# Patient Record
Sex: FEMALE | Race: WHITE | NOT HISPANIC OR LATINO | Employment: FULL TIME | URBAN - METROPOLITAN AREA
[De-identification: names, ages, dates, MRNs, and addresses within clinical notes are randomized per-mention and may not be internally consistent; named-entity substitution may affect disease eponyms.]

---

## 2020-05-25 ENCOUNTER — APPOINTMENT (EMERGENCY)
Dept: RADIOLOGY | Facility: HOSPITAL | Age: 55
End: 2020-05-25
Payer: COMMERCIAL

## 2020-05-25 ENCOUNTER — HOSPITAL ENCOUNTER (EMERGENCY)
Facility: HOSPITAL | Age: 55
Discharge: HOME | End: 2020-05-25
Attending: EMERGENCY MEDICINE | Admitting: EMERGENCY MEDICINE
Payer: COMMERCIAL

## 2020-05-25 VITALS
DIASTOLIC BLOOD PRESSURE: 92 MMHG | WEIGHT: 151.46 LBS | TEMPERATURE: 98.8 F | RESPIRATION RATE: 20 BRPM | SYSTOLIC BLOOD PRESSURE: 159 MMHG | OXYGEN SATURATION: 96 % | HEART RATE: 106 BPM

## 2020-05-25 DIAGNOSIS — R06.00 DYSPNEA: Primary | ICD-10-CM

## 2020-05-25 LAB — SARS-COV-2 RNA RESP QL NAA+PROBE: NEGATIVE

## 2020-05-25 PROCEDURE — 99282 EMERGENCY DEPT VISIT SF MDM: CPT | Performed by: EMERGENCY MEDICINE

## 2020-05-25 PROCEDURE — 71045 X-RAY EXAM CHEST 1 VIEW: CPT

## 2020-05-25 PROCEDURE — 99285 EMERGENCY DEPT VISIT HI MDM: CPT

## 2020-05-25 PROCEDURE — 87635 SARS-COV-2 COVID-19 AMP PRB: CPT | Performed by: EMERGENCY MEDICINE

## 2020-05-25 RX ORDER — LEVOTHYROXINE SODIUM 175 UG/1
1 TABLET ORAL DAILY
COMMUNITY
Start: 2012-01-16 | End: 2020-06-19 | Stop reason: HOSPADM

## 2020-05-27 ENCOUNTER — APPOINTMENT (EMERGENCY)
Dept: RADIOLOGY | Facility: HOSPITAL | Age: 55
End: 2020-05-27
Payer: COMMERCIAL

## 2020-05-27 ENCOUNTER — HOSPITAL ENCOUNTER (EMERGENCY)
Facility: HOSPITAL | Age: 55
Discharge: HOME | End: 2020-05-27
Attending: EMERGENCY MEDICINE | Admitting: EMERGENCY MEDICINE
Payer: COMMERCIAL

## 2020-05-27 VITALS
SYSTOLIC BLOOD PRESSURE: 149 MMHG | OXYGEN SATURATION: 97 % | HEART RATE: 109 BPM | TEMPERATURE: 98.5 F | DIASTOLIC BLOOD PRESSURE: 82 MMHG | RESPIRATION RATE: 19 BRPM | WEIGHT: 149.25 LBS

## 2020-05-27 DIAGNOSIS — S52.502A DISTAL RADIUS FRACTURE, LEFT: Primary | ICD-10-CM

## 2020-05-27 PROCEDURE — 73110 X-RAY EXAM OF WRIST: CPT

## 2020-05-27 PROCEDURE — 99283 EMERGENCY DEPT VISIT LOW MDM: CPT

## 2020-05-27 PROCEDURE — 29125 APPL SHORT ARM SPLINT STATIC: CPT | Performed by: EMERGENCY MEDICINE

## 2020-05-27 PROCEDURE — 99285 EMERGENCY DEPT VISIT HI MDM: CPT | Performed by: EMERGENCY MEDICINE

## 2020-05-27 RX ORDER — OXYCODONE HYDROCHLORIDE AND ACETAMINOPHEN 5; 325 MG/1; MG/1
1 TABLET ORAL EVERY 6 HOURS PRN
Qty: 10 TABLET | Refills: 0 | Status: SHIPPED | OUTPATIENT
Start: 2020-05-27 | End: 2020-06-03 | Stop reason: HOSPADM

## 2020-05-27 RX ORDER — OXYCODONE HYDROCHLORIDE AND ACETAMINOPHEN 5; 325 MG/1; MG/1
1 TABLET ORAL ONCE
Status: COMPLETED | OUTPATIENT
Start: 2020-05-27 | End: 2020-05-27

## 2020-05-27 RX ORDER — OXYCODONE HYDROCHLORIDE AND ACETAMINOPHEN 5; 325 MG/1; MG/1
1 TABLET ORAL EVERY 6 HOURS PRN
Qty: 10 TABLET | Refills: 0 | Status: SHIPPED | OUTPATIENT
Start: 2020-05-27 | End: 2020-05-27 | Stop reason: SDUPTHER

## 2020-05-27 RX ADMIN — OXYCODONE HYDROCHLORIDE AND ACETAMINOPHEN 1 TABLET: 5; 325 TABLET ORAL at 12:45

## 2020-05-28 ENCOUNTER — OFFICE VISIT (OUTPATIENT)
Dept: OBGYN CLINIC | Facility: MEDICAL CENTER | Age: 55
End: 2020-05-28
Payer: COMMERCIAL

## 2020-05-28 VITALS
SYSTOLIC BLOOD PRESSURE: 134 MMHG | HEIGHT: 65 IN | BODY MASS INDEX: 24.66 KG/M2 | WEIGHT: 148 LBS | DIASTOLIC BLOOD PRESSURE: 85 MMHG | TEMPERATURE: 99.2 F | HEART RATE: 100 BPM

## 2020-05-28 DIAGNOSIS — Z11.59 SCREENING FOR VIRAL DISEASE: ICD-10-CM

## 2020-05-28 DIAGNOSIS — S52.602A CLOSED FRACTURE DISTAL RADIUS AND ULNA, LEFT, INITIAL ENCOUNTER: ICD-10-CM

## 2020-05-28 DIAGNOSIS — S52.502A CLOSED FRACTURE DISTAL RADIUS AND ULNA, LEFT, INITIAL ENCOUNTER: ICD-10-CM

## 2020-05-28 DIAGNOSIS — Z11.59 SCREENING FOR VIRAL DISEASE: Primary | ICD-10-CM

## 2020-05-28 PROCEDURE — 99204 OFFICE O/P NEW MOD 45 MIN: CPT | Performed by: ORTHOPAEDIC SURGERY

## 2020-05-28 PROCEDURE — U0003 INFECTIOUS AGENT DETECTION BY NUCLEIC ACID (DNA OR RNA); SEVERE ACUTE RESPIRATORY SYNDROME CORONAVIRUS 2 (SARS-COV-2) (CORONAVIRUS DISEASE [COVID-19]), AMPLIFIED PROBE TECHNIQUE, MAKING USE OF HIGH THROUGHPUT TECHNOLOGIES AS DESCRIBED BY CMS-2020-01-R: HCPCS

## 2020-05-28 PROCEDURE — 29125 APPL SHORT ARM SPLINT STATIC: CPT | Performed by: ORTHOPAEDIC SURGERY

## 2020-05-28 RX ORDER — CEFAZOLIN SODIUM 2 G/50ML
2000 SOLUTION INTRAVENOUS ONCE
Status: CANCELLED | OUTPATIENT
Start: 2020-06-03 | End: 2020-05-28

## 2020-05-30 LAB — SARS-COV-2 RNA SPEC QL NAA+PROBE: NOT DETECTED

## 2020-06-02 ENCOUNTER — ANESTHESIA EVENT (OUTPATIENT)
Dept: PERIOP | Facility: HOSPITAL | Age: 55
End: 2020-06-02
Payer: COMMERCIAL

## 2020-06-03 ENCOUNTER — ANESTHESIA (OUTPATIENT)
Dept: PERIOP | Facility: HOSPITAL | Age: 55
End: 2020-06-03
Payer: COMMERCIAL

## 2020-06-03 ENCOUNTER — HOSPITAL ENCOUNTER (OUTPATIENT)
Facility: HOSPITAL | Age: 55
Setting detail: OUTPATIENT SURGERY
Discharge: HOME | End: 2020-06-03
Attending: ORTHOPAEDIC SURGERY | Admitting: ORTHOPAEDIC SURGERY
Payer: COMMERCIAL

## 2020-06-03 ENCOUNTER — APPOINTMENT (OUTPATIENT)
Dept: RADIOLOGY | Facility: HOSPITAL | Age: 55
End: 2020-06-03
Payer: COMMERCIAL

## 2020-06-03 VITALS
HEART RATE: 92 BPM | DIASTOLIC BLOOD PRESSURE: 82 MMHG | SYSTOLIC BLOOD PRESSURE: 142 MMHG | TEMPERATURE: 97.4 F | RESPIRATION RATE: 16 BRPM | OXYGEN SATURATION: 91 %

## 2020-06-03 DIAGNOSIS — S52.592A OTHER CLOSED FRACTURE OF DISTAL END OF LEFT RADIUS, INITIAL ENCOUNTER: Primary | ICD-10-CM

## 2020-06-03 PROCEDURE — C1713 ANCHOR/SCREW BN/BN,TIS/BN: HCPCS | Performed by: ORTHOPAEDIC SURGERY

## 2020-06-03 PROCEDURE — 73100 X-RAY EXAM OF WRIST: CPT

## 2020-06-03 PROCEDURE — 73100 X-RAY EXAM OF WRIST: CPT | Performed by: ORTHOPAEDIC SURGERY

## 2020-06-03 PROCEDURE — 25607 OPTX DST RD XARTC FX/EPI SEP: CPT | Performed by: ORTHOPAEDIC SURGERY

## 2020-06-03 DEVICE — SCREW CORT 2.5 X 13MM HEXADRIVE 7: Type: IMPLANTABLE DEVICE | Site: WRIST | Status: FUNCTIONAL

## 2020-06-03 DEVICE — 2.5 TRILOCK SCREW 18MM, HD7, 1/PKG
Type: IMPLANTABLE DEVICE | Site: WRIST | Status: FUNCTIONAL
Brand: APTUS

## 2020-06-03 DEVICE — 2.5 TRILOCK SCREW 20MM, HD7, 1/PKG
Type: IMPLANTABLE DEVICE | Site: WRIST | Status: FUNCTIONAL
Brand: APTUS

## 2020-06-03 DEVICE — 2.5 CORTICAL SCREW 16MM, HD7, 5/PKG
Type: IMPLANTABLE DEVICE | Site: WRIST | Status: FUNCTIONAL
Brand: APTUS

## 2020-06-03 DEVICE — 2.5 TRILOCK SCREW 14MM, HD7, 1/PKG
Type: IMPLANTABLE DEVICE | Site: WRIST | Status: FUNCTIONAL
Brand: APTUS

## 2020-06-03 DEVICE — 2.5 TRILOCK SCREW 22MM, HD7, 1/PKG
Type: IMPLANTABLE DEVICE | Site: WRIST | Status: FUNCTIONAL
Brand: APTUS

## 2020-06-03 DEVICE — 2.5 CORTICAL SCREW 14MM, HD7, 5/PKG
Type: IMPLANTABLE DEVICE | Site: WRIST | Status: FUNCTIONAL
Brand: APTUS

## 2020-06-03 DEVICE — 2.5 TRILOCK DIST.RAD.FRACT.PL VOLAR LEFT
Type: IMPLANTABLE DEVICE | Site: WRIST | Status: FUNCTIONAL
Brand: APTUS

## 2020-06-03 DEVICE — 2.5 TRILOCK SCREW 16MM, HD7, 1/PKG
Type: IMPLANTABLE DEVICE | Site: WRIST | Status: FUNCTIONAL
Brand: APTUS

## 2020-06-03 RX ORDER — ONDANSETRON 2 MG/ML
4 INJECTION INTRAMUSCULAR; INTRAVENOUS ONCE AS NEEDED
Status: DISCONTINUED | OUTPATIENT
Start: 2020-06-03 | End: 2020-06-03 | Stop reason: HOSPADM

## 2020-06-03 RX ORDER — MAGNESIUM HYDROXIDE 1200 MG/15ML
LIQUID ORAL AS NEEDED
Status: DISCONTINUED | OUTPATIENT
Start: 2020-06-03 | End: 2020-06-03 | Stop reason: HOSPADM

## 2020-06-03 RX ORDER — ONDANSETRON 2 MG/ML
INJECTION INTRAMUSCULAR; INTRAVENOUS AS NEEDED
Status: DISCONTINUED | OUTPATIENT
Start: 2020-06-03 | End: 2020-06-03 | Stop reason: SURG

## 2020-06-03 RX ORDER — CEFAZOLIN SODIUM 2 G/50ML
SOLUTION INTRAVENOUS AS NEEDED
Status: DISCONTINUED | OUTPATIENT
Start: 2020-06-03 | End: 2020-06-03

## 2020-06-03 RX ORDER — PROMETHAZINE HYDROCHLORIDE 25 MG/ML
12.5 INJECTION, SOLUTION INTRAMUSCULAR; INTRAVENOUS ONCE AS NEEDED
Status: DISCONTINUED | OUTPATIENT
Start: 2020-06-03 | End: 2020-06-03 | Stop reason: HOSPADM

## 2020-06-03 RX ORDER — FENTANYL CITRATE 50 UG/ML
INJECTION, SOLUTION INTRAMUSCULAR; INTRAVENOUS
Status: COMPLETED | OUTPATIENT
Start: 2020-06-03 | End: 2020-06-03

## 2020-06-03 RX ORDER — OXYCODONE HYDROCHLORIDE 5 MG/1
5 TABLET ORAL EVERY 4 HOURS PRN
Qty: 15 TABLET | Refills: 0 | Status: SHIPPED | OUTPATIENT
Start: 2020-06-03 | End: 2020-06-12 | Stop reason: CLARIF

## 2020-06-03 RX ORDER — OXYCODONE HYDROCHLORIDE 5 MG/1
5 TABLET ORAL EVERY 4 HOURS PRN
Status: DISCONTINUED | OUTPATIENT
Start: 2020-06-03 | End: 2020-06-03 | Stop reason: HOSPADM

## 2020-06-03 RX ORDER — LIDOCAINE HYDROCHLORIDE 10 MG/ML
INJECTION, SOLUTION EPIDURAL; INFILTRATION; INTRACAUDAL; PERINEURAL AS NEEDED
Status: DISCONTINUED | OUTPATIENT
Start: 2020-06-03 | End: 2020-06-03 | Stop reason: SURG

## 2020-06-03 RX ORDER — CEFAZOLIN SODIUM 2 G/50ML
2000 SOLUTION INTRAVENOUS ONCE
Status: COMPLETED | OUTPATIENT
Start: 2020-06-03 | End: 2020-06-03

## 2020-06-03 RX ORDER — DEXAMETHASONE SODIUM PHOSPHATE 4 MG/ML
INJECTION, SOLUTION INTRA-ARTICULAR; INTRALESIONAL; INTRAMUSCULAR; INTRAVENOUS; SOFT TISSUE AS NEEDED
Status: DISCONTINUED | OUTPATIENT
Start: 2020-06-03 | End: 2020-06-03 | Stop reason: SURG

## 2020-06-03 RX ORDER — PROPOFOL 10 MG/ML
INJECTION, EMULSION INTRAVENOUS AS NEEDED
Status: DISCONTINUED | OUTPATIENT
Start: 2020-06-03 | End: 2020-06-03 | Stop reason: SURG

## 2020-06-03 RX ORDER — ONDANSETRON 2 MG/ML
4 INJECTION INTRAMUSCULAR; INTRAVENOUS EVERY 6 HOURS PRN
Status: DISCONTINUED | OUTPATIENT
Start: 2020-06-03 | End: 2020-06-03 | Stop reason: HOSPADM

## 2020-06-03 RX ORDER — SODIUM CHLORIDE, SODIUM LACTATE, POTASSIUM CHLORIDE, CALCIUM CHLORIDE 600; 310; 30; 20 MG/100ML; MG/100ML; MG/100ML; MG/100ML
50 INJECTION, SOLUTION INTRAVENOUS CONTINUOUS
Status: DISCONTINUED | OUTPATIENT
Start: 2020-06-03 | End: 2020-06-03 | Stop reason: HOSPADM

## 2020-06-03 RX ORDER — MIDAZOLAM HYDROCHLORIDE 2 MG/2ML
INJECTION, SOLUTION INTRAMUSCULAR; INTRAVENOUS
Status: COMPLETED | OUTPATIENT
Start: 2020-06-03 | End: 2020-06-03

## 2020-06-03 RX ORDER — HYDROMORPHONE HCL/PF 1 MG/ML
0.5 SYRINGE (ML) INJECTION
Status: DISCONTINUED | OUTPATIENT
Start: 2020-06-03 | End: 2020-06-03 | Stop reason: HOSPADM

## 2020-06-03 RX ORDER — ROPIVACAINE HYDROCHLORIDE 5 MG/ML
INJECTION, SOLUTION EPIDURAL; INFILTRATION; PERINEURAL
Status: COMPLETED | OUTPATIENT
Start: 2020-06-03 | End: 2020-06-03

## 2020-06-03 RX ADMIN — DEXAMETHASONE SODIUM PHOSPHATE 4 MG: 4 INJECTION, SOLUTION INTRA-ARTICULAR; INTRALESIONAL; INTRAMUSCULAR; INTRAVENOUS; SOFT TISSUE at 15:00

## 2020-06-03 RX ADMIN — ONDANSETRON HYDROCHLORIDE 4 MG: 2 INJECTION, SOLUTION INTRAMUSCULAR; INTRAVENOUS at 15:00

## 2020-06-03 RX ADMIN — SODIUM CHLORIDE, SODIUM LACTATE, POTASSIUM CHLORIDE, AND CALCIUM CHLORIDE: .6; .31; .03; .02 INJECTION, SOLUTION INTRAVENOUS at 15:01

## 2020-06-03 RX ADMIN — HYDROMORPHONE HYDROCHLORIDE 0.5 MG: 1 INJECTION, SOLUTION INTRAMUSCULAR; INTRAVENOUS; SUBCUTANEOUS at 17:13

## 2020-06-03 RX ADMIN — ROPIVACAINE HYDROCHLORIDE 25 ML: 5 INJECTION, SOLUTION EPIDURAL; INFILTRATION; PERINEURAL at 14:00

## 2020-06-03 RX ADMIN — FENTANYL CITRATE 100 MCG: 50 INJECTION INTRAMUSCULAR; INTRAVENOUS at 14:00

## 2020-06-03 RX ADMIN — CEFAZOLIN SODIUM 2000 MG: 2 SOLUTION INTRAVENOUS at 14:42

## 2020-06-03 RX ADMIN — SODIUM CHLORIDE, SODIUM LACTATE, POTASSIUM CHLORIDE, AND CALCIUM CHLORIDE: .6; .31; .03; .02 INJECTION, SOLUTION INTRAVENOUS at 13:44

## 2020-06-03 RX ADMIN — PROPOFOL 200 MG: 10 INJECTION, EMULSION INTRAVENOUS at 14:44

## 2020-06-03 RX ADMIN — MIDAZOLAM HYDROCHLORIDE 2 MG: 1 INJECTION, SOLUTION INTRAMUSCULAR; INTRAVENOUS at 14:00

## 2020-06-03 RX ADMIN — OXYCODONE HYDROCHLORIDE 5 MG: 5 TABLET ORAL at 18:54

## 2020-06-03 RX ADMIN — ONDANSETRON 4 MG: 2 INJECTION INTRAMUSCULAR; INTRAVENOUS at 17:51

## 2020-06-03 RX ADMIN — LIDOCAINE HYDROCHLORIDE 50 MG: 10 INJECTION, SOLUTION EPIDURAL; INFILTRATION; INTRACAUDAL; PERINEURAL at 14:44

## 2020-06-10 ENCOUNTER — TELEPHONE (OUTPATIENT)
Dept: OBGYN CLINIC | Facility: HOSPITAL | Age: 55
End: 2020-06-10

## 2020-06-12 ENCOUNTER — APPOINTMENT (EMERGENCY)
Dept: RADIOLOGY | Facility: HOSPITAL | Age: 55
DRG: 246 | End: 2020-06-12
Payer: COMMERCIAL

## 2020-06-12 ENCOUNTER — APPOINTMENT (INPATIENT)
Dept: NON INVASIVE DIAGNOSTICS | Facility: HOSPITAL | Age: 55
DRG: 246 | End: 2020-06-12
Payer: COMMERCIAL

## 2020-06-12 ENCOUNTER — HOSPITAL ENCOUNTER (INPATIENT)
Facility: HOSPITAL | Age: 55
LOS: 7 days | Discharge: HOME | DRG: 246 | End: 2020-06-19
Attending: EMERGENCY MEDICINE | Admitting: INTERNAL MEDICINE
Payer: COMMERCIAL

## 2020-06-12 DIAGNOSIS — Z20.822 SUSPECTED COVID-19 VIRUS INFECTION: ICD-10-CM

## 2020-06-12 DIAGNOSIS — E07.9 DISEASE OF THYROID GLAND: ICD-10-CM

## 2020-06-12 DIAGNOSIS — I25.5 ISCHEMIC CARDIOMYOPATHY: ICD-10-CM

## 2020-06-12 DIAGNOSIS — R77.8 ELEVATED TROPONIN: ICD-10-CM

## 2020-06-12 DIAGNOSIS — I50.9 ACUTE CONGESTIVE HEART FAILURE, UNSPECIFIED HEART FAILURE TYPE (HCC): Primary | ICD-10-CM

## 2020-06-12 PROBLEM — N17.9 AKI (ACUTE KIDNEY INJURY) (HCC): Status: ACTIVE | Noted: 2020-06-12

## 2020-06-12 PROBLEM — R05.9 COUGH: Status: ACTIVE | Noted: 2020-06-12

## 2020-06-12 LAB
ALBUMIN SERPL BCP-MCNC: 3.1 G/DL (ref 3.5–5)
ALP SERPL-CCNC: 119 U/L (ref 46–116)
ALT SERPL W P-5'-P-CCNC: 16 U/L (ref 12–78)
ANION GAP SERPL CALCULATED.3IONS-SCNC: 9 MMOL/L (ref 4–13)
AST SERPL W P-5'-P-CCNC: 27 U/L (ref 5–45)
BASOPHILS # BLD AUTO: 0.04 THOUSANDS/ΜL (ref 0–0.1)
BASOPHILS NFR BLD AUTO: 0 % (ref 0–1)
BILIRUB DIRECT SERPL-MCNC: 0.29 MG/DL (ref 0–0.2)
BILIRUB SERPL-MCNC: 0.65 MG/DL (ref 0.2–1)
BUN SERPL-MCNC: 15 MG/DL (ref 5–25)
CALCIUM SERPL-MCNC: 8 MG/DL (ref 8.3–10.1)
CHLORIDE SERPL-SCNC: 103 MMOL/L (ref 100–108)
CO2 SERPL-SCNC: 26 MMOL/L (ref 21–32)
CREAT SERPL-MCNC: 1.35 MG/DL (ref 0.6–1.3)
EOSINOPHIL # BLD AUTO: 0.02 THOUSAND/ΜL (ref 0–0.61)
EOSINOPHIL NFR BLD AUTO: 0 % (ref 0–6)
ERYTHROCYTE [DISTWIDTH] IN BLOOD BY AUTOMATED COUNT: 17.1 % (ref 11.6–15.1)
GFR SERPL CREATININE-BSD FRML MDRD: 45 ML/MIN/1.73SQ M
GLUCOSE SERPL-MCNC: 147 MG/DL (ref 65–140)
HCT VFR BLD AUTO: 33 % (ref 34.8–46.1)
HGB BLD-MCNC: 10.2 G/DL (ref 11.5–15.4)
IMM GRANULOCYTES # BLD AUTO: 0.04 THOUSAND/UL (ref 0–0.2)
IMM GRANULOCYTES NFR BLD AUTO: 0 % (ref 0–2)
LACTATE SERPL-SCNC: 1.4 MMOL/L (ref 0.5–2)
LYMPHOCYTES # BLD AUTO: 0.72 THOUSANDS/ΜL (ref 0.6–4.47)
LYMPHOCYTES NFR BLD AUTO: 8 % (ref 14–44)
MCH RBC QN AUTO: 26 PG (ref 26.8–34.3)
MCHC RBC AUTO-ENTMCNC: 30.9 G/DL (ref 31.4–37.4)
MCV RBC AUTO: 84 FL (ref 82–98)
MONOCYTES # BLD AUTO: 0.73 THOUSAND/ΜL (ref 0.17–1.22)
MONOCYTES NFR BLD AUTO: 8 % (ref 4–12)
NEUTROPHILS # BLD AUTO: 7.84 THOUSANDS/ΜL (ref 1.85–7.62)
NEUTS SEG NFR BLD AUTO: 84 % (ref 43–75)
NRBC BLD AUTO-RTO: 0 /100 WBCS
NT-PROBNP SERPL-MCNC: ABNORMAL PG/ML
PLATELET # BLD AUTO: 245 THOUSANDS/UL (ref 149–390)
PLATELET # BLD AUTO: 278 THOUSANDS/UL (ref 149–390)
PMV BLD AUTO: 11.1 FL (ref 8.9–12.7)
PMV BLD AUTO: 11.4 FL (ref 8.9–12.7)
POTASSIUM SERPL-SCNC: 3.6 MMOL/L (ref 3.5–5.3)
PROT SERPL-MCNC: 7.1 G/DL (ref 6.4–8.2)
RBC # BLD AUTO: 3.93 MILLION/UL (ref 3.81–5.12)
SARS-COV-2 RNA RESP QL NAA+PROBE: NEGATIVE
SODIUM SERPL-SCNC: 138 MMOL/L (ref 136–145)
TROPONIN I SERPL-MCNC: 0.31 NG/ML
TROPONIN I SERPL-MCNC: 0.31 NG/ML
TROPONIN I SERPL-MCNC: 0.35 NG/ML
TROPONIN I SERPL-MCNC: 0.38 NG/ML
TSH SERPL DL<=0.05 MIU/L-ACNC: 3.37 UIU/ML (ref 0.36–3.74)
WBC # BLD AUTO: 9.39 THOUSAND/UL (ref 4.31–10.16)

## 2020-06-12 PROCEDURE — 87635 SARS-COV-2 COVID-19 AMP PRB: CPT | Performed by: EMERGENCY MEDICINE

## 2020-06-12 PROCEDURE — 80048 BASIC METABOLIC PNL TOTAL CA: CPT | Performed by: EMERGENCY MEDICINE

## 2020-06-12 PROCEDURE — 71045 X-RAY EXAM CHEST 1 VIEW: CPT

## 2020-06-12 PROCEDURE — 93005 ELECTROCARDIOGRAM TRACING: CPT

## 2020-06-12 PROCEDURE — 99223 1ST HOSP IP/OBS HIGH 75: CPT | Performed by: INTERNAL MEDICINE

## 2020-06-12 PROCEDURE — 84484 ASSAY OF TROPONIN QUANT: CPT | Performed by: PHYSICIAN ASSISTANT

## 2020-06-12 PROCEDURE — 84443 ASSAY THYROID STIM HORMONE: CPT | Performed by: EMERGENCY MEDICINE

## 2020-06-12 PROCEDURE — 99285 EMERGENCY DEPT VISIT HI MDM: CPT

## 2020-06-12 PROCEDURE — 36415 COLL VENOUS BLD VENIPUNCTURE: CPT | Performed by: EMERGENCY MEDICINE

## 2020-06-12 PROCEDURE — 99285 EMERGENCY DEPT VISIT HI MDM: CPT | Performed by: EMERGENCY MEDICINE

## 2020-06-12 PROCEDURE — 83880 ASSAY OF NATRIURETIC PEPTIDE: CPT | Performed by: EMERGENCY MEDICINE

## 2020-06-12 PROCEDURE — 80076 HEPATIC FUNCTION PANEL: CPT | Performed by: PHYSICIAN ASSISTANT

## 2020-06-12 PROCEDURE — 84484 ASSAY OF TROPONIN QUANT: CPT | Performed by: EMERGENCY MEDICINE

## 2020-06-12 PROCEDURE — 99223 1ST HOSP IP/OBS HIGH 75: CPT | Performed by: PHYSICIAN ASSISTANT

## 2020-06-12 PROCEDURE — 83605 ASSAY OF LACTIC ACID: CPT | Performed by: EMERGENCY MEDICINE

## 2020-06-12 PROCEDURE — 93306 TTE W/DOPPLER COMPLETE: CPT

## 2020-06-12 PROCEDURE — 85025 COMPLETE CBC W/AUTO DIFF WBC: CPT | Performed by: EMERGENCY MEDICINE

## 2020-06-12 PROCEDURE — 85049 AUTOMATED PLATELET COUNT: CPT | Performed by: PHYSICIAN ASSISTANT

## 2020-06-12 RX ORDER — BENZONATATE 100 MG/1
100 CAPSULE ORAL 3 TIMES DAILY PRN
Status: DISCONTINUED | OUTPATIENT
Start: 2020-06-12 | End: 2020-06-19 | Stop reason: HOSPADM

## 2020-06-12 RX ORDER — FUROSEMIDE 10 MG/ML
40 INJECTION INTRAMUSCULAR; INTRAVENOUS ONCE
Status: COMPLETED | OUTPATIENT
Start: 2020-06-12 | End: 2020-06-12

## 2020-06-12 RX ORDER — GUAIFENESIN/DEXTROMETHORPHAN 100-10MG/5
10 SYRUP ORAL EVERY 4 HOURS PRN
Status: DISCONTINUED | OUTPATIENT
Start: 2020-06-12 | End: 2020-06-19 | Stop reason: HOSPADM

## 2020-06-12 RX ORDER — FUROSEMIDE 20 MG/1
20 TABLET ORAL
Status: DISCONTINUED | OUTPATIENT
Start: 2020-06-13 | End: 2020-06-13

## 2020-06-12 RX ORDER — ACETAMINOPHEN 325 MG/1
650 TABLET ORAL ONCE
Status: COMPLETED | OUTPATIENT
Start: 2020-06-12 | End: 2020-06-12

## 2020-06-12 RX ORDER — ACETAMINOPHEN 325 MG/1
650 TABLET ORAL EVERY 4 HOURS PRN
Status: DISCONTINUED | OUTPATIENT
Start: 2020-06-12 | End: 2020-06-19 | Stop reason: HOSPADM

## 2020-06-12 RX ORDER — LANOLIN ALCOHOL/MO/W.PET/CERES
3 CREAM (GRAM) TOPICAL
Status: DISCONTINUED | OUTPATIENT
Start: 2020-06-12 | End: 2020-06-19 | Stop reason: HOSPADM

## 2020-06-12 RX ORDER — ASPIRIN 325 MG
325 TABLET ORAL ONCE
Status: COMPLETED | OUTPATIENT
Start: 2020-06-12 | End: 2020-06-12

## 2020-06-12 RX ORDER — ASPIRIN 81 MG/1
81 TABLET, CHEWABLE ORAL DAILY
Status: DISCONTINUED | OUTPATIENT
Start: 2020-06-12 | End: 2020-06-19 | Stop reason: HOSPADM

## 2020-06-12 RX ORDER — GUAIFENESIN 600 MG
600 TABLET, EXTENDED RELEASE 12 HR ORAL ONCE
Status: COMPLETED | OUTPATIENT
Start: 2020-06-12 | End: 2020-06-12

## 2020-06-12 RX ADMIN — FUROSEMIDE 40 MG: 10 INJECTION, SOLUTION INTRAMUSCULAR; INTRAVENOUS at 16:17

## 2020-06-12 RX ADMIN — ASPIRIN 325 MG ORAL TABLET 325 MG: 325 PILL ORAL at 11:03

## 2020-06-12 RX ADMIN — BENZONATATE 100 MG: 100 CAPSULE ORAL at 21:35

## 2020-06-12 RX ADMIN — GUAIFENESIN AND DEXTROMETHORPHAN 10 ML: 100; 10 SYRUP ORAL at 21:35

## 2020-06-12 RX ADMIN — GUAIFENESIN 600 MG: 600 TABLET, EXTENDED RELEASE ORAL at 11:03

## 2020-06-12 RX ADMIN — BENZONATATE 100 MG: 100 CAPSULE ORAL at 17:51

## 2020-06-12 RX ADMIN — BENZONATATE 100 MG: 100 CAPSULE ORAL at 13:52

## 2020-06-12 RX ADMIN — MELATONIN TAB 3 MG 3 MG: 3 TAB at 21:35

## 2020-06-12 RX ADMIN — FUROSEMIDE 40 MG: 10 INJECTION, SOLUTION INTRAMUSCULAR; INTRAVENOUS at 11:03

## 2020-06-12 RX ADMIN — ACETAMINOPHEN 650 MG: 325 TABLET ORAL at 12:29

## 2020-06-12 RX ADMIN — ENOXAPARIN SODIUM 30 MG: 30 INJECTION SUBCUTANEOUS at 13:42

## 2020-06-13 ENCOUNTER — APPOINTMENT (INPATIENT)
Dept: RADIOLOGY | Facility: HOSPITAL | Age: 55
DRG: 246 | End: 2020-06-13
Payer: COMMERCIAL

## 2020-06-13 PROBLEM — J96.01 ACUTE RESPIRATORY FAILURE WITH HYPOXIA (HCC): Status: ACTIVE | Noted: 2020-06-12

## 2020-06-13 LAB
ALBUMIN SERPL BCP-MCNC: 2.5 G/DL (ref 3.5–5)
ALP SERPL-CCNC: 108 U/L (ref 46–116)
ALT SERPL W P-5'-P-CCNC: 17 U/L (ref 12–78)
ANION GAP SERPL CALCULATED.3IONS-SCNC: 9 MMOL/L (ref 4–13)
AST SERPL W P-5'-P-CCNC: 34 U/L (ref 5–45)
ATRIAL RATE: 105 BPM
BASOPHILS # BLD AUTO: 0.05 THOUSANDS/ΜL (ref 0–0.1)
BASOPHILS NFR BLD AUTO: 1 % (ref 0–1)
BILIRUB SERPL-MCNC: 0.84 MG/DL (ref 0.2–1)
BUN SERPL-MCNC: 17 MG/DL (ref 5–25)
CALCIUM SERPL-MCNC: 7.1 MG/DL (ref 8.3–10.1)
CHLORIDE SERPL-SCNC: 99 MMOL/L (ref 100–108)
CHOLEST SERPL-MCNC: 167 MG/DL (ref 50–200)
CO2 SERPL-SCNC: 26 MMOL/L (ref 21–32)
CREAT SERPL-MCNC: 1.31 MG/DL (ref 0.6–1.3)
CRP SERPL QL: 249.8 MG/L
EOSINOPHIL # BLD AUTO: 0.12 THOUSAND/ΜL (ref 0–0.61)
EOSINOPHIL NFR BLD AUTO: 1 % (ref 0–6)
ERYTHROCYTE [DISTWIDTH] IN BLOOD BY AUTOMATED COUNT: 16.9 % (ref 11.6–15.1)
ERYTHROCYTE [SEDIMENTATION RATE] IN BLOOD: 104 MM/HOUR (ref 0–20)
EST. AVERAGE GLUCOSE BLD GHB EST-MCNC: 126 MG/DL
FERRITIN SERPL-MCNC: 94 NG/ML (ref 8–388)
GFR SERPL CREATININE-BSD FRML MDRD: 47 ML/MIN/1.73SQ M
GLUCOSE SERPL-MCNC: 108 MG/DL (ref 65–140)
HBA1C MFR BLD: 6 %
HCT VFR BLD AUTO: 30.3 % (ref 34.8–46.1)
HDLC SERPL-MCNC: 52 MG/DL
HGB BLD-MCNC: 9.4 G/DL (ref 11.5–15.4)
IMM GRANULOCYTES # BLD AUTO: 0.04 THOUSAND/UL (ref 0–0.2)
IMM GRANULOCYTES NFR BLD AUTO: 1 % (ref 0–2)
LDH SERPL-CCNC: 540 U/L (ref 81–234)
LDLC SERPL CALC-MCNC: 97 MG/DL (ref 0–100)
LYMPHOCYTES # BLD AUTO: 0.99 THOUSANDS/ΜL (ref 0.6–4.47)
LYMPHOCYTES NFR BLD AUTO: 12 % (ref 14–44)
MCH RBC QN AUTO: 25.8 PG (ref 26.8–34.3)
MCHC RBC AUTO-ENTMCNC: 31 G/DL (ref 31.4–37.4)
MCV RBC AUTO: 83 FL (ref 82–98)
MONOCYTES # BLD AUTO: 0.68 THOUSAND/ΜL (ref 0.17–1.22)
MONOCYTES NFR BLD AUTO: 8 % (ref 4–12)
NEUTROPHILS # BLD AUTO: 6.65 THOUSANDS/ΜL (ref 1.85–7.62)
NEUTS SEG NFR BLD AUTO: 77 % (ref 43–75)
NRBC BLD AUTO-RTO: 0 /100 WBCS
P AXIS: 41 DEGREES
PLATELET # BLD AUTO: 229 THOUSANDS/UL (ref 149–390)
PMV BLD AUTO: 11.9 FL (ref 8.9–12.7)
POTASSIUM SERPL-SCNC: 3.4 MMOL/L (ref 3.5–5.3)
PR INTERVAL: 132 MS
PROT SERPL-MCNC: 6.5 G/DL (ref 6.4–8.2)
QRS AXIS: -33 DEGREES
QRSD INTERVAL: 156 MS
QT INTERVAL: 424 MS
QTC INTERVAL: 560 MS
RBC # BLD AUTO: 3.65 MILLION/UL (ref 3.81–5.12)
SARS-COV-2 RNA RESP QL NAA+PROBE: NEGATIVE
SODIUM SERPL-SCNC: 134 MMOL/L (ref 136–145)
T WAVE AXIS: 127 DEGREES
TRIGL SERPL-MCNC: 89 MG/DL
VENTRICULAR RATE: 105 BPM
WBC # BLD AUTO: 8.53 THOUSAND/UL (ref 4.31–10.16)

## 2020-06-13 PROCEDURE — NC001 PR NO CHARGE: Performed by: INTERNAL MEDICINE

## 2020-06-13 PROCEDURE — 99232 SBSQ HOSP IP/OBS MODERATE 35: CPT | Performed by: INTERNAL MEDICINE

## 2020-06-13 PROCEDURE — 82728 ASSAY OF FERRITIN: CPT | Performed by: FAMILY MEDICINE

## 2020-06-13 PROCEDURE — 71045 X-RAY EXAM CHEST 1 VIEW: CPT

## 2020-06-13 PROCEDURE — 83615 LACTATE (LD) (LDH) ENZYME: CPT | Performed by: FAMILY MEDICINE

## 2020-06-13 PROCEDURE — 83036 HEMOGLOBIN GLYCOSYLATED A1C: CPT | Performed by: PHYSICIAN ASSISTANT

## 2020-06-13 PROCEDURE — 99233 SBSQ HOSP IP/OBS HIGH 50: CPT | Performed by: FAMILY MEDICINE

## 2020-06-13 PROCEDURE — 86140 C-REACTIVE PROTEIN: CPT | Performed by: FAMILY MEDICINE

## 2020-06-13 PROCEDURE — 93010 ELECTROCARDIOGRAM REPORT: CPT | Performed by: INTERNAL MEDICINE

## 2020-06-13 PROCEDURE — 85652 RBC SED RATE AUTOMATED: CPT | Performed by: FAMILY MEDICINE

## 2020-06-13 PROCEDURE — 93306 TTE W/DOPPLER COMPLETE: CPT | Performed by: INTERNAL MEDICINE

## 2020-06-13 PROCEDURE — 80061 LIPID PANEL: CPT | Performed by: PHYSICIAN ASSISTANT

## 2020-06-13 PROCEDURE — 87635 SARS-COV-2 COVID-19 AMP PRB: CPT | Performed by: PHYSICIAN ASSISTANT

## 2020-06-13 PROCEDURE — 85025 COMPLETE CBC W/AUTO DIFF WBC: CPT | Performed by: PHYSICIAN ASSISTANT

## 2020-06-13 PROCEDURE — 80053 COMPREHEN METABOLIC PANEL: CPT | Performed by: PHYSICIAN ASSISTANT

## 2020-06-13 RX ORDER — B-COMPLEX WITH VITAMIN C
1 TABLET ORAL 2 TIMES DAILY WITH MEALS
Status: DISCONTINUED | OUTPATIENT
Start: 2020-06-13 | End: 2020-06-19 | Stop reason: HOSPADM

## 2020-06-13 RX ORDER — FUROSEMIDE 10 MG/ML
40 INJECTION INTRAMUSCULAR; INTRAVENOUS
Status: DISCONTINUED | OUTPATIENT
Start: 2020-06-14 | End: 2020-06-13

## 2020-06-13 RX ORDER — ZINC SULFATE 50(220)MG
220 CAPSULE ORAL DAILY
Status: DISCONTINUED | OUTPATIENT
Start: 2020-06-13 | End: 2020-06-19 | Stop reason: HOSPADM

## 2020-06-13 RX ORDER — ASCORBIC ACID 500 MG
1000 TABLET ORAL DAILY
Status: DISCONTINUED | OUTPATIENT
Start: 2020-06-13 | End: 2020-06-19 | Stop reason: HOSPADM

## 2020-06-13 RX ORDER — CARVEDILOL 3.12 MG/1
3.12 TABLET ORAL 2 TIMES DAILY WITH MEALS
Status: DISCONTINUED | OUTPATIENT
Start: 2020-06-13 | End: 2020-06-14

## 2020-06-13 RX ORDER — FUROSEMIDE 10 MG/ML
40 INJECTION INTRAMUSCULAR; INTRAVENOUS
Status: DISCONTINUED | OUTPATIENT
Start: 2020-06-13 | End: 2020-06-13

## 2020-06-13 RX ORDER — FUROSEMIDE 10 MG/ML
20 INJECTION INTRAMUSCULAR; INTRAVENOUS
Status: DISCONTINUED | OUTPATIENT
Start: 2020-06-13 | End: 2020-06-13

## 2020-06-13 RX ORDER — FUROSEMIDE 10 MG/ML
40 INJECTION INTRAMUSCULAR; INTRAVENOUS
Status: COMPLETED | OUTPATIENT
Start: 2020-06-14 | End: 2020-06-15

## 2020-06-13 RX ADMIN — CALCIUM CARBONATE-VITAMIN D TAB 500 MG-200 UNIT 1 TABLET: 500-200 TAB at 16:28

## 2020-06-13 RX ADMIN — ZINC SULFATE 220 MG (50 MG) CAPSULE 220 MG: CAPSULE at 11:28

## 2020-06-13 RX ADMIN — FUROSEMIDE 20 MG: 10 INJECTION, SOLUTION INTRAMUSCULAR; INTRAVENOUS at 11:28

## 2020-06-13 RX ADMIN — LEVOTHYROXINE SODIUM 175 MCG: 125 TABLET ORAL at 06:03

## 2020-06-13 RX ADMIN — ENOXAPARIN SODIUM 30 MG: 30 INJECTION SUBCUTANEOUS at 08:16

## 2020-06-13 RX ADMIN — MELATONIN TAB 3 MG 3 MG: 3 TAB at 21:18

## 2020-06-13 RX ADMIN — CARVEDILOL 3.12 MG: 3.12 TABLET, FILM COATED ORAL at 16:28

## 2020-06-13 RX ADMIN — FUROSEMIDE 20 MG: 20 TABLET ORAL at 08:16

## 2020-06-13 RX ADMIN — ASPIRIN 81 MG 81 MG: 81 TABLET ORAL at 08:16

## 2020-06-13 RX ADMIN — OXYCODONE HYDROCHLORIDE AND ACETAMINOPHEN 1000 MG: 500 TABLET ORAL at 11:28

## 2020-06-14 ENCOUNTER — APPOINTMENT (INPATIENT)
Dept: RADIOLOGY | Facility: HOSPITAL | Age: 55
DRG: 246 | End: 2020-06-14
Payer: COMMERCIAL

## 2020-06-14 PROBLEM — I50.21 ACUTE SYSTOLIC CONGESTIVE HEART FAILURE (HCC): Status: ACTIVE | Noted: 2020-06-12

## 2020-06-14 LAB
ANION GAP SERPL CALCULATED.3IONS-SCNC: 12 MMOL/L (ref 4–13)
BUN SERPL-MCNC: 22 MG/DL (ref 5–25)
CALCIUM SERPL-MCNC: 8.1 MG/DL (ref 8.3–10.1)
CHLORIDE SERPL-SCNC: 100 MMOL/L (ref 100–108)
CO2 SERPL-SCNC: 28 MMOL/L (ref 21–32)
CREAT SERPL-MCNC: 1.28 MG/DL (ref 0.6–1.3)
ERYTHROCYTE [DISTWIDTH] IN BLOOD BY AUTOMATED COUNT: 17.1 % (ref 11.6–15.1)
GFR SERPL CREATININE-BSD FRML MDRD: 48 ML/MIN/1.73SQ M
GLUCOSE SERPL-MCNC: 106 MG/DL (ref 65–140)
HCT VFR BLD AUTO: 36 % (ref 34.8–46.1)
HGB BLD-MCNC: 11 G/DL (ref 11.5–15.4)
MCH RBC QN AUTO: 25.6 PG (ref 26.8–34.3)
MCHC RBC AUTO-ENTMCNC: 30.6 G/DL (ref 31.4–37.4)
MCV RBC AUTO: 84 FL (ref 82–98)
PLATELET # BLD AUTO: 252 THOUSANDS/UL (ref 149–390)
PMV BLD AUTO: 10.9 FL (ref 8.9–12.7)
POTASSIUM SERPL-SCNC: 3.1 MMOL/L (ref 3.5–5.3)
RBC # BLD AUTO: 4.29 MILLION/UL (ref 3.81–5.12)
SODIUM SERPL-SCNC: 140 MMOL/L (ref 136–145)
WBC # BLD AUTO: 5.53 THOUSAND/UL (ref 4.31–10.16)

## 2020-06-14 PROCEDURE — 99232 SBSQ HOSP IP/OBS MODERATE 35: CPT | Performed by: FAMILY MEDICINE

## 2020-06-14 PROCEDURE — 99232 SBSQ HOSP IP/OBS MODERATE 35: CPT | Performed by: INTERNAL MEDICINE

## 2020-06-14 PROCEDURE — 80048 BASIC METABOLIC PNL TOTAL CA: CPT | Performed by: FAMILY MEDICINE

## 2020-06-14 PROCEDURE — 85027 COMPLETE CBC AUTOMATED: CPT | Performed by: FAMILY MEDICINE

## 2020-06-14 PROCEDURE — 71045 X-RAY EXAM CHEST 1 VIEW: CPT

## 2020-06-14 RX ORDER — CARVEDILOL 6.25 MG/1
6.25 TABLET ORAL 2 TIMES DAILY WITH MEALS
Status: DISCONTINUED | OUTPATIENT
Start: 2020-06-14 | End: 2020-06-19 | Stop reason: HOSPADM

## 2020-06-14 RX ORDER — POTASSIUM CHLORIDE 20 MEQ/1
40 TABLET, EXTENDED RELEASE ORAL ONCE
Status: COMPLETED | OUTPATIENT
Start: 2020-06-14 | End: 2020-06-14

## 2020-06-14 RX ADMIN — ENOXAPARIN SODIUM 30 MG: 30 INJECTION SUBCUTANEOUS at 08:01

## 2020-06-14 RX ADMIN — CARVEDILOL 6.25 MG: 6.25 TABLET, FILM COATED ORAL at 16:02

## 2020-06-14 RX ADMIN — POTASSIUM CHLORIDE 40 MEQ: 1500 TABLET, EXTENDED RELEASE ORAL at 09:04

## 2020-06-14 RX ADMIN — OXYCODONE HYDROCHLORIDE AND ACETAMINOPHEN 1000 MG: 500 TABLET ORAL at 08:00

## 2020-06-14 RX ADMIN — ASPIRIN 81 MG 81 MG: 81 TABLET ORAL at 08:01

## 2020-06-14 RX ADMIN — LEVOTHYROXINE SODIUM 175 MCG: 125 TABLET ORAL at 05:25

## 2020-06-14 RX ADMIN — MELATONIN TAB 3 MG 3 MG: 3 TAB at 21:22

## 2020-06-14 RX ADMIN — CARVEDILOL 3.12 MG: 3.12 TABLET, FILM COATED ORAL at 07:52

## 2020-06-14 RX ADMIN — ZINC SULFATE 220 MG (50 MG) CAPSULE 220 MG: CAPSULE at 08:01

## 2020-06-14 RX ADMIN — CALCIUM CARBONATE-VITAMIN D TAB 500 MG-200 UNIT 1 TABLET: 500-200 TAB at 16:02

## 2020-06-14 RX ADMIN — FUROSEMIDE 40 MG: 10 INJECTION, SOLUTION INTRAMUSCULAR; INTRAVENOUS at 16:02

## 2020-06-14 RX ADMIN — CALCIUM CARBONATE-VITAMIN D TAB 500 MG-200 UNIT 1 TABLET: 500-200 TAB at 07:52

## 2020-06-15 ENCOUNTER — APPOINTMENT (INPATIENT)
Dept: RADIOLOGY | Facility: HOSPITAL | Age: 55
DRG: 246 | End: 2020-06-15
Payer: COMMERCIAL

## 2020-06-15 ENCOUNTER — TELEPHONE (OUTPATIENT)
Dept: OBGYN CLINIC | Facility: MEDICAL CENTER | Age: 55
End: 2020-06-15

## 2020-06-15 LAB
ANION GAP SERPL CALCULATED.3IONS-SCNC: 10 MMOL/L (ref 4–13)
BUN SERPL-MCNC: 26 MG/DL (ref 5–25)
CALCIUM SERPL-MCNC: 8.6 MG/DL (ref 8.3–10.1)
CHLORIDE SERPL-SCNC: 102 MMOL/L (ref 100–108)
CO2 SERPL-SCNC: 29 MMOL/L (ref 21–32)
CREAT SERPL-MCNC: 1.21 MG/DL (ref 0.6–1.3)
CRP SERPL QL: 112.2 MG/L
D DIMER PPP FEU-MCNC: 2.34 UG/ML FEU
GFR SERPL CREATININE-BSD FRML MDRD: 52 ML/MIN/1.73SQ M
GLUCOSE SERPL-MCNC: 105 MG/DL (ref 65–140)
MAGNESIUM SERPL-MCNC: 2 MG/DL (ref 1.6–2.6)
POTASSIUM SERPL-SCNC: 3.2 MMOL/L (ref 3.5–5.3)
PROCALCITONIN SERPL-MCNC: 0.41 NG/ML
SODIUM SERPL-SCNC: 141 MMOL/L (ref 136–145)

## 2020-06-15 PROCEDURE — 83735 ASSAY OF MAGNESIUM: CPT | Performed by: FAMILY MEDICINE

## 2020-06-15 PROCEDURE — 99232 SBSQ HOSP IP/OBS MODERATE 35: CPT | Performed by: INTERNAL MEDICINE

## 2020-06-15 PROCEDURE — 99233 SBSQ HOSP IP/OBS HIGH 50: CPT | Performed by: INTERNAL MEDICINE

## 2020-06-15 PROCEDURE — 80048 BASIC METABOLIC PNL TOTAL CA: CPT | Performed by: FAMILY MEDICINE

## 2020-06-15 PROCEDURE — 84145 PROCALCITONIN (PCT): CPT | Performed by: FAMILY MEDICINE

## 2020-06-15 PROCEDURE — 86140 C-REACTIVE PROTEIN: CPT | Performed by: FAMILY MEDICINE

## 2020-06-15 PROCEDURE — 85379 FIBRIN DEGRADATION QUANT: CPT | Performed by: FAMILY MEDICINE

## 2020-06-15 PROCEDURE — 71045 X-RAY EXAM CHEST 1 VIEW: CPT

## 2020-06-15 RX ORDER — FUROSEMIDE 20 MG/1
20 TABLET ORAL DAILY
Status: DISCONTINUED | OUTPATIENT
Start: 2020-06-16 | End: 2020-06-19 | Stop reason: HOSPADM

## 2020-06-15 RX ORDER — POTASSIUM CHLORIDE 20 MEQ/1
40 TABLET, EXTENDED RELEASE ORAL 2 TIMES DAILY
Status: COMPLETED | OUTPATIENT
Start: 2020-06-15 | End: 2020-06-15

## 2020-06-15 RX ADMIN — FUROSEMIDE 40 MG: 10 INJECTION, SOLUTION INTRAMUSCULAR; INTRAVENOUS at 15:31

## 2020-06-15 RX ADMIN — CARVEDILOL 6.25 MG: 6.25 TABLET, FILM COATED ORAL at 07:09

## 2020-06-15 RX ADMIN — ZINC SULFATE 220 MG (50 MG) CAPSULE 220 MG: CAPSULE at 07:09

## 2020-06-15 RX ADMIN — CALCIUM CARBONATE-VITAMIN D TAB 500 MG-200 UNIT 1 TABLET: 500-200 TAB at 07:09

## 2020-06-15 RX ADMIN — POTASSIUM CHLORIDE 40 MEQ: 1500 TABLET, EXTENDED RELEASE ORAL at 09:37

## 2020-06-15 RX ADMIN — ASPIRIN 81 MG 81 MG: 81 TABLET ORAL at 07:09

## 2020-06-15 RX ADMIN — LEVOTHYROXINE SODIUM 175 MCG: 125 TABLET ORAL at 05:00

## 2020-06-15 RX ADMIN — FUROSEMIDE 40 MG: 10 INJECTION, SOLUTION INTRAMUSCULAR; INTRAVENOUS at 07:08

## 2020-06-15 RX ADMIN — CARVEDILOL 6.25 MG: 6.25 TABLET, FILM COATED ORAL at 15:31

## 2020-06-15 RX ADMIN — CALCIUM CARBONATE-VITAMIN D TAB 500 MG-200 UNIT 1 TABLET: 500-200 TAB at 15:31

## 2020-06-15 RX ADMIN — POTASSIUM CHLORIDE 40 MEQ: 1500 TABLET, EXTENDED RELEASE ORAL at 17:06

## 2020-06-15 RX ADMIN — OXYCODONE HYDROCHLORIDE AND ACETAMINOPHEN 1000 MG: 500 TABLET ORAL at 07:09

## 2020-06-16 ENCOUNTER — APPOINTMENT (INPATIENT)
Dept: RADIOLOGY | Facility: HOSPITAL | Age: 55
DRG: 246 | End: 2020-06-16
Payer: COMMERCIAL

## 2020-06-16 PROBLEM — I42.9 CARDIOMYOPATHY (HCC): Status: ACTIVE | Noted: 2020-06-16

## 2020-06-16 PROBLEM — S52.502A FRACTURE OF LEFT DISTAL RADIUS: Status: ACTIVE | Noted: 2020-06-16

## 2020-06-16 PROBLEM — D64.9 ANEMIA: Status: ACTIVE | Noted: 2020-06-16

## 2020-06-16 PROBLEM — R73.03 PREDIABETES: Status: ACTIVE | Noted: 2020-06-16

## 2020-06-16 PROBLEM — I44.7 LBBB (LEFT BUNDLE BRANCH BLOCK): Status: ACTIVE | Noted: 2020-06-16

## 2020-06-16 PROBLEM — J96.01 ACUTE RESPIRATORY FAILURE WITH HYPOXIA (HCC): Status: RESOLVED | Noted: 2020-06-12 | Resolved: 2020-06-16

## 2020-06-16 LAB
ALBUMIN SERPL BCP-MCNC: 2.7 G/DL (ref 3.5–5)
ALP SERPL-CCNC: 117 U/L (ref 46–116)
ALT SERPL W P-5'-P-CCNC: 21 U/L (ref 12–78)
ANION GAP SERPL CALCULATED.3IONS-SCNC: 10 MMOL/L (ref 4–13)
AST SERPL W P-5'-P-CCNC: 36 U/L (ref 5–45)
BASOPHILS # BLD AUTO: 0.04 THOUSANDS/ΜL (ref 0–0.1)
BASOPHILS NFR BLD AUTO: 1 % (ref 0–1)
BILIRUB SERPL-MCNC: 0.32 MG/DL (ref 0.2–1)
BUN SERPL-MCNC: 27 MG/DL (ref 5–25)
CALCIUM SERPL-MCNC: 8.8 MG/DL (ref 8.3–10.1)
CHLORIDE SERPL-SCNC: 100 MMOL/L (ref 100–108)
CO2 SERPL-SCNC: 29 MMOL/L (ref 21–32)
CREAT SERPL-MCNC: 1.15 MG/DL (ref 0.6–1.3)
EOSINOPHIL # BLD AUTO: 0.2 THOUSAND/ΜL (ref 0–0.61)
EOSINOPHIL NFR BLD AUTO: 4 % (ref 0–6)
ERYTHROCYTE [DISTWIDTH] IN BLOOD BY AUTOMATED COUNT: 17.1 % (ref 11.6–15.1)
GFR SERPL CREATININE-BSD FRML MDRD: 55 ML/MIN/1.73SQ M
GLUCOSE SERPL-MCNC: 99 MG/DL (ref 65–140)
HCT VFR BLD AUTO: 36.4 % (ref 34.8–46.1)
HGB BLD-MCNC: 11 G/DL (ref 11.5–15.4)
IMM GRANULOCYTES # BLD AUTO: 0.01 THOUSAND/UL (ref 0–0.2)
IMM GRANULOCYTES NFR BLD AUTO: 0 % (ref 0–2)
LYMPHOCYTES # BLD AUTO: 1.13 THOUSANDS/ΜL (ref 0.6–4.47)
LYMPHOCYTES NFR BLD AUTO: 22 % (ref 14–44)
MAGNESIUM SERPL-MCNC: 2 MG/DL (ref 1.6–2.6)
MCH RBC QN AUTO: 25.2 PG (ref 26.8–34.3)
MCHC RBC AUTO-ENTMCNC: 30.2 G/DL (ref 31.4–37.4)
MCV RBC AUTO: 84 FL (ref 82–98)
MONOCYTES # BLD AUTO: 0.49 THOUSAND/ΜL (ref 0.17–1.22)
MONOCYTES NFR BLD AUTO: 10 % (ref 4–12)
NEUTROPHILS # BLD AUTO: 3.28 THOUSANDS/ΜL (ref 1.85–7.62)
NEUTS SEG NFR BLD AUTO: 63 % (ref 43–75)
NRBC BLD AUTO-RTO: 0 /100 WBCS
PHOSPHATE SERPL-MCNC: 4.3 MG/DL (ref 2.7–4.5)
PLATELET # BLD AUTO: 303 THOUSANDS/UL (ref 149–390)
PMV BLD AUTO: 11.4 FL (ref 8.9–12.7)
POTASSIUM SERPL-SCNC: 4.1 MMOL/L (ref 3.5–5.3)
PROCALCITONIN SERPL-MCNC: 0.25 NG/ML
PROT SERPL-MCNC: 7.2 G/DL (ref 6.4–8.2)
RBC # BLD AUTO: 4.36 MILLION/UL (ref 3.81–5.12)
SODIUM SERPL-SCNC: 139 MMOL/L (ref 136–145)
WBC # BLD AUTO: 5.15 THOUSAND/UL (ref 4.31–10.16)

## 2020-06-16 PROCEDURE — 99024 POSTOP FOLLOW-UP VISIT: CPT | Performed by: PHYSICIAN ASSISTANT

## 2020-06-16 PROCEDURE — 84100 ASSAY OF PHOSPHORUS: CPT | Performed by: INTERNAL MEDICINE

## 2020-06-16 PROCEDURE — 73110 X-RAY EXAM OF WRIST: CPT

## 2020-06-16 PROCEDURE — 84145 PROCALCITONIN (PCT): CPT | Performed by: FAMILY MEDICINE

## 2020-06-16 PROCEDURE — 71045 X-RAY EXAM CHEST 1 VIEW: CPT

## 2020-06-16 PROCEDURE — 99233 SBSQ HOSP IP/OBS HIGH 50: CPT | Performed by: INTERNAL MEDICINE

## 2020-06-16 PROCEDURE — 83735 ASSAY OF MAGNESIUM: CPT | Performed by: INTERNAL MEDICINE

## 2020-06-16 PROCEDURE — 80053 COMPREHEN METABOLIC PANEL: CPT | Performed by: INTERNAL MEDICINE

## 2020-06-16 PROCEDURE — 99232 SBSQ HOSP IP/OBS MODERATE 35: CPT | Performed by: INTERNAL MEDICINE

## 2020-06-16 PROCEDURE — 85025 COMPLETE CBC W/AUTO DIFF WBC: CPT | Performed by: INTERNAL MEDICINE

## 2020-06-16 RX ORDER — SODIUM CHLORIDE 9 MG/ML
100 INJECTION, SOLUTION INTRAVENOUS ONCE
Status: COMPLETED | OUTPATIENT
Start: 2020-06-17 | End: 2020-06-17

## 2020-06-16 RX ORDER — ASPIRIN 81 MG/1
243 TABLET, CHEWABLE ORAL ONCE
Status: DISCONTINUED | OUTPATIENT
Start: 2020-06-17 | End: 2020-06-18

## 2020-06-16 RX ORDER — ASPIRIN 81 MG/1
243 TABLET, CHEWABLE ORAL ONCE
Status: DISCONTINUED | OUTPATIENT
Start: 2020-06-16 | End: 2020-06-16

## 2020-06-16 RX ADMIN — OXYCODONE HYDROCHLORIDE AND ACETAMINOPHEN 1000 MG: 500 TABLET ORAL at 08:30

## 2020-06-16 RX ADMIN — LEVOTHYROXINE SODIUM 175 MCG: 125 TABLET ORAL at 05:11

## 2020-06-16 RX ADMIN — CARVEDILOL 6.25 MG: 6.25 TABLET, FILM COATED ORAL at 08:30

## 2020-06-16 RX ADMIN — ZINC SULFATE 220 MG (50 MG) CAPSULE 220 MG: CAPSULE at 08:30

## 2020-06-16 RX ADMIN — FUROSEMIDE 20 MG: 20 TABLET ORAL at 08:30

## 2020-06-16 RX ADMIN — CALCIUM CARBONATE-VITAMIN D TAB 500 MG-200 UNIT 1 TABLET: 500-200 TAB at 17:27

## 2020-06-16 RX ADMIN — CALCIUM CARBONATE-VITAMIN D TAB 500 MG-200 UNIT 1 TABLET: 500-200 TAB at 08:30

## 2020-06-16 RX ADMIN — ASPIRIN 81 MG 81 MG: 81 TABLET ORAL at 08:30

## 2020-06-16 RX ADMIN — ENOXAPARIN SODIUM 40 MG: 40 INJECTION SUBCUTANEOUS at 08:30

## 2020-06-16 RX ADMIN — CARVEDILOL 6.25 MG: 6.25 TABLET, FILM COATED ORAL at 17:28

## 2020-06-17 ENCOUNTER — APPOINTMENT (OUTPATIENT)
Dept: NON INVASIVE DIAGNOSTICS | Facility: HOSPITAL | Age: 55
DRG: 246 | End: 2020-06-17
Attending: INTERNAL MEDICINE
Payer: COMMERCIAL

## 2020-06-17 PROBLEM — N17.9 AKI (ACUTE KIDNEY INJURY) (HCC): Status: RESOLVED | Noted: 2020-06-12 | Resolved: 2020-06-17

## 2020-06-17 LAB
ANION GAP SERPL CALCULATED.3IONS-SCNC: 8 MMOL/L (ref 4–13)
ATRIAL RATE: 81 BPM
ATRIAL RATE: 89 BPM
ATRIAL RATE: 90 BPM
BUN SERPL-MCNC: 30 MG/DL (ref 5–25)
CALCIUM SERPL-MCNC: 8.7 MG/DL (ref 8.3–10.1)
CHLORIDE SERPL-SCNC: 100 MMOL/L (ref 100–108)
CO2 SERPL-SCNC: 29 MMOL/L (ref 21–32)
CREAT SERPL-MCNC: 1.09 MG/DL (ref 0.6–1.3)
ERYTHROCYTE [DISTWIDTH] IN BLOOD BY AUTOMATED COUNT: 16.6 % (ref 11.6–15.1)
FERRITIN SERPL-MCNC: 83 NG/ML (ref 8–388)
GFR SERPL CREATININE-BSD FRML MDRD: 59 ML/MIN/1.73SQ M
GLUCOSE SERPL-MCNC: 103 MG/DL (ref 65–140)
HCT VFR BLD AUTO: 33.9 % (ref 34.8–46.1)
HGB BLD-MCNC: 10.3 G/DL (ref 11.5–15.4)
IRON SATN MFR SERPL: 9 %
IRON SERPL-MCNC: 24 UG/DL (ref 50–170)
KCT BLD-ACNC: 231 SEC (ref 89–137)
MCH RBC QN AUTO: 25.4 PG (ref 26.8–34.3)
MCHC RBC AUTO-ENTMCNC: 30.4 G/DL (ref 31.4–37.4)
MCV RBC AUTO: 84 FL (ref 82–98)
P AXIS: 28 DEGREES
P AXIS: 30 DEGREES
P AXIS: 34 DEGREES
PLATELET # BLD AUTO: 266 THOUSANDS/UL (ref 149–390)
PMV BLD AUTO: 11.1 FL (ref 8.9–12.7)
POTASSIUM SERPL-SCNC: 4 MMOL/L (ref 3.5–5.3)
PR INTERVAL: 130 MS
PR INTERVAL: 132 MS
PR INTERVAL: 134 MS
QRS AXIS: -18 DEGREES
QRS AXIS: -20 DEGREES
QRS AXIS: -20 DEGREES
QRSD INTERVAL: 84 MS
QRSD INTERVAL: 90 MS
QRSD INTERVAL: 92 MS
QT INTERVAL: 410 MS
QT INTERVAL: 414 MS
QT INTERVAL: 444 MS
QTC INTERVAL: 498 MS
QTC INTERVAL: 506 MS
QTC INTERVAL: 515 MS
RBC # BLD AUTO: 4.06 MILLION/UL (ref 3.81–5.12)
SODIUM SERPL-SCNC: 137 MMOL/L (ref 136–145)
SPECIMEN SOURCE: ABNORMAL
T WAVE AXIS: 201 DEGREES
T WAVE AXIS: 217 DEGREES
T WAVE AXIS: 218 DEGREES
TIBC SERPL-MCNC: 267 UG/DL (ref 250–450)
VENTRICULAR RATE: 81 BPM
VENTRICULAR RATE: 89 BPM
VENTRICULAR RATE: 90 BPM
WBC # BLD AUTO: 4.59 THOUSAND/UL (ref 4.31–10.16)

## 2020-06-17 PROCEDURE — 99152 MOD SED SAME PHYS/QHP 5/>YRS: CPT | Performed by: INTERNAL MEDICINE

## 2020-06-17 PROCEDURE — C1874 STENT, COATED/COV W/DEL SYS: HCPCS

## 2020-06-17 PROCEDURE — 92928 PRQ TCAT PLMT NTRAC ST 1 LES: CPT | Performed by: INTERNAL MEDICINE

## 2020-06-17 PROCEDURE — 93458 L HRT ARTERY/VENTRICLE ANGIO: CPT | Performed by: INTERNAL MEDICINE

## 2020-06-17 PROCEDURE — 93571 IV DOP VEL&/PRESS C FLO 1ST: CPT | Performed by: INTERNAL MEDICINE

## 2020-06-17 PROCEDURE — 82728 ASSAY OF FERRITIN: CPT | Performed by: PHYSICIAN ASSISTANT

## 2020-06-17 PROCEDURE — 85347 COAGULATION TIME ACTIVATED: CPT

## 2020-06-17 PROCEDURE — 80048 BASIC METABOLIC PNL TOTAL CA: CPT | Performed by: PHYSICIAN ASSISTANT

## 2020-06-17 PROCEDURE — 85027 COMPLETE CBC AUTOMATED: CPT | Performed by: PHYSICIAN ASSISTANT

## 2020-06-17 PROCEDURE — C1887 CATHETER, GUIDING: HCPCS | Performed by: INTERNAL MEDICINE

## 2020-06-17 PROCEDURE — C1769 GUIDE WIRE: HCPCS | Performed by: INTERNAL MEDICINE

## 2020-06-17 PROCEDURE — 99232 SBSQ HOSP IP/OBS MODERATE 35: CPT

## 2020-06-17 PROCEDURE — B2111ZZ FLUOROSCOPY OF MULTIPLE CORONARY ARTERIES USING LOW OSMOLAR CONTRAST: ICD-10-PCS | Performed by: INTERNAL MEDICINE

## 2020-06-17 PROCEDURE — 93010 ELECTROCARDIOGRAM REPORT: CPT | Performed by: INTERNAL MEDICINE

## 2020-06-17 PROCEDURE — 83550 IRON BINDING TEST: CPT | Performed by: PHYSICIAN ASSISTANT

## 2020-06-17 PROCEDURE — 93005 ELECTROCARDIOGRAM TRACING: CPT

## 2020-06-17 PROCEDURE — 93572 IV DOP VEL&/PRESS C FLO EA: CPT | Performed by: INTERNAL MEDICINE

## 2020-06-17 PROCEDURE — C1894 INTRO/SHEATH, NON-LASER: HCPCS | Performed by: INTERNAL MEDICINE

## 2020-06-17 PROCEDURE — 99153 MOD SED SAME PHYS/QHP EA: CPT | Performed by: INTERNAL MEDICINE

## 2020-06-17 PROCEDURE — 99232 SBSQ HOSP IP/OBS MODERATE 35: CPT | Performed by: PHYSICIAN ASSISTANT

## 2020-06-17 PROCEDURE — 83540 ASSAY OF IRON: CPT | Performed by: PHYSICIAN ASSISTANT

## 2020-06-17 PROCEDURE — 4A023N7 MEASUREMENT OF CARDIAC SAMPLING AND PRESSURE, LEFT HEART, PERCUTANEOUS APPROACH: ICD-10-PCS | Performed by: INTERNAL MEDICINE

## 2020-06-17 PROCEDURE — B2151ZZ FLUOROSCOPY OF LEFT HEART USING LOW OSMOLAR CONTRAST: ICD-10-PCS | Performed by: INTERNAL MEDICINE

## 2020-06-17 PROCEDURE — C9600 PERC DRUG-EL COR STENT SING: HCPCS | Performed by: INTERNAL MEDICINE

## 2020-06-17 PROCEDURE — 027036Z DILATION OF CORONARY ARTERY, ONE ARTERY WITH THREE DRUG-ELUTING INTRALUMINAL DEVICES, PERCUTANEOUS APPROACH: ICD-10-PCS | Performed by: INTERNAL MEDICINE

## 2020-06-17 PROCEDURE — C1725 CATH, TRANSLUMIN NON-LASER: HCPCS | Performed by: INTERNAL MEDICINE

## 2020-06-17 RX ORDER — VERAPAMIL HYDROCHLORIDE 2.5 MG/ML
INJECTION, SOLUTION INTRAVENOUS CODE/TRAUMA/SEDATION MEDICATION
Status: COMPLETED | OUTPATIENT
Start: 2020-06-17 | End: 2020-06-17

## 2020-06-17 RX ORDER — NITROGLYCERIN 20 MG/100ML
INJECTION INTRAVENOUS CODE/TRAUMA/SEDATION MEDICATION
Status: COMPLETED | OUTPATIENT
Start: 2020-06-17 | End: 2020-06-17

## 2020-06-17 RX ORDER — ONDANSETRON 2 MG/ML
4 INJECTION INTRAMUSCULAR; INTRAVENOUS EVERY 4 HOURS PRN
Status: DISCONTINUED | OUTPATIENT
Start: 2020-06-17 | End: 2020-06-19 | Stop reason: HOSPADM

## 2020-06-17 RX ORDER — LIDOCAINE HYDROCHLORIDE 10 MG/ML
INJECTION, SOLUTION EPIDURAL; INFILTRATION; INTRACAUDAL; PERINEURAL CODE/TRAUMA/SEDATION MEDICATION
Status: COMPLETED | OUTPATIENT
Start: 2020-06-17 | End: 2020-06-17

## 2020-06-17 RX ORDER — FERROUS SULFATE 325(65) MG
325 TABLET ORAL
Status: DISCONTINUED | OUTPATIENT
Start: 2020-06-17 | End: 2020-06-19 | Stop reason: HOSPADM

## 2020-06-17 RX ORDER — HYDROXYZINE HCL 10 MG/5 ML
25 SOLUTION, ORAL ORAL ONCE
Status: COMPLETED | OUTPATIENT
Start: 2020-06-17 | End: 2020-06-17

## 2020-06-17 RX ORDER — FENTANYL CITRATE 50 UG/ML
INJECTION, SOLUTION INTRAMUSCULAR; INTRAVENOUS CODE/TRAUMA/SEDATION MEDICATION
Status: COMPLETED | OUTPATIENT
Start: 2020-06-17 | End: 2020-06-17

## 2020-06-17 RX ORDER — MIDAZOLAM HYDROCHLORIDE 2 MG/2ML
INJECTION, SOLUTION INTRAMUSCULAR; INTRAVENOUS CODE/TRAUMA/SEDATION MEDICATION
Status: COMPLETED | OUTPATIENT
Start: 2020-06-17 | End: 2020-06-17

## 2020-06-17 RX ORDER — NITROGLYCERIN 0.4 MG/1
0.4 TABLET SUBLINGUAL
Status: DISCONTINUED | OUTPATIENT
Start: 2020-06-17 | End: 2020-06-19 | Stop reason: HOSPADM

## 2020-06-17 RX ORDER — SODIUM CHLORIDE 9 MG/ML
100 INJECTION, SOLUTION INTRAVENOUS CONTINUOUS
Status: DISPENSED | OUTPATIENT
Start: 2020-06-17 | End: 2020-06-17

## 2020-06-17 RX ORDER — HEPARIN SODIUM 1000 [USP'U]/ML
INJECTION, SOLUTION INTRAVENOUS; SUBCUTANEOUS CODE/TRAUMA/SEDATION MEDICATION
Status: COMPLETED | OUTPATIENT
Start: 2020-06-17 | End: 2020-06-17

## 2020-06-17 RX ADMIN — SODIUM CHLORIDE 100 ML/HR: 0.9 INJECTION, SOLUTION INTRAVENOUS at 05:15

## 2020-06-17 RX ADMIN — FUROSEMIDE 20 MG: 20 TABLET ORAL at 08:02

## 2020-06-17 RX ADMIN — TICAGRELOR 90 MG: 90 TABLET ORAL at 17:46

## 2020-06-17 RX ADMIN — Medication 200 MCG: at 11:28

## 2020-06-17 RX ADMIN — IOHEXOL 10 ML: 350 INJECTION, SOLUTION INTRAVENOUS at 12:18

## 2020-06-17 RX ADMIN — OXYCODONE HYDROCHLORIDE AND ACETAMINOPHEN 1000 MG: 500 TABLET ORAL at 08:02

## 2020-06-17 RX ADMIN — IOHEXOL 100 ML: 350 INJECTION, SOLUTION INTRAVENOUS at 11:57

## 2020-06-17 RX ADMIN — FENTANYL CITRATE 50 MCG: 50 INJECTION, SOLUTION INTRAMUSCULAR; INTRAVENOUS at 11:41

## 2020-06-17 RX ADMIN — CARVEDILOL 6.25 MG: 6.25 TABLET, FILM COATED ORAL at 16:35

## 2020-06-17 RX ADMIN — ASPIRIN 81 MG 81 MG: 81 TABLET ORAL at 08:03

## 2020-06-17 RX ADMIN — NITROGLYCERIN 200 MCG: 20 INJECTION INTRAVENOUS at 11:42

## 2020-06-17 RX ADMIN — NITROGLYCERIN 1 INCH: 20 OINTMENT TOPICAL at 13:12

## 2020-06-17 RX ADMIN — IOHEXOL 36 ML: 350 INJECTION, SOLUTION INTRAVENOUS at 12:13

## 2020-06-17 RX ADMIN — MIDAZOLAM 1 MG: 1 INJECTION INTRAMUSCULAR; INTRAVENOUS at 11:41

## 2020-06-17 RX ADMIN — HEPARIN SODIUM 4000 UNITS: 1000 INJECTION INTRAVENOUS; SUBCUTANEOUS at 11:27

## 2020-06-17 RX ADMIN — HEPARIN SODIUM 4000 UNITS: 1000 INJECTION INTRAVENOUS; SUBCUTANEOUS at 11:47

## 2020-06-17 RX ADMIN — CARVEDILOL 6.25 MG: 6.25 TABLET, FILM COATED ORAL at 08:02

## 2020-06-17 RX ADMIN — ZINC SULFATE 220 MG (50 MG) CAPSULE 220 MG: CAPSULE at 08:02

## 2020-06-17 RX ADMIN — MIDAZOLAM 1 MG: 1 INJECTION INTRAMUSCULAR; INTRAVENOUS at 11:24

## 2020-06-17 RX ADMIN — HEPARIN SODIUM 4000 UNITS: 1000 INJECTION INTRAVENOUS; SUBCUTANEOUS at 11:32

## 2020-06-17 RX ADMIN — FENTANYL CITRATE 50 MCG: 50 INJECTION, SOLUTION INTRAMUSCULAR; INTRAVENOUS at 11:24

## 2020-06-17 RX ADMIN — ONDANSETRON 4 MG: 2 INJECTION INTRAMUSCULAR; INTRAVENOUS at 09:31

## 2020-06-17 RX ADMIN — CALCIUM CARBONATE-VITAMIN D TAB 500 MG-200 UNIT 1 TABLET: 500-200 TAB at 08:02

## 2020-06-17 RX ADMIN — FENTANYL CITRATE 50 MCG: 50 INJECTION, SOLUTION INTRAMUSCULAR; INTRAVENOUS at 12:04

## 2020-06-17 RX ADMIN — TICAGRELOR 180 MG: 90 TABLET ORAL at 11:34

## 2020-06-17 RX ADMIN — LIDOCAINE HYDROCHLORIDE 1 ML: 10 INJECTION, SOLUTION EPIDURAL; INFILTRATION; INTRACAUDAL; PERINEURAL at 11:24

## 2020-06-17 RX ADMIN — VERAPAMIL HYDROCHLORIDE 2.5 MG: 2.5 INJECTION, SOLUTION INTRAVENOUS at 11:28

## 2020-06-17 RX ADMIN — CALCIUM CARBONATE-VITAMIN D TAB 500 MG-200 UNIT 1 TABLET: 500-200 TAB at 16:35

## 2020-06-17 RX ADMIN — HYDROXYZINE HYDROCHLORIDE 25 MG: 10 SYRUP ORAL at 23:44

## 2020-06-17 RX ADMIN — SODIUM CHLORIDE 100 ML/HR: 0.9 INJECTION, SOLUTION INTRAVENOUS at 12:43

## 2020-06-17 RX ADMIN — FERROUS SULFATE TAB 325 MG (65 MG ELEMENTAL FE) 325 MG: 325 (65 FE) TAB at 13:15

## 2020-06-17 RX ADMIN — NITROGLYCERIN 1 INCH: 20 OINTMENT TOPICAL at 20:42

## 2020-06-17 RX ADMIN — MIDAZOLAM 1 MG: 1 INJECTION INTRAMUSCULAR; INTRAVENOUS at 12:04

## 2020-06-18 PROCEDURE — 99232 SBSQ HOSP IP/OBS MODERATE 35: CPT | Performed by: INTERNAL MEDICINE

## 2020-06-18 PROCEDURE — 99232 SBSQ HOSP IP/OBS MODERATE 35: CPT

## 2020-06-18 RX ORDER — HYDROXYZINE HYDROCHLORIDE 25 MG/1
25 TABLET, FILM COATED ORAL ONCE
Status: COMPLETED | OUTPATIENT
Start: 2020-06-18 | End: 2020-06-18

## 2020-06-18 RX ORDER — LOSARTAN POTASSIUM 25 MG/1
25 TABLET ORAL DAILY
Status: DISCONTINUED | OUTPATIENT
Start: 2020-06-18 | End: 2020-06-19 | Stop reason: HOSPADM

## 2020-06-18 RX ADMIN — LEVOTHYROXINE SODIUM 175 MCG: 125 TABLET ORAL at 05:42

## 2020-06-18 RX ADMIN — ENOXAPARIN SODIUM 40 MG: 40 INJECTION SUBCUTANEOUS at 08:25

## 2020-06-18 RX ADMIN — FERROUS SULFATE TAB 325 MG (65 MG ELEMENTAL FE) 325 MG: 325 (65 FE) TAB at 12:05

## 2020-06-18 RX ADMIN — CALCIUM CARBONATE-VITAMIN D TAB 500 MG-200 UNIT 1 TABLET: 500-200 TAB at 17:06

## 2020-06-18 RX ADMIN — ASPIRIN 81 MG 81 MG: 81 TABLET ORAL at 08:25

## 2020-06-18 RX ADMIN — TICAGRELOR 90 MG: 90 TABLET ORAL at 08:25

## 2020-06-18 RX ADMIN — LOSARTAN POTASSIUM 25 MG: 25 TABLET, FILM COATED ORAL at 12:10

## 2020-06-18 RX ADMIN — OXYCODONE HYDROCHLORIDE AND ACETAMINOPHEN 1000 MG: 500 TABLET ORAL at 08:25

## 2020-06-18 RX ADMIN — CARVEDILOL 6.25 MG: 6.25 TABLET, FILM COATED ORAL at 08:25

## 2020-06-18 RX ADMIN — FUROSEMIDE 20 MG: 20 TABLET ORAL at 08:25

## 2020-06-18 RX ADMIN — TICAGRELOR 90 MG: 90 TABLET ORAL at 17:06

## 2020-06-18 RX ADMIN — CALCIUM CARBONATE-VITAMIN D TAB 500 MG-200 UNIT 1 TABLET: 500-200 TAB at 08:25

## 2020-06-18 RX ADMIN — HYDROXYZINE HYDROCHLORIDE 25 MG: 25 TABLET ORAL at 22:28

## 2020-06-18 RX ADMIN — ZINC SULFATE 220 MG (50 MG) CAPSULE 220 MG: CAPSULE at 08:25

## 2020-06-19 ENCOUNTER — TRANSITIONAL CARE MANAGEMENT (OUTPATIENT)
Dept: FAMILY MEDICINE CLINIC | Facility: CLINIC | Age: 55
End: 2020-06-19

## 2020-06-19 ENCOUNTER — HOSPITAL ENCOUNTER (INPATIENT)
Facility: HOSPITAL | Age: 55
LOS: 4 days | Discharge: HOME | DRG: 246 | End: 2020-06-24
Attending: EMERGENCY MEDICINE | Admitting: INTERNAL MEDICINE
Payer: COMMERCIAL

## 2020-06-19 VITALS
TEMPERATURE: 98.7 F | OXYGEN SATURATION: 98 % | SYSTOLIC BLOOD PRESSURE: 92 MMHG | HEIGHT: 65 IN | HEART RATE: 87 BPM | RESPIRATION RATE: 18 BRPM | WEIGHT: 140.43 LBS | DIASTOLIC BLOOD PRESSURE: 62 MMHG | BODY MASS INDEX: 23.4 KG/M2

## 2020-06-19 DIAGNOSIS — I50.9 CHF (CONGESTIVE HEART FAILURE) (HCC): ICD-10-CM

## 2020-06-19 DIAGNOSIS — R77.8 ELEVATED TROPONIN: ICD-10-CM

## 2020-06-19 DIAGNOSIS — R07.9 CHEST PAIN, UNSPECIFIED TYPE: Primary | ICD-10-CM

## 2020-06-19 DIAGNOSIS — I42.9 CARDIOMYOPATHY (HCC): ICD-10-CM

## 2020-06-19 DIAGNOSIS — I30.9 ACUTE MYOPERICARDITIS: ICD-10-CM

## 2020-06-19 DIAGNOSIS — D64.9 ANEMIA: ICD-10-CM

## 2020-06-19 DIAGNOSIS — Z95.820 S/P ANGIOPLASTY WITH STENT: ICD-10-CM

## 2020-06-19 PROBLEM — I50.22 CHRONIC SYSTOLIC (CONGESTIVE) HEART FAILURE (HCC): Status: ACTIVE | Noted: 2020-06-19

## 2020-06-19 PROBLEM — I25.10 CAD (CORONARY ARTERY DISEASE): Status: ACTIVE | Noted: 2020-06-19

## 2020-06-19 LAB
ALBUMIN SERPL BCP-MCNC: 2.7 G/DL (ref 3.5–5)
ALP SERPL-CCNC: 133 U/L (ref 46–116)
ALT SERPL W P-5'-P-CCNC: 27 U/L (ref 12–78)
ANION GAP SERPL CALCULATED.3IONS-SCNC: 10 MMOL/L (ref 4–13)
APTT PPP: 29 SECONDS (ref 23–37)
AST SERPL W P-5'-P-CCNC: 30 U/L (ref 5–45)
ATRIAL RATE: 87 BPM
BASOPHILS # BLD AUTO: 0.04 THOUSANDS/ΜL (ref 0–0.1)
BASOPHILS NFR BLD AUTO: 1 % (ref 0–1)
BILIRUB SERPL-MCNC: 0.33 MG/DL (ref 0.2–1)
BUN SERPL-MCNC: 29 MG/DL (ref 5–25)
CALCIUM SERPL-MCNC: 7.9 MG/DL (ref 8.3–10.1)
CHLORIDE SERPL-SCNC: 99 MMOL/L (ref 100–108)
CK SERPL-CCNC: 64 U/L (ref 26–192)
CO2 SERPL-SCNC: 27 MMOL/L (ref 21–32)
CREAT SERPL-MCNC: 1.47 MG/DL (ref 0.6–1.3)
EOSINOPHIL # BLD AUTO: 0.1 THOUSAND/ΜL (ref 0–0.61)
EOSINOPHIL NFR BLD AUTO: 1 % (ref 0–6)
ERYTHROCYTE [DISTWIDTH] IN BLOOD BY AUTOMATED COUNT: 16.5 % (ref 11.6–15.1)
GFR SERPL CREATININE-BSD FRML MDRD: 41 ML/MIN/1.73SQ M
GLUCOSE SERPL-MCNC: 127 MG/DL (ref 65–140)
HCT VFR BLD AUTO: 33.3 % (ref 34.8–46.1)
HGB BLD-MCNC: 10.3 G/DL (ref 11.5–15.4)
IMM GRANULOCYTES # BLD AUTO: 0.09 THOUSAND/UL (ref 0–0.2)
IMM GRANULOCYTES NFR BLD AUTO: 1 % (ref 0–2)
INR PPP: 1.05 (ref 0.84–1.19)
LYMPHOCYTES # BLD AUTO: 0.94 THOUSANDS/ΜL (ref 0.6–4.47)
LYMPHOCYTES NFR BLD AUTO: 12 % (ref 14–44)
MAGNESIUM SERPL-MCNC: 2 MG/DL (ref 1.6–2.6)
MCH RBC QN AUTO: 25.7 PG (ref 26.8–34.3)
MCHC RBC AUTO-ENTMCNC: 30.9 G/DL (ref 31.4–37.4)
MCV RBC AUTO: 83 FL (ref 82–98)
MONOCYTES # BLD AUTO: 0.92 THOUSAND/ΜL (ref 0.17–1.22)
MONOCYTES NFR BLD AUTO: 11 % (ref 4–12)
NEUTROPHILS # BLD AUTO: 6.09 THOUSANDS/ΜL (ref 1.85–7.62)
NEUTS SEG NFR BLD AUTO: 74 % (ref 43–75)
NRBC BLD AUTO-RTO: 0 /100 WBCS
P AXIS: 32 DEGREES
PLATELET # BLD AUTO: 286 THOUSANDS/UL (ref 149–390)
PMV BLD AUTO: 11.6 FL (ref 8.9–12.7)
POTASSIUM SERPL-SCNC: 4 MMOL/L (ref 3.5–5.3)
PR INTERVAL: 128 MS
PROT SERPL-MCNC: 6.7 G/DL (ref 6.4–8.2)
PROTHROMBIN TIME: 13.8 SECONDS (ref 11.6–14.5)
QRS AXIS: -4 DEGREES
QRSD INTERVAL: 90 MS
QT INTERVAL: 420 MS
QTC INTERVAL: 505 MS
RBC # BLD AUTO: 4.01 MILLION/UL (ref 3.81–5.12)
SODIUM SERPL-SCNC: 136 MMOL/L (ref 136–145)
T WAVE AXIS: 226 DEGREES
VENTRICULAR RATE: 87 BPM
WBC # BLD AUTO: 8.18 THOUSAND/UL (ref 4.31–10.16)

## 2020-06-19 PROCEDURE — 83735 ASSAY OF MAGNESIUM: CPT | Performed by: NURSE PRACTITIONER

## 2020-06-19 PROCEDURE — 96374 THER/PROPH/DIAG INJ IV PUSH: CPT

## 2020-06-19 PROCEDURE — 99239 HOSP IP/OBS DSCHRG MGMT >30: CPT | Performed by: INTERNAL MEDICINE

## 2020-06-19 PROCEDURE — 84484 ASSAY OF TROPONIN QUANT: CPT | Performed by: NURSE PRACTITIONER

## 2020-06-19 PROCEDURE — 99285 EMERGENCY DEPT VISIT HI MDM: CPT | Performed by: NURSE PRACTITIONER

## 2020-06-19 PROCEDURE — 93010 ELECTROCARDIOGRAM REPORT: CPT | Performed by: INTERNAL MEDICINE

## 2020-06-19 PROCEDURE — 85025 COMPLETE CBC W/AUTO DIFF WBC: CPT | Performed by: NURSE PRACTITIONER

## 2020-06-19 PROCEDURE — 36415 COLL VENOUS BLD VENIPUNCTURE: CPT | Performed by: NURSE PRACTITIONER

## 2020-06-19 PROCEDURE — 85730 THROMBOPLASTIN TIME PARTIAL: CPT | Performed by: NURSE PRACTITIONER

## 2020-06-19 PROCEDURE — 99285 EMERGENCY DEPT VISIT HI MDM: CPT

## 2020-06-19 PROCEDURE — 82550 ASSAY OF CK (CPK): CPT | Performed by: NURSE PRACTITIONER

## 2020-06-19 PROCEDURE — 80053 COMPREHEN METABOLIC PANEL: CPT | Performed by: NURSE PRACTITIONER

## 2020-06-19 PROCEDURE — 93005 ELECTROCARDIOGRAM TRACING: CPT

## 2020-06-19 PROCEDURE — 99232 SBSQ HOSP IP/OBS MODERATE 35: CPT | Performed by: INTERNAL MEDICINE

## 2020-06-19 PROCEDURE — 83880 ASSAY OF NATRIURETIC PEPTIDE: CPT | Performed by: NURSE PRACTITIONER

## 2020-06-19 PROCEDURE — 85610 PROTHROMBIN TIME: CPT | Performed by: NURSE PRACTITIONER

## 2020-06-19 RX ORDER — ACETAMINOPHEN 325 MG/1
975 TABLET ORAL ONCE
Status: COMPLETED | OUTPATIENT
Start: 2020-06-20 | End: 2020-06-20

## 2020-06-19 RX ORDER — ATORVASTATIN CALCIUM 40 MG/1
40 TABLET, FILM COATED ORAL DAILY
Qty: 30 TABLET | Refills: 0 | Status: SHIPPED | OUTPATIENT
Start: 2020-06-19 | End: 2020-07-10

## 2020-06-19 RX ORDER — ASPIRIN 81 MG/1
81 TABLET, CHEWABLE ORAL DAILY
Qty: 30 TABLET | Refills: 0 | Status: SHIPPED | OUTPATIENT
Start: 2020-06-19

## 2020-06-19 RX ORDER — FUROSEMIDE 20 MG/1
20 TABLET ORAL DAILY
Qty: 30 TABLET | Refills: 0 | Status: SHIPPED | OUTPATIENT
Start: 2020-06-19 | End: 2020-07-10 | Stop reason: SDUPTHER

## 2020-06-19 RX ORDER — LOSARTAN POTASSIUM 25 MG/1
25 TABLET ORAL DAILY
Qty: 30 TABLET | Refills: 0 | Status: SHIPPED | OUTPATIENT
Start: 2020-06-19 | End: 2020-06-28 | Stop reason: HOSPADM

## 2020-06-19 RX ORDER — FENTANYL CITRATE 50 UG/ML
25 INJECTION, SOLUTION INTRAMUSCULAR; INTRAVENOUS ONCE
Status: DISCONTINUED | OUTPATIENT
Start: 2020-06-20 | End: 2020-06-20

## 2020-06-19 RX ORDER — FENTANYL CITRATE 50 UG/ML
25 INJECTION, SOLUTION INTRAMUSCULAR; INTRAVENOUS ONCE
Status: COMPLETED | OUTPATIENT
Start: 2020-06-19 | End: 2020-06-19

## 2020-06-19 RX ORDER — CARVEDILOL 6.25 MG/1
6.25 TABLET ORAL 2 TIMES DAILY WITH MEALS
Qty: 60 TABLET | Refills: 0 | Status: SHIPPED | OUTPATIENT
Start: 2020-06-19 | End: 2020-06-24 | Stop reason: HOSPADM

## 2020-06-19 RX ORDER — LEVOTHYROXINE SODIUM 175 UG/1
175 TABLET ORAL
Qty: 30 TABLET | Refills: 0 | Status: SHIPPED | OUTPATIENT
Start: 2020-06-20 | End: 2020-07-21 | Stop reason: SDUPTHER

## 2020-06-19 RX ORDER — NITROGLYCERIN 0.4 MG/1
0.4 TABLET SUBLINGUAL
Qty: 30 TABLET | Refills: 0 | Status: SHIPPED | OUTPATIENT
Start: 2020-06-19

## 2020-06-19 RX ORDER — ACETAMINOPHEN 325 MG/1
650 TABLET ORAL EVERY 4 HOURS PRN
Qty: 30 TABLET | Refills: 0 | Status: SHIPPED | OUTPATIENT
Start: 2020-06-19

## 2020-06-19 RX ADMIN — TICAGRELOR 90 MG: 90 TABLET ORAL at 08:22

## 2020-06-19 RX ADMIN — ZINC SULFATE 220 MG (50 MG) CAPSULE 220 MG: CAPSULE at 08:24

## 2020-06-19 RX ADMIN — FENTANYL CITRATE 25 MCG: 50 INJECTION, SOLUTION INTRAMUSCULAR; INTRAVENOUS at 23:28

## 2020-06-19 RX ADMIN — LEVOTHYROXINE SODIUM 175 MCG: 125 TABLET ORAL at 06:22

## 2020-06-19 RX ADMIN — FUROSEMIDE 20 MG: 20 TABLET ORAL at 08:22

## 2020-06-19 RX ADMIN — CALCIUM CARBONATE-VITAMIN D TAB 500 MG-200 UNIT 1 TABLET: 500-200 TAB at 08:23

## 2020-06-19 RX ADMIN — CARVEDILOL 6.25 MG: 6.25 TABLET, FILM COATED ORAL at 08:22

## 2020-06-19 RX ADMIN — LOSARTAN POTASSIUM 25 MG: 25 TABLET, FILM COATED ORAL at 08:23

## 2020-06-19 RX ADMIN — OXYCODONE HYDROCHLORIDE AND ACETAMINOPHEN 1000 MG: 500 TABLET ORAL at 08:23

## 2020-06-19 RX ADMIN — ASPIRIN 81 MG 81 MG: 81 TABLET ORAL at 08:23

## 2020-06-20 ENCOUNTER — APPOINTMENT (INPATIENT)
Dept: RADIOLOGY | Facility: HOSPITAL | Age: 55
DRG: 246 | End: 2020-06-20
Payer: COMMERCIAL

## 2020-06-20 LAB
ANION GAP SERPL CALCULATED.3IONS-SCNC: 10 MMOL/L (ref 4–13)
APTT PPP: 31 SECONDS (ref 23–37)
ATRIAL RATE: 94 BPM
BUN SERPL-MCNC: 30 MG/DL (ref 5–25)
CALCIUM SERPL-MCNC: 7.8 MG/DL (ref 8.3–10.1)
CHLORIDE SERPL-SCNC: 99 MMOL/L (ref 100–108)
CO2 SERPL-SCNC: 26 MMOL/L (ref 21–32)
CREAT SERPL-MCNC: 1.5 MG/DL (ref 0.6–1.3)
CRP SERPL QL: 57.9 MG/L
ERYTHROCYTE [SEDIMENTATION RATE] IN BLOOD: 76 MM/HOUR (ref 0–20)
GFR SERPL CREATININE-BSD FRML MDRD: 40 ML/MIN/1.73SQ M
GLUCOSE SERPL-MCNC: 115 MG/DL (ref 65–140)
NT-PROBNP SERPL-MCNC: 1880 PG/ML
P AXIS: 43 DEGREES
PLATELET # BLD AUTO: 262 THOUSANDS/UL (ref 149–390)
PMV BLD AUTO: 11.6 FL (ref 8.9–12.7)
POTASSIUM SERPL-SCNC: 4.2 MMOL/L (ref 3.5–5.3)
PR INTERVAL: 130 MS
QRS AXIS: 2 DEGREES
QRSD INTERVAL: 86 MS
QT INTERVAL: 376 MS
QTC INTERVAL: 470 MS
SARS-COV-2 RNA RESP QL NAA+PROBE: NEGATIVE
SODIUM SERPL-SCNC: 135 MMOL/L (ref 136–145)
T WAVE AXIS: 203 DEGREES
TROPONIN I SERPL-MCNC: 3.44 NG/ML
TROPONIN I SERPL-MCNC: 3.71 NG/ML
TROPONIN I SERPL-MCNC: 3.77 NG/ML
VENTRICULAR RATE: 94 BPM

## 2020-06-20 PROCEDURE — 71046 X-RAY EXAM CHEST 2 VIEWS: CPT

## 2020-06-20 PROCEDURE — 99223 1ST HOSP IP/OBS HIGH 75: CPT | Performed by: INTERNAL MEDICINE

## 2020-06-20 PROCEDURE — 86769 SARS-COV-2 COVID-19 ANTIBODY: CPT | Performed by: INTERNAL MEDICINE

## 2020-06-20 PROCEDURE — 85652 RBC SED RATE AUTOMATED: CPT | Performed by: INTERNAL MEDICINE

## 2020-06-20 PROCEDURE — 93010 ELECTROCARDIOGRAM REPORT: CPT | Performed by: INTERNAL MEDICINE

## 2020-06-20 PROCEDURE — 84484 ASSAY OF TROPONIN QUANT: CPT | Performed by: NURSE PRACTITIONER

## 2020-06-20 PROCEDURE — 86140 C-REACTIVE PROTEIN: CPT | Performed by: INTERNAL MEDICINE

## 2020-06-20 PROCEDURE — 85730 THROMBOPLASTIN TIME PARTIAL: CPT | Performed by: PHYSICIAN ASSISTANT

## 2020-06-20 PROCEDURE — 87635 SARS-COV-2 COVID-19 AMP PRB: CPT | Performed by: INTERNAL MEDICINE

## 2020-06-20 PROCEDURE — 84484 ASSAY OF TROPONIN QUANT: CPT | Performed by: PHYSICIAN ASSISTANT

## 2020-06-20 PROCEDURE — 85049 AUTOMATED PLATELET COUNT: CPT | Performed by: PHYSICIAN ASSISTANT

## 2020-06-20 PROCEDURE — 93005 ELECTROCARDIOGRAM TRACING: CPT

## 2020-06-20 PROCEDURE — 80048 BASIC METABOLIC PNL TOTAL CA: CPT | Performed by: PHYSICIAN ASSISTANT

## 2020-06-20 PROCEDURE — 36415 COLL VENOUS BLD VENIPUNCTURE: CPT | Performed by: NURSE PRACTITIONER

## 2020-06-20 RX ORDER — ASPIRIN 81 MG/1
81 TABLET, CHEWABLE ORAL DAILY
Status: DISCONTINUED | OUTPATIENT
Start: 2020-06-20 | End: 2020-06-24 | Stop reason: HOSPADM

## 2020-06-20 RX ORDER — ATORVASTATIN CALCIUM 40 MG/1
40 TABLET, FILM COATED ORAL
Status: DISCONTINUED | OUTPATIENT
Start: 2020-06-20 | End: 2020-06-24 | Stop reason: HOSPADM

## 2020-06-20 RX ORDER — COLCHICINE 0.6 MG/1
0.6 TABLET ORAL 2 TIMES DAILY
Status: DISCONTINUED | OUTPATIENT
Start: 2020-06-20 | End: 2020-06-24 | Stop reason: HOSPADM

## 2020-06-20 RX ORDER — CARVEDILOL 6.25 MG/1
6.25 TABLET ORAL 2 TIMES DAILY WITH MEALS
Status: DISCONTINUED | OUTPATIENT
Start: 2020-06-20 | End: 2020-06-23

## 2020-06-20 RX ORDER — NITROGLYCERIN 0.4 MG/1
0.4 TABLET SUBLINGUAL
Status: DISCONTINUED | OUTPATIENT
Start: 2020-06-20 | End: 2020-06-24

## 2020-06-20 RX ORDER — DEXAMETHASONE SODIUM PHOSPHATE 10 MG/ML
10 INJECTION, SOLUTION INTRAMUSCULAR; INTRAVENOUS ONCE
Status: COMPLETED | OUTPATIENT
Start: 2020-06-20 | End: 2020-06-20

## 2020-06-20 RX ORDER — ONDANSETRON 2 MG/ML
4 INJECTION INTRAMUSCULAR; INTRAVENOUS EVERY 6 HOURS PRN
Status: DISCONTINUED | OUTPATIENT
Start: 2020-06-20 | End: 2020-06-20

## 2020-06-20 RX ORDER — ACETAMINOPHEN 325 MG/1
650 TABLET ORAL EVERY 6 HOURS PRN
Status: DISCONTINUED | OUTPATIENT
Start: 2020-06-20 | End: 2020-06-21

## 2020-06-20 RX ORDER — HEPARIN SODIUM 5000 [USP'U]/ML
5000 INJECTION, SOLUTION INTRAVENOUS; SUBCUTANEOUS EVERY 8 HOURS SCHEDULED
Status: DISCONTINUED | OUTPATIENT
Start: 2020-06-20 | End: 2020-06-20

## 2020-06-20 RX ORDER — HEPARIN SODIUM 10000 [USP'U]/100ML
3-30 INJECTION, SOLUTION INTRAVENOUS
Status: DISCONTINUED | OUTPATIENT
Start: 2020-06-20 | End: 2020-06-20

## 2020-06-20 RX ADMIN — TICAGRELOR 90 MG: 90 TABLET ORAL at 17:36

## 2020-06-20 RX ADMIN — MORPHINE SULFATE 2 MG: 2 INJECTION, SOLUTION INTRAMUSCULAR; INTRAVENOUS at 06:41

## 2020-06-20 RX ADMIN — NITROGLYCERIN 0.4 MG: 0.4 TABLET SUBLINGUAL at 13:20

## 2020-06-20 RX ADMIN — ATORVASTATIN CALCIUM 40 MG: 40 TABLET, FILM COATED ORAL at 17:36

## 2020-06-20 RX ADMIN — TICAGRELOR 90 MG: 90 TABLET ORAL at 08:26

## 2020-06-20 RX ADMIN — NITROGLYCERIN 0.4 MG: 0.4 TABLET SUBLINGUAL at 13:26

## 2020-06-20 RX ADMIN — HEPARIN SODIUM AND DEXTROSE 18 UNITS/KG/HR: 10000; 5 INJECTION INTRAVENOUS at 06:53

## 2020-06-20 RX ADMIN — TICAGRELOR 90 MG: 90 TABLET ORAL at 02:16

## 2020-06-20 RX ADMIN — ACETAMINOPHEN 975 MG: 325 TABLET ORAL at 00:02

## 2020-06-20 RX ADMIN — ASPIRIN 81 MG 81 MG: 81 TABLET ORAL at 08:26

## 2020-06-20 RX ADMIN — COLCHICINE 0.6 MG: 0.6 TABLET, FILM COATED ORAL at 17:36

## 2020-06-20 RX ADMIN — LEVOTHYROXINE SODIUM 175 MCG: 100 TABLET ORAL at 05:13

## 2020-06-20 RX ADMIN — DEXAMETHASONE SODIUM PHOSPHATE 10 MG: 10 INJECTION, SOLUTION INTRAMUSCULAR; INTRAVENOUS at 11:24

## 2020-06-20 RX ADMIN — COLCHICINE 0.6 MG: 0.6 TABLET, FILM COATED ORAL at 11:23

## 2020-06-20 RX ADMIN — ACETAMINOPHEN 650 MG: 325 TABLET ORAL at 21:38

## 2020-06-20 RX ADMIN — MORPHINE SULFATE 2 MG: 2 INJECTION, SOLUTION INTRAMUSCULAR; INTRAVENOUS at 01:09

## 2020-06-21 PROBLEM — I30.9 ACUTE MYOPERICARDITIS: Status: ACTIVE | Noted: 2020-06-19

## 2020-06-21 LAB
ANION GAP SERPL CALCULATED.3IONS-SCNC: 9 MMOL/L (ref 4–13)
ATRIAL RATE: 76 BPM
BASOPHILS # BLD AUTO: 0.02 THOUSANDS/ΜL (ref 0–0.1)
BASOPHILS NFR BLD AUTO: 0 % (ref 0–1)
BUN SERPL-MCNC: 29 MG/DL (ref 5–25)
CALCIUM SERPL-MCNC: 7.8 MG/DL (ref 8.3–10.1)
CHLORIDE SERPL-SCNC: 102 MMOL/L (ref 100–108)
CO2 SERPL-SCNC: 27 MMOL/L (ref 21–32)
CREAT SERPL-MCNC: 1.21 MG/DL (ref 0.6–1.3)
EOSINOPHIL # BLD AUTO: 0.05 THOUSAND/ΜL (ref 0–0.61)
EOSINOPHIL NFR BLD AUTO: 1 % (ref 0–6)
ERYTHROCYTE [DISTWIDTH] IN BLOOD BY AUTOMATED COUNT: 16.1 % (ref 11.6–15.1)
GFR SERPL CREATININE-BSD FRML MDRD: 52 ML/MIN/1.73SQ M
GLUCOSE SERPL-MCNC: 101 MG/DL (ref 65–140)
HCT VFR BLD AUTO: 31.8 % (ref 34.8–46.1)
HGB BLD-MCNC: 9.9 G/DL (ref 11.5–15.4)
IMM GRANULOCYTES # BLD AUTO: 0.05 THOUSAND/UL (ref 0–0.2)
IMM GRANULOCYTES NFR BLD AUTO: 1 % (ref 0–2)
LYMPHOCYTES # BLD AUTO: 1.12 THOUSANDS/ΜL (ref 0.6–4.47)
LYMPHOCYTES NFR BLD AUTO: 15 % (ref 14–44)
MCH RBC QN AUTO: 25.8 PG (ref 26.8–34.3)
MCHC RBC AUTO-ENTMCNC: 31.1 G/DL (ref 31.4–37.4)
MCV RBC AUTO: 83 FL (ref 82–98)
MONOCYTES # BLD AUTO: 0.61 THOUSAND/ΜL (ref 0.17–1.22)
MONOCYTES NFR BLD AUTO: 8 % (ref 4–12)
NEUTROPHILS # BLD AUTO: 5.75 THOUSANDS/ΜL (ref 1.85–7.62)
NEUTS SEG NFR BLD AUTO: 75 % (ref 43–75)
NRBC BLD AUTO-RTO: 0 /100 WBCS
P AXIS: 31 DEGREES
PLATELET # BLD AUTO: 260 THOUSANDS/UL (ref 149–390)
PMV BLD AUTO: 11.4 FL (ref 8.9–12.7)
POTASSIUM SERPL-SCNC: 4 MMOL/L (ref 3.5–5.3)
PR INTERVAL: 130 MS
QRS AXIS: -1 DEGREES
QRSD INTERVAL: 88 MS
QT INTERVAL: 416 MS
QTC INTERVAL: 468 MS
RBC # BLD AUTO: 3.84 MILLION/UL (ref 3.81–5.12)
SARS-COV-2 IGG SERPL QL IA: NEGATIVE
SODIUM SERPL-SCNC: 138 MMOL/L (ref 136–145)
T WAVE AXIS: 203 DEGREES
VENTRICULAR RATE: 76 BPM
WBC # BLD AUTO: 7.6 THOUSAND/UL (ref 4.31–10.16)

## 2020-06-21 PROCEDURE — 93010 ELECTROCARDIOGRAM REPORT: CPT | Performed by: INTERNAL MEDICINE

## 2020-06-21 PROCEDURE — 85025 COMPLETE CBC W/AUTO DIFF WBC: CPT | Performed by: INTERNAL MEDICINE

## 2020-06-21 PROCEDURE — 99232 SBSQ HOSP IP/OBS MODERATE 35: CPT | Performed by: INTERNAL MEDICINE

## 2020-06-21 PROCEDURE — 80048 BASIC METABOLIC PNL TOTAL CA: CPT | Performed by: INTERNAL MEDICINE

## 2020-06-21 RX ORDER — PANTOPRAZOLE SODIUM 40 MG/1
40 TABLET, DELAYED RELEASE ORAL ONCE
Status: COMPLETED | OUTPATIENT
Start: 2020-06-21 | End: 2020-06-21

## 2020-06-21 RX ORDER — ACETAMINOPHEN 325 MG/1
650 TABLET ORAL EVERY 6 HOURS PRN
Status: DISCONTINUED | OUTPATIENT
Start: 2020-06-21 | End: 2020-06-24 | Stop reason: HOSPADM

## 2020-06-21 RX ORDER — ACETAMINOPHEN 325 MG/1
650 TABLET ORAL EVERY 6 HOURS PRN
Status: DISCONTINUED | OUTPATIENT
Start: 2020-06-21 | End: 2020-06-21

## 2020-06-21 RX ORDER — DEXAMETHASONE SODIUM PHOSPHATE 4 MG/ML
10 INJECTION, SOLUTION INTRA-ARTICULAR; INTRALESIONAL; INTRAMUSCULAR; INTRAVENOUS; SOFT TISSUE ONCE
Status: COMPLETED | OUTPATIENT
Start: 2020-06-21 | End: 2020-06-21

## 2020-06-21 RX ADMIN — ACETAMINOPHEN 650 MG: 325 TABLET ORAL at 21:33

## 2020-06-21 RX ADMIN — ATORVASTATIN CALCIUM 40 MG: 40 TABLET, FILM COATED ORAL at 17:14

## 2020-06-21 RX ADMIN — COLCHICINE 0.6 MG: 0.6 TABLET, FILM COATED ORAL at 17:14

## 2020-06-21 RX ADMIN — CARVEDILOL 6.25 MG: 6.25 TABLET, FILM COATED ORAL at 17:14

## 2020-06-21 RX ADMIN — COLCHICINE 0.6 MG: 0.6 TABLET, FILM COATED ORAL at 09:09

## 2020-06-21 RX ADMIN — TICAGRELOR 90 MG: 90 TABLET ORAL at 09:09

## 2020-06-21 RX ADMIN — CARVEDILOL 6.25 MG: 6.25 TABLET, FILM COATED ORAL at 09:09

## 2020-06-21 RX ADMIN — DOXYLAMINE SUCCINATE 12.5 MG: 25 TABLET ORAL at 00:23

## 2020-06-21 RX ADMIN — PANTOPRAZOLE SODIUM 40 MG: 40 TABLET, DELAYED RELEASE ORAL at 11:58

## 2020-06-21 RX ADMIN — LEVOTHYROXINE SODIUM 175 MCG: 100 TABLET ORAL at 05:45

## 2020-06-21 RX ADMIN — ASPIRIN 81 MG 81 MG: 81 TABLET ORAL at 09:09

## 2020-06-21 RX ADMIN — DEXAMETHASONE SODIUM PHOSPHATE 10 MG: 4 INJECTION, SOLUTION INTRAMUSCULAR; INTRAVENOUS at 11:58

## 2020-06-21 RX ADMIN — ENOXAPARIN SODIUM 40 MG: 40 INJECTION SUBCUTANEOUS at 11:58

## 2020-06-21 RX ADMIN — TICAGRELOR 90 MG: 90 TABLET ORAL at 17:14

## 2020-06-22 ENCOUNTER — APPOINTMENT (INPATIENT)
Dept: NON INVASIVE DIAGNOSTICS | Facility: HOSPITAL | Age: 55
DRG: 246 | End: 2020-06-22
Payer: COMMERCIAL

## 2020-06-22 PROBLEM — D50.9 IRON DEFICIENCY ANEMIA: Status: ACTIVE | Noted: 2020-06-22

## 2020-06-22 LAB
ANION GAP SERPL CALCULATED.3IONS-SCNC: 11 MMOL/L (ref 4–13)
BASOPHILS # BLD AUTO: 0.04 THOUSANDS/ΜL (ref 0–0.1)
BASOPHILS NFR BLD AUTO: 1 % (ref 0–1)
BUN SERPL-MCNC: 30 MG/DL (ref 5–25)
CALCIUM SERPL-MCNC: 8 MG/DL (ref 8.3–10.1)
CHLORIDE SERPL-SCNC: 103 MMOL/L (ref 100–108)
CO2 SERPL-SCNC: 24 MMOL/L (ref 21–32)
CREAT SERPL-MCNC: 1.19 MG/DL (ref 0.6–1.3)
CRP SERPL QL: 58.5 MG/L
EOSINOPHIL # BLD AUTO: 0.06 THOUSAND/ΜL (ref 0–0.61)
EOSINOPHIL NFR BLD AUTO: 1 % (ref 0–6)
ERYTHROCYTE [DISTWIDTH] IN BLOOD BY AUTOMATED COUNT: 16.3 % (ref 11.6–15.1)
GFR SERPL CREATININE-BSD FRML MDRD: 53 ML/MIN/1.73SQ M
GLUCOSE SERPL-MCNC: 93 MG/DL (ref 65–140)
HCT VFR BLD AUTO: 32.5 % (ref 34.8–46.1)
HGB BLD-MCNC: 10.1 G/DL (ref 11.5–15.4)
IMM GRANULOCYTES # BLD AUTO: 0.05 THOUSAND/UL (ref 0–0.2)
IMM GRANULOCYTES NFR BLD AUTO: 1 % (ref 0–2)
LYMPHOCYTES # BLD AUTO: 1.73 THOUSANDS/ΜL (ref 0.6–4.47)
LYMPHOCYTES NFR BLD AUTO: 22 % (ref 14–44)
MCH RBC QN AUTO: 25.8 PG (ref 26.8–34.3)
MCHC RBC AUTO-ENTMCNC: 31.1 G/DL (ref 31.4–37.4)
MCV RBC AUTO: 83 FL (ref 82–98)
MONOCYTES # BLD AUTO: 0.6 THOUSAND/ΜL (ref 0.17–1.22)
MONOCYTES NFR BLD AUTO: 8 % (ref 4–12)
NEUTROPHILS # BLD AUTO: 5.42 THOUSANDS/ΜL (ref 1.85–7.62)
NEUTS SEG NFR BLD AUTO: 67 % (ref 43–75)
NRBC BLD AUTO-RTO: 0 /100 WBCS
PLATELET # BLD AUTO: 281 THOUSANDS/UL (ref 149–390)
PMV BLD AUTO: 11.2 FL (ref 8.9–12.7)
POTASSIUM SERPL-SCNC: 4 MMOL/L (ref 3.5–5.3)
RBC # BLD AUTO: 3.92 MILLION/UL (ref 3.81–5.12)
SODIUM SERPL-SCNC: 138 MMOL/L (ref 136–145)
WBC # BLD AUTO: 7.9 THOUSAND/UL (ref 4.31–10.16)

## 2020-06-22 PROCEDURE — 99232 SBSQ HOSP IP/OBS MODERATE 35: CPT | Performed by: INTERNAL MEDICINE

## 2020-06-22 PROCEDURE — 80048 BASIC METABOLIC PNL TOTAL CA: CPT | Performed by: INTERNAL MEDICINE

## 2020-06-22 PROCEDURE — 86140 C-REACTIVE PROTEIN: CPT | Performed by: INTERNAL MEDICINE

## 2020-06-22 PROCEDURE — 93325 DOPPLER ECHO COLOR FLOW MAPG: CPT | Performed by: INTERNAL MEDICINE

## 2020-06-22 PROCEDURE — 93308 TTE F-UP OR LMTD: CPT | Performed by: INTERNAL MEDICINE

## 2020-06-22 PROCEDURE — 93321 DOPPLER ECHO F-UP/LMTD STD: CPT | Performed by: INTERNAL MEDICINE

## 2020-06-22 PROCEDURE — 85025 COMPLETE CBC W/AUTO DIFF WBC: CPT | Performed by: INTERNAL MEDICINE

## 2020-06-22 PROCEDURE — 93308 TTE F-UP OR LMTD: CPT

## 2020-06-22 RX ADMIN — ACETAMINOPHEN 650 MG: 325 TABLET ORAL at 22:13

## 2020-06-22 RX ADMIN — ATORVASTATIN CALCIUM 40 MG: 40 TABLET, FILM COATED ORAL at 18:10

## 2020-06-22 RX ADMIN — TICAGRELOR 90 MG: 90 TABLET ORAL at 09:41

## 2020-06-22 RX ADMIN — COLCHICINE 0.6 MG: 0.6 TABLET, FILM COATED ORAL at 18:10

## 2020-06-22 RX ADMIN — CARVEDILOL 6.25 MG: 6.25 TABLET, FILM COATED ORAL at 18:10

## 2020-06-22 RX ADMIN — ENOXAPARIN SODIUM 40 MG: 40 INJECTION SUBCUTANEOUS at 09:41

## 2020-06-22 RX ADMIN — ASPIRIN 81 MG 81 MG: 81 TABLET ORAL at 09:41

## 2020-06-22 RX ADMIN — COLCHICINE 0.6 MG: 0.6 TABLET, FILM COATED ORAL at 09:40

## 2020-06-22 RX ADMIN — TICAGRELOR 90 MG: 90 TABLET ORAL at 18:10

## 2020-06-22 RX ADMIN — DOXYLAMINE SUCCINATE 12.5 MG: 25 TABLET ORAL at 00:02

## 2020-06-22 RX ADMIN — CARVEDILOL 6.25 MG: 6.25 TABLET, FILM COATED ORAL at 09:40

## 2020-06-22 RX ADMIN — LEVOTHYROXINE SODIUM 175 MCG: 100 TABLET ORAL at 06:14

## 2020-06-23 ENCOUNTER — APPOINTMENT (INPATIENT)
Dept: NON INVASIVE DIAGNOSTICS | Facility: HOSPITAL | Age: 55
DRG: 246 | End: 2020-06-23
Payer: COMMERCIAL

## 2020-06-23 ENCOUNTER — APPOINTMENT (INPATIENT)
Dept: CT IMAGING | Facility: HOSPITAL | Age: 55
DRG: 246 | End: 2020-06-23
Payer: COMMERCIAL

## 2020-06-23 LAB — D DIMER PPP FEU-MCNC: 1.82 UG/ML FEU

## 2020-06-23 PROCEDURE — 71275 CT ANGIOGRAPHY CHEST: CPT

## 2020-06-23 PROCEDURE — 85379 FIBRIN DEGRADATION QUANT: CPT

## 2020-06-23 PROCEDURE — 93970 EXTREMITY STUDY: CPT | Performed by: SURGERY

## 2020-06-23 PROCEDURE — 93970 EXTREMITY STUDY: CPT

## 2020-06-23 PROCEDURE — 99232 SBSQ HOSP IP/OBS MODERATE 35: CPT | Performed by: INTERNAL MEDICINE

## 2020-06-23 PROCEDURE — 99232 SBSQ HOSP IP/OBS MODERATE 35: CPT

## 2020-06-23 RX ORDER — DOXYCYCLINE HYCLATE 50 MG/1
324 CAPSULE, GELATIN COATED ORAL
Status: DISCONTINUED | OUTPATIENT
Start: 2020-06-23 | End: 2020-06-24 | Stop reason: HOSPADM

## 2020-06-23 RX ORDER — CARVEDILOL 12.5 MG/1
12.5 TABLET ORAL 2 TIMES DAILY WITH MEALS
Status: DISCONTINUED | OUTPATIENT
Start: 2020-06-23 | End: 2020-06-24 | Stop reason: HOSPADM

## 2020-06-23 RX ADMIN — LEVOTHYROXINE SODIUM 175 MCG: 100 TABLET ORAL at 06:02

## 2020-06-23 RX ADMIN — CARVEDILOL 6.25 MG: 6.25 TABLET, FILM COATED ORAL at 09:01

## 2020-06-23 RX ADMIN — COLCHICINE 0.6 MG: 0.6 TABLET, FILM COATED ORAL at 16:37

## 2020-06-23 RX ADMIN — TICAGRELOR 90 MG: 90 TABLET ORAL at 16:37

## 2020-06-23 RX ADMIN — ACETAMINOPHEN 650 MG: 325 TABLET ORAL at 22:23

## 2020-06-23 RX ADMIN — FERROUS GLUCONATE 324 MG: 324 TABLET ORAL at 16:36

## 2020-06-23 RX ADMIN — CARVEDILOL 12.5 MG: 12.5 TABLET, FILM COATED ORAL at 16:36

## 2020-06-23 RX ADMIN — COLCHICINE 0.6 MG: 0.6 TABLET, FILM COATED ORAL at 09:01

## 2020-06-23 RX ADMIN — DOXYLAMINE SUCCINATE 12.5 MG: 25 TABLET ORAL at 22:23

## 2020-06-23 RX ADMIN — TICAGRELOR 90 MG: 90 TABLET ORAL at 09:01

## 2020-06-23 RX ADMIN — ASPIRIN 81 MG 81 MG: 81 TABLET ORAL at 09:02

## 2020-06-23 RX ADMIN — IOHEXOL 80 ML: 350 INJECTION, SOLUTION INTRAVENOUS at 11:52

## 2020-06-23 RX ADMIN — ENOXAPARIN SODIUM 40 MG: 40 INJECTION SUBCUTANEOUS at 09:01

## 2020-06-23 RX ADMIN — ATORVASTATIN CALCIUM 40 MG: 40 TABLET, FILM COATED ORAL at 16:36

## 2020-06-24 VITALS
RESPIRATION RATE: 20 BRPM | SYSTOLIC BLOOD PRESSURE: 123 MMHG | TEMPERATURE: 98.7 F | BODY MASS INDEX: 23.25 KG/M2 | HEIGHT: 65 IN | HEART RATE: 81 BPM | DIASTOLIC BLOOD PRESSURE: 69 MMHG | WEIGHT: 139.55 LBS | OXYGEN SATURATION: 96 %

## 2020-06-24 PROCEDURE — 99239 HOSP IP/OBS DSCHRG MGMT >30: CPT | Performed by: INTERNAL MEDICINE

## 2020-06-24 PROCEDURE — 99232 SBSQ HOSP IP/OBS MODERATE 35: CPT | Performed by: INTERNAL MEDICINE

## 2020-06-24 RX ORDER — DOXYCYCLINE HYCLATE 50 MG/1
324 CAPSULE, GELATIN COATED ORAL
Qty: 60 TABLET | Refills: 0 | Status: SHIPPED | OUTPATIENT
Start: 2020-06-24 | End: 2020-08-13 | Stop reason: SDUPTHER

## 2020-06-24 RX ORDER — CARVEDILOL 12.5 MG/1
12.5 TABLET ORAL 2 TIMES DAILY WITH MEALS
Qty: 60 TABLET | Refills: 0 | Status: SHIPPED | OUTPATIENT
Start: 2020-06-24 | End: 2021-07-17 | Stop reason: HOSPADM

## 2020-06-24 RX ORDER — COLCHICINE 0.6 MG/1
0.6 TABLET ORAL 2 TIMES DAILY
Qty: 60 TABLET | Refills: 0 | Status: SHIPPED | OUTPATIENT
Start: 2020-06-24 | End: 2020-07-10

## 2020-06-24 RX ADMIN — TICAGRELOR 90 MG: 90 TABLET ORAL at 08:18

## 2020-06-24 RX ADMIN — FERROUS GLUCONATE 324 MG: 324 TABLET ORAL at 06:27

## 2020-06-24 RX ADMIN — ASPIRIN 81 MG 81 MG: 81 TABLET ORAL at 08:17

## 2020-06-24 RX ADMIN — ENOXAPARIN SODIUM 40 MG: 40 INJECTION SUBCUTANEOUS at 08:17

## 2020-06-24 RX ADMIN — CARVEDILOL 12.5 MG: 12.5 TABLET, FILM COATED ORAL at 08:17

## 2020-06-24 RX ADMIN — LEVOTHYROXINE SODIUM 175 MCG: 100 TABLET ORAL at 06:27

## 2020-06-24 RX ADMIN — COLCHICINE 0.6 MG: 0.6 TABLET, FILM COATED ORAL at 08:17

## 2020-06-25 ENCOUNTER — TRANSITIONAL CARE MANAGEMENT (OUTPATIENT)
Dept: FAMILY MEDICINE CLINIC | Facility: CLINIC | Age: 55
End: 2020-06-25

## 2020-06-27 ENCOUNTER — APPOINTMENT (EMERGENCY)
Dept: RADIOLOGY | Facility: HOSPITAL | Age: 55
End: 2020-06-27
Payer: COMMERCIAL

## 2020-06-27 ENCOUNTER — APPOINTMENT (EMERGENCY)
Dept: CT IMAGING | Facility: HOSPITAL | Age: 55
End: 2020-06-27
Payer: COMMERCIAL

## 2020-06-27 ENCOUNTER — HOSPITAL ENCOUNTER (OUTPATIENT)
Facility: HOSPITAL | Age: 55
Setting detail: OBSERVATION
Discharge: HOME | End: 2020-06-28
Attending: EMERGENCY MEDICINE | Admitting: INTERNAL MEDICINE
Payer: COMMERCIAL

## 2020-06-27 DIAGNOSIS — R51.9 HEADACHE: ICD-10-CM

## 2020-06-27 DIAGNOSIS — I25.5 ISCHEMIC CARDIOMYOPATHY: ICD-10-CM

## 2020-06-27 DIAGNOSIS — R07.9 CHEST PAIN, RULE OUT ACUTE MYOCARDIAL INFARCTION: Primary | ICD-10-CM

## 2020-06-27 DIAGNOSIS — N17.9 AKI (ACUTE KIDNEY INJURY) (HCC): ICD-10-CM

## 2020-06-27 DIAGNOSIS — R55 SYNCOPE: ICD-10-CM

## 2020-06-27 DIAGNOSIS — R93.89 ABNORMAL CHEST CT: ICD-10-CM

## 2020-06-27 PROBLEM — R79.89 ELEVATED D-DIMER: Status: ACTIVE | Noted: 2020-06-27

## 2020-06-27 PROBLEM — E83.51 HYPOCALCEMIA: Status: ACTIVE | Noted: 2020-06-27

## 2020-06-27 LAB
ANION GAP SERPL CALCULATED.3IONS-SCNC: 12 MMOL/L (ref 4–13)
BASOPHILS # BLD AUTO: 0.05 THOUSANDS/ΜL (ref 0–0.1)
BASOPHILS NFR BLD AUTO: 1 % (ref 0–1)
BUN SERPL-MCNC: 27 MG/DL (ref 5–25)
CALCIUM SERPL-MCNC: 7.7 MG/DL (ref 8.3–10.1)
CHLORIDE SERPL-SCNC: 104 MMOL/L (ref 100–108)
CO2 SERPL-SCNC: 24 MMOL/L (ref 21–32)
CREAT SERPL-MCNC: 1.75 MG/DL (ref 0.6–1.3)
D DIMER PPP FEU-MCNC: 7.91 UG/ML FEU
EOSINOPHIL # BLD AUTO: 0.14 THOUSAND/ΜL (ref 0–0.61)
EOSINOPHIL NFR BLD AUTO: 2 % (ref 0–6)
ERYTHROCYTE [DISTWIDTH] IN BLOOD BY AUTOMATED COUNT: 16.4 % (ref 11.6–15.1)
GFR SERPL CREATININE-BSD FRML MDRD: 33 ML/MIN/1.73SQ M
GLUCOSE SERPL-MCNC: 118 MG/DL (ref 65–140)
HCT VFR BLD AUTO: 32.8 % (ref 34.8–46.1)
HGB BLD-MCNC: 9.9 G/DL (ref 11.5–15.4)
IMM GRANULOCYTES # BLD AUTO: 0.13 THOUSAND/UL (ref 0–0.2)
IMM GRANULOCYTES NFR BLD AUTO: 2 % (ref 0–2)
LYMPHOCYTES # BLD AUTO: 1.13 THOUSANDS/ΜL (ref 0.6–4.47)
LYMPHOCYTES NFR BLD AUTO: 16 % (ref 14–44)
MCH RBC QN AUTO: 25.7 PG (ref 26.8–34.3)
MCHC RBC AUTO-ENTMCNC: 30.2 G/DL (ref 31.4–37.4)
MCV RBC AUTO: 85 FL (ref 82–98)
MONOCYTES # BLD AUTO: 0.54 THOUSAND/ΜL (ref 0.17–1.22)
MONOCYTES NFR BLD AUTO: 8 % (ref 4–12)
NEUTROPHILS # BLD AUTO: 5.04 THOUSANDS/ΜL (ref 1.85–7.62)
NEUTS SEG NFR BLD AUTO: 71 % (ref 43–75)
NRBC BLD AUTO-RTO: 0 /100 WBCS
NT-PROBNP SERPL-MCNC: 3370 PG/ML
PLATELET # BLD AUTO: 272 THOUSANDS/UL (ref 149–390)
PMV BLD AUTO: 10.3 FL (ref 8.9–12.7)
POTASSIUM SERPL-SCNC: 4.3 MMOL/L (ref 3.5–5.3)
RBC # BLD AUTO: 3.85 MILLION/UL (ref 3.81–5.12)
SODIUM SERPL-SCNC: 140 MMOL/L (ref 136–145)
TROPONIN I SERPL-MCNC: 0.26 NG/ML
TROPONIN I SERPL-MCNC: 0.26 NG/ML
WBC # BLD AUTO: 7.03 THOUSAND/UL (ref 4.31–10.16)

## 2020-06-27 PROCEDURE — 96361 HYDRATE IV INFUSION ADD-ON: CPT

## 2020-06-27 PROCEDURE — 84484 ASSAY OF TROPONIN QUANT: CPT | Performed by: PHYSICIAN ASSISTANT

## 2020-06-27 PROCEDURE — 85379 FIBRIN DEGRADATION QUANT: CPT | Performed by: EMERGENCY MEDICINE

## 2020-06-27 PROCEDURE — 36415 COLL VENOUS BLD VENIPUNCTURE: CPT | Performed by: EMERGENCY MEDICINE

## 2020-06-27 PROCEDURE — 80048 BASIC METABOLIC PNL TOTAL CA: CPT | Performed by: EMERGENCY MEDICINE

## 2020-06-27 PROCEDURE — 93005 ELECTROCARDIOGRAM TRACING: CPT

## 2020-06-27 PROCEDURE — 85025 COMPLETE CBC W/AUTO DIFF WBC: CPT | Performed by: EMERGENCY MEDICINE

## 2020-06-27 PROCEDURE — 99220 PR INITIAL OBSERVATION CARE/DAY 70 MINUTES: CPT | Performed by: PHYSICIAN ASSISTANT

## 2020-06-27 PROCEDURE — 71275 CT ANGIOGRAPHY CHEST: CPT

## 2020-06-27 PROCEDURE — 99285 EMERGENCY DEPT VISIT HI MDM: CPT | Performed by: EMERGENCY MEDICINE

## 2020-06-27 PROCEDURE — 99285 EMERGENCY DEPT VISIT HI MDM: CPT

## 2020-06-27 PROCEDURE — 83880 ASSAY OF NATRIURETIC PEPTIDE: CPT | Performed by: EMERGENCY MEDICINE

## 2020-06-27 PROCEDURE — 96374 THER/PROPH/DIAG INJ IV PUSH: CPT

## 2020-06-27 PROCEDURE — 84484 ASSAY OF TROPONIN QUANT: CPT | Performed by: EMERGENCY MEDICINE

## 2020-06-27 PROCEDURE — 96375 TX/PRO/DX INJ NEW DRUG ADDON: CPT

## 2020-06-27 PROCEDURE — 70450 CT HEAD/BRAIN W/O DYE: CPT

## 2020-06-27 RX ORDER — ONDANSETRON 2 MG/ML
4 INJECTION INTRAMUSCULAR; INTRAVENOUS ONCE
Status: COMPLETED | OUTPATIENT
Start: 2020-06-27 | End: 2020-06-27

## 2020-06-27 RX ORDER — ATORVASTATIN CALCIUM 40 MG/1
40 TABLET, FILM COATED ORAL EVERY EVENING
Status: DISCONTINUED | OUTPATIENT
Start: 2020-06-28 | End: 2020-06-28 | Stop reason: HOSPADM

## 2020-06-27 RX ORDER — ASPIRIN 325 MG
325 TABLET ORAL ONCE
Status: COMPLETED | OUTPATIENT
Start: 2020-06-27 | End: 2020-06-27

## 2020-06-27 RX ORDER — SODIUM CHLORIDE 9 MG/ML
75 INJECTION, SOLUTION INTRAVENOUS CONTINUOUS
Status: DISPENSED | OUTPATIENT
Start: 2020-06-27 | End: 2020-06-28

## 2020-06-27 RX ORDER — LANOLIN ALCOHOL/MO/W.PET/CERES
6 CREAM (GRAM) TOPICAL
Status: DISCONTINUED | OUTPATIENT
Start: 2020-06-27 | End: 2020-06-27

## 2020-06-27 RX ORDER — CARVEDILOL 12.5 MG/1
12.5 TABLET ORAL 2 TIMES DAILY WITH MEALS
Status: DISCONTINUED | OUTPATIENT
Start: 2020-06-28 | End: 2020-06-28

## 2020-06-27 RX ORDER — HEPARIN SODIUM 5000 [USP'U]/ML
5000 INJECTION, SOLUTION INTRAVENOUS; SUBCUTANEOUS EVERY 8 HOURS SCHEDULED
Status: DISCONTINUED | OUTPATIENT
Start: 2020-06-27 | End: 2020-06-28 | Stop reason: HOSPADM

## 2020-06-27 RX ORDER — MORPHINE SULFATE 4 MG/ML
4 INJECTION, SOLUTION INTRAMUSCULAR; INTRAVENOUS ONCE
Status: COMPLETED | OUTPATIENT
Start: 2020-06-27 | End: 2020-06-27

## 2020-06-27 RX ORDER — ASPIRIN 81 MG/1
81 TABLET, CHEWABLE ORAL DAILY
Status: DISCONTINUED | OUTPATIENT
Start: 2020-06-28 | End: 2020-06-28 | Stop reason: HOSPADM

## 2020-06-27 RX ORDER — NITROGLYCERIN 0.4 MG/1
0.4 TABLET SUBLINGUAL
Status: DISCONTINUED | OUTPATIENT
Start: 2020-06-27 | End: 2020-06-28 | Stop reason: HOSPADM

## 2020-06-27 RX ORDER — ACETAMINOPHEN 325 MG/1
650 TABLET ORAL EVERY 6 HOURS PRN
Status: DISCONTINUED | OUTPATIENT
Start: 2020-06-27 | End: 2020-06-28 | Stop reason: HOSPADM

## 2020-06-27 RX ORDER — COLCHICINE 0.6 MG/1
0.6 TABLET ORAL 2 TIMES DAILY
Status: DISCONTINUED | OUTPATIENT
Start: 2020-06-27 | End: 2020-06-28 | Stop reason: HOSPADM

## 2020-06-27 RX ORDER — ONDANSETRON 2 MG/ML
4 INJECTION INTRAMUSCULAR; INTRAVENOUS EVERY 6 HOURS PRN
Status: DISCONTINUED | OUTPATIENT
Start: 2020-06-27 | End: 2020-06-28 | Stop reason: HOSPADM

## 2020-06-27 RX ORDER — LOSARTAN POTASSIUM 25 MG/1
25 TABLET ORAL DAILY
Status: DISCONTINUED | OUTPATIENT
Start: 2020-06-28 | End: 2020-06-28

## 2020-06-27 RX ADMIN — SODIUM CHLORIDE 500 ML: 0.9 INJECTION, SOLUTION INTRAVENOUS at 17:23

## 2020-06-27 RX ADMIN — DOXYLAMINE SUCCINATE 25 MG: 25 TABLET ORAL at 23:57

## 2020-06-27 RX ADMIN — COLCHICINE 0.6 MG: 0.6 TABLET, FILM COATED ORAL at 22:02

## 2020-06-27 RX ADMIN — MORPHINE SULFATE 4 MG: 4 INJECTION INTRAVENOUS at 16:01

## 2020-06-27 RX ADMIN — TICAGRELOR 90 MG: 90 TABLET ORAL at 22:02

## 2020-06-27 RX ADMIN — SODIUM CHLORIDE 75 ML/HR: 0.9 INJECTION, SOLUTION INTRAVENOUS at 22:04

## 2020-06-27 RX ADMIN — ASPIRIN 325 MG ORAL TABLET 325 MG: 325 PILL ORAL at 17:56

## 2020-06-27 RX ADMIN — IOHEXOL 85 ML: 350 INJECTION, SOLUTION INTRAVENOUS at 17:16

## 2020-06-27 RX ADMIN — ACETAMINOPHEN 650 MG: 325 TABLET ORAL at 23:57

## 2020-06-27 RX ADMIN — HEPARIN SODIUM 5000 UNITS: 5000 INJECTION INTRAVENOUS; SUBCUTANEOUS at 22:03

## 2020-06-27 RX ADMIN — ONDANSETRON 4 MG: 2 INJECTION INTRAMUSCULAR; INTRAVENOUS at 16:00

## 2020-06-28 VITALS
TEMPERATURE: 98 F | RESPIRATION RATE: 18 BRPM | BODY MASS INDEX: 23.21 KG/M2 | HEART RATE: 86 BPM | WEIGHT: 139.33 LBS | SYSTOLIC BLOOD PRESSURE: 114 MMHG | DIASTOLIC BLOOD PRESSURE: 63 MMHG | HEIGHT: 65 IN | OXYGEN SATURATION: 96 %

## 2020-06-28 LAB
25(OH)D3 SERPL-MCNC: 106.3 NG/ML (ref 30–100)
ANION GAP SERPL CALCULATED.3IONS-SCNC: 13 MMOL/L (ref 4–13)
ATRIAL RATE: 66 BPM
BUN SERPL-MCNC: 27 MG/DL (ref 5–25)
CA-I BLD-SCNC: 0.94 MMOL/L (ref 1.12–1.32)
CALCIUM SERPL-MCNC: 6.9 MG/DL (ref 8.3–10.1)
CHLORIDE SERPL-SCNC: 105 MMOL/L (ref 100–108)
CO2 SERPL-SCNC: 22 MMOL/L (ref 21–32)
CREAT SERPL-MCNC: 1.59 MG/DL (ref 0.6–1.3)
GFR SERPL CREATININE-BSD FRML MDRD: 37 ML/MIN/1.73SQ M
GLUCOSE SERPL-MCNC: 88 MG/DL (ref 65–140)
P AXIS: 26 DEGREES
POTASSIUM SERPL-SCNC: 3.7 MMOL/L (ref 3.5–5.3)
PR INTERVAL: 124 MS
PTH-INTACT SERPL-MCNC: 14 PG/ML (ref 18.4–80.1)
QRS AXIS: -2 DEGREES
QRSD INTERVAL: 92 MS
QT INTERVAL: 426 MS
QTC INTERVAL: 446 MS
SODIUM SERPL-SCNC: 140 MMOL/L (ref 136–145)
T WAVE AXIS: 197 DEGREES
VENTRICULAR RATE: 66 BPM

## 2020-06-28 PROCEDURE — 93010 ELECTROCARDIOGRAM REPORT: CPT | Performed by: INTERNAL MEDICINE

## 2020-06-28 PROCEDURE — 80048 BASIC METABOLIC PNL TOTAL CA: CPT | Performed by: PHYSICIAN ASSISTANT

## 2020-06-28 PROCEDURE — 82306 VITAMIN D 25 HYDROXY: CPT | Performed by: PHYSICIAN ASSISTANT

## 2020-06-28 PROCEDURE — 99217 PR OBSERVATION CARE DISCHARGE MANAGEMENT: CPT | Performed by: FAMILY MEDICINE

## 2020-06-28 PROCEDURE — 99204 OFFICE O/P NEW MOD 45 MIN: CPT | Performed by: INTERNAL MEDICINE

## 2020-06-28 PROCEDURE — 82330 ASSAY OF CALCIUM: CPT | Performed by: PHYSICIAN ASSISTANT

## 2020-06-28 PROCEDURE — 83970 ASSAY OF PARATHORMONE: CPT | Performed by: PHYSICIAN ASSISTANT

## 2020-06-28 RX ORDER — CARVEDILOL 12.5 MG/1
12.5 TABLET ORAL 2 TIMES DAILY WITH MEALS
Status: DISCONTINUED | OUTPATIENT
Start: 2020-06-28 | End: 2020-06-28 | Stop reason: HOSPADM

## 2020-06-28 RX ADMIN — LEVOTHYROXINE SODIUM 175 MCG: 100 TABLET ORAL at 05:36

## 2020-06-28 RX ADMIN — LOSARTAN POTASSIUM 25 MG: 25 TABLET, FILM COATED ORAL at 08:26

## 2020-06-28 RX ADMIN — ASPIRIN 81 MG 81 MG: 81 TABLET ORAL at 08:27

## 2020-06-28 RX ADMIN — COLCHICINE 0.6 MG: 0.6 TABLET, FILM COATED ORAL at 08:26

## 2020-06-28 RX ADMIN — CARVEDILOL 12.5 MG: 12.5 TABLET, FILM COATED ORAL at 08:27

## 2020-06-28 RX ADMIN — HEPARIN SODIUM 5000 UNITS: 5000 INJECTION INTRAVENOUS; SUBCUTANEOUS at 05:36

## 2020-06-28 RX ADMIN — ACETAMINOPHEN 650 MG: 325 TABLET ORAL at 13:40

## 2020-06-28 RX ADMIN — TICAGRELOR 90 MG: 90 TABLET ORAL at 08:27

## 2020-07-10 ENCOUNTER — OFFICE VISIT (OUTPATIENT)
Dept: CARDIOLOGY CLINIC | Facility: CLINIC | Age: 55
End: 2020-07-10
Payer: COMMERCIAL

## 2020-07-10 VITALS
HEIGHT: 65 IN | OXYGEN SATURATION: 99 % | HEART RATE: 90 BPM | BODY MASS INDEX: 22.73 KG/M2 | WEIGHT: 136.4 LBS | SYSTOLIC BLOOD PRESSURE: 114 MMHG | DIASTOLIC BLOOD PRESSURE: 62 MMHG | TEMPERATURE: 98.2 F

## 2020-07-10 DIAGNOSIS — I25.5 ISCHEMIC CARDIOMYOPATHY: ICD-10-CM

## 2020-07-10 DIAGNOSIS — I50.22 CHRONIC SYSTOLIC CONGESTIVE HEART FAILURE (HCC): Primary | ICD-10-CM

## 2020-07-10 DIAGNOSIS — I25.10 CORONARY ARTERY DISEASE INVOLVING NATIVE CORONARY ARTERY OF NATIVE HEART WITHOUT ANGINA PECTORIS: ICD-10-CM

## 2020-07-10 DIAGNOSIS — R55 SYNCOPE, UNSPECIFIED SYNCOPE TYPE: ICD-10-CM

## 2020-07-10 DIAGNOSIS — I30.9 ACUTE MYOPERICARDITIS: ICD-10-CM

## 2020-07-10 DIAGNOSIS — N17.9 AKI (ACUTE KIDNEY INJURY) (HCC): ICD-10-CM

## 2020-07-10 LAB
ATRIAL RATE: 105 BPM
P AXIS: 41 DEGREES
PR INTERVAL: 132 MS
QRS AXIS: -33 DEGREES
QRSD INTERVAL: 156 MS
QT INTERVAL: 424 MS
QTC INTERVAL: 560 MS
T WAVE AXIS: 127 DEGREES
VENTRICULAR RATE: 105 BPM

## 2020-07-10 PROCEDURE — 1036F TOBACCO NON-USER: CPT | Performed by: INTERNAL MEDICINE

## 2020-07-10 PROCEDURE — 93010 ELECTROCARDIOGRAM REPORT: CPT | Performed by: INTERNAL MEDICINE

## 2020-07-10 PROCEDURE — 1111F DSCHRG MED/CURRENT MED MERGE: CPT | Performed by: INTERNAL MEDICINE

## 2020-07-10 PROCEDURE — 3008F BODY MASS INDEX DOCD: CPT | Performed by: INTERNAL MEDICINE

## 2020-07-10 PROCEDURE — 99214 OFFICE O/P EST MOD 30 MIN: CPT | Performed by: INTERNAL MEDICINE

## 2020-07-10 RX ORDER — ATORVASTATIN CALCIUM 80 MG/1
80 TABLET, FILM COATED ORAL DAILY
Qty: 90 TABLET | Refills: 2 | Status: SHIPPED | OUTPATIENT
Start: 2020-07-10 | End: 2020-07-10

## 2020-07-10 RX ORDER — FUROSEMIDE 20 MG/1
TABLET ORAL
Qty: 90 TABLET | Refills: 1 | Status: SHIPPED | OUTPATIENT
Start: 2020-07-10 | End: 2021-01-29 | Stop reason: SDUPTHER

## 2020-07-10 RX ORDER — COLCHICINE 0.6 MG/1
0.6 TABLET ORAL 2 TIMES DAILY
Qty: 60 TABLET | Refills: 1 | Status: SHIPPED | OUTPATIENT
Start: 2020-07-10 | End: 2021-07-17 | Stop reason: HOSPADM

## 2020-07-10 RX ORDER — COLCHICINE 0.6 MG/1
0.6 TABLET ORAL 2 TIMES DAILY
Qty: 60 TABLET | Refills: 1 | Status: SHIPPED | OUTPATIENT
Start: 2020-07-10 | End: 2020-07-10

## 2020-07-10 RX ORDER — ATORVASTATIN CALCIUM 40 MG/1
40 TABLET, FILM COATED ORAL DAILY
Qty: 90 TABLET | Refills: 2 | Status: SHIPPED | OUTPATIENT
Start: 2020-07-10 | End: 2021-07-17 | Stop reason: HOSPADM

## 2020-07-10 NOTE — PROGRESS NOTES
Cardiology Office Note  MD Yvrose Donis MD Estanislado Langdon, DO, MD Mariajose Reid DO, Alban Parmar DO, [facility] [place]  ----------------------------------------------------------------  [address] [address]    Trenton Gooden 50 y o  female MRN: [id number]  Unit/Bed#:  Encounter: [id number]      History of Present Illness: It was a please to see Trenton Gooden in the office today for follow-up CV evaluation  She was recently hospitalized in June of 2020 for significant shortness of breath and bilateral airspace disease  The patient was found to be in acute heart failure  She underwent aggressive IV diuresis  Echocardiogram showed have severe left ventricular dysfunction  She has a known history of left ventricular dysfunction with recovered LV function before in the past   Her EF was now down to 25%  She underwent cardiac catheterization and was found to have LAD disease  She received 3 stents to the LAD  Subsequently, she was discharged home  She then came back with episodes of chest discomfort  The chest discomfort she was experiencing was pleuritic in nature  Her symptoms markedly improved on colchicine  She was treated as possible myopericarditis  Once her symptoms of chest discomfort were resolved, she was discharged home  Several days later, she had experienced some lightheadedness and dizziness with loss of consciousness  She came in with orthostatic hypotension  Creatinine was as high as 1 7  IV fluids were given and she had improvement in her creatinine  Lasix was decreased to 20 mg every other day and she was discharged home  Since discharge, she denies any further lightheadedness or dizziness or loss of consciousness  Denies lower extremity swelling, orthopnea or paroxysmal nocturnal dyspnea  She states she is back to her usual state of health    At discharge, she was discharged with a life vest and has been using it appropriately  Denies any shocks  She has been ambulating without any problems  She has plan for cardiac rehab in the near future  Review of Systems:  Review of Systems   Constitution: Negative for decreased appetite, fever, weight gain and weight loss  HENT: Negative for congestion and sore throat  Eyes: Negative for visual disturbance  Cardiovascular: Negative for chest pain, dyspnea on exertion, leg swelling, near-syncope and palpitations  Respiratory: Negative for cough and shortness of breath  Hematologic/Lymphatic: Negative for bleeding problem  Skin: Negative for rash  Musculoskeletal: Negative for myalgias and neck pain  Gastrointestinal: Negative for abdominal pain and nausea  Neurological: Negative for light-headedness and weakness  Psychiatric/Behavioral: Negative for depression  Past Medical History:   Diagnosis Date    Allergies     Disease of thyroid gland     hypo       Past Surgical History:   Procedure Laterality Date    CARDIAC CATHETERIZATION       SECTION      x2    GA OPEN TX RADIAL & ULNAR SHAFT FX FIX RADIUS AND ULNA Left 6/3/2020    Procedure: Open reduction internal fixation left distal radius fracture;  Surgeon: Gabriele Larios MD;  Location:  MAIN OR;  Service: Orthopedics    TOTAL THYROIDECTOMY      TUBAL LIGATION         Social History     Socioeconomic History    Marital status:      Spouse name: Not on file    Number of children: Not on file    Years of education: Not on file    Highest education level: Not on file   Occupational History    Not on file   Social Needs    Financial resource strain: Not on file    Food insecurity:     Worry: Not on file     Inability: Not on file    Transportation needs:     Medical: Not on file     Non-medical: Not on file   Tobacco Use    Smoking status: Never Smoker    Smokeless tobacco: Never Used   Substance and Sexual Activity    Alcohol use:  Yes Frequency: Monthly or less     Binge frequency: Never     Comment: only on special ocassions       Drug use: Never    Sexual activity: Not on file   Lifestyle    Physical activity:     Days per week: Not on file     Minutes per session: Not on file    Stress: Not on file   Relationships    Social connections:     Talks on phone: Not on file     Gets together: Not on file     Attends Jewish service: Not on file     Active member of club or organization: Not on file     Attends meetings of clubs or organizations: Not on file     Relationship status: Not on file    Intimate partner violence:     Fear of current or ex partner: Not on file     Emotionally abused: Not on file     Physically abused: Not on file     Forced sexual activity: Not on file   Other Topics Concern    Not on file   Social History Narrative    Not on file       Family History   Problem Relation Age of Onset    No Known Problems Mother     No Known Problems Father     Cancer Family        Allergies   Allergen Reactions    Penicillins Rash         Current Outpatient Medications:     acetaminophen (TYLENOL) 325 mg tablet, Take 2 tablets (650 mg total) by mouth every 4 (four) hours as needed for mild pain, headaches or fever, Disp: 30 tablet, Rfl: 0    aspirin 81 mg chewable tablet, Chew 1 tablet (81 mg total) daily, Disp: 30 tablet, Rfl: 0    atorvastatin (LIPITOR) 40 mg tablet, Take 1 tablet (40 mg total) by mouth daily, Disp: 30 tablet, Rfl: 0    carvedilol (COREG) 12 5 mg tablet, Take 1 tablet (12 5 mg total) by mouth 2 (two) times a day with meals, Disp: 60 tablet, Rfl: 0    colchicine (COLCRYS) 0 6 mg tablet, Take 1 tablet (0 6 mg total) by mouth 2 (two) times a day, Disp: 60 tablet, Rfl: 0    ferrous gluconate (FERGON) 324 mg tablet, Take 1 tablet (324 mg total) by mouth 2 (two) times a day before meals, Disp: 60 tablet, Rfl: 0    furosemide (LASIX) 20 mg tablet, Take 1 tablet (20 mg total) by mouth daily, Disp: 30 tablet, Rfl: 0    levothyroxine (Synthroid) 175 mcg tablet, Take 1 tablet (175 mcg total) by mouth daily in the early morning, Disp: 30 tablet, Rfl: 0    nitroglycerin (NITROSTAT) 0 4 mg SL tablet, Place 1 tablet (0 4 mg total) under the tongue every 5 (five) minutes as needed for chest pain, Disp: 30 tablet, Rfl: 0    ticagrelor (BRILINTA) 90 MG, Take 1 tablet (90 mg total) by mouth 2 (two) times a day, Disp: 60 tablet, Rfl: 0    Vitals:    07/10/20 0959   BP: 114/62   Pulse: 90   Temp: 98 2 °F (36 8 °C)   SpO2: 99%   Weight: 61 9 kg (136 lb 6 4 oz)   Height: 5' 5" (1 651 m)       PHYSICAL EXAMINATION:  Gen: Awake, Alert, NAD    HEENT: AT/NC, Anicteric, mmm  Neck: Supple, No elevated JVP  Resp: CTA bilaterally no w/r/r  CV: RRR +S1, S2, No m/r/g; wearing life vest  Abd: Soft, NT/ND + BS  Ext: warm, no LE edema bilaterally  Neuro: Follows commands, moves all extermities  Psych: Appropriate affect    --------------------------------------------------------------------------------  TREADMILL STRESS  No results found for this or any previous visit    --------------------------------------------------------------------------------  NUCLEAR STRESS TEST: No results found for this or any previous visit  No results found for this or any previous visit     --------------------------------------------------------------------------------  CATH:    Results for orders placed during the hospital encounter of 20   Cardiac catheterization    Narrative [address]    [place], [address] [facility]  [phone]    [facility]    Invasive Cardiovascular Lab Complete Report    Patient: Adi Merchant  MR number: [id number]  Account number: [de-identified]  Study date: 2020  Gender: Female  : 1965  Height: 65 in  Weight: 141 9 lb  BSA: 1 71 mï¾²    Allergies: PENICILLINS    Diagnostic Cardiologist:  Nina Melendrez MD  Interventional Cardiologist:  Nina Melendrez MD  Primary Physician:  Theresa Markham MD    SUMMARY    CORONARY CIRCULATION:  Mid LAD: There was a diffuse 60 % stenosis  This is a likely culprit for the patient's clinical presentation  An intervention was performed  Positive iFR 0 85  1st obtuse marginal: There was a 50 % stenosis  It appears amenable to percutaneous intervention  Negative iFR    CARDIAC STRUCTURES:  Global left ventricular function was moderately depressed  EF calculated by contrast ventriculography was 35 %  1ST LESION INTERVENTIONS:  A successful drug-eluting stent with balloon angioplasty procedure was performed on the 60 % lesion in the mid LAD  Following intervention there was an excellent angiographic appearance with a 0 % residual stenosis  A Resolute [place] Rx 2 5 x 22mm drug-eluting stent was placed across the lesion and deployed at a maximum inflation pressure of 16 elsa  A Resolute [place] Rx 3 0 x 26mm drug-eluting stent was placed across the lesion and deployed at a maximum inflation pressure of 16 elsa  A Resolute [place] Rx 3 5 x 12mm drug-eluting stent was placed across the lesion and deployed at a maximum inflation pressure of 12 elsa  INDICATIONS:  --  Possible CAD: myocardial infarction without ST elevation (NSTEMI)  --  Congestive heart failure with cardiomyopathy  PROCEDURES PERFORMED    --  Left heart catheterization with ventriculography  --  Left coronary angiography  --  Right coronary angiography  --  Diagnostic myocardial fractional flow reserve  --  Diagnostic myocardial fractional flow reserve  --  Inpatient  --  Mod Sedation Same Physician Initial 15min  --  Myocardial Flow [place]  --  Coronary Catheterization (w/ LHC)  --  Myocardial Flow Reserve Additional Vessel  --  Mod Sedation Same Physician Add 15min  --  Mod Sedation Same Physician Add 15min  --  Mod Sedation Same Physician Add 15min  --  --  Coronary Drug Eluting Stent w/PTCA    --  Intervention on mid LAD: drug-eluting stent, balloon angioplasty  PROCEDURE: The risks and alternatives of the procedures and conscious sedation were explained to the patient and informed consent was obtained  The patient was brought to the cath lab and placed on the table  The planned puncture sites  were prepped and draped in the usual sterile fashion  --  Right radial artery access  After performing an Otoniel's test to verify adequate ulnar artery supply to the hand, the radial site was prepped  The puncture site was infiltrated with local anesthetic  The vessel was accessed using the  modified Seldinger technique, a wire was advanced into the vessel, and a sheath was advanced over the wire into the vessel  --  Left heart catheterization with ventriculography  A catheter was advanced over a guidewire into the ascending aorta  After recording ascending aortic pressure, the catheter was advanced across the aortic valve and left ventricular  pressure was recorded  Ventriculography was performed  The catheter was pulled back across the aortic valve and into the ascending aorta and pullback pressures were obtained  --  Left coronary artery angiography  A catheter was advanced over a guidewire into the aorta and positioned in the left coronary artery ostium under fluoroscopic guidance  Angiography was performed  --  Right coronary artery angiography  A catheter was advanced over a guidewire into the aorta and positioned in the right coronary artery ostium under fluoroscopic guidance  Angiography was performed  --  Myocardial fractional flow reserve  Flow reserve was measured using a 0 014" pressure-monitoring guide-wire  Steady baseline values were obtained  Mean arterial pressure and mean distal coronary pressures were then obtained at maximum  hyperemia  --  Myocardial fractional flow reserve  Flow reserve was measured using a 0 014" pressure-monitoring guide-wire  Steady baseline values were obtained   Mean arterial pressure and mean distal coronary pressures were then obtained at maximum  hyperemia  --  Inpatient  --  Mod Sedation Same Physician Initial 15min  --  Myocardial Flow [place]  --  Coronary Catheterization (w/ LHC)  --  Myocardial Flow Reserve Additional Vessel  --  Mod Sedation Same Physician Add 15min  --  Mod Sedation Same Physician Add 15min  --  Mod Sedation Same Physician Add 15min  --  Instant Flow Reserve was measured using 0 014 pressure-monitoring guide-wire  Steady baseline values were obtained by measuring the ratio of distal coronary pressure (Pd) to the aortic pressure (Pa) during a wave-free segment of  diastole  LAD and OM1    LESION INTERVENTION: A successful drug-eluting stent with balloon angioplasty procedure was performed on the 60 % lesion in the mid LAD  Following intervention there was an excellent angiographic appearance with a 0 % residual stenosis  There was LALO 3 flow before the procedure and LALO 3 flow after the procedure  There was no dissection  --  Vessel setup was performed  A Launcher 6Fr Ebu 3 5 guiding catheter was used to cannulate the vessel  --  Vessel setup was performed  A Runthrough NS 180cm wire was used to cross the lesion  --  A Resolute [place] Rx 2 5 x 22mm drug-eluting stent was placed across the lesion and deployed at a maximum inflation pressure of 16 elsa  --  A Resolute [place] Rx 3 0 x 26mm drug-eluting stent was placed across the lesion and deployed at a maximum inflation pressure of 16 elsa  --  Balloon angioplasty was performed, with 1 inflations and a maximum inflation pressure of 18 elsa  --  A Resolute [place] Rx 3 5 x 12mm drug-eluting stent was placed across the lesion and deployed at a maximum inflation pressure of 12 elsa  --  Balloon angioplasty was performed, with 1 inflations and a maximum inflation pressure of 16 elsa      --  Balloon angioplasty was performed, using a [place] Trek Rx 3 5 x 12mm balloon, with 6 inflations and a maximum inflation pressure of 20 elsa  INTERVENTIONS:  --  Coronary Drug Eluting Stent w/PTCA  PROCEDURE COMPLETION: The patient tolerated the procedure well and was discharged from the cath lab  TIMING: Test started at 11:17  Test concluded at 12:17  HEMOSTASIS: The sheath was removed  The site was compressed with a Hemoband  device  Hemostasis was obtained  MEDICATIONS GIVEN: Versed (2mg/2ml), 1 mg, IV, at 11:23  Fentanyl (1OOmcg/2 ml), 50 mcg, IV, at 11:23  1% Lidocaine, 1 ml, subcutaneously, at 11:25  Heparin 1000 units/ml, 4,000 units, IV, at 11:27  Verapamil (5mg/2ml), 2 5 mg, IV, at 11:28  Nitroglycerin (200mcg/ml), 200 mcg, at 11:28  Heparin 1000 units/ml, 4,000 units, IV, at 11:31  Ticagrelor, 180 mg, PO, at 11:33  Versed (2mg/2ml), 1 mg, IV, at 11:41  Fentanyl (1OOmcg/2 ml), 50  mcg, IV, at 11:41  Nitroglycerine (200mcg/ml), 200 mcg, at 11:42  Heparin 1000 units/ml, 4,000 units, IV, at 11:46  Versed (2mg/2ml), 1 mg, IV, at 12:03  Fentanyl (1OOmcg/2 ml), 50 mcg, IV, at 12:04  CONTRAST GIVEN: 100 ml Omnipaque (350mg  I /ml)  36 ml Omnipaque (350mg I/ml)  10 ml Omnipaque (350mg I /ml)  RADIATION EXPOSURE: Fluoroscopy time: 8 18 min  HEMODYNAMICS: Hemodynamic assessment demonstrated normal LVEDP  VENTRICLES:   --  Global left ventricular function was moderately depressed  EF calculated by contrast ventriculography was 35 %  VALVES:  AORTIC VALVE:   --  There was no aortic stenosis  MITRAL VALVE:   --  The mitral valve exhibited no regurgitation  CORONARY VESSELS:   --  The coronary circulation is right dominant  --  Left main: The vessel was medium to large sized  Angiography showed minor luminal irregularities  --  LAD: The vessel was large sized and mildly calcified  Angiography showed the vessel to wrap around the cardiac apex and moderate atherosclerosis  There was one major diagonal branch  --  Proximal LAD: There was a 40 % stenosis  --  Mid LAD: There was a diffuse 60 % stenosis   This is a likely culprit for the patient's clinical presentation  An intervention was performed  Positive iFR 0 85  --  Circumflex: The vessel was medium sized  There were two major obtuse marginals  --  1st obtuse marginal: There was a 50 % stenosis  It appears amenable to percutaneous intervention  Negative iFR  --  RCA: The vessel was medium sized and moderately tortuous  Angiography showed mild atherosclerosis  --  Mid RCA: There was a discrete 40 % stenosis  IMPRESSIONS:  There is significant double vessel coronary artery disease  RECOMMENDATIONS  The patient should continue with the present medications  DISPOSITION:  The patient left the catheterization laboratory in stable condition  Prepared and signed by    Gilma Stroud MD  Signed 2020 12:28:47    Study diagram    Angiographic findings  Native coronary lesions:  ï¾·Proximal LAD: Lesion 1: 40 % stenosis  ï¾·Mid LAD: Lesion 1: diffuse, 60 % stenosis  ï¾·OM1: Lesion 1: 50 % stenosis  ï¾·Mid RCA: Lesion 1: discrete, 40 % stenosis  Intervention results  Native coronary lesions:  ï¾·Successful drug-eluting stent and balloon angioplasty of the 60 % stenosis in mid LAD  Appearance excellent with 0 % residual stenosis  Stent: Resolute [place] Rx 2 5 x 22mm drug-eluting  Stent: Resolute [place] Rx 3 0 x 26mm drug-eluting  Stent: Resolute [place] Rx 3 5 x 12mm drug-eluting      Hemodynamic tables    Pressures:  Baseline  Pressures:  - HR: 86  Pressures:  - Rhythm:  Pressures:  -- Aortic Pressure (S/D/M): 119/78/70  Pressures:  -- Left Ventricle (s/edp): 128/20/--    Outputs:  Baseline  Outputs:  -- CALCULATIONS: [age]  Outputs:  -- CALCULATIONS: Body Surface Area: 1 71  Outputs:  -- CALCULATIONS: Height in cm: 165 00  Outputs:  -- CALCULATIONS: Sex: Female  Outputs:  -- CALCULATIONS: Weight in k 50       --------------------------------------------------------------------------------  ECHO:   Results for orders placed during the hospital encounter of 20   Echo complete with contrast if indicated    Narrative [facility] [address]  [place], [address]  [phone]    Transthoracic Echocardiogram  2D, M-mode, Doppler, and Color Doppler    Study date:  2020    Patient: Catherine Ye  MR number: [id number]  Account number: [de-identified]  : 1965  Age: 48 years  Gender: Female  Status: Inpatient  Location: Bedside  Height: 65 in  Weight: 145 6 lb  BP: 130/ 109 mmHg    Indications: Heart failure  New bundle branch block  Diagnoses: I50 9 - Heart failure, unspecified    Sonographer:  CARLTON Mitchell  Primary Physician:  Theresa Markham MD  Referring Physician:  Jules Brown DO  Group:  [facility]  Interpreting Physician:  Jules Brown DO    SUMMARY    SUMMARY:  This is a technically adequate study  1  Left ventricle is moderately dilated with severely reduced systolic function  Left ventricular ejection fraction is estimated at 29%  2  Mild concentric left ventricular hypertrophy  3  Grade 1 diastolic dysfunction is present  4  Severe global hypokinesis with wall motion consistent with conduction abnormality  5  Left atrium is moderately dilated and right atrium is mildly dilated  6  Aortic valve sclerotic with adequate separation  Mild aortic regurgitation  7  Mild mitral annular calcification with mild mitral regurgitation  8  Pulmonary artery systolic pressures cannot be estimated due to lack of tricuspid regurgitation jet  9  Small pericardial effusion without echocardiographic indications of tamponade physiology    SUMMARY MEASUREMENTS  2D measurements:  Unspecified Anatomy:   %FS was 10 7 %  EDV(Teich) was 171 6 ml   EF Biplane was 28 7 %  EF(Teich) was 23 %  ESV(Teich) was 132 1 ml  IVSd was 1 cm  LAAs A2C was 24 2 cm2  LAAs A4C was 21 cm2  LAESV A-L A2C was 90 3 ml  LAESV A-L  A4C was 63 ml  LAESV Index (A-L) was 45 4 ml/m2  LAESV MOD A2C was 87 5 ml    LAESV MOD A4C was 58 1 ml  LAESV(A-L) was 78 5 ml  LAESV(MOD BP) was 74 ml  LAESVInd MOD BP was 42 8 ml/m2  LALs A2C was 5 5 cm  LALs A4C was 6 cm  LVEDV  MOD A2C was 155 1 ml  LVEDV MOD A4C was 149 9 ml  LVEDV MOD BP was 153 3 ml  LVEDVInd MOD BP was 88 6 ml/m2  LVEF MOD A2C was 30 6 %  LVEF MOD A4C was 27 9 %  LVESV MOD A2C was 107 7 ml  LVESV MOD A4C was 108 ml  LVESV MOD BP was  109 3 ml  LVESVInd MOD BP was 63 2 ml/m2  LVIDd was 5 9 cm  LVIDs was 5 2 cm  LVLd A2C was 9 7 cm  LVLd A4C was 9 6 cm  LVLs A2C was 9 2 cm  LVLs A4C was 8 9 cm  LVPWd was 1 2 cm  Lorrie A4C was 16 4 cm2  RAEDV A-L was 47 5 ml  RAEDV MOD was 46 7 ml  RALd was 4 8 cm  RVIDd was 3 8 cm  RWT was 0 4   SV MOD A2C was 47 4 ml   SV MOD A4C was 41 8 ml   SV(Teich) was 39 5 ml   CW measurements:  Unspecified Anatomy:   AV Env  Ti was 234 4 ms   AV VTI was 27 4 cm  AV Vmax was 1 5 m/s  AV Vmean was 1 2 m/s  AV maxPG was 8 5 mmHg  AV meanPG was 5 9 mmHg  MM measurements:  Unspecified Anatomy:   TAPSE was 3 3 cm  PW measurements:  Unspecified Anatomy:   DVI was 0 6   LVOT Env  Ti was 238 5 ms  LVOT VTI was 15 5 cm  LVOT Vmax was 0 9 m/s  LVOT Vmean was 0 7 m/s  LVOT maxPG was 3 3 mmHg  LVOT meanPG was 1 9 mmHg  HISTORY: PRIOR HISTORY: hypothyroid  PROCEDURE: The procedure was performed at the bedside  This was a routine study  The transthoracic approach was used  The study included complete 2D imaging, M-mode, complete spectral Doppler, and color Doppler  The heart rate was 108 bpm,  at the start of the study  Image quality was adequate  LEFT VENTRICLE: Left ventricle is moderately dilated with severely reduced systolic function  Left ventricular ejection fraction is estimated at 29%  There is mild concentric left ventricular hypertrophy  Grade 1 diastolic dysfunction  There is severe global hypokinesis with wall motion consistent with conduction abnormality      RIGHT VENTRICLE: Right ventricle is normal size and function  LEFT ATRIUM: Left atrium is moderately dilated  ATRIAL SEPTUM: Interatrial septum is bowing toward the right atrium consistent with elevated left atrial pressures  RIGHT ATRIUM: Right atrium is mildly dilated  MITRAL VALVE: Mitral valve opens well  There is mild mitral annular calcification posteriorly  Mild mitral regurgitation  AORTIC VALVE: Aortic valve is minimally sclerotic with adequate separation  There is no evidence of aortic stenosis  Mild aortic regurgitation  LVOT diameter 2 cm, LVOT VTI 15 5, stroke volume 48 9 mL    TRICUSPID VALVE: Tricuspid valve opens well  There is no evidence of tricuspid regurgitation  Pulmonary artery systolic pressures cannot be estimated due to lack of tricuspid regurgitation jet  PULMONIC VALVE: Pulmonic valve not seen on the study  PERICARDIUM: There is a small pericardial effusion without echocardiographic indications of tamponade physiology  AORTA: The aortic root is normal in size at 3 3 cm  SYSTEMIC VEINS: IVC: The IVC is normal size with collapse      SYSTEM MEASUREMENT TABLES    2D  %FS: 10 7 %  EDV(Teich): 171 6 ml  EF Biplane: 28 7 %  EF(Teich): 23 %  ESV(Teich): 132 1 ml  IVSd: 1 cm  LAAs A2C: 24 2 cm2  LAAs A4C: 21 cm2  LAESV A-L A2C: 90 3 ml  LAESV A-L A4C: 63 ml  LAESV Index (A-L): 45 4 ml/m2  LAESV MOD A2C: 87 5 ml  LAESV MOD A4C: 58 1 ml  LAESV(A-L): 78 5 ml  LAESV(MOD BP): 74 ml  LAESVInd MOD BP: 42 8 ml/m2  LALs A2C: 5 5 cm  LALs A4C: 6 cm  LVEDV MOD A2C: 155 1 ml  LVEDV MOD A4C: 149 9 ml  LVEDV MOD BP: 153 3 ml  LVEDVInd MOD BP: 88 6 ml/m2  LVEF MOD A2C: 30 6 %  LVEF MOD A4C: 27 9 %  LVESV MOD A2C: 107 7 ml  LVESV MOD A4C: 108 ml  LVESV MOD BP: 109 3 ml  LVESVInd MOD BP: 63 2 ml/m2  LVIDd: 5 9 cm  LVIDs: 5 2 cm  LVLd A2C: 9 7 cm  LVLd A4C: 9 6 cm  LVLs A2C: 9 2 cm  LVLs A4C: 8 9 cm  LVPWd: 1 2 cm  Lorrie A4C: 16 4 cm2  RAEDV A-L: 47 5 ml  RAEDV MOD: 46 7 ml  RALd: 4 8 cm  RVIDd: 3 8 cm  RWT: 0 4  SV MOD A2C: 47 4 ml  SV MOD A4C: 41 8 ml  SV(Teich): 39 5 ml    CW  AV Env  Ti: 234 4 ms  AV VTI: 27 4 cm  AV Vmax: 1 5 m/s  AV Vmean: 1 2 m/s  AV maxP 5 mmHg  AV meanP 9 mmHg    MM  TAPSE: 3 3 cm    PW  DVI: 0 6  LVOT Env  Ti: 238 5 ms  LVOT VTI: 15 5 cm  LVOT Vmax: 0 9 m/s  LVOT Vmean: 0 7 m/s  LVOT maxPG: 3 3 mmHg  LVOT meanP 9 mmHg    [facility]    Prepared and electronically signed by    Raj Benton DO  Signed 2020 15:04:09       No results found for this or any previous visit   --------------------------------------------------------------------------------  HOLTER  No results found for this or any previous visit  No results found for this or any previous visit   --------------------------------------------------------------------------------  CAROTIDS  No results found for this or any previous visit    --------------------------------------------------------------------------------  ECGs:  No results found for this visit on 07/10/20  Lab Results   Component Value Date    WBC 7 03 2020    HGB 9 9 (L) 2020    HCT 32 8 (L) 2020    MCV 85 2020     2020      Lab Results   Component Value Date    SODIUM 140 2020    K 3 7 2020     2020    CO2 22 2020    BUN 27 (H) 2020    CREATININE 1 59 (H) 2020    GLUC 88 2020    CALCIUM 6 9 (L) 2020      Lab Results   Component Value Date    HGBA1C 6 0 (H) 2020      No results found for: CHOL  Lab Results   Component Value Date    HDL 52 2020     Lab Results   Component Value Date    LDLCALC 97 2020     Lab Results   Component Value Date    TRIG 89 2020     No results found for: [place], [place]   Lab Results   Component Value Date    INR 1 05 2020    PROTIME 13 8 2020        There are no diagnoses linked to this encounter      IMPRESSION:  Syncope secondary orthostasis/volume depletion  Recent acute myopericarditis  Chronic systolic heart failure  CAD s/p cath w/ SEJAL-LAD x 3, with residual 40% pLAD, 50% OM1, 40% mRCA, luminal and irregularities of LM, June 17, 2020  LVEF 29%, moderate LV dilatation, mild LVH, grade 1 diastolic dysfunction, global hypokinesis, biatrial dilatation AV sclerosis with mild AR, mild, small pericardial effusion, June 2020  Acute Kidney Injury  Left bundle branch block   History of ischemic and non-ischemic cardiomyopathy  Anemia  Goiter s/p thyroidectomy with hypothyroidism    PLAN:  It was a pleasure to see Alease Severs in the office today for follow-up CV evaluation  Since our last encounter in the hospital, she has been doing well without any complaints of chest discomfort  She has no exertional symptoms  She examines to be euvolemic in the office today  Her weight trend shows that she is at her best weight overall  Her blood pressure and heart rate are stable in the office today  She does state that she feels a little bit fatigued since the increased dose in carvedilol medication  She has been taking her other medications as prescribed  Denies any bleeding  Recent hospitalization for syncope was likely orthostatic in nature as there was some exertional component and her creatinine was found to be elevated  She is now fitted with a life vest and has been wearing it as instructed  Denies any shocks  Based on her clinical presentation, I have the following recommendations:    1  Given her significant fatigue, we have discussed risks and benefits of decreasing her carvedilol medication and she would like to be decreased to 6 25 mg b i d     We will decrease her medication to this dose  2  Continue with life vest for minimum of 3 months duration and total   3  Repeat echocardiogram in 2 months to reassess her cardiac structure and function    Should her left ventricular function be decreased at that time with ejection fraction of less than 35%, is reasonable that she have ICD placement  4  If her renal function has returned to normal on metabolic panel, may consider the initiation of ACE-inhibitor as an outpatient with 2 week blood pressure check  5  Continue dual anti-platelet therapy with aspirin and Brilinta for a minimum of 1 year  6  Continue Lipitor 40 mg q h s     Would increase Lipitor to 80 mg q h s  Once patient is off of colchicine  7  Check BMP to assess patient's renal function on current dosing of Lasix  Continue Lasix every Monday Wednesday Friday for now  8  As always, I have recommended a heart healthy diet low in sodium an exercise regimen  9  She is planned for cardiac rehab in near future  I believe this is appropriate  We will see how she does with exercise on monitor  10  We will follow up with her in 2 months to discuss evaluate her echocardiogram findings and discuss possibility of ICD    As always, please do not hesitate to call if any questions  Portions of the record may have been created with voice recognition software  Occasional wrong word or "sound a like" substitutions may have occurred due to the inherent limitations of voice recognition software  Read the chart carefully and recognize, using context, where substitutions have occurred          Signed: Mio La DO, [facility]

## 2020-07-14 LAB
BUN SERPL-MCNC: 29 MG/DL (ref 7–25)
BUN/CREAT SERPL: 20 (CALC) (ref 6–22)
CALCIUM SERPL-MCNC: 8.5 MG/DL (ref 8.6–10.4)
CHLORIDE SERPL-SCNC: 106 MMOL/L (ref 98–110)
CO2 SERPL-SCNC: 24 MMOL/L (ref 20–32)
CREAT SERPL-MCNC: 1.42 MG/DL (ref 0.5–1.05)
GLUCOSE SERPL-MCNC: 87 MG/DL (ref 65–99)
POTASSIUM SERPL-SCNC: 4.3 MMOL/L (ref 3.5–5.3)
SL AMB EGFR AFRICAN AMERICAN: 49 ML/MIN/1.73M2
SL AMB EGFR NON AFRICAN AMERICAN: 42 ML/MIN/1.73M2
SODIUM SERPL-SCNC: 140 MMOL/L (ref 135–146)

## 2020-07-21 ENCOUNTER — OFFICE VISIT (OUTPATIENT)
Dept: FAMILY MEDICINE CLINIC | Facility: CLINIC | Age: 55
End: 2020-07-21
Payer: COMMERCIAL

## 2020-07-21 VITALS
WEIGHT: 136 LBS | SYSTOLIC BLOOD PRESSURE: 88 MMHG | TEMPERATURE: 97.6 F | DIASTOLIC BLOOD PRESSURE: 50 MMHG | BODY MASS INDEX: 22.66 KG/M2 | HEIGHT: 65 IN | HEART RATE: 96 BPM

## 2020-07-21 DIAGNOSIS — N17.9 AKI (ACUTE KIDNEY INJURY) (HCC): ICD-10-CM

## 2020-07-21 DIAGNOSIS — I25.10 CORONARY ARTERY DISEASE INVOLVING NATIVE CORONARY ARTERY OF NATIVE HEART WITHOUT ANGINA PECTORIS: ICD-10-CM

## 2020-07-21 DIAGNOSIS — R94.4 DECREASED GFR: ICD-10-CM

## 2020-07-21 DIAGNOSIS — E83.51 HYPOCALCEMIA: ICD-10-CM

## 2020-07-21 DIAGNOSIS — R79.89 LOW SERUM PARATHYROID HORMONE (PTH): ICD-10-CM

## 2020-07-21 DIAGNOSIS — I42.9 CARDIOMYOPATHY, UNSPECIFIED TYPE (HCC): ICD-10-CM

## 2020-07-21 DIAGNOSIS — E07.9 DISEASE OF THYROID GLAND: ICD-10-CM

## 2020-07-21 DIAGNOSIS — E89.0 POSTOPERATIVE HYPOTHYROIDISM: ICD-10-CM

## 2020-07-21 DIAGNOSIS — I50.22 CHRONIC SYSTOLIC (CONGESTIVE) HEART FAILURE (HCC): Primary | ICD-10-CM

## 2020-07-21 DIAGNOSIS — I30.9 ACUTE MYOPERICARDITIS: ICD-10-CM

## 2020-07-21 PROCEDURE — 1036F TOBACCO NON-USER: CPT | Performed by: PHYSICIAN ASSISTANT

## 2020-07-21 PROCEDURE — 99214 OFFICE O/P EST MOD 30 MIN: CPT | Performed by: PHYSICIAN ASSISTANT

## 2020-07-21 PROCEDURE — 1111F DSCHRG MED/CURRENT MED MERGE: CPT | Performed by: PHYSICIAN ASSISTANT

## 2020-07-21 PROCEDURE — 3008F BODY MASS INDEX DOCD: CPT | Performed by: PHYSICIAN ASSISTANT

## 2020-07-21 RX ORDER — LEVOTHYROXINE SODIUM 175 UG/1
175 TABLET ORAL
Qty: 90 TABLET | Refills: 1 | Status: SHIPPED | OUTPATIENT
Start: 2020-07-21 | End: 2020-10-06 | Stop reason: DRUGHIGH

## 2020-07-21 NOTE — ASSESSMENT & PLAN NOTE
PT was just admitted for syncope with MARINA however looking back her GFR has been running in the 40's with normal BUN/creat   Refer to Nephro for eval

## 2020-07-21 NOTE — ASSESSMENT & PLAN NOTE
Wt Readings from Last 3 Encounters:   07/10/20 61 9 kg (136 lb 6 4 oz)   06/27/20 63 2 kg (139 lb 5 3 oz)   06/20/20 63 3 kg (139 lb 8 8 oz)

## 2020-07-21 NOTE — ASSESSMENT & PLAN NOTE
Found during last admission  PTH was low at 14 vitamin-D was actually high at over 100 and phosphorus was not run  At this time because it has been persistent over the past month or so I would like to refer patient to endocrinology for further evaluation of a possible parathyroid issue causing these findings

## 2020-07-21 NOTE — PATIENT INSTRUCTIONS
Problem List Items Addressed This Visit        Endocrine    Postoperative hypothyroidism     Last TSH within normal limits  Continue current supplementation  Relevant Medications    levothyroxine (Synthroid) 175 mcg tablet       Cardiovascular and Mediastinum    Cardiomyopathy ([facility] )     Recent 3 stent placement with EF at 25%  Pt following with cardio on lipitor  Acute myopericarditis     Currently on therapy with colchicine and status post dexamethasone  Patient currently asymptomatic  Follow-up with cardiology as directed  CAD (coronary artery disease)     Patient continues with cardiology and has an appoint with them in September  She is currently wearing a LifeVest and may need a pacemaker in the future  Currently doing well  Recently status post stenting x3 in June  Chronic systolic (congestive) heart failure (HCC) - Primary     Wt Readings from Last 3 Encounters:   07/10/20 61 9 kg (136 lb 6 4 oz)   06/27/20 63 2 kg (139 lb 5 3 oz)   06/20/20 63 3 kg (139 lb 8 8 oz)                     Genitourinary    MARINA (acute kidney injury) ([facility] )    Relevant Orders    Ambulatory referral to Nephrology       Other    Hypocalcemia     Found during last admission  PTH was low at 14 vitamin-D was actually high at over 100 and phosphorus was not run  At this time because it has been persistent over the past month or so I would like to refer patient to endocrinology for further evaluation of a possible parathyroid issue causing these findings  Relevant Orders    Ambulatory referral to Endocrinology    Decreased GFR     PT was just admitted for syncope with MARINA however looking back her GFR has been running in the 40's with normal BUN/creat   Refer to Nephro for eval           Relevant Orders    Ambulatory referral to Nephrology      Other Visit Diagnoses     Disease of thyroid gland        Relevant Medications    levothyroxine (Synthroid) 175 mcg tablet    Other Relevant Orders Ambulatory referral to Endocrinology    Low serum parathyroid hormone (PTH)        Relevant Orders    Ambulatory referral to Endocrinology

## 2020-07-21 NOTE — ASSESSMENT & PLAN NOTE
Currently on therapy with colchicine and status post dexamethasone  Patient currently asymptomatic  Follow-up with cardiology as directed

## 2020-07-21 NOTE — ASSESSMENT & PLAN NOTE
Patient continues with cardiology and has an appoint with them in September  She is currently wearing a LifeVest and may need a pacemaker in the future  Currently doing well  Recently status post stenting x3 in June  yes

## 2020-07-21 NOTE — ASSESSMENT & PLAN NOTE
Wt Readings from Last 3 Encounters:   07/21/20 61 7 kg (136 lb)   07/10/20 61 9 kg (136 lb 6 4 oz)   06/27/20 63 2 kg (139 lb 5 3 oz)

## 2020-07-21 NOTE — PROGRESS NOTES
Assessment and Plan:    Problem List Items Addressed This Visit        Endocrine    Postoperative hypothyroidism     Last TSH within normal limits  Continue current supplementation  Relevant Medications    levothyroxine (Synthroid) 175 mcg tablet       Cardiovascular and Mediastinum    Cardiomyopathy ([facility] )     Recent 3 stent placement with EF at 25%  Pt following with cardio on lipitor  Acute myopericarditis     Currently on therapy with colchicine and status post dexamethasone  Patient currently asymptomatic  Follow-up with cardiology as directed  CAD (coronary artery disease)     Patient continues with cardiology and has an appoint with them in September  She is currently wearing a LifeVest and may need a pacemaker in the future  Currently doing well  Recently status post stenting x3 in June  Chronic systolic (congestive) heart failure (HCC) - Primary     Wt Readings from Last 3 Encounters:   07/10/20 61 9 kg (136 lb 6 4 oz)   06/27/20 63 2 kg (139 lb 5 3 oz)   06/20/20 63 3 kg (139 lb 8 8 oz)                     Genitourinary    MARINA (acute kidney injury) ([facility] )    Relevant Orders    Ambulatory referral to Nephrology       Other    Hypocalcemia     Found during last admission  PTH was low at 14 vitamin-D was actually high at over 100 and phosphorus was not run  At this time because it has been persistent over the past month or so I would like to refer patient to endocrinology for further evaluation of a possible parathyroid issue causing these findings  Relevant Orders    Ambulatory referral to Endocrinology    Decreased GFR     PT was just admitted for syncope with MARINA however looking back her GFR has been running in the 40's with normal BUN/creat   Refer to Nephro for eval           Relevant Orders    Ambulatory referral to Nephrology      Other Visit Diagnoses     Disease of thyroid gland        Relevant Medications    levothyroxine (Synthroid) 175 mcg tablet Other Relevant Orders    Ambulatory referral to Endocrinology    Low serum parathyroid hormone (PTH)        Relevant Orders    Ambulatory referral to Endocrinology                 Diagnoses and all orders for this visit:    Chronic systolic (congestive) heart failure (HCC)    Coronary artery disease involving native coronary artery of native heart without angina pectoris    MARINA (acute kidney injury) ([facility] )  -     Ambulatory referral to Nephrology; Future    Hypocalcemia  -     Ambulatory referral to Endocrinology; Future    Decreased GFR  -     Ambulatory referral to Nephrology; Future    Cardiomyopathy, unspecified type ([facility] )    Postoperative hypothyroidism    Disease of thyroid gland  -     levothyroxine (Synthroid) 175 mcg tablet; Take 1 tablet (175 mcg total) by mouth daily in the early morning  -     Ambulatory referral to Endocrinology; Future    Low serum parathyroid hormone (PTH)  -     Ambulatory referral to Endocrinology; Future    Acute myopericarditis    Other orders  -     Cancel: PTH, intact  -     Cancel: Vitamin D 25 hydroxy  -     Cancel: Phosphorus  -     Cancel: Calcium; Future              Subjective:      Patient ID: Flora Reyes is a 50 y o  female  CC:    Chief Complaint   Patient presents with    Follow-up     patient is here for a hospital f/u admitted on 6/27/20, d/c'd on 6/28/20 for chest pain and a syncopal episode  Patient saw cardiology on 7/10  ak       HPI:    Patient here for a follow-up to hospitalization from [id number] at [facility]  She actually had multiple admissions prior to this which showed that she had CAD and 3 stents were placed and then she went back into the hospital for probable myocarditis and was treated with dexamethasone and colchicine earlier in June  This time she went into the hospital and was admitted because she had syncope with dehydration and acute kidney injury    She did see cardiology on the 10th of this month and does have a life vest on which is supposed to be on for 3 months repeat echo in 2 months as her last ejection fraction was 25%  She will then be getting a pacemaker if her ejection fraction continues to be less than 35%  Cardiology's states that they would like to increase her Lipitor to 80 mg once she is off the colchicine  They did decrease her Lasix and carvedilol  Incidentally found was it continued low calcium for which a vitamin D was in the range of 100 and a PTH was slightly low at 14  Further outpatient workup is warranted  Patient states that she is still very tired and short of breath and has a 2nd opinion with cardiologist   She keeps repeating that she just wants to go back to work  Denies any chest pain  The following portions of the patient's history were reviewed and updated as appropriate: allergies, current medications, past family history, past medical history, past social history, past surgical history and problem list       Review of Systems   Constitutional: Positive for fatigue  HENT: Negative  Eyes: Negative  Respiratory: Positive for shortness of breath  Negative for cough  Cardiovascular: Negative  Negative for chest pain  Gastrointestinal: Negative  Endocrine: Negative  Genitourinary: Negative  Musculoskeletal: Negative  Skin: Negative  Allergic/Immunologic: Negative  Neurological: Negative  Hematological: Negative  Psychiatric/Behavioral: Negative  Data to review:       Objective:    Vitals:    07/21/20 1413   BP: (!) 88/50   Pulse: 96   Temp: 97 6 °F (36 4 °C)   Weight: 61 7 kg (136 lb)   Height: 5' 5" (1 651 m)        Physical Exam   Constitutional: She is oriented to person, place, and time  She appears well-developed and well-nourished  No distress  HENT:   Head: Normocephalic and atraumatic  Eyes: Conjunctivae are normal  Right eye exhibits no discharge  Left eye exhibits no discharge  Neck: Neck supple  Carotid bruit is not present  Cardiovascular: Normal rate, regular rhythm and normal heart sounds  Exam reveals no gallop and no friction rub  No murmur heard  Pulmonary/Chest: Effort normal and breath sounds normal  No respiratory distress  She has no wheezes  She has no rales  Neurological: She is alert and oriented to person, place, and time  Skin: Skin is warm and dry  She is not diaphoretic  Psychiatric: She has a normal mood and affect  Judgment normal    Nursing note and vitals reviewed

## 2020-07-23 ENCOUNTER — TELEPHONE (OUTPATIENT)
Dept: CARDIOLOGY CLINIC | Facility: CLINIC | Age: 55
End: 2020-07-23

## 2020-07-23 NOTE — TELEPHONE ENCOUNTER
I think I filled out the form and it has been in the out chart bin  I would be happy to change the date if necessary  Please let me know  Thank you

## 2020-07-23 NOTE — TELEPHONE ENCOUNTER
Pt called looking for release to work form she ask Dr Pita To to complete at last [address] 7/10/20   Patient would like work release for end of month please call or send form to her

## 2020-07-31 ENCOUNTER — TELEPHONE (OUTPATIENT)
Dept: CARDIOLOGY CLINIC | Facility: CLINIC | Age: 55
End: 2020-07-31

## 2020-07-31 NOTE — TELEPHONE ENCOUNTER
Phone call to patient  Received fax request for records from 2nd  [place]  To release recent office visit and blood work  Patient confirmed she signed release  Her work is requesting she get 2nd opinion  She requested we hold off sending records until she recontacts us to forward records  She has not been informed of a sched visit as of yet  She is feeling better and may not want to proceed with visit  Female

## 2020-08-13 ENCOUNTER — OFFICE VISIT (OUTPATIENT)
Dept: FAMILY MEDICINE CLINIC | Facility: CLINIC | Age: 55
End: 2020-08-13
Payer: COMMERCIAL

## 2020-08-13 VITALS
HEART RATE: 82 BPM | HEIGHT: 65 IN | TEMPERATURE: 97.8 F | BODY MASS INDEX: 22.33 KG/M2 | WEIGHT: 134 LBS | RESPIRATION RATE: 18 BRPM | SYSTOLIC BLOOD PRESSURE: 108 MMHG | DIASTOLIC BLOOD PRESSURE: 64 MMHG

## 2020-08-13 DIAGNOSIS — I42.9 CARDIOMYOPATHY, UNSPECIFIED TYPE (HCC): ICD-10-CM

## 2020-08-13 DIAGNOSIS — I50.22 CHRONIC SYSTOLIC (CONGESTIVE) HEART FAILURE (HCC): Primary | ICD-10-CM

## 2020-08-13 DIAGNOSIS — D64.9 ANEMIA: ICD-10-CM

## 2020-08-13 PROCEDURE — 99214 OFFICE O/P EST MOD 30 MIN: CPT | Performed by: PHYSICIAN ASSISTANT

## 2020-08-13 PROCEDURE — 1111F DSCHRG MED/CURRENT MED MERGE: CPT | Performed by: PHYSICIAN ASSISTANT

## 2020-08-13 PROCEDURE — 3008F BODY MASS INDEX DOCD: CPT | Performed by: PHYSICIAN ASSISTANT

## 2020-08-13 PROCEDURE — 1036F TOBACCO NON-USER: CPT | Performed by: PHYSICIAN ASSISTANT

## 2020-08-13 RX ORDER — DOXYCYCLINE HYCLATE 50 MG/1
324 CAPSULE, GELATIN COATED ORAL
Qty: 60 TABLET | Refills: 0 | Status: SHIPPED | OUTPATIENT
Start: 2020-08-13 | End: 2020-09-05

## 2020-08-13 NOTE — ASSESSMENT & PLAN NOTE
Wt Readings from Last 3 Encounters:   08/13/20 60 8 kg (134 lb)   07/21/20 61 7 kg (136 lb)   07/10/20 61 9 kg (136 lb 6 4 oz)       Note completed for patient to return to work without restrictions  Patient has imaging for Cardiology the end of the month with an appointment to review  She is not wearing her LifeVest   She was feeling very lightheaded dizzy with near syncope and apparently got no response from cardiologist office so she took up on herself to cut her Coreg in half as well as her Lipitor  She states after doing this she feels amazing and all of her side effects went away her blood pressure has improved and she really feels ready to go back to work full-time

## 2020-08-13 NOTE — PROGRESS NOTES
Assessment and Plan:    Problem List Items Addressed This Visit        Cardiovascular and Mediastinum    Cardiomyopathy ([facility] )    Chronic systolic (congestive) heart failure ([facility] ) - Primary     Wt Readings from Last 3 Encounters:   08/13/20 60 8 kg (134 lb)   07/21/20 61 7 kg (136 lb)   07/10/20 61 9 kg (136 lb 6 4 oz)       Note completed for patient to return to work without restrictions  Patient has imaging for Cardiology the end of the month with an appointment to review  She is not wearing her LifeVest   She was feeling very lightheaded dizzy with near syncope and apparently got no response from cardiologist office so she took up on herself to cut her Coreg in half as well as her Lipitor  She states after doing this she feels amazing and all of her side effects went away her blood pressure has improved and she really feels ready to go back to work full-time  Other    Anemia    Relevant Medications    ferrous gluconate (FERGON) 324 mg tablet                 Diagnoses and all orders for this visit:    Chronic systolic (congestive) heart failure (HCC)    Anemia  -     ferrous gluconate (FERGON) 324 mg tablet; Take 1 tablet (324 mg total) by mouth 2 (two) times a day before meals    Cardiomyopathy, unspecified type ([facility] )              Subjective:      Patient ID: Artem Avilez is a 50 y o  female  CC:    Chief Complaint   Patient presents with    Follow-up     form; return to work       HPI:    Patient is here today to get clearance to return to work  She was hospitalized for significant issues including CHF and cardiomyopathy  After discharge she was not feeling well and kept feeling too lightheaded and dizzy to be able to do anything and felt like she was going to pass out all the time  She did follow-up with cardiology and was released to work but she felt she really still could not work because of her lightheadedness and dizziness and near-syncope L episodes    She had another calling to cardiology and never received any message back from them and states that the office is horrible and she can never get any communication in response to her questions  She took it upon herself to cut her carvedilol and Lipitor in half and she states she feels so much better and all of her symptoms have resolved and she feels great and that she would like to return to work  She works from home and work [employer] starts on Wednesdays so she would like to return to work next Wednesday if at all possible  She states that she was unable to return to work from the low no initially given to her by Cardiology  She does have a follow-up imaging to be done the end of this month and then an appointment with cardiology to review  She also has a life vest but she is not wearing it as often as she should be which is always because she states that it does not work well and does not connect with her and is too big getting keeps going off saying that it is not connected and that she feels bad because her insurance is paying a lot of money every month for this and does not seem to be working  She is going to return to Cardiology but she states that she thought it would mess up the insurance company if she did so before her next appointment  The following portions of the patient's history were reviewed and updated as appropriate: allergies, current medications, past family history, past medical history, past social history, past surgical history and problem list       Review of Systems   Constitutional: Negative  HENT: Negative  Eyes: Negative  Respiratory: Negative  Cardiovascular: Negative  Gastrointestinal: Negative  Endocrine: Negative  Genitourinary: Negative  Musculoskeletal: Negative  Skin: Negative  Allergic/Immunologic: Negative  Neurological: Negative  Hematological: Negative  Psychiatric/Behavioral: Negative            Data to review: Objective:    Vitals:    08/13/20 1129   BP: 108/64   Pulse: 82   Resp: 18   Temp: 97 8 °F (36 6 °C)   TempSrc: Temporal   Weight: 60 8 kg (134 lb)   Height: 5' 5" (1 651 m)        Physical Exam  Vitals signs and nursing note reviewed  Constitutional:       Appearance: Normal appearance  She is well-developed  HENT:      Head: Normocephalic and atraumatic  Eyes:      General: Lids are normal       Conjunctiva/sclera: Conjunctivae normal       Pupils: Pupils are equal, round, and reactive to light  Cardiovascular:      Rate and Rhythm: Normal rate and regular rhythm  Heart sounds: No murmur  Pulmonary:      Effort: Pulmonary effort is normal       Breath sounds: Normal breath sounds  Skin:     General: Skin is warm and dry  Neurological:      General: No focal deficit present  Mental Status: She is alert  Coordination: Coordination is intact  Psychiatric:         Attention and Perception: Attention normal          Mood and Affect: Mood normal          Behavior: Behavior normal  Behavior is cooperative  Thought Content:  Thought content normal          Cognition and Memory: Cognition normal          Judgment: Judgment normal

## 2020-08-25 ENCOUNTER — TELEPHONE (OUTPATIENT)
Dept: CARDIOLOGY CLINIC | Facility: CLINIC | Age: 55
End: 2020-08-25

## 2020-08-25 ENCOUNTER — HOSPITAL ENCOUNTER (OUTPATIENT)
Dept: NON INVASIVE DIAGNOSTICS | Facility: HOSPITAL | Age: 55
Discharge: HOME | End: 2020-08-25
Attending: INTERNAL MEDICINE
Payer: COMMERCIAL

## 2020-08-25 DIAGNOSIS — I25.5 ISCHEMIC CARDIOMYOPATHY: ICD-10-CM

## 2020-08-25 PROCEDURE — 93325 DOPPLER ECHO COLOR FLOW MAPG: CPT | Performed by: INTERNAL MEDICINE

## 2020-08-25 PROCEDURE — 93308 TTE F-UP OR LMTD: CPT | Performed by: INTERNAL MEDICINE

## 2020-08-25 PROCEDURE — 93321 DOPPLER ECHO F-UP/LMTD STD: CPT | Performed by: INTERNAL MEDICINE

## 2020-08-25 PROCEDURE — 93308 TTE F-UP OR LMTD: CPT

## 2020-08-25 NOTE — TELEPHONE ENCOUNTER
Received [facility] and it was put in Dr Mike Wilcox in box for review and signature

## 2020-08-31 ENCOUNTER — TELEPHONE (OUTPATIENT)
Dept: CARDIOLOGY CLINIC | Facility: CLINIC | Age: 55
End: 2020-08-31

## 2020-09-02 NOTE — TELEPHONE ENCOUNTER
Per DR Marv Mary, patient no longer needs lifevest   I faxed the paperwork back to [phone], with Dr Nico Brand notation and signature

## 2020-09-05 DIAGNOSIS — D64.9 ANEMIA: ICD-10-CM

## 2020-09-05 RX ORDER — DOXYCYCLINE HYCLATE 50 MG/1
324 CAPSULE, GELATIN COATED ORAL
Qty: 60 TABLET | Refills: 0 | Status: SHIPPED | OUTPATIENT
Start: 2020-09-05

## 2020-09-09 ENCOUNTER — CONSULT (OUTPATIENT)
Dept: ENDOCRINOLOGY | Facility: CLINIC | Age: 55
End: 2020-09-09
Payer: COMMERCIAL

## 2020-09-09 VITALS
SYSTOLIC BLOOD PRESSURE: 120 MMHG | HEART RATE: 84 BPM | TEMPERATURE: 97.3 F | BODY MASS INDEX: 22.66 KG/M2 | WEIGHT: 136 LBS | DIASTOLIC BLOOD PRESSURE: 84 MMHG | HEIGHT: 65 IN

## 2020-09-09 DIAGNOSIS — Z12.11 ENCOUNTER FOR SCREENING COLONOSCOPY: ICD-10-CM

## 2020-09-09 DIAGNOSIS — E89.0 POSTOPERATIVE HYPOTHYROIDISM: ICD-10-CM

## 2020-09-09 DIAGNOSIS — R79.89 LOW SERUM PARATHYROID HORMONE (PTH): ICD-10-CM

## 2020-09-09 DIAGNOSIS — E83.51 HYPOCALCEMIA: Primary | ICD-10-CM

## 2020-09-09 DIAGNOSIS — E07.9 DISEASE OF THYROID GLAND: ICD-10-CM

## 2020-09-09 PROCEDURE — 1036F TOBACCO NON-USER: CPT | Performed by: INTERNAL MEDICINE

## 2020-09-09 PROCEDURE — 99204 OFFICE O/P NEW MOD 45 MIN: CPT | Performed by: INTERNAL MEDICINE

## 2020-09-09 NOTE — PROGRESS NOTES
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  · Patient reports being on calcium supplements and vitamin D supplements over the counter but she has not taken them for a while   · She was hospitalized many years ago for a seizure secondary to hypocalcemia   · Currently not on supplements so we will check her labs again to see if her hypoparathyroidism is post surgical vs secondary to elevated Vitamin D     Prediabetes  · Dietary advise given  · Exercise advised   · Will follow A1c     CC:   Low calcium     History of Present Illness     HPI:  Patient is a 50year old female with a past medical history of heart failure with reduced EF, CAD who was hospitalized for a heart failure exacerbation and found to have low calcium levels while inpatient  She denies any symptoms other than fatigue and feels well overall  She was hospitalized many years ago for a seizure secondary to hypocalcemia  Patient reports being on calcium supplements and vitamin D supplements over the counter but she has not taken them for a while      Review of Systems   Constitutional: Positive for fatigue  HENT: Negative  Eyes: Negative  Respiratory: Negative  Cardiovascular: Negative  Endocrine: Negative  Genitourinary: Negative  Neurological: Negative for seizures and weakness         Historical Information   Past Medical History:   Diagnosis Date    Allergies     Disease of thyroid gland     hypo Past Surgical History:   Procedure Laterality Date    CARDIAC CATHETERIZATION       SECTION      x2    OR OPEN TX RADIAL & ULNAR SHAFT FX FIX RADIUS AND ULNA Left 6/3/2020    Procedure: Open reduction internal fixation left distal radius fracture;  Surgeon: Homero Messina MD;  Location: [facility] MAIN OR;  Service: Orthopedics    TOTAL THYROIDECTOMY      TUBAL LIGATION       Social History   Social History     Substance and Sexual Activity   Alcohol Use Yes    Frequency: Monthly or less    Binge frequency: Never    Comment: only on special ocassions        Social History     Substance and Sexual Activity   Drug Use Never     Social History     Tobacco Use   Smoking Status Never Smoker   Smokeless Tobacco Never Used     Family History:   Family History   Problem Relation Age of Onset    No Known Problems Mother     No Known Problems Father     Cancer Family        Meds/Allergies   Current Outpatient Medications   Medication Sig Dispense Refill    acetaminophen (TYLENOL) 325 mg tablet Take 2 tablets (650 mg total) by mouth every 4 (four) hours as needed for mild pain, headaches or fever 30 tablet 0    aspirin 81 mg chewable tablet Chew 1 tablet (81 mg total) daily 30 tablet 0    atorvastatin (LIPITOR) 40 mg tablet Take 1 tablet (40 mg total) by mouth daily (Patient taking differently: Take 20 mg by mouth daily ) 90 tablet 2    carvedilol (COREG) 12 5 mg tablet Take 1 tablet (12 5 mg total) by mouth 2 (two) times a day with meals (Patient taking differently: Take 6 25 mg by mouth 2 (two) times a day with meals ) 60 tablet 0    colchicine (COLCRYS) 0 6 mg tablet Take 1 tablet (0 6 mg total) by mouth 2 (two) times a day 60 tablet 1    ferrous gluconate (FERGON) 324 mg tablet TAKE 1 TABLET (324 MG TOTAL) BY MOUTH 2 (TWO) TIMES A DAY BEFORE MEALS 60 tablet 0    furosemide (LASIX) 20 mg tablet TAKE 1 TABLET BY MOUTH ONCE DAILY EVERY Monday, Wednesday, and Friday 90 tablet 1    levothyroxine (Synthroid) 175 mcg tablet Take 1 tablet (175 mcg total) by mouth daily in the early morning 90 tablet 1    nitroglycerin (NITROSTAT) 0 4 mg SL tablet Place 1 tablet (0 4 mg total) under the tongue every 5 (five) minutes as needed for chest pain 30 tablet 0    ticagrelor (BRILINTA) 90 MG Take 1 tablet (90 mg total) by mouth 2 (two) times a day 60 tablet 11     No current facility-administered medications for this visit  Allergies   Allergen Reactions    Penicillins Rash       Objective   Vitals: Blood pressure 120/84, pulse 84, temperature (!) 97 3 °F (36 3 °C), height 5' 5" (1 651 m), weight 61 7 kg (136 lb), not currently breastfeeding  Physical Exam:   GENERAL: NAD  HEENT:  NC/AT, MMM, no scleral icterus  CARDIAC:  RRR, +S1/S2, no S3/S4 heard, no m/g/r  PULMONARY:  CTA B/L, no wheezing/rales/rhonci, non-labored breathing  ABDOMEN:  Soft, NT/ND, +BS, no rebound/guarding/rigidity  Extremities:  2+ Pulses in DP/PT  No edema, cyanosis, or clubbing  NEUROLOGIC:  Alert/oriented x3  SKIN:  No rashes or erythema    The history was obtained from the review of the chart, patient  Lab Results:   Lab Results   Component Value Date/Time    TSH 3RD Amanda Lai 3 370 06/12/2020 11:09 AM       Imaging Studies:        I have personally reviewed pertinent reports  Portions of the record may have been created with voice recognition software  Occasional wrong word or "sound a like" substitutions may have occurred due to the inherent limitations of voice recognition software  Read the chart carefully and recognize, using context, where substitutions have occurred

## 2020-09-15 ENCOUNTER — TELEPHONE (OUTPATIENT)
Dept: CARDIOLOGY CLINIC | Facility: CLINIC | Age: 55
End: 2020-09-15

## 2020-09-15 NOTE — TELEPHONE ENCOUNTER
----- Message from Letty Parada sent at 9/2/2020  2:37 PM EDT -----    ----- Message -----  From: Winter Eastman DO  Sent: 9/2/2020   2:31 PM EDT  To: Card[facility]    Please schedule for follow-up appointment in 1 month to reassess patient  Thank you

## 2020-09-28 LAB
25(OH)D3 SERPL-MCNC: 84 NG/ML (ref 30–100)
ALBUMIN SERPL-MCNC: 3.4 G/DL (ref 3.6–5.1)
ALBUMIN/GLOB SERPL: 1.3 (CALC) (ref 1–2.5)
ALP SERPL-CCNC: 98 U/L (ref 37–153)
ALT SERPL-CCNC: 16 U/L (ref 6–29)
AST SERPL-CCNC: 20 U/L (ref 10–35)
BILIRUB SERPL-MCNC: 0.6 MG/DL (ref 0.2–1.2)
BUN SERPL-MCNC: 27 MG/DL (ref 7–25)
BUN/CREAT SERPL: 25 (CALC) (ref 6–22)
CALCIUM SERPL-MCNC: 8.5 MG/DL (ref 8.6–10.4)
CALCIUM SERPL-MCNC: 8.5 MG/DL (ref 8.6–10.4)
CHLORIDE SERPL-SCNC: 106 MMOL/L (ref 98–110)
CO2 SERPL-SCNC: 29 MMOL/L (ref 20–32)
CREAT SERPL-MCNC: 1.09 MG/DL (ref 0.5–1.05)
GLOBULIN SER CALC-MCNC: 2.7 G/DL (CALC) (ref 1.9–3.7)
GLUCOSE SERPL-MCNC: 92 MG/DL (ref 65–99)
PHOSPHATE SERPL-MCNC: 4.7 MG/DL (ref 2.5–4.5)
POTASSIUM SERPL-SCNC: 4 MMOL/L (ref 3.5–5.3)
PROT SERPL-MCNC: 6.1 G/DL (ref 6.1–8.1)
PTH-INTACT SERPL-MCNC: 9 PG/ML (ref 14–64)
SL AMB EGFR AFRICAN AMERICAN: 67 ML/MIN/1.73M2
SL AMB EGFR NON AFRICAN AMERICAN: 58 ML/MIN/1.73M2
SODIUM SERPL-SCNC: 142 MMOL/L (ref 135–146)
T4 FREE SERPL-MCNC: 2.6 NG/DL (ref 0.8–1.8)
TSH SERPL-ACNC: 0.01 MIU/L

## 2020-10-06 ENCOUNTER — TELEPHONE (OUTPATIENT)
Dept: ENDOCRINOLOGY | Facility: CLINIC | Age: 55
End: 2020-10-06

## 2020-10-06 DIAGNOSIS — E07.9 DISEASE OF THYROID GLAND: Primary | ICD-10-CM

## 2020-10-06 DIAGNOSIS — E89.0 POSTOPERATIVE HYPOTHYROIDISM: Primary | ICD-10-CM

## 2020-10-07 RX ORDER — LEVOTHYROXINE SODIUM 0.15 MG/1
150 TABLET ORAL DAILY
Qty: 30 TABLET | Refills: 5 | Status: SHIPPED | OUTPATIENT
Start: 2020-10-07 | End: 2021-07-17 | Stop reason: HOSPADM

## 2020-10-16 PROBLEM — E20.9 HYPOPARATHYROIDISM (HCC): Status: ACTIVE | Noted: 2020-10-16

## 2020-10-21 ENCOUNTER — TELEPHONE (OUTPATIENT)
Dept: CARDIOLOGY CLINIC | Facility: CLINIC | Age: 55
End: 2020-10-21

## 2021-01-29 DIAGNOSIS — I25.5 ISCHEMIC CARDIOMYOPATHY: ICD-10-CM

## 2021-01-29 RX ORDER — FUROSEMIDE 20 MG/1
TABLET ORAL
Qty: 90 TABLET | Refills: 1 | Status: SHIPPED | OUTPATIENT
Start: 2021-01-29 | End: 2021-07-17 | Stop reason: HOSPADM

## 2021-03-02 DIAGNOSIS — E07.9 DISEASE OF THYROID GLAND: ICD-10-CM

## 2021-03-02 RX ORDER — LEVOTHYROXINE SODIUM 0.15 MG/1
TABLET ORAL
Qty: 90 TABLET | Refills: 1 | OUTPATIENT
Start: 2021-03-02

## 2021-04-28 ENCOUNTER — HOSPITAL ENCOUNTER (EMERGENCY)
Facility: HOSPITAL | Age: 56
Discharge: HOME | End: 2021-04-28
Attending: EMERGENCY MEDICINE | Admitting: EMERGENCY MEDICINE
Payer: COMMERCIAL

## 2021-04-28 ENCOUNTER — APPOINTMENT (EMERGENCY)
Dept: RADIOLOGY | Facility: HOSPITAL | Age: 56
End: 2021-04-28
Payer: COMMERCIAL

## 2021-04-28 VITALS
TEMPERATURE: 98.4 F | DIASTOLIC BLOOD PRESSURE: 80 MMHG | HEART RATE: 103 BPM | OXYGEN SATURATION: 100 % | BODY MASS INDEX: 23.19 KG/M2 | RESPIRATION RATE: 16 BRPM | SYSTOLIC BLOOD PRESSURE: 157 MMHG | WEIGHT: 139.33 LBS

## 2021-04-28 DIAGNOSIS — S93.401A RIGHT ANKLE SPRAIN: Primary | ICD-10-CM

## 2021-04-28 PROCEDURE — 99283 EMERGENCY DEPT VISIT LOW MDM: CPT

## 2021-04-28 PROCEDURE — 99282 EMERGENCY DEPT VISIT SF MDM: CPT | Performed by: PHYSICIAN ASSISTANT

## 2021-04-28 PROCEDURE — 73610 X-RAY EXAM OF ANKLE: CPT

## 2021-04-28 NOTE — ED PROVIDER NOTES
History  Chief Complaint   Patient presents with    Ankle Pain     C/o R ankle pain since Sunday after jumping out of a truck  Patient is a 82-year-old female with a past medical history of cardiomyopathy, CHF who presents with right ankle pain for 4 days  Patient states she was stepping out of a truck when she missed the last step, rolling her right ankle  She states she did slowly collapsed to her buttock, but denies any head injury, LOC, headaches, dizziness, lightheadedness, neck pain or stiffness, vision changes, nausea, vomiting  She states she was able to get up after the injury, but noted pain in the right ankle  Initially it was swollen and painful, but she states the swelling has improved but notes persistent pain  The pain is worse with ambulation, better with rest   She has been taking ibuprofen, with little relief  She denies any other injuries, numbness, tingling, weakness of the leg, knee pain, calf pain or swelling  She believes she broke that ankle as a child, but denies any recent injury or history of surgery to the ankle  Patient states she is otherwise in her usual state health denies any fevers, chills, diaphoresis, congestion, cough, shortness of breath, chest pain, abdominal pain, diarrhea, urinary changes, rash  Prior to Admission Medications   Prescriptions Last Dose Informant Patient Reported?  Taking?   acetaminophen (TYLENOL) 325 mg tablet  Self No No   Sig: Take 2 tablets (650 mg total) by mouth every 4 (four) hours as needed for mild pain, headaches or fever   aspirin 81 mg chewable tablet  Self No No   Sig: Chew 1 tablet (81 mg total) daily   atorvastatin (LIPITOR) 40 mg tablet   No No   Sig: Take 1 tablet (40 mg total) by mouth daily   Patient taking differently: Take 20 mg by mouth daily    carvedilol (COREG) 12 5 mg tablet  Self No No   Sig: Take 1 tablet (12 5 mg total) by mouth 2 (two) times a day with meals   Patient taking differently: Take 6 25 mg by mouth 2 (two) times a day with meals    colchicine (COLCRYS) 0 6 mg tablet   No No   Sig: Take 1 tablet (0 6 mg total) by mouth 2 (two) times a day   ferrous gluconate (FERGON) 324 mg tablet   No No   Sig: TAKE 1 TABLET (324 MG TOTAL) BY MOUTH 2 (TWO) TIMES A DAY BEFORE MEALS   furosemide (LASIX) 20 mg tablet   No No   Sig: TAKE 1 TABLET BY MOUTH ONCE DAILY EVERY Monday, Wednesday, and Friday   levothyroxine 150 mcg tablet   No No   Sig: Take 1 tablet (150 mcg total) by mouth daily   nitroglycerin (NITROSTAT) 0 4 mg SL tablet  Self No No   Sig: Place 1 tablet (0 4 mg total) under the tongue every 5 (five) minutes as needed for chest pain   ticagrelor (BRILINTA) 90 MG   No No   Sig: Take 1 tablet (90 mg total) by mouth 2 (two) times a day      Facility-Administered Medications: None       Past Medical History:   Diagnosis Date    Allergies     Disease of thyroid gland     hypo       Past Surgical History:   Procedure Laterality Date    CARDIAC CATHETERIZATION       SECTION      x2    WI OPEN TX RADIAL & ULNAR SHAFT FX FIX RADIUS AND ULNA Left 6/3/2020    Procedure: Open reduction internal fixation left distal radius fracture;  Surgeon: Nikolai Loya MD;  Location: [address] [place] [address];  Service: Orthopedics    TOTAL THYROIDECTOMY      TUBAL LIGATION         Family History   Problem Relation Age of Onset    No Known Problems Mother     No Known Problems Father     Cancer Family      I have reviewed and agree with the history as documented  E-Cigarette/Vaping    E-Cigarette Use Never User      E-Cigarette/Vaping Substances    Nicotine No     THC No     CBD No     Flavoring No      Social History     Tobacco Use    Smoking status: Never Smoker    Smokeless tobacco: Never Used   Substance Use Topics    Alcohol use: Yes     Frequency: Monthly or less     Binge frequency: Never     Comment: only on special ocassions       Drug use: Never       Review of Systems   Constitutional: Negative for chills, diaphoresis and fever  HENT: Negative for congestion  Respiratory: Negative for cough, shortness of breath, wheezing and stridor  Cardiovascular: Negative for chest pain, palpitations and leg swelling  Gastrointestinal: Negative for abdominal pain, diarrhea, nausea and vomiting  Genitourinary: Negative for difficulty urinating  Musculoskeletal: Positive for arthralgias (Right ankle pain and swelling) and joint swelling  Negative for neck pain  Skin: Negative for color change, pallor and rash  Neurological: Negative for light-headedness and headaches  All other systems reviewed and are negative  Physical Exam  Physical Exam  Vitals signs and nursing note reviewed  Constitutional:       General: She is awake  She is not in acute distress  Appearance: She is well-developed  She is not ill-appearing, toxic-appearing or diaphoretic  HENT:      Head: Normocephalic and atraumatic  Right Ear: External ear normal       Left Ear: External ear normal       Nose: Nose normal    Eyes:      General: No scleral icterus  Conjunctiva/sclera: Conjunctivae normal       Pupils: Pupils are equal, round, and reactive to light  Neck:      Musculoskeletal: Normal range of motion and neck supple  Vascular: No JVD  Trachea: No tracheal deviation  Cardiovascular:      Rate and Rhythm: Normal rate and regular rhythm  Pulses: Normal pulses  Dorsalis pedis pulses are 2+ on the right side and 2+ on the left side  Posterior tibial pulses are 2+ on the right side and 2+ on the left side  Heart sounds: Normal heart sounds, S1 normal and S2 normal    Pulmonary:      Effort: Pulmonary effort is normal  No tachypnea or respiratory distress  Breath sounds: Normal breath sounds  No stridor  No decreased breath sounds or wheezing  Musculoskeletal: Normal range of motion  General: No deformity  Right knee: Normal       Right ankle: She exhibits swelling  She exhibits no ecchymosis, no deformity, no laceration and normal pulse  Tenderness  Lateral malleolus tenderness found  No head of 5th metatarsal and no proximal fibula tenderness found  Achilles tendon normal       Right lower leg: Normal       Right foot: Normal    Skin:     General: Skin is dry  Neurological:      Mental Status: She is alert and oriented to person, place, and time  GCS: GCS eye subscore is 4  GCS verbal subscore is 5  GCS motor subscore is 6  Psychiatric:         Behavior: Behavior normal  Behavior is cooperative  Vital Signs  ED Triage Vitals [04/28/21 0954]   Temperature Pulse Respirations Blood Pressure SpO2   98 4 °F (36 9 °C) 103 16 157/80 100 %      Temp Source Heart Rate Source Patient Position - Orthostatic VS BP Location FiO2 (%)   Oral Monitor Sitting Right arm --      Pain Score       8           Vitals:    04/28/21 0954   BP: 157/80   Pulse: 103   Patient Position - Orthostatic VS: Sitting         Visual Acuity      ED Medications  Medications - No data to display    Diagnostic Studies  Results Reviewed     None                 XR ankle 3+ views RIGHT   ED Interpretation by Gabby Mendoza PA-C (04/28 1142)   No acute osseous abnormality noted      Final Result by Jodie Clarke MD (04/28 1400)      No acute osseous abnormality  Workstation performed: [id number]                    Procedures  Procedures         ED Course                                           MDM  Number of Diagnoses or Management Options  Right ankle sprain:   Diagnosis management comments: Reviewed results of x-ray, no acute pathology noted  Informed that we will call if radiology notes any significant findings  Patient provided with Aircast and crutches, reviewed treatment at home  Recommended follow-up with Podiatry or Ortho for further evaluation of symptoms  Recommended follow-up with PCP as needed for monitoring of symptoms   The management plan was discussed in detail with the patient at bedside and all questions were answered  Provided both verbal and written instructions  Reviewed red flag symptoms and strict return to ED instructions  Patient notes understanding and agrees to plan  Disposition  Final diagnoses:   Right ankle sprain     Time reflects when diagnosis was documented in both MDM as applicable and the Disposition within this note     Time User Action Codes Description Comment    4/28/2021 11:44 AM Padma Junior Add [[id number] [id number]] Right ankle sprain       ED Disposition     ED Disposition Condition Date/Time Comment    Discharge Stable Wed Apr 28, 2021 11:58 AM Omkar Luis discharge to home/self care              Follow-up Information     Follow up With Specialties Details Why Contact Info Additional Jayme Bravo PA-C Family Medicine, Physician Assistant  As needed [address] [place] [address] [address]  [phone] [address] [facility] Emergency Department Emergency Medicine  If symptoms worsen [facility] [id number]  [address] [place] [address] Emergency Department, [address] [place] [address], [facility] [address] Orthopedic Surgery Schedule an appointment as soon as possible for a visit   [address] [facility]  [address] [facility] [address] [phone] [address], [address] [facility], [address], [place], [place], [address]   [address]  [address] [facility] [address]  [address] [place] [address] [facility], [address], [place], [place], [address] [facility]          Discharge Medication List as of 4/28/2021 11:59 AM      CONTINUE these medications which have NOT CHANGED    Details   acetaminophen (TYLENOL) 325 mg tablet Take 2 tablets (650 mg total) by mouth every 4 (four) hours as needed for mild pain, headaches or fever, Starting Fri 6/19/2020, Normal      aspirin 81 mg chewable tablet Chew 1 tablet (81 mg total) daily, Starting Fri 6/19/2020, Normal      atorvastatin (LIPITOR) 40 mg tablet Take 1 tablet (40 mg total) by mouth daily, Starting Fri 7/10/2020, Until Thu 10/8/2020, Normal      carvedilol (COREG) 12 5 mg tablet Take 1 tablet (12 5 mg total) by mouth 2 (two) times a day with meals, Starting Wed 6/24/2020, Print      colchicine (COLCRYS) 0 6 mg tablet Take 1 tablet (0 6 mg total) by mouth 2 (two) times a day, Starting Fri 7/10/2020, Normal      ferrous gluconate (FERGON) 324 mg tablet TAKE 1 TABLET (324 MG TOTAL) BY MOUTH 2 (TWO) TIMES A DAY BEFORE MEALS, Starting Sat 9/5/2020, Normal      furosemide (LASIX) 20 mg tablet TAKE 1 TABLET BY MOUTH ONCE DAILY EVERY Monday, Wednesday, and Friday, Normal      levothyroxine 150 mcg tablet Take 1 tablet (150 mcg total) by mouth daily, Starting Wed 10/7/2020, Normal      nitroglycerin (NITROSTAT) 0 4 mg SL tablet Place 1 tablet (0 4 mg total) under the tongue every 5 (five) minutes as needed for chest pain, Starting Fri 6/19/2020, Normal      ticagrelor (BRILINTA) 90 MG Take 1 tablet (90 mg total) by mouth 2 (two) times a day, Starting Fri 7/10/2020, Normal           No discharge procedures on file      PDMP Review       Value Time User    PDMP Reviewed  Yes 6/27/2020  8:57 PM Fiordaliza Shell PA-C          ED Provider  Electronically Signed by           Corbin Palma PA-C  04/28/21 1181

## 2021-04-28 NOTE — Clinical Note
Augustus Eaton was seen and treated in our emergency department on 4/28/2021  Diagnosis: Right ankle sprain    Dennis Mixnaresh  may return to work on return date  She may return on this date: 05/03/2021         If you have any questions or concerns, please don't hesitate to call        Sharona Sun PA-C    ______________________________           _______________          _______________  Hospital Representative                              Date                                Time

## 2021-04-28 NOTE — DISCHARGE INSTRUCTIONS
Continue Tylenol or ibuprofen at home as needed for pain  Rest, ice, elevation may help alleviate symptoms  Follow-up with Podiatry or Ortho for further evaluation of symptoms  Return to ED if symptoms worsen including increasing pain, swelling, inability to bear weight, tingling, numbness of the foot or leg

## 2021-05-05 ENCOUNTER — APPOINTMENT (EMERGENCY)
Dept: RADIOLOGY | Facility: HOSPITAL | Age: 56
End: 2021-05-05
Payer: COMMERCIAL

## 2021-05-05 ENCOUNTER — HOSPITAL ENCOUNTER (EMERGENCY)
Facility: HOSPITAL | Age: 56
Discharge: HOME | End: 2021-05-05
Attending: EMERGENCY MEDICINE
Payer: COMMERCIAL

## 2021-05-05 VITALS
HEART RATE: 108 BPM | SYSTOLIC BLOOD PRESSURE: 160 MMHG | RESPIRATION RATE: 20 BRPM | OXYGEN SATURATION: 98 % | TEMPERATURE: 99 F | DIASTOLIC BLOOD PRESSURE: 80 MMHG | BODY MASS INDEX: 23.52 KG/M2 | WEIGHT: 141.31 LBS

## 2021-05-05 DIAGNOSIS — S40.029A: ICD-10-CM

## 2021-05-05 DIAGNOSIS — M79.601 RIGHT ARM PAIN: Primary | ICD-10-CM

## 2021-05-05 PROCEDURE — 73110 X-RAY EXAM OF WRIST: CPT

## 2021-05-05 PROCEDURE — 99282 EMERGENCY DEPT VISIT SF MDM: CPT | Performed by: PHYSICIAN ASSISTANT

## 2021-05-05 PROCEDURE — 99283 EMERGENCY DEPT VISIT LOW MDM: CPT

## 2021-05-05 RX ORDER — ACETAMINOPHEN 325 MG/1
650 TABLET ORAL ONCE
Status: COMPLETED | OUTPATIENT
Start: 2021-05-05 | End: 2021-05-05

## 2021-05-05 RX ADMIN — ACETAMINOPHEN 650 MG: 325 TABLET ORAL at 20:06

## 2021-05-05 NOTE — Clinical Note
Augustus Angelito was seen and treated in our emergency department on 5/5/2021  Diagnosis:     Alpha Mixnaresh  may return to work on return date  She may return on this date: 05/07/2021         If you have any questions or concerns, please don't hesitate to call        Elver Crespo RN    ______________________________           _______________          _______________  Hospital Representative                              Date                                Time

## 2021-05-05 NOTE — Clinical Note
Ian Maddox was seen and treated in our emergency department on 5/5/2021  Diagnosis:     Helen   may return to work on return date  She may return on this date: 05/10/2021         If you have any questions or concerns, please don't hesitate to call        Gabi Barraza RN    ______________________________           _______________          _______________  Hospital Representative                              Date                                Time

## 2021-05-05 NOTE — ED PROVIDER NOTES
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  Taking?   acetaminophen (TYLENOL) 325 mg tablet  Self No No   Sig: Take 2 tablets (650 mg total) by mouth every 4 (four) hours as needed for mild pain, headaches or fever   aspirin 81 mg chewable tablet  Self No No   Sig: Chew 1 tablet (81 mg total) daily   atorvastatin (LIPITOR) 40 mg tablet   No No   Sig: Take 1 tablet (40 mg total) by mouth daily   Patient taking differently: Take 20 mg by mouth daily    carvedilol (COREG) 12 5 mg tablet  Self No No   Sig: Take 1 tablet (12 5 mg total) by mouth 2 (two) times a day with meals   Patient taking differently: Take 6 25 mg by mouth 2 (two) times a day with meals    colchicine (COLCRYS) 0 6 mg tablet   No No   Sig: Take 1 tablet (0 6 mg total) by mouth 2 (two) times a day   ferrous gluconate (FERGON) 324 mg tablet   No No   Sig: TAKE 1 TABLET (324 MG TOTAL) BY MOUTH 2 (TWO) TIMES A DAY BEFORE MEALS   furosemide (LASIX) 20 mg tablet   No No   Sig: TAKE 1 TABLET BY MOUTH ONCE DAILY EVERY Monday, Wednesday, and Friday   levothyroxine 150 mcg tablet   No No   Sig: Take 1 tablet (150 mcg total) by mouth daily   nitroglycerin (NITROSTAT) 0 4 mg SL tablet  Self No No   Sig: Place 1 tablet (0 4 mg total) under the tongue every 5 (five) minutes as needed for chest pain   ticagrelor (BRILINTA) 90 MG   No No   Sig: Take 1 tablet (90 mg total) by mouth 2 (two) times a day      Facility-Administered Medications: None       Past Medical History:   Diagnosis Date    Allergies     Disease of thyroid gland     hypo       Past Surgical History:   Procedure Laterality Date    CARDIAC CATHETERIZATION       SECTION      x2    UT OPEN TX RADIAL & ULNAR SHAFT FX FIX RADIUS AND ULNA Left 6/3/2020    Procedure: Open reduction internal fixation left distal radius fracture;  Surgeon: Ry Joe MD;  Location: [address] [place] [address];  Service: Orthopedics    TOTAL THYROIDECTOMY      TUBAL LIGATION         Family History   Problem Relation Age of Onset    No Known Problems Mother     No Known Problems Father     Cancer Family      I have reviewed and agree with the history as documented  E-Cigarette/Vaping    E-Cigarette Use Never User      E-Cigarette/Vaping Substances    Nicotine No     THC No     CBD No     Flavoring No      Social History     Tobacco Use    Smoking status: Never Smoker    Smokeless tobacco: Never Used   Substance Use Topics    Alcohol use: Yes     Frequency: Monthly or less     Binge frequency: Never     Comment: only on special ocassions   Drug use: Never       Review of Systems   Musculoskeletal: Positive for arthralgias  Negative for joint swelling  Skin: Positive for color change and wound  Neurological: Negative for weakness and numbness  All other systems reviewed and are negative  Physical Exam  Physical Exam  Vitals signs reviewed  Constitutional:       General: She is awake  She is not in acute distress  Appearance: Normal appearance  She is well-developed and well-groomed  She is not ill-appearing or toxic-appearing  HENT:      Head: Normocephalic and atraumatic  Mouth/Throat:      Lips: Pink        Mouth: Mucous membranes are moist    Eyes:      Extraocular Movements: Extraocular movements intact  Conjunctiva/sclera: Conjunctivae normal       Pupils: Pupils are equal, round, and reactive to light  Neck:      Musculoskeletal: Normal range of motion and neck supple  Cardiovascular:      Rate and Rhythm: Normal rate and regular rhythm  Pulses: Normal pulses  Pulmonary:      Effort: Pulmonary effort is normal       Breath sounds: Normal breath sounds and air entry  Musculoskeletal:      Right elbow: She exhibits normal range of motion, no swelling, no effusion, no deformity and no laceration  No tenderness found  Right wrist: She exhibits decreased range of motion and bony tenderness  She exhibits no swelling, no effusion, no crepitus, no deformity and no laceration  Right forearm: She exhibits tenderness, bony tenderness and laceration (+abrasion)  She exhibits no swelling (-hematoma) and no edema  Right hand: She exhibits normal range of motion, no bony tenderness, normal capillary refill, no deformity, no laceration and no swelling  Normal sensation noted  Normal strength noted  Comments: Sensation intact in all proximal and distal dermatomes; compartments soft; skin pink and warm; radial pulse 2+   Skin:     General: Skin is warm  Capillary Refill: Capillary refill takes less than 2 seconds  Neurological:      Mental Status: She is alert and oriented to person, place, and time  Psychiatric:         Behavior: Behavior is cooperative           Vital Signs  ED Triage Vitals   Temperature Pulse Respirations Blood Pressure SpO2   05/05/21 1856 05/05/21 1856 05/05/21 1856 05/05/21 1856 05/05/21 1856   99 1 °F (37 3 °C) (!) 124 (!) 24 162/87 98 %      Temp Source Heart Rate Source Patient Position - Orthostatic VS BP Location FiO2 (%)   05/05/21 1856 05/05/21 1856 05/05/21 1856 05/05/21 1856 --   Oral Monitor Sitting Left arm       Pain Score       05/05/21 2006       7           Vitals:    05/05/21 1856 05/05/21 2032   BP: 162/87 160/80   Pulse: (!) 124 (!) 108   Patient Position - Orthostatic VS: Sitting          Visual Acuity      ED Medications  Medications   acetaminophen (TYLENOL) tablet 650 mg (650 mg Oral Given 5/5/21 2006)       Diagnostic Studies  Results Reviewed     None                 XR wrist 3+ views RIGHT   Final Result by Rosa Toledo MD (05/05 2021)      Swelling  No fracture            Workstation performed: [id number]                    Procedures  Procedures         ED Course                             SBIRT 22yo+      Most Recent Value   SBIRT (22 yo +)   In order to provide better care to our patients, we are screening all of our patients for alcohol and drug use  Would it be okay to ask you these screening questions? Unable to answer at this time Filed at: 05/05/2021 2025                    MDM  Number of Diagnoses or Management Options  Contusion of arm:   Right arm pain:   Diagnosis management comments: On exam, well-appearing female, no acute distress, nontoxic appearance, vitals unremarkable, awake alert and oriented, musculoskeletal and neurovascular exam as above, remainder of exam unremarkable  History and exam most consistent with traumatic contusion  Will obtain x-ray to assess for any fracture, given decreased range of motion of the wrist   There are no exam findings to suggest hematoma formation or compartment syndrome  Amount and/or Complexity of Data Reviewed  Tests in the radiology section of CPT®: ordered and reviewed  Discussion of test results with the performing providers: yes  Decide to obtain previous medical records or to obtain history from someone other than the patient: yes  Obtain history from someone other than the patient: yes  Review and summarize past medical records: yes  Discuss the patient with other providers: yes  Independent visualization of images, tracings, or specimens: yes    Patient Progress  Patient progress: stable (See ED course note for dispo and plan    Supportive care measures and PCP follow-up within 24 hours of ED discharge discussed  I reviewed and discussed imaging findings with the patient at bedside  I discussed emergency department return parameters  I answered any and all questions the patient had regarding emergency department course of evaluation and treatment  The patient verbalized understanding of and agreement with plan   )      Disposition  Final diagnoses:   Right arm pain   Contusion of arm     Time reflects when diagnosis was documented in both MDM as applicable and the Disposition within this note     Time User Action Codes Description Comment    5/5/2021  8:24 PM Manuela Byrd Add [M79 601] Right arm pain     5/5/2021  8:24 PM Manuela Byrd Add [S40 029A] Contusion of arm       ED Disposition     ED Disposition Condition Date/Time Comment    Discharge Stable Wed May 5, 2021  8:24 PM Kerry Dubois discharge to home/self care              Follow-up Information     Follow up With Specialties Details Why [address] [facility] Emergency Department Emergency Medicine Go to  If symptoms worsen [facility] [id number]  [address] [place] [address] Emergency Department, [address] [place] [address] [address], [place], [address] Neeta Mccray PA-C Family Medicine, Physician Assistant Call  For further evaluation [address] [place] [address] Drive  [phone]             Discharge Medication List as of 5/5/2021  8:24 PM      CONTINUE these medications which have NOT CHANGED    Details   acetaminophen (TYLENOL) 325 mg tablet Take 2 tablets (650 mg total) by mouth every 4 (four) hours as needed for mild pain, headaches or fever, Starting Fri 6/19/2020, Normal      aspirin 81 mg chewable tablet Chew 1 tablet (81 mg total) daily, Starting Fri 6/19/2020, Normal      atorvastatin (LIPITOR) 40 mg tablet Take 1 tablet (40 mg total) by mouth daily, Starting Fri 7/10/2020, Until Thu 10/8/2020, Normal      carvedilol (COREG) 12 5 mg tablet Take 1 tablet (12 5 mg total) by mouth 2 (two) times a day with meals, Starting Wed 6/24/2020, Print      colchicine (COLCRYS) 0 6 mg tablet Take 1 tablet (0 6 mg total) by mouth 2 (two) times a day, Starting Fri 7/10/2020, Normal      ferrous gluconate (FERGON) 324 mg tablet TAKE 1 TABLET (324 MG TOTAL) BY MOUTH 2 (TWO) TIMES A DAY BEFORE MEALS, Starting Sat 9/5/2020, Normal      furosemide (LASIX) 20 mg tablet TAKE 1 TABLET BY MOUTH ONCE DAILY EVERY Monday, Wednesday, and Friday, Normal      levothyroxine 150 mcg tablet Take 1 tablet (150 mcg total) by mouth daily, Starting Wed 10/7/2020, Normal      nitroglycerin (NITROSTAT) 0 4 mg SL tablet Place 1 tablet (0 4 mg total) under the tongue every 5 (five) minutes as needed for chest pain, Starting Fri 6/19/2020, Normal      ticagrelor (BRILINTA) 90 MG Take 1 tablet (90 mg total) by mouth 2 (two) times a day, Starting Fri 7/10/2020, Normal           No discharge procedures on file      PDMP Review       Value Time User    PDMP Reviewed  Yes 6/27/2020  8:57 PM Bill Pillai PA-C          ED Provider  Electronically Signed by           Steve Reina PA-C  05/07/21 7122

## 2021-05-07 ENCOUNTER — OFFICE VISIT (OUTPATIENT)
Dept: FAMILY MEDICINE CLINIC | Facility: CLINIC | Age: 56
End: 2021-05-07
Payer: COMMERCIAL

## 2021-05-07 VITALS
BODY MASS INDEX: 23.66 KG/M2 | HEART RATE: 120 BPM | WEIGHT: 142 LBS | TEMPERATURE: 98.6 F | DIASTOLIC BLOOD PRESSURE: 88 MMHG | SYSTOLIC BLOOD PRESSURE: 152 MMHG | HEIGHT: 65 IN

## 2021-05-07 DIAGNOSIS — I67.82 ISCHEMIC BRAIN DAMAGE: ICD-10-CM

## 2021-05-07 DIAGNOSIS — R90.89 ABNORMAL CT OF BRAIN: Primary | ICD-10-CM

## 2021-05-07 PROCEDURE — 99214 OFFICE O/P EST MOD 30 MIN: CPT | Performed by: PHYSICIAN ASSISTANT

## 2021-05-07 PROCEDURE — 3008F BODY MASS INDEX DOCD: CPT | Performed by: PHYSICIAN ASSISTANT

## 2021-05-07 PROCEDURE — 1036F TOBACCO NON-USER: CPT | Performed by: PHYSICIAN ASSISTANT

## 2021-05-07 NOTE — PROGRESS NOTES
Assessment and Plan:    Problem List Items Addressed This Visit        Cardiovascular and Mediastinum    Ischemic brain damage     This was found after a fall at [facility] CT and radiology is recommending MRI for further eval  Pt is asymptomatic  Consider need to refer to neuro after results available  Relevant Orders    MRI brain wo contrast       Other    Abnormal CT of brain - Primary    Relevant Orders    MRI brain wo contrast                 Diagnoses and all orders for this visit:    Abnormal CT of brain  -     MRI brain wo contrast; Future    Ischemic brain damage  -     MRI brain wo contrast; Future              Subjective:      Patient ID: Luis Kirk is a 50 y o  female  CC:    Chief Complaint   Patient presents with    Follow-up     review ct results  HPI:     Patient fell and went to the emergency room at [facility] on the 3rd of this month  They did do a CT head which compared to the last 1 done in 2008 showed changes new finding that needs to be worked up with an MRI as an outpatient  Basically was showing some ischemic changes in the left basal ganglia and left frontal ruiz  America Sanches did a CT head in June which also did show some findings but not the findings that were picked up this month which are new when he do compared to radiology reports  Patient states that she has no problems at all with being off balance  She states that she is clumsy nose has been  No other neurological focal deficits memory problems trouble with speech or strength  The following portions of the patient's history were reviewed and updated as appropriate: allergies, current medications, past family history, past medical history, past social history, past surgical history and problem list       Review of Systems   Constitutional: Negative  HENT: Negative  Eyes: Negative  Respiratory: Negative  Cardiovascular: Negative  Gastrointestinal: Negative      Endocrine: Negative  Genitourinary: Negative  Musculoskeletal: Negative  Skin: Negative  Allergic/Immunologic: Negative  Neurological: Negative  Hematological: Negative  Psychiatric/Behavioral: Negative  Data to review:       Objective:    Vitals:    05/07/21 1456   BP: 152/88   BP Location: Left arm   Patient Position: Sitting   Cuff Size: Adult   Pulse: (!) 120   Temp: 98 6 °F (37 °C)   TempSrc: Temporal   Weight: 64 4 kg (142 lb)   Height: 5' 5" (1 651 m)        Physical Exam  Vitals signs and nursing note reviewed  Constitutional:       Appearance: Normal appearance  She is well-developed  HENT:      Head: Normocephalic and atraumatic  Eyes:      General: Lids are normal       Conjunctiva/sclera: Conjunctivae normal       Pupils: Pupils are equal, round, and reactive to light  Cardiovascular:      Rate and Rhythm: Normal rate and regular rhythm  Heart sounds: No murmur  Pulmonary:      Effort: Pulmonary effort is normal       Breath sounds: Normal breath sounds  Skin:     General: Skin is warm and dry  Neurological:      General: No focal deficit present  Mental Status: She is alert and oriented to person, place, and time  Cranial Nerves: Cranial nerves are intact  Sensory: Sensation is intact  Motor: Motor function is intact  Coordination: Coordination is intact  Gait: Gait is intact  Deep Tendon Reflexes: Reflexes are normal and symmetric  Psychiatric:         Mood and Affect: Mood normal          Behavior: Behavior normal  Behavior is cooperative  Thought Content:  Thought content normal          Judgment: Judgment normal

## 2021-05-07 NOTE — ASSESSMENT & PLAN NOTE
This was found after a fall at [address] and radiology is recommending MRI for further eval  Pt is asymptomatic  Consider need to refer to neuro after results available

## 2021-05-19 ENCOUNTER — HOSPITAL ENCOUNTER (OUTPATIENT)
Dept: MRI IMAGING | Facility: HOSPITAL | Age: 56
Discharge: HOME | End: 2021-05-19
Payer: COMMERCIAL

## 2021-05-19 ENCOUNTER — TELEPHONE (OUTPATIENT)
Dept: FAMILY MEDICINE CLINIC | Facility: CLINIC | Age: 56
End: 2021-05-19

## 2021-05-19 DIAGNOSIS — I67.82 ISCHEMIC BRAIN DAMAGE: ICD-10-CM

## 2021-05-19 DIAGNOSIS — R90.89 ABNORMAL CT OF BRAIN: ICD-10-CM

## 2021-05-19 PROCEDURE — 70551 MRI BRAIN STEM W/O DYE: CPT

## 2021-05-19 PROCEDURE — G1004 CDSM NDSC: HCPCS

## 2021-05-21 ENCOUNTER — TELEPHONE (OUTPATIENT)
Dept: FAMILY MEDICINE CLINIC | Facility: CLINIC | Age: 56
End: 2021-05-21

## 2021-05-21 DIAGNOSIS — R93.89 ABNORMAL CT OF THE CHEST: Primary | ICD-10-CM

## 2021-05-21 NOTE — TELEPHONE ENCOUNTER
LM on VM informing pt we received reminder that pt was to have follow up CT scan to study performed last June (2020),  I gave # for central scheduling and requested pt call to schedule follow up CT scan

## 2021-05-27 ENCOUNTER — PATIENT MESSAGE (OUTPATIENT)
Dept: FAMILY MEDICINE CLINIC | Facility: CLINIC | Age: 56
End: 2021-05-27

## 2021-05-27 DIAGNOSIS — E07.9 DISEASE OF THYROID GLAND: ICD-10-CM

## 2021-05-27 DIAGNOSIS — I63.81 MULTIPLE LACUNAR INFARCTS (HCC): Primary | ICD-10-CM

## 2021-05-27 RX ORDER — LEVOTHYROXINE SODIUM 0.15 MG/1
150 TABLET ORAL DAILY
Qty: 30 TABLET | Refills: 0 | Status: CANCELLED | OUTPATIENT
Start: 2021-05-27

## 2021-05-27 NOTE — RESULT ENCOUNTER NOTE
Please let pt know that her MRI brain shows that she has evidence of L infarcts (strokes)  I have placed an order for her to see neuro non emergently for follow up  She is already on an anticoagulant from cardio  Neuro will likely take time to get into and this is OK  Thank you

## 2021-05-28 ENCOUNTER — TELEPHONE (OUTPATIENT)
Dept: FAMILY MEDICINE CLINIC | Facility: CLINIC | Age: 56
End: 2021-05-28

## 2021-05-28 RX ORDER — LEVOTHYROXINE SODIUM 0.15 MG/1
150 TABLET ORAL DAILY
Qty: 30 TABLET | Refills: 5 | OUTPATIENT
Start: 2021-05-28

## 2021-05-28 RX ORDER — LEVOTHYROXINE SODIUM 0.15 MG/1
TABLET ORAL
Qty: 90 TABLET | OUTPATIENT
Start: 2021-05-28

## 2021-05-28 NOTE — TELEPHONE ENCOUNTER
She is getting us confused with her endocrinologist  She left a message with them as well  They prescribe her thyroid meds

## 2021-05-28 NOTE — TELEPHONE ENCOUNTER
Pt states she was told 2 weeks ago that med would be filled for 30 days, but labs were needed  I don't see any notes in the pt's chart that indicate she was to get a 30 days supply until labs are done  All I see is refill request denied  Last appt, 9/9/20    Please review and see if refill is appropriate

## 2021-05-28 NOTE — TELEPHONE ENCOUNTER
We can provide refill when patient scheduled f/u   Has not been seen since initial consultation in September and no labs completed

## 2021-05-28 NOTE — TELEPHONE ENCOUNTER
Pt gets this med through her endo and she is overdue for their lab order  Elvia Montemayor may have already called pt but not sure

## 2021-07-11 ENCOUNTER — APPOINTMENT (EMERGENCY)
Dept: RADIOLOGY | Facility: HOSPITAL | Age: 56
DRG: 246 | End: 2021-07-11
Payer: COMMERCIAL

## 2021-07-11 ENCOUNTER — HOSPITAL ENCOUNTER (INPATIENT)
Facility: HOSPITAL | Age: 56
LOS: 6 days | Discharge: HOME | DRG: 246 | End: 2021-07-17
Attending: EMERGENCY MEDICINE | Admitting: INTERNAL MEDICINE
Payer: COMMERCIAL

## 2021-07-11 DIAGNOSIS — N17.9 AKI (ACUTE KIDNEY INJURY) (HCC): ICD-10-CM

## 2021-07-11 DIAGNOSIS — R77.8 ELEVATED TROPONIN: ICD-10-CM

## 2021-07-11 DIAGNOSIS — R07.9 CHEST PAIN: Primary | ICD-10-CM

## 2021-07-11 DIAGNOSIS — I50.21 ACUTE SYSTOLIC CONGESTIVE HEART FAILURE (HCC): ICD-10-CM

## 2021-07-11 DIAGNOSIS — E87.1 HYPONATREMIA: ICD-10-CM

## 2021-07-11 DIAGNOSIS — R06.02 SHORTNESS OF BREATH: ICD-10-CM

## 2021-07-11 DIAGNOSIS — E83.51 HYPOCALCEMIA: ICD-10-CM

## 2021-07-11 DIAGNOSIS — E89.0 POSTOPERATIVE HYPOTHYROIDISM: ICD-10-CM

## 2021-07-11 DIAGNOSIS — E87.6 HYPOKALEMIA: ICD-10-CM

## 2021-07-11 DIAGNOSIS — R79.89 POSITIVE D DIMER: ICD-10-CM

## 2021-07-11 LAB
ALBUMIN SERPL BCP-MCNC: 3 G/DL (ref 3.5–5)
ALP SERPL-CCNC: 126 U/L (ref 46–116)
ALT SERPL W P-5'-P-CCNC: 34 U/L (ref 12–78)
AMPHETAMINES SERPL QL SCN: NEGATIVE
ANION GAP SERPL CALCULATED.3IONS-SCNC: 11 MMOL/L (ref 4–13)
ANION GAP SERPL CALCULATED.3IONS-SCNC: 8 MMOL/L (ref 4–13)
ANION GAP SERPL CALCULATED.3IONS-SCNC: 9 MMOL/L (ref 4–13)
ANION GAP SERPL CALCULATED.3IONS-SCNC: 9 MMOL/L (ref 4–13)
APTT PPP: 26 SECONDS (ref 23–37)
APTT PPP: 50 SECONDS (ref 23–37)
APTT PPP: 56 SECONDS (ref 23–37)
AST SERPL W P-5'-P-CCNC: 31 U/L (ref 5–45)
ATRIAL RATE: 101 BPM
ATRIAL RATE: 104 BPM
ATRIAL RATE: 105 BPM
BARBITURATES UR QL: NEGATIVE
BASE EX.OXY STD BLDV CALC-SCNC: 78.5 % (ref 60–80)
BASE EXCESS BLDV CALC-SCNC: -2.5 MMOL/L
BASOPHILS # BLD AUTO: 0.07 THOUSANDS/ΜL (ref 0–0.1)
BASOPHILS NFR BLD AUTO: 1 % (ref 0–1)
BENZODIAZ UR QL: NEGATIVE
BILIRUB DIRECT SERPL-MCNC: 0.11 MG/DL (ref 0–0.2)
BILIRUB SERPL-MCNC: 0.24 MG/DL (ref 0.2–1)
BUN SERPL-MCNC: 21 MG/DL (ref 5–25)
BUN SERPL-MCNC: 21 MG/DL (ref 5–25)
BUN SERPL-MCNC: 22 MG/DL (ref 5–25)
BUN SERPL-MCNC: 23 MG/DL (ref 5–25)
CA-I BLD-SCNC: 0.92 MMOL/L (ref 1.12–1.32)
CA-I BLD-SCNC: 1.03 MMOL/L (ref 1.12–1.32)
CALCIUM SERPL-MCNC: 6.4 MG/DL (ref 8.3–10.1)
CALCIUM SERPL-MCNC: 6.8 MG/DL (ref 8.3–10.1)
CALCIUM SERPL-MCNC: 6.8 MG/DL (ref 8.3–10.1)
CALCIUM SERPL-MCNC: 7.2 MG/DL (ref 8.3–10.1)
CHLORIDE SERPL-SCNC: 107 MMOL/L (ref 100–108)
CHLORIDE SERPL-SCNC: 87 MMOL/L (ref 100–108)
CO2 SERPL-SCNC: 23 MMOL/L (ref 21–32)
CO2 SERPL-SCNC: 24 MMOL/L (ref 21–32)
CO2 SERPL-SCNC: 26 MMOL/L (ref 21–32)
CO2 SERPL-SCNC: 28 MMOL/L (ref 21–32)
COCAINE UR QL: NEGATIVE
CREAT SERPL-MCNC: 1.82 MG/DL (ref 0.6–1.3)
CREAT SERPL-MCNC: 1.95 MG/DL (ref 0.6–1.3)
CREAT SERPL-MCNC: 2.06 MG/DL (ref 0.6–1.3)
CREAT SERPL-MCNC: 2.13 MG/DL (ref 0.6–1.3)
D DIMER PPP FEU-MCNC: 1.17 UG/ML FEU
EOSINOPHIL # BLD AUTO: 0.19 THOUSAND/ΜL (ref 0–0.61)
EOSINOPHIL NFR BLD AUTO: 3 % (ref 0–6)
ERYTHROCYTE [DISTWIDTH] IN BLOOD BY AUTOMATED COUNT: 16.1 % (ref 11.6–15.1)
GFR SERPL CREATININE-BSD FRML MDRD: 26 ML/MIN/1.73SQ M
GFR SERPL CREATININE-BSD FRML MDRD: 27 ML/MIN/1.73SQ M
GFR SERPL CREATININE-BSD FRML MDRD: 29 ML/MIN/1.73SQ M
GFR SERPL CREATININE-BSD FRML MDRD: 31 ML/MIN/1.73SQ M
GLUCOSE SERPL-MCNC: 137 MG/DL (ref 65–140)
GLUCOSE SERPL-MCNC: 86 MG/DL (ref 65–140)
GLUCOSE SERPL-MCNC: 93 MG/DL (ref 65–140)
GLUCOSE SERPL-MCNC: 95 MG/DL (ref 65–140)
HCO3 BLDV-SCNC: 22 MMOL/L (ref 24–30)
HCT VFR BLD AUTO: 37.8 % (ref 34.8–46.1)
HGB BLD-MCNC: 11.5 G/DL (ref 11.5–15.4)
IMM GRANULOCYTES # BLD AUTO: 0.02 THOUSAND/UL (ref 0–0.2)
IMM GRANULOCYTES NFR BLD AUTO: 0 % (ref 0–2)
INR PPP: 1.07 (ref 0.84–1.19)
LYMPHOCYTES # BLD AUTO: 1.58 THOUSANDS/ΜL (ref 0.6–4.47)
LYMPHOCYTES NFR BLD AUTO: 21 % (ref 14–44)
MAGNESIUM SERPL-MCNC: 1.8 MG/DL (ref 1.6–2.6)
MAGNESIUM SERPL-MCNC: 1.9 MG/DL (ref 1.6–2.6)
MCH RBC QN AUTO: 25.8 PG (ref 26.8–34.3)
MCHC RBC AUTO-ENTMCNC: 30.4 G/DL (ref 31.4–37.4)
MCV RBC AUTO: 85 FL (ref 82–98)
METHADONE UR QL: NEGATIVE
MONOCYTES # BLD AUTO: 0.58 THOUSAND/ΜL (ref 0.17–1.22)
MONOCYTES NFR BLD AUTO: 8 % (ref 4–12)
NEUTROPHILS # BLD AUTO: 4.99 THOUSANDS/ΜL (ref 1.85–7.62)
NEUTS SEG NFR BLD AUTO: 67 % (ref 43–75)
NRBC BLD AUTO-RTO: 0 /100 WBCS
NT-PROBNP SERPL-MCNC: ABNORMAL PG/ML
O2 CT BLDV-SCNC: 12.8 ML/DL
OPIATES UR QL SCN: NEGATIVE
OXYCODONE+OXYMORPHONE UR QL SCN: NEGATIVE
P AXIS: 42 DEGREES
P AXIS: 44 DEGREES
P AXIS: 50 DEGREES
PCO2 BLDV: 37 MM HG (ref 42–50)
PCP UR QL: POSITIVE
PH BLDV: 7.39 [PH] (ref 7.3–7.4)
PHOSPHATE SERPL-MCNC: 3.8 MG/DL (ref 2.7–4.5)
PLATELET # BLD AUTO: 259 THOUSANDS/UL (ref 149–390)
PMV BLD AUTO: 11 FL (ref 8.9–12.7)
PO2 BLDV: 46.9 MM HG (ref 35–45)
POTASSIUM SERPL-SCNC: 2.3 MMOL/L (ref 3.5–5.3)
POTASSIUM SERPL-SCNC: 4.1 MMOL/L (ref 3.5–5.3)
POTASSIUM SERPL-SCNC: 4.4 MMOL/L (ref 3.5–5.3)
POTASSIUM SERPL-SCNC: 4.5 MMOL/L (ref 3.5–5.3)
PR INTERVAL: 122 MS
PR INTERVAL: 130 MS
PR INTERVAL: 140 MS
PROT SERPL-MCNC: 6.2 G/DL (ref 6.4–8.2)
PROTHROMBIN TIME: 13.7 SECONDS (ref 11.6–14.5)
QRS AXIS: -42 DEGREES
QRS AXIS: -46 DEGREES
QRS AXIS: -49 DEGREES
QRSD INTERVAL: 158 MS
QRSD INTERVAL: 162 MS
QRSD INTERVAL: 164 MS
QT INTERVAL: 426 MS
QT INTERVAL: 436 MS
QT INTERVAL: 444 MS
QTC INTERVAL: 563 MS
QTC INTERVAL: 573 MS
QTC INTERVAL: 575 MS
RBC # BLD AUTO: 4.46 MILLION/UL (ref 3.81–5.12)
SARS-COV-2 RNA RESP QL NAA+PROBE: NEGATIVE
SODIUM SERPL-SCNC: 119 MMOL/L (ref 136–145)
SODIUM SERPL-SCNC: 142 MMOL/L (ref 136–145)
SODIUM SERPL-SCNC: 142 MMOL/L (ref 136–145)
SODIUM SERPL-SCNC: 143 MMOL/L (ref 136–145)
T WAVE AXIS: 101 DEGREES
T WAVE AXIS: 104 DEGREES
T WAVE AXIS: 97 DEGREES
T4 FREE SERPL-MCNC: 1.66 NG/DL (ref 0.76–1.46)
THC UR QL: NEGATIVE
TROPONIN I SERPL-MCNC: 0.21 NG/ML
TROPONIN I SERPL-MCNC: 0.24 NG/ML
TROPONIN I SERPL-MCNC: 0.31 NG/ML
TROPONIN I SERPL-MCNC: 0.38 NG/ML
TROPONIN I SERPL-MCNC: 0.43 NG/ML
TSH SERPL DL<=0.05 MIU/L-ACNC: 0.01 UIU/ML (ref 0.36–3.74)
VENTRICULAR RATE: 101 BPM
VENTRICULAR RATE: 104 BPM
VENTRICULAR RATE: 105 BPM
WBC # BLD AUTO: 7.43 THOUSAND/UL (ref 4.31–10.16)

## 2021-07-11 PROCEDURE — 84484 ASSAY OF TROPONIN QUANT: CPT | Performed by: PHYSICIAN ASSISTANT

## 2021-07-11 PROCEDURE — 80048 BASIC METABOLIC PNL TOTAL CA: CPT | Performed by: NURSE PRACTITIONER

## 2021-07-11 PROCEDURE — 99285 EMERGENCY DEPT VISIT HI MDM: CPT | Performed by: PHYSICIAN ASSISTANT

## 2021-07-11 PROCEDURE — 71045 X-RAY EXAM CHEST 1 VIEW: CPT

## 2021-07-11 PROCEDURE — 84100 ASSAY OF PHOSPHORUS: CPT | Performed by: NURSE PRACTITIONER

## 2021-07-11 PROCEDURE — U0003 INFECTIOUS AGENT DETECTION BY NUCLEIC ACID (DNA OR RNA); SEVERE ACUTE RESPIRATORY SYNDROME CORONAVIRUS 2 (SARS-COV-2) (CORONAVIRUS DISEASE [COVID-19]), AMPLIFIED PROBE TECHNIQUE, MAKING USE OF HIGH THROUGHPUT TECHNOLOGIES AS DESCRIBED BY CMS-2020-01-R: HCPCS | Performed by: PHYSICIAN ASSISTANT

## 2021-07-11 PROCEDURE — 96375 TX/PRO/DX INJ NEW DRUG ADDON: CPT

## 2021-07-11 PROCEDURE — 99222 1ST HOSP IP/OBS MODERATE 55: CPT | Performed by: INTERNAL MEDICINE

## 2021-07-11 PROCEDURE — 99285 EMERGENCY DEPT VISIT HI MDM: CPT

## 2021-07-11 PROCEDURE — 85610 PROTHROMBIN TIME: CPT | Performed by: PHYSICIAN ASSISTANT

## 2021-07-11 PROCEDURE — 96365 THER/PROPH/DIAG IV INF INIT: CPT

## 2021-07-11 PROCEDURE — 93005 ELECTROCARDIOGRAM TRACING: CPT

## 2021-07-11 PROCEDURE — 84439 ASSAY OF FREE THYROXINE: CPT | Performed by: NURSE PRACTITIONER

## 2021-07-11 PROCEDURE — 85379 FIBRIN DEGRADATION QUANT: CPT | Performed by: PHYSICIAN ASSISTANT

## 2021-07-11 PROCEDURE — U0005 INFEC AGEN DETEC AMPLI PROBE: HCPCS | Performed by: PHYSICIAN ASSISTANT

## 2021-07-11 PROCEDURE — 80307 DRUG TEST PRSMV CHEM ANLYZR: CPT | Performed by: NURSE PRACTITIONER

## 2021-07-11 PROCEDURE — 83880 ASSAY OF NATRIURETIC PEPTIDE: CPT | Performed by: PHYSICIAN ASSISTANT

## 2021-07-11 PROCEDURE — 84443 ASSAY THYROID STIM HORMONE: CPT | Performed by: NURSE PRACTITIONER

## 2021-07-11 PROCEDURE — 85025 COMPLETE CBC W/AUTO DIFF WBC: CPT | Performed by: PHYSICIAN ASSISTANT

## 2021-07-11 PROCEDURE — 85730 THROMBOPLASTIN TIME PARTIAL: CPT | Performed by: PHYSICIAN ASSISTANT

## 2021-07-11 PROCEDURE — 82805 BLOOD GASES W/O2 SATURATION: CPT | Performed by: PHYSICIAN ASSISTANT

## 2021-07-11 PROCEDURE — 83735 ASSAY OF MAGNESIUM: CPT | Performed by: NURSE PRACTITIONER

## 2021-07-11 PROCEDURE — 80048 BASIC METABOLIC PNL TOTAL CA: CPT | Performed by: INTERNAL MEDICINE

## 2021-07-11 PROCEDURE — 85730 THROMBOPLASTIN TIME PARTIAL: CPT | Performed by: INTERNAL MEDICINE

## 2021-07-11 PROCEDURE — 96368 THER/DIAG CONCURRENT INF: CPT

## 2021-07-11 PROCEDURE — 80048 BASIC METABOLIC PNL TOTAL CA: CPT | Performed by: PHYSICIAN ASSISTANT

## 2021-07-11 PROCEDURE — 80076 HEPATIC FUNCTION PANEL: CPT | Performed by: PHYSICIAN ASSISTANT

## 2021-07-11 PROCEDURE — 85730 THROMBOPLASTIN TIME PARTIAL: CPT | Performed by: NURSE PRACTITIONER

## 2021-07-11 PROCEDURE — 36415 COLL VENOUS BLD VENIPUNCTURE: CPT | Performed by: PHYSICIAN ASSISTANT

## 2021-07-11 PROCEDURE — 83735 ASSAY OF MAGNESIUM: CPT | Performed by: PHYSICIAN ASSISTANT

## 2021-07-11 PROCEDURE — 82330 ASSAY OF CALCIUM: CPT | Performed by: PHYSICIAN ASSISTANT

## 2021-07-11 PROCEDURE — 93010 ELECTROCARDIOGRAM REPORT: CPT | Performed by: INTERNAL MEDICINE

## 2021-07-11 PROCEDURE — 82330 ASSAY OF CALCIUM: CPT | Performed by: NURSE PRACTITIONER

## 2021-07-11 PROCEDURE — 84484 ASSAY OF TROPONIN QUANT: CPT | Performed by: NURSE PRACTITIONER

## 2021-07-11 PROCEDURE — 99291 CRITICAL CARE FIRST HOUR: CPT | Performed by: INTERNAL MEDICINE

## 2021-07-11 RX ORDER — HEPARIN SODIUM 1000 [USP'U]/ML
4000 INJECTION, SOLUTION INTRAVENOUS; SUBCUTANEOUS ONCE
Status: COMPLETED | OUTPATIENT
Start: 2021-07-11 | End: 2021-07-11

## 2021-07-11 RX ORDER — ASPIRIN 81 MG/1
81 TABLET, CHEWABLE ORAL DAILY
Status: DISCONTINUED | OUTPATIENT
Start: 2021-07-11 | End: 2021-07-17 | Stop reason: HOSPADM

## 2021-07-11 RX ORDER — ACETAMINOPHEN 325 MG/1
650 TABLET ORAL EVERY 4 HOURS PRN
Status: DISCONTINUED | OUTPATIENT
Start: 2021-07-11 | End: 2021-07-17 | Stop reason: HOSPADM

## 2021-07-11 RX ORDER — SODIUM CHLORIDE 9 MG/ML
75 INJECTION, SOLUTION INTRAVENOUS CONTINUOUS
Status: DISCONTINUED | OUTPATIENT
Start: 2021-07-11 | End: 2021-07-11

## 2021-07-11 RX ORDER — FENTANYL CITRATE 50 UG/ML
25 INJECTION, SOLUTION INTRAMUSCULAR; INTRAVENOUS ONCE
Status: COMPLETED | OUTPATIENT
Start: 2021-07-11 | End: 2021-07-11

## 2021-07-11 RX ORDER — LANOLIN ALCOHOL/MO/W.PET/CERES
6 CREAM (GRAM) TOPICAL
Status: DISCONTINUED | OUTPATIENT
Start: 2021-07-11 | End: 2021-07-17 | Stop reason: HOSPADM

## 2021-07-11 RX ORDER — METOPROLOL SUCCINATE 25 MG/1
12.5 TABLET, EXTENDED RELEASE ORAL 2 TIMES DAILY
Status: DISCONTINUED | OUTPATIENT
Start: 2021-07-11 | End: 2021-07-17 | Stop reason: HOSPADM

## 2021-07-11 RX ORDER — POTASSIUM CHLORIDE 14.9 MG/ML
20 INJECTION INTRAVENOUS
Status: COMPLETED | OUTPATIENT
Start: 2021-07-11 | End: 2021-07-11

## 2021-07-11 RX ORDER — ONDANSETRON 2 MG/ML
4 INJECTION INTRAMUSCULAR; INTRAVENOUS ONCE
Status: COMPLETED | OUTPATIENT
Start: 2021-07-11 | End: 2021-07-11

## 2021-07-11 RX ORDER — FUROSEMIDE 10 MG/ML
20 INJECTION INTRAMUSCULAR; INTRAVENOUS DAILY
Status: DISCONTINUED | OUTPATIENT
Start: 2021-07-12 | End: 2021-07-11

## 2021-07-11 RX ORDER — FUROSEMIDE 10 MG/ML
20 INJECTION INTRAMUSCULAR; INTRAVENOUS DAILY
Status: DISCONTINUED | OUTPATIENT
Start: 2021-07-11 | End: 2021-07-12

## 2021-07-11 RX ORDER — POTASSIUM CHLORIDE 20 MEQ/1
60 TABLET, EXTENDED RELEASE ORAL ONCE
Status: COMPLETED | OUTPATIENT
Start: 2021-07-11 | End: 2021-07-11

## 2021-07-11 RX ORDER — DIPHENHYDRAMINE HCL 25 MG
50 TABLET ORAL
Status: DISCONTINUED | OUTPATIENT
Start: 2021-07-11 | End: 2021-07-13

## 2021-07-11 RX ORDER — CALCIUM GLUCONATE 20 MG/ML
1 INJECTION, SOLUTION INTRAVENOUS ONCE
Status: COMPLETED | OUTPATIENT
Start: 2021-07-11 | End: 2021-07-11

## 2021-07-11 RX ORDER — LEVOTHYROXINE SODIUM 0.07 MG/1
150 TABLET ORAL
Status: DISCONTINUED | OUTPATIENT
Start: 2021-07-11 | End: 2021-07-13

## 2021-07-11 RX ORDER — HEPARIN SODIUM 10000 [USP'U]/100ML
3-20 INJECTION, SOLUTION INTRAVENOUS
Status: DISCONTINUED | OUTPATIENT
Start: 2021-07-11 | End: 2021-07-13

## 2021-07-11 RX ADMIN — FUROSEMIDE 20 MG: 10 INJECTION, SOLUTION INTRAMUSCULAR; INTRAVENOUS at 12:52

## 2021-07-11 RX ADMIN — MELATONIN 6 MG: at 22:45

## 2021-07-11 RX ADMIN — METOPROLOL SUCCINATE 12.5 MG: 25 TABLET, EXTENDED RELEASE ORAL at 12:40

## 2021-07-11 RX ADMIN — POTASSIUM CHLORIDE 20 MEQ: 14.9 INJECTION, SOLUTION INTRAVENOUS at 06:23

## 2021-07-11 RX ADMIN — FENTANYL CITRATE 25 MCG: 50 INJECTION INTRAMUSCULAR; INTRAVENOUS at 03:46

## 2021-07-11 RX ADMIN — ASPIRIN 81 MG CHEWABLE TABLET 81 MG: 81 TABLET CHEWABLE at 08:05

## 2021-07-11 RX ADMIN — HEPARIN SODIUM 4000 UNITS: 1000 INJECTION INTRAVENOUS; SUBCUTANEOUS at 04:27

## 2021-07-11 RX ADMIN — POTASSIUM CHLORIDE 20 MEQ: 14.9 INJECTION, SOLUTION INTRAVENOUS at 04:19

## 2021-07-11 RX ADMIN — POTASSIUM CHLORIDE 60 MEQ: 1500 TABLET, EXTENDED RELEASE ORAL at 04:12

## 2021-07-11 RX ADMIN — METOPROLOL SUCCINATE 12.5 MG: 25 TABLET, EXTENDED RELEASE ORAL at 21:41

## 2021-07-11 RX ADMIN — ONDANSETRON 4 MG: 2 INJECTION INTRAMUSCULAR; INTRAVENOUS at 03:30

## 2021-07-11 RX ADMIN — SODIUM CHLORIDE 75 ML/HR: 0.9 INJECTION, SOLUTION INTRAVENOUS at 06:26

## 2021-07-11 RX ADMIN — LEVOTHYROXINE SODIUM 150 MCG: 75 TABLET ORAL at 08:05

## 2021-07-11 RX ADMIN — HEPARIN SODIUM 12 UNITS/KG/HR: 10000 INJECTION, SOLUTION INTRAVENOUS at 04:29

## 2021-07-11 RX ADMIN — CALCIUM GLUCONATE 1 G: 20 INJECTION, SOLUTION INTRAVENOUS at 04:13

## 2021-07-11 RX ADMIN — NITROGLYCERIN 1 INCH: 20 OINTMENT TOPICAL at 03:32

## 2021-07-11 NOTE — H&P
[place] 50  H&P- Blaise Pea 1965, 49 y o  female MRN: [id number]  Unit/Bed#: ICU 01 Encounter: [id number]  Primary Care Provider: Abhinav Damon PA-C   Date and time admitted to hospital: 7/11/2021  3:16 AM    * Hyponatremia  Assessment & Plan  · Patient presented to the ED with persistent chest pain which was similar in nature to when she had a cardiac stent placed June 2020  · Patient was noted to have a sodium 119   Previous sodium September 2020 142  · Start saline at 75 ml/hr  · BMP q4h  · Consider nephrology consult  · Monitor for signs of volume overload and pulmonary edema due to elevated pro- BNP    MARINA (acute kidney injury) ([facility] [address] )  Assessment & Plan  · Cardio-renal syndrome (?)  · Baseline creatinine appears to be between 1-1 4  · Creatinine 2 1  · Continue IV fluids  · Consider nephrology consult    Acute systolic congestive heart failure (HCC)  Assessment & Plan  Wt Readings from Last 3 Encounters:   07/11/21 70 2 kg (154 lb 12 2 oz)   05/19/21 64 4 kg (142 lb)   05/07/21 64 4 kg (142 lb)         · Weight appears elevated from baseline 1 year ago at 61 kgs  · Pro-BNP 16,000  · Chest xray with pulmonary edema  · Patient on room air with 100% oxygenation  · Unable to give lasix due to hyponatremia and elevated creatinine  · Check echocardiogram  · Consider cardiology consult    Cardiomyopathy [facility])  Assessment & Plan  · Patient was admitted in June 2020 with CAD s/p stent placement to be continued on ASA and brilinta for 1 year  · June 22, 2020 Echo at that time revealed an EF 25% with severe global hypokinesis and grade 1 diastolic heart failure  · Patient was placed in a lifevest to follow up with cardiology - discontinued in august 2020  · August 2020 echo showed improvement of EF to 43% with only mild hypokinesis  · Patient has not followed up with cardiology since July 2020  · Patient was to continue coreg and lipitor and lasix  · Check echocardiogram    CAD (coronary artery disease)  Assessment & Plan  · S/p stent placement on June 2020  · Follow up cardiology  · Continue ASA    Elevated d-dimer  Assessment & Plan  · Appears chronic      Postoperative hypothyroidism  Assessment & Plan  · Patient follow with endocrine  · Continue levothyroxine  · Check TSH    Hypocalcemia  Assessment & Plan  · Replete  · Check BMP this afternoon    Hypokalemia  Assessment & Plan  · replete    Chest pain  Assessment & Plan  · EKG without signs of infarction  · Trop 0 24 trend  · Continue ASA  · Consider cardiology consult  · Heparin infusion started in the ER for ACS - can likely discontinue    -------------------------------------------------------------------------------------------------------------  Chief Complaint: chest pain    History of Present Illness   HX and PE limited by:   Kelly Lopez is a 49 y o  female with a past medical history of CAD s/p stent, hypothyroidism, cardiomyopathy, CHF, hypocalcemia  Patient presented to the emergency department with chest pain and shortness of breath  The patient was noted to have a sodium 119 with hypokalemia, hypocalcemia, elevated creatinine and elevated pro - BNP  In the ED the patient was given nitro, electrolyte replacements and started on a heparin infusion  Patient notes her chest pain has resolved but still feels short of breath  Of note, she is 100% on room air and is talking in full sentences without distress  History obtained from chart review and the patient   -------------------------------------------------------------------------------------------------------------  Dispo: Admit to Stepdown Level 1    Code Status: Prior  --------------------------------------------------------------------------------------------------------------  Review of Systems   Respiratory: Positive for cough and shortness of breath  Cardiovascular: Positive for chest pain         A 12-point, complete review of systems was reviewed and negative except as stated above     Physical Exam  Vitals reviewed  Constitutional:       General: She is not in acute distress  HENT:      Head: Normocephalic and atraumatic  Nose: Nose normal       Mouth/Throat:      Mouth: Mucous membranes are moist    Eyes:      Extraocular Movements: Extraocular movements intact  Pupils: Pupils are equal, round, and reactive to light  Cardiovascular:      Rate and Rhythm: Normal rate and regular rhythm  Pulses: Normal pulses  Heart sounds: Normal heart sounds  Pulmonary:      Effort: Pulmonary effort is normal       Breath sounds: Rales present  Abdominal:      General: Abdomen is flat  Bowel sounds are normal  There is no distension  Palpations: Abdomen is soft  Tenderness: There is no abdominal tenderness  Musculoskeletal:         General: No swelling  Normal range of motion  Cervical back: Normal range of motion  Right lower leg: No edema  Left lower leg: No edema  Skin:     General: Skin is warm and dry  Neurological:      General: No focal deficit present  Mental Status: She is alert and oriented to person, place, and time  Psychiatric:         Mood and Affect: Mood normal        --------------------------------------------------------------------------------------------------------------  Vitals:   Vitals:    07/11/21 0349 07/11/21 0401 07/11/21 0445 07/11/21 0515   BP:  112/63 101/68 121/73   BP Location:  Right arm Right arm Right arm   Pulse:  (!) 106 100 101   Resp:  (!) 24 (!) 24 20   Temp: 98 3 °F (36 8 °C)      TempSrc: Oral      SpO2:  97% 94% 93%   Weight:         Temp  Min: 98 3 °F (36 8 °C)  Max: 98 3 °F (36 8 °C)        Body mass index is 25 75 kg/m²      Laboratory and Diagnostics:  Results from last 7 days   Lab Units 07/11/21  0327   WBC Thousand/uL 7 43   HEMOGLOBIN g/dL 11 5   HEMATOCRIT % 37 8   PLATELETS Thousands/uL 259   NEUTROS PCT % 67   MONOS PCT % 8 Results from last 7 days   Lab Units 21  0327   SODIUM mmol/L 119*   POTASSIUM mmol/L 2 3*   CHLORIDE mmol/L 87*   CO2 mmol/L 23   ANION GAP mmol/L 9   BUN mg/dL 23   CREATININE mg/dL 2 13*   CALCIUM mg/dL 6 4*   GLUCOSE RANDOM mg/dL 137   ALT U/L 34   AST U/L 31   ALK PHOS U/L 126*   ALBUMIN g/dL 3 0*   TOTAL BILIRUBIN mg/dL 0 24     Results from last 7 days   Lab Units 21  0435   MAGNESIUM mg/dL 1 8      Results from last 7 days   Lab Units 21  0409   INR  1 07   PTT seconds 26      Results from last 7 days   Lab Units 21  0327   TROPONIN I ng/mL 0 24*         ABG:    VBG:  Results from last 7 days   Lab Units 21  0327   PH MARY  7 392   PCO2 MARY mm Hg 37 0*   PO2 MARY mm Hg 46 9*   HCO3 MARY mmol/L 22 0*   BASE EXC MARY mmol/L -2 5           Micro:        EKG: SR  Imaging: I have personally reviewed pertinent reports  Historical Information   Past Medical History:   Diagnosis Date    Allergies     Disease of thyroid gland     hypo     Past Surgical History:   Procedure Laterality Date    CARDIAC CATHETERIZATION       SECTION      x2    WA OPEN TX RADIAL & ULNAR SHAFT FX FIX RADIUS AND ULNA Left 6/3/2020    Procedure: Open reduction internal fixation left distal radius fracture;  Surgeon: Raj Mcdaniel MD;  Location: [address] [place] [address];  Service: Orthopedics    TOTAL THYROIDECTOMY      TUBAL LIGATION       Social History   Social History     Substance and Sexual Activity   Alcohol Use Yes    Comment: only on special ocassions        Social History     Substance and Sexual Activity   Drug Use Never     Social History     Tobacco Use   Smoking Status Never Smoker   Smokeless Tobacco Never Used     Exercise History:   Family History:   Family History   Problem Relation Age of Onset    No Known Problems Mother     No Known Problems Father     Cancer Family      I have reviewed this patient's family history and commented on sigificant items within the HPI      Medications:  Current Facility-Administered Medications   Medication Dose Route Frequency    heparin (porcine) 25,000 units in 0 45% NaCl 250 mL infusion (premix)  3-20 Units/kg/hr (Order-Specific) Intravenous Titrated    potassium chloride 20 mEq IVPB (premix)  20 mEq Intravenous Q2H    sodium chloride 0 9 % infusion  75 mL/hr Intravenous Continuous     Home medications:  Prior to Admission Medications   Prescriptions Last Dose Informant Patient Reported? Taking?   acetaminophen (TYLENOL) 325 mg tablet  Self No No   Sig: Take 2 tablets (650 mg total) by mouth every 4 (four) hours as needed for mild pain, headaches or fever   aspirin 81 mg chewable tablet  Self No No   Sig: Chew 1 tablet (81 mg total) daily   atorvastatin (LIPITOR) 40 mg tablet   No No   Sig: Take 1 tablet (40 mg total) by mouth daily   Patient taking differently: Take 20 mg by mouth daily    carvedilol (COREG) 12 5 mg tablet  Self No No   Sig: Take 1 tablet (12 5 mg total) by mouth 2 (two) times a day with meals   Patient taking differently: Take 6 25 mg by mouth 2 (two) times a day with meals    colchicine (COLCRYS) 0 6 mg tablet   No No   Sig: Take 1 tablet (0 6 mg total) by mouth 2 (two) times a day   ferrous gluconate (FERGON) 324 mg tablet   No No   Sig: TAKE 1 TABLET (324 MG TOTAL) BY MOUTH 2 (TWO) TIMES A DAY BEFORE MEALS   furosemide (LASIX) 20 mg tablet   No No   Sig: TAKE 1 TABLET BY MOUTH ONCE DAILY EVERY Monday, Wednesday, and Friday   levothyroxine 150 mcg tablet   No Yes   Sig: Take 1 tablet (150 mcg total) by mouth daily   nitroglycerin (NITROSTAT) 0 4 mg SL tablet  Self No No   Sig: Place 1 tablet (0 4 mg total) under the tongue every 5 (five) minutes as needed for chest pain   ticagrelor (BRILINTA) 90 MG   No Yes   Sig: Take 1 tablet (90 mg total) by mouth 2 (two) times a day      Facility-Administered Medications: None     Allergies:   Allergies   Allergen Reactions    Penicillins Rash ------------------------------------------------------------------------------------------------------------  Advance Directive and Living Will:      Power of :    POLST:    ------------------------------------------------------------------------------------------------------------  Anticipated Length of Stay is > 2 midnights    Care Time Delivered:   No Critical Care time spent       [facility]        Portions of the record may have been created with voice recognition software  Occasional wrong word or "sound a like" substitutions may have occurred due to the inherent limitations of voice recognition software    Read the chart carefully and recognize, using context, where substitutions have occurred

## 2021-07-11 NOTE — ASSESSMENT & PLAN NOTE
Wt Readings from Last 3 Encounters:   07/11/21 70 2 kg (154 lb 12 2 oz)   05/19/21 64 4 kg (142 lb)   05/07/21 64 4 kg (142 lb)         · Weight appears elevated from baseline 1 year ago at 61 kgs  · Pro-BNP 16,000  · Chest xray with pulmonary edema  · Patient on room air with 100% oxygenation  · Unable to give lasix due to hyponatremia and elevated creatinine  · Check echocardiogram  · Consider cardiology consult

## 2021-07-11 NOTE — ASSESSMENT & PLAN NOTE
· Cardio-renal syndrome (?)  · Baseline creatinine appears to be between 1-1 4  · Creatinine 2 1  · Continue IV fluids  · Consider nephrology consult

## 2021-07-11 NOTE — CONSULTS
Consultation - Cardiology   Camilla Rodriguez 49 y o  female MRN: [id number]  Unit/Bed#: ICU 01 Encounter: [id number]    Physician Requesting Consult: Louis De Jesus DO  Reason for Consult / Principal Problem:      Chest pain, shortness of breath  Consulting physician:  Nahomi Colmenares MD      Assessment/Plan     Assessment:  1  Acute on chronic systolic congestive heart failure, NT-proBNP 17924  2  Angina type discomfort provoked by severe CHF  3  Non MI troponin elevation secondary to congestive heart failure  4  From history, congestive heart failure was the aggravating event not ischemia causing the congestive heart failure  In the scenario of ischemia causing the congestive heart failure, I would suspect flash pulmonary edema while patient describes worsening symptoms over at least a week  5  Prior stent of LAD 06/17/2020 with residual 1st obtuse marginal 50% which was negative IFR  6  Persistent sinus tachycardia which is of concern in the setting of CHF   7  Marginal blood pressure  8  Chronic kidney disease stage 4    Plan:  1  Will begin furosemide 20 mg daily IV  2  Will begin metoprolol 12 5 mg b i d   3  When patient more stabilized, recommend pharmacologic nuclear stress test  4  Agree with repeat echocardiogram  5  Patient is actually more ill than she appears and more ill than she realizes    History of Present Illness   HPI: Camilla Rodriguez is a 49y o  year old female who presents with chest discomfort and shortness of breath  The shortness of breath began before the chest discomfort  She states that over the past week she gradually became more short of breath  She 1st noticed it on going stairs  Then she developed orthopnea and paroxysmal nocturnal dyspnea    Before coming to the ED, the shortness of breath was fairly severe and was accompanied by mild pressure feeling on her chest   She states that she has had this mild chest discomfort in the past and has never been concerned about it  She is somewhat vague about the frequency and related activities and symptoms  Patient Active Problem List    Diagnosis Date Noted    Hyponatremia 2021    Hypokalemia 2021    Chest pain 2021    [facility] infarcts [facility] 2021    Abnormal CT of brain 2021    Ischemic brain damage 2021    Hypoparathyroidism ([facility] ) 10/16/2020    Decreased GFR 2020    Syncope 2020    Elevated d-dimer 2020    Hypocalcemia 2020    Iron deficiency anemia 2020    Acute myopericarditis 2020    CAD (coronary artery disease) 2020    Chronic systolic (congestive) heart failure ([facility] ) 2020    Cardiomyopathy ([facility] ) 2020    LBBB (left bundle branch block) 2020    Anemia 2020    Prediabetes 2020    s/p ORIF of left distal radius     Acute systolic congestive heart failure ([facility] ) 2020    MARINA (acute kidney injury) ([facility] ) 2020    Postoperative hypothyroidism        Historical Information   Past Medical History:   Diagnosis Date    Allergies     CHF (congestive heart failure) ([facility] )     Coronary artery disease     Disease of thyroid gland     hypo    Renal disorder      Past Surgical History:   Procedure Laterality Date    CARDIAC CATHETERIZATION       SECTION      x2    FRACTURE SURGERY      PA OPEN TX RADIAL & ULNAR SHAFT FX FIX RADIUS AND ULNA Left 6/3/2020    Procedure: Open reduction internal fixation left distal radius fracture;  Surgeon: Jazmyne Nowak MD;  Location: [address] [place] [address];  Service: Orthopedics    TOTAL THYROIDECTOMY      TUBAL LIGATION       Past Surgical History:   Procedure Laterality Date    CARDIAC CATHETERIZATION       SECTION      x2    FRACTURE SURGERY      PA OPEN TX RADIAL & ULNAR SHAFT FX FIX RADIUS AND ULNA Left 6/3/2020    Procedure: Open reduction internal fixation left distal radius fracture;  Surgeon:  Jazmyne Nowak MD; Location:  MAIN OR;  Service: Orthopedics    TOTAL THYROIDECTOMY      TUBAL LIGATION       Social History:  Social History     Substance and Sexual Activity   Alcohol Use Yes    Comment: only on special ocassions  Social History     Substance and Sexual Activity   Drug Use Never     Social History     Tobacco Use   Smoking Status Never Smoker   Smokeless Tobacco Never Used     Social History     Socioeconomic History    Marital status:      Spouse name: Not on file    Number of children: Not on file    Years of education: Not on file    Highest education level: Not on file   Occupational History    Not on file   Tobacco Use    Smoking status: Never Smoker    Smokeless tobacco: Never Used   Vaping Use    Vaping Use: Never used   Substance and Sexual Activity    Alcohol use: Yes     Comment: only on special ocassions   Drug use: Never    Sexual activity: Not on file   Other Topics Concern    Not on file   Social History Narrative    Not on file     Social Determinants of Health     Financial Resource Strain:     Difficulty of Paying Living Expenses:    Food Insecurity:     Worried About Running Out of Food in the Last Year:     [address] in the Last Year:    Transportation Needs:     Lack of Transportation (Medical):  Lack of Transportation (Non-Medical):    Physical Activity:     Days of Exercise per Week:     Minutes of Exercise per Session:    Stress:     Feeling of Stress :    Social Connections:     Frequency of Communication with Friends and Family:     Frequency of Social Gatherings with Friends and Family:     Attends Muslim Services:     Active Member of Clubs or Organizations:     Attends Club or Organization Meetings:     Marital Status:    Intimate Partner Violence:     Fear of Current or Ex-Partner:     Emotionally Abused:     Physically Abused:     Sexually Abused:       Family History:   family history includes Cancer in her family;  No Known Problems in her father and mother  Meds/Allergies   Current Facility-Administered Medications   Medication Dose Route Frequency    acetaminophen (TYLENOL) tablet 650 mg  650 mg Oral Q4H PRN    aspirin chewable tablet 81 mg  81 mg Oral Daily    heparin (porcine) 25,000 units in 0 45% NaCl 250 mL infusion (premix)  3-20 Units/kg/hr (Order-Specific) Intravenous Titrated    levothyroxine tablet 150 mcg  150 mcg Oral Early Morning     Allergies   Allergen Reactions    Penicillins Rash         Review of Systems   Constitutional: Negative  HENT: Negative  Respiratory: Positive for shortness of breath  Negative for chest tightness  Cardiovascular: Positive for chest pain  Negative for leg swelling  Gastrointestinal: Negative  Endocrine: Negative  Genitourinary: Negative  Musculoskeletal: Negative  Skin: Negative  Allergic/Immunologic: Negative  Neurological: Negative  Hematological: Negative  Psychiatric/Behavioral: Negative  Objective   Vitals: Blood pressure 100/69, pulse 100, temperature 98 3 °F (36 8 °C), temperature source Temporal, resp  rate 17, weight 70 2 kg (154 lb 12 2 oz), SpO2 99 %, not currently breastfeeding  Orthostatic Blood Pressures      Most Recent Value   Blood Pressure  100/69 filed at 07/11/2021 1100   Patient Position - Orthostatic VS  Lying filed at 07/11/2021 0515          Intake/Output Summary (Last 24 hours) at 7/11/2021 1220  Last data filed at 7/11/2021 1201  Gross per 24 hour   Intake 418 5 ml   Output 55 ml   Net 363 5 ml     Invasive Devices     Peripheral Intravenous Line            Peripheral IV 07/11/21 Left Antecubital <1 day    Peripheral IV 07/11/21 Left Forearm <1 day    Peripheral IV 07/11/21 Right Antecubital <1 day                Physical Exam  Constitutional:       General: She is not in acute distress  Appearance: She is well-developed  HENT:      Head: Normocephalic and atraumatic  Neck:      Thyroid: No thyromegaly  Vascular: No carotid bruit or JVD  Trachea: No tracheal deviation  Cardiovascular:      Rate and Rhythm: Regular rhythm  Tachycardia present  Pulses: Normal pulses  Heart sounds: Normal heart sounds  No murmur heard  No friction rub  No gallop  Pulmonary:      Effort: Pulmonary effort is normal  No respiratory distress  Breath sounds: Rales present  No wheezing or rhonchi  Comments: Bilateral basilar rales posterior and anterior  Chest:      Chest wall: No tenderness  Abdominal:      General: Bowel sounds are normal  There is no distension  Palpations: Abdomen is soft  Tenderness: There is no abdominal tenderness  Musculoskeletal:         General: Normal range of motion  Cervical back: Normal range of motion and neck supple  Right lower leg: No edema  Left lower leg: No edema  Skin:     General: Skin is warm and dry  Neurological:      General: No focal deficit present  Mental Status: She is alert and oriented to person, place, and time  Gait: Gait normal    Psychiatric:         Mood and Affect: Mood normal          Behavior: Behavior normal          Thought Content: Thought content normal          Judgment: Judgment normal        --------------------------------------------------------------------------------  CATH:  Results for orders placed during the hospital encounter of 20    Cardiac catheterization    Narrative  [address] [place] [address], [facility]  [phone]    [facility]    Invasive Cardiovascular Lab Complete Report    Patient: Sandrita Glynn  MR number: [id number]  Account number: [de-identified]  Study date: 2020  Gender: Female  : 1965  Height: 65 in  Weight: 141 9 lb  BSA: 1 71 mï¾²    Allergies: PENICILLINS    Diagnostic Cardiologist:  Cesar Cruz MD  Interventional Cardiologist:  Cesar Cruz MD  Primary Physician:  Lauren Lemus MD    SUMMARY    CORONARY CIRCULATION:  Mid LAD: There was a diffuse 60 % stenosis  This is a likely culprit for the patient's clinical presentation  An intervention was performed  Positive iFR 0 85  1st obtuse marginal: There was a 50 % stenosis  It appears amenable to percutaneous intervention  Negative iFR    CARDIAC STRUCTURES:  Global left ventricular function was moderately depressed  EF calculated by contrast ventriculography was 35 %  1ST LESION INTERVENTIONS:  A successful drug-eluting stent with balloon angioplasty procedure was performed on the 60 % lesion in the mid LAD  Following intervention there was an excellent angiographic appearance with a 0 % residual stenosis  A Resolute [place] Rx 2 5 x 22mm drug-eluting stent was placed across the lesion and deployed at a maximum inflation pressure of 16 elsa  A Resolute [place] Rx 3 0 x 26mm drug-eluting stent was placed across the lesion and deployed at a maximum inflation pressure of 16 elsa  A Resolute [place] Rx 3 5 x 12mm drug-eluting stent was placed across the lesion and deployed at a maximum inflation pressure of 12 elsa  INDICATIONS:  --  Possible CAD: myocardial infarction without ST elevation (NSTEMI)  --  Congestive heart failure with cardiomyopathy  PROCEDURES PERFORMED    --  Left heart catheterization with ventriculography  --  Left coronary angiography  --  Right coronary angiography  --  Diagnostic myocardial fractional flow reserve  --  Diagnostic myocardial fractional flow reserve  --  Inpatient  --  Mod Sedation Same Physician Initial 15min  --  Myocardial Flow [place]  --  Coronary Catheterization (w/ LHC)  --  Myocardial Flow Reserve Additional Vessel  --  Mod Sedation Same Physician Add 15min  --  Mod Sedation Same Physician Add 15min  --  Mod Sedation Same Physician Add 15min  --  --  Coronary Drug Eluting Stent w/PTCA  --  Intervention on mid LAD: drug-eluting stent, balloon angioplasty      PROCEDURE: The risks and alternatives of the procedures and conscious sedation were explained to the patient and informed consent was obtained  The patient was brought to the cath lab and placed on the table  The planned puncture sites  were prepped and draped in the usual sterile fashion  --  Right radial artery access  After performing an Otoniel's test to verify adequate ulnar artery supply to the hand, the radial site was prepped  The puncture site was infiltrated with local anesthetic  The vessel was accessed using the  modified Seldinger technique, a wire was advanced into the vessel, and a sheath was advanced over the wire into the vessel  --  Left heart catheterization with ventriculography  A catheter was advanced over a guidewire into the ascending aorta  After recording ascending aortic pressure, the catheter was advanced across the aortic valve and left ventricular  pressure was recorded  Ventriculography was performed  The catheter was pulled back across the aortic valve and into the ascending aorta and pullback pressures were obtained  --  Left coronary artery angiography  A catheter was advanced over a guidewire into the aorta and positioned in the left coronary artery ostium under fluoroscopic guidance  Angiography was performed  --  Right coronary artery angiography  A catheter was advanced over a guidewire into the aorta and positioned in the right coronary artery ostium under fluoroscopic guidance  Angiography was performed  --  Myocardial fractional flow reserve  Flow reserve was measured using a 0 014" pressure-monitoring guide-wire  Steady baseline values were obtained  Mean arterial pressure and mean distal coronary pressures were then obtained at maximum  hyperemia  --  Myocardial fractional flow reserve  Flow reserve was measured using a 0 014" pressure-monitoring guide-wire  Steady baseline values were obtained   Mean arterial pressure and mean distal coronary pressures were then obtained at maximum  hyperemia  --  Inpatient  --  Mod Sedation Same Physician Initial 15min  --  Myocardial Flow [place]  --  Coronary Catheterization (w/ LHC)  --  Myocardial Flow Reserve Additional Vessel  --  Mod Sedation Same Physician Add 15min  --  Mod Sedation Same Physician Add 15min  --  Mod Sedation Same Physician Add 15min  --  Instant Flow Reserve was measured using 0 014 pressure-monitoring guide-wire  Steady baseline values were obtained by measuring the ratio of distal coronary pressure (Pd) to the aortic pressure (Pa) during a wave-free segment of  diastole  LAD and OM1    LESION INTERVENTION: A successful drug-eluting stent with balloon angioplasty procedure was performed on the 60 % lesion in the mid LAD  Following intervention there was an excellent angiographic appearance with a 0 % residual stenosis  There was LALO 3 flow before the procedure and LALO 3 flow after the procedure  There was no dissection  --  Vessel setup was performed  A Launcher 6Fr Ebu 3 5 guiding catheter was used to cannulate the vessel  --  Vessel setup was performed  A Runthrough NS 180cm wire was used to cross the lesion  --  A Resolute [place] Rx 2 5 x 22mm drug-eluting stent was placed across the lesion and deployed at a maximum inflation pressure of 16 elsa  --  A Resolute [place] Rx 3 0 x 26mm drug-eluting stent was placed across the lesion and deployed at a maximum inflation pressure of 16 elsa  --  Balloon angioplasty was performed, with 1 inflations and a maximum inflation pressure of 18 elsa  --  A Resolute [place] Rx 3 5 x 12mm drug-eluting stent was placed across the lesion and deployed at a maximum inflation pressure of 12 elsa  --  Balloon angioplasty was performed, with 1 inflations and a maximum inflation pressure of 16 elsa      --  Balloon angioplasty was performed, using a [place] Rx 3 5 x 12mm balloon, with 6 inflations and a maximum inflation pressure of 20 esla     INTERVENTIONS:  --  Coronary Drug Eluting Stent w/PTCA  PROCEDURE COMPLETION: The patient tolerated the procedure well and was discharged from the cath lab  TIMING: Test started at 11:17  Test concluded at 12:17  HEMOSTASIS: The sheath was removed  The site was compressed with a Hemoband  device  Hemostasis was obtained  MEDICATIONS GIVEN: Versed (2mg/2ml), 1 mg, IV, at 11:23  Fentanyl (1OOmcg/2 ml), 50 mcg, IV, at 11:23  1% Lidocaine, 1 ml, subcutaneously, at 11:25  Heparin 1000 units/ml, 4,000 units, IV, at 11:27  Verapamil (5mg/2ml), 2 5 mg, IV, at 11:28  Nitroglycerin (200mcg/ml), 200 mcg, at 11:28  Heparin 1000 units/ml, 4,000 units, IV, at 11:31  Ticagrelor, 180 mg, PO, at 11:33  Versed (2mg/2ml), 1 mg, IV, at 11:41  Fentanyl (1OOmcg/2 ml), 50  mcg, IV, at 11:41  Nitroglycerine (200mcg/ml), 200 mcg, at 11:42  Heparin 1000 units/ml, 4,000 units, IV, at 11:46  Versed (2mg/2ml), 1 mg, IV, at 12:03  Fentanyl (1OOmcg/2 ml), 50 mcg, IV, at 12:04  CONTRAST GIVEN: 100 ml Omnipaque (350mg  I /ml)  36 ml Omnipaque (350mg I/ml)  10 ml Omnipaque (350mg I /ml)  RADIATION EXPOSURE: Fluoroscopy time: 8 18 min  HEMODYNAMICS: Hemodynamic assessment demonstrated normal LVEDP  VENTRICLES:   --  Global left ventricular function was moderately depressed  EF calculated by contrast ventriculography was 35 %  VALVES:  AORTIC VALVE:   --  There was no aortic stenosis  MITRAL VALVE:   --  The mitral valve exhibited no regurgitation  CORONARY VESSELS:   --  The coronary circulation is right dominant  --  Left main: The vessel was medium to large sized  Angiography showed minor luminal irregularities  --  LAD: The vessel was large sized and mildly calcified  Angiography showed the vessel to wrap around the cardiac apex and moderate atherosclerosis  There was one major diagonal branch  --  Proximal LAD: There was a 40 % stenosis  --  Mid LAD: There was a diffuse 60 % stenosis   This is a likely culprit for the patient's clinical presentation  An intervention was performed  Positive iFR 0 85  --  Circumflex: The vessel was medium sized  There were two major obtuse marginals  --  1st obtuse marginal: There was a 50 % stenosis  It appears amenable to percutaneous intervention  Negative iFR  --  RCA: The vessel was medium sized and moderately tortuous  Angiography showed mild atherosclerosis  --  Mid RCA: There was a discrete 40 % stenosis  IMPRESSIONS:  There is significant double vessel coronary artery disease  RECOMMENDATIONS  The patient should continue with the present medications  DISPOSITION:  The patient left the catheterization laboratory in stable condition  Prepared and signed by    Rajan Ramirez MD  Signed 2020 12:28:47    Study diagram    Angiographic findings  Native coronary lesions:  ï¾·Proximal LAD: Lesion 1: 40 % stenosis  ï¾·Mid LAD: Lesion 1: diffuse, 60 % stenosis  ï¾·OM1: Lesion 1: 50 % stenosis  ï¾·Mid RCA: Lesion 1: discrete, 40 % stenosis  Intervention results  Native coronary lesions:  ï¾·Successful drug-eluting stent and balloon angioplasty of the 60 % stenosis in mid LAD  Appearance excellent with 0 % residual stenosis  Stent: Resolute [place] Rx 2 5 x 22mm drug-eluting  Stent: Resolute [place] Rx 3 0 x 26mm drug-eluting  Stent: Resolute [place] Rx 3 5 x 12mm drug-eluting      Hemodynamic tables    Pressures:  Baseline  Pressures:  - HR: 86  Pressures:  - Rhythm:  Pressures:  -- Aortic Pressure (S/D/M): 119/78/70  Pressures:  -- Left Ventricle (s/edp): 128/20/--    Outputs:  Baseline  Outputs:  -- CALCULATIONS: [age]  Outputs:  -- CALCULATIONS: Body Surface Area: 1 71  Outputs:  -- CALCULATIONS: Height in cm: 165 00  Outputs:  -- CALCULATIONS: Sex: Female  Outputs:  -- CALCULATIONS: Weight in k 50    --------------------------------------------------------------------------------  ECHO:   Results for orders placed during the hospital encounter of 20    Echo complete with contrast if indicated    Narrative  [address] [place] [address] 35  [place], [address]  [phone]    Transthoracic Echocardiogram  2D, M-mode, Doppler, and Color Doppler    Study date:  2020    Patient: Ramiro Gupta  MR number: [id number]  Account number: [de-identified]  : 1965  Age: 48 years  Gender: Female  Status: Inpatient  Location: Bedside  Height: 65 in  Weight: 145 6 lb  BP: 130/ 109 mmHg    Indications: Heart failure  New bundle branch block  Diagnoses: I50 9 - Heart failure, unspecified    Sonographer:  Wilbur Felty, RCS  Primary Physician:  Jerrica Petit MD  Referring Physician:  Cathryne Collet, DO  Group:  [facility]  Interpreting Physician:  Cathryne Collet, DO    SUMMARY    SUMMARY:  This is a technically adequate study  1  Left ventricle is moderately dilated with severely reduced systolic function  Left ventricular ejection fraction is estimated at 29%  2  Mild concentric left ventricular hypertrophy  3  Grade 1 diastolic dysfunction is present  4  Severe global hypokinesis with wall motion consistent with conduction abnormality  5  Left atrium is moderately dilated and right atrium is mildly dilated  6  Aortic valve sclerotic with adequate separation  Mild aortic regurgitation  7  Mild mitral annular calcification with mild mitral regurgitation  8  Pulmonary artery systolic pressures cannot be estimated due to lack of tricuspid regurgitation jet  9  Small pericardial effusion without echocardiographic indications of tamponade physiology    SUMMARY MEASUREMENTS  2D measurements:  Unspecified Anatomy:   %FS was 10 7 %  EDV(Teich) was 171 6 ml   EF Biplane was 28 7 %  EF(Teich) was 23 %  ESV(Teich) was 132 1 ml  IVSd was 1 cm  LAAs A2C was 24 2 cm2  LAAs A4C was 21 cm2  LAESV A-L A2C was 90 3 ml  LAESV A-L  A4C was 63 ml  LAESV Index (A-L) was 45 4 ml/m2  LAESV MOD A2C was 87 5 ml    LAESV MOD A4C was 58 1 ml  LAESV(A-L) was 78 5 ml  LAESV(MOD BP) was 74 ml  LAESVInd MOD BP was 42 8 ml/m2  LALs A2C was 5 5 cm  LALs A4C was 6 cm  LVEDV  MOD A2C was 155 1 ml  LVEDV MOD A4C was 149 9 ml  LVEDV MOD BP was 153 3 ml  LVEDVInd MOD BP was 88 6 ml/m2  LVEF MOD A2C was 30 6 %  LVEF MOD A4C was 27 9 %  LVESV MOD A2C was 107 7 ml  LVESV MOD A4C was 108 ml  LVESV MOD BP was  109 3 ml  LVESVInd MOD BP was 63 2 ml/m2  LVIDd was 5 9 cm  LVIDs was 5 2 cm  LVLd A2C was 9 7 cm  LVLd A4C was 9 6 cm  LVLs A2C was 9 2 cm  LVLs A4C was 8 9 cm  LVPWd was 1 2 cm  Lorrie A4C was 16 4 cm2  RAEDV A-L was 47 5 ml  RAEDV MOD was 46 7 ml  RALd was 4 8 cm  RVIDd was 3 8 cm  RWT was 0 4   SV MOD A2C was 47 4 ml   SV MOD A4C was 41 8 ml   SV(Teich) was 39 5 ml   CW measurements:  Unspecified Anatomy:   AV Env  Ti was 234 4 ms   AV VTI was 27 4 cm  AV Vmax was 1 5 m/s  AV Vmean was 1 2 m/s  AV maxPG was 8 5 mmHg  AV meanPG was 5 9 mmHg  MM measurements:  Unspecified Anatomy:   TAPSE was 3 3 cm  PW measurements:  Unspecified Anatomy:   DVI was 0 6   LVOT Env  Ti was 238 5 ms  LVOT VTI was 15 5 cm  LVOT Vmax was 0 9 m/s  LVOT Vmean was 0 7 m/s  LVOT maxPG was 3 3 mmHg  LVOT meanPG was 1 9 mmHg  HISTORY: PRIOR HISTORY: hypothyroid  PROCEDURE: The procedure was performed at the bedside  This was a routine study  The transthoracic approach was used  The study included complete 2D imaging, M-mode, complete spectral Doppler, and color Doppler  The heart rate was 108 bpm,  at the start of the study  Image quality was adequate  LEFT VENTRICLE: Left ventricle is moderately dilated with severely reduced systolic function  Left ventricular ejection fraction is estimated at 29%  There is mild concentric left ventricular hypertrophy  Grade 1 diastolic dysfunction  There is severe global hypokinesis with wall motion consistent with conduction abnormality      RIGHT VENTRICLE: Right ventricle is normal size and function  LEFT ATRIUM: Left atrium is moderately dilated  ATRIAL SEPTUM: Interatrial septum is bowing toward the right atrium consistent with elevated left atrial pressures  RIGHT ATRIUM: Right atrium is mildly dilated  MITRAL VALVE: Mitral valve opens well  There is mild mitral annular calcification posteriorly  Mild mitral regurgitation  AORTIC VALVE: Aortic valve is minimally sclerotic with adequate separation  There is no evidence of aortic stenosis  Mild aortic regurgitation  LVOT diameter 2 cm, LVOT VTI 15 5, stroke volume 48 9 mL    TRICUSPID VALVE: Tricuspid valve opens well  There is no evidence of tricuspid regurgitation  Pulmonary artery systolic pressures cannot be estimated due to lack of tricuspid regurgitation jet  PULMONIC VALVE: Pulmonic valve not seen on the study  PERICARDIUM: There is a small pericardial effusion without echocardiographic indications of tamponade physiology  AORTA: The aortic root is normal in size at 3 3 cm  SYSTEMIC VEINS: IVC: The IVC is normal size with collapse      SYSTEM MEASUREMENT TABLES    2D  %FS: 10 7 %  EDV(Teich): 171 6 ml  EF Biplane: 28 7 %  EF(Teich): 23 %  ESV(Teich): 132 1 ml  IVSd: 1 cm  LAAs A2C: 24 2 cm2  LAAs A4C: 21 cm2  LAESV A-L A2C: 90 3 ml  LAESV A-L A4C: 63 ml  LAESV Index (A-L): 45 4 ml/m2  LAESV MOD A2C: 87 5 ml  LAESV MOD A4C: 58 1 ml  LAESV(A-L): 78 5 ml  LAESV(MOD BP): 74 ml  LAESVInd MOD BP: 42 8 ml/m2  LALs A2C: 5 5 cm  LALs A4C: 6 cm  LVEDV MOD A2C: 155 1 ml  LVEDV MOD A4C: 149 9 ml  LVEDV MOD BP: 153 3 ml  LVEDVInd MOD BP: 88 6 ml/m2  LVEF MOD A2C: 30 6 %  LVEF MOD A4C: 27 9 %  LVESV MOD A2C: 107 7 ml  LVESV MOD A4C: 108 ml  LVESV MOD BP: 109 3 ml  LVESVInd MOD BP: 63 2 ml/m2  LVIDd: 5 9 cm  LVIDs: 5 2 cm  LVLd A2C: 9 7 cm  LVLd A4C: 9 6 cm  LVLs A2C: 9 2 cm  LVLs A4C: 8 9 cm  LVPWd: 1 2 cm  Lorrie A4C: 16 4 cm2  RAEDV A-L: 47 5 ml  RAEDV MOD: 46 7 ml  RALd: 4 8 cm  RVIDd: 3 8 cm  RWT: 0 4  SV MOD A2C: 47 4 ml  SV MOD A4C: 41 8 ml  SV(Teich): 39 5 ml    CW  AV Env  Ti: 234 4 ms  AV VTI: 27 4 cm  AV Vmax: 1 5 m/s  AV Vmean: 1 2 m/s  AV maxP 5 mmHg  AV meanP 9 mmHg    MM  TAPSE: 3 3 cm    PW  DVI: 0 6  LVOT Env  Ti: 238 5 ms  LVOT VTI: 15 5 cm  LVOT Vmax: 0 9 m/s  LVOT Vmean: 0 7 m/s  LVOT maxPG: 3 3 mmHg  LVOT meanP 9 mmHg    [facility]    Prepared and electronically signed by    Emilia Amaya DO  Signed 2020 15:04:09      ======================================================    Lab Results   Component Value Date    WBC 7 43 2021    HGB 11 5 2021    HCT 37 8 2021    MCV 85 2021     2021      Lab Results   Component Value Date    SODIUM 142 2021    K 4 5 2021     2021    CO2 26 2021    BUN 21 2021    CREATININE 1 95 (H) 2021    GLUC 93 2021    CALCIUM 6 8 (L) 2021      Lab Results   Component Value Date    HGBA1C 6 0 (H) 2020      No results found for: CHOL  Lab Results   Component Value Date    HDL 52 2020     Lab Results   Component Value Date    LDLCALC 97 2020     Lab Results   Component Value Date    TRIG 89 2020     No results found for: [place], [place]   Lab Results   Component Value Date    INR 1 07 2021    INR 1 05 2020    PROTIME 13 7 2021    PROTIME 13 8 2020        Imaging:   I have personally reviewed pertinent reports  EKG: Sinus tachycardia  Possible Left atrial enlargement  Left axis deviation  Left bundle branch block  Abnormal ECG    Portions of the record may have been created with voice recognition software  Occasional wrong word or "sound a like" substitutions may have occurred due to the inherent limitations of voice recognition software  Read the chart carefully and recognize, using context, where substitutions have occurred

## 2021-07-11 NOTE — ASSESSMENT & PLAN NOTE
· Patient presented to the ED with persistent chest pain which was similar in nature to when she had a cardiac stent placed June 2020  · Patient was noted to have a sodium 119   Previous sodium September 2020 142  · Start saline at 75 ml/hr  · BMP q4h  · Consider nephrology consult  · Monitor for signs of volume overload and pulmonary edema due to elevated pro- BNP

## 2021-07-11 NOTE — ED PROVIDER NOTES
History  Chief Complaint   Patient presents with    Chest Pain     pt  reports chest pain and sob and diaphoretic  prt  reports started this morning and getting worwse  pt  denies other symptoms at this time  pt  recieved 324 aspirin and 1 nitro      30-year-old female with relevant past medical history significant for CAD status post stenting in June 2020, cardiomyopathy EF 43% from ECHO in August 5326, chronic systolic heart failure, myopericarditis, hypocalcemia who presents to the emergency department via EMS for complaint of chest pain  Patient endorses chest pain beginning yesterday morning, which has persisted throughout the day and is progressively getting worse  Feels similar to chest pain experienced before prior cardiac catheterization  Localizes pain to the midsternum, nonradiating, accompanied by shortness of breath, diaphoresis, lightheadedness, and near-syncope  She received 324 mg aspirin and 1 nitro in EMS transit  Patient noted to be coughing on exam, states this is due to allergies, denies fever or recent direct sick contacts  Further denies leg swelling, abdominal bloating  Prior to Admission Medications   Prescriptions Last Dose Informant Patient Reported?  Taking?   acetaminophen (TYLENOL) 325 mg tablet  Self No No   Sig: Take 2 tablets (650 mg total) by mouth every 4 (four) hours as needed for mild pain, headaches or fever   aspirin 81 mg chewable tablet Not Taking at Unknown time Self No No   Sig: Chew 1 tablet (81 mg total) daily   Patient not taking: Reported on 7/11/2021   atorvastatin (LIPITOR) 40 mg tablet   No No   Sig: Take 1 tablet (40 mg total) by mouth daily   Patient taking differently: Take 20 mg by mouth daily    carvedilol (COREG) 12 5 mg tablet 7/10/2021 at Unknown time Self No Yes   Sig: Take 1 tablet (12 5 mg total) by mouth 2 (two) times a day with meals   Patient taking differently: Take 6 25 mg by mouth 2 (two) times a day with meals    colchicine (COLCRYS) 0 6 mg tablet   No No   Sig: Take 1 tablet (0 6 mg total) by mouth 2 (two) times a day   ferrous gluconate (FERGON) 324 mg tablet 7/10/2021 at Unknown time  No Yes   Sig: TAKE 1 TABLET (324 MG TOTAL) BY MOUTH 2 (TWO) TIMES A DAY BEFORE MEALS   furosemide (LASIX) 20 mg tablet 7/10/2021 at Unknown time  No Yes   Sig: TAKE 1 TABLET BY MOUTH ONCE DAILY EVERY Monday, Wednesday, and Friday   levothyroxine 150 mcg tablet 7/10/2021 at Unknown time  No Yes   Sig: Take 1 tablet (150 mcg total) by mouth daily   nitroglycerin (NITROSTAT) 0 4 mg SL tablet Unknown at Unknown time Self No No   Sig: Place 1 tablet (0 4 mg total) under the tongue every 5 (five) minutes as needed for chest pain   ticagrelor (BRILINTA) 90 MG Past Week at Unknown time  No Yes   Sig: Take 1 tablet (90 mg total) by mouth 2 (two) times a day      Facility-Administered Medications: None       Past Medical History:   Diagnosis Date    Allergies     CHF (congestive heart failure) (HCC)     Coronary artery disease     Disease of thyroid gland     hypo    Renal disorder        Past Surgical History:   Procedure Laterality Date    CARDIAC CATHETERIZATION       SECTION      x2    FRACTURE SURGERY      NE OPEN TX RADIAL & ULNAR SHAFT FX FIX RADIUS AND ULNA Left 6/3/2020    Procedure: Open reduction internal fixation left distal radius fracture;  Surgeon: Beti Vázquez MD;  Location: [address] [place] [address];  Service: Orthopedics    TOTAL THYROIDECTOMY      TUBAL LIGATION         Family History   Problem Relation Age of Onset    No Known Problems Mother     No Known Problems Father     Cancer Family      I have reviewed and agree with the history as documented      E-Cigarette/Vaping    E-Cigarette Use Never User      E-Cigarette/Vaping Substances    Nicotine No     THC No     CBD No     Flavoring No      Social History     Tobacco Use    Smoking status: Never Smoker    Smokeless tobacco: Never Used   Vaping Use    Vaping Use: Never used   Substance Use Topics    Alcohol use: Yes     Comment: only on special ocassions   Drug use: Never       Review of Systems   Constitutional: Positive for diaphoresis  Negative for activity change, appetite change, chills, fatigue, fever and unexpected weight change  HENT: Negative for congestion, rhinorrhea and sore throat  Eyes: Negative for visual disturbance  Respiratory: Positive for cough and shortness of breath  Negative for chest tightness  Cardiovascular: Positive for chest pain  Negative for palpitations and leg swelling  Gastrointestinal: Negative for abdominal distention, abdominal pain, nausea and vomiting  Musculoskeletal: Negative for back pain, myalgias, neck pain and neck stiffness  Skin: Negative for color change and rash  Neurological: Positive for dizziness and light-headedness  Negative for tremors, seizures, syncope, facial asymmetry, speech difficulty, weakness, numbness and headaches  Hematological: Negative for adenopathy  All other systems reviewed and are negative  Physical Exam  Physical Exam  Vitals reviewed  Constitutional:       General: She is awake  She is not in acute distress  Appearance: Normal appearance  She is well-developed  She is not ill-appearing or toxic-appearing  HENT:      Head: Normocephalic and atraumatic  Mouth/Throat:      Lips: Pink  Mouth: Mucous membranes are moist       Pharynx: Oropharynx is clear  Uvula midline  Eyes:      Extraocular Movements: Extraocular movements intact  Conjunctiva/sclera: Conjunctivae normal       Pupils: Pupils are equal, round, and reactive to light  Cardiovascular:      Rate and Rhythm: Normal rate and regular rhythm  Pulses: Normal pulses  Pulmonary:      Effort: Pulmonary effort is normal  Tachypnea present  No accessory muscle usage, prolonged expiration, respiratory distress or retractions  Breath sounds: Normal air entry   No stridor, decreased air movement or transmitted upper airway sounds  Examination of the right-upper field reveals rales  Examination of the left-upper field reveals rales  Examination of the right-middle field reveals rales  Examination of the left-middle field reveals rales  Examination of the right-lower field reveals rales  Examination of the left-lower field reveals rales  Rales present  No decreased breath sounds, wheezing or rhonchi  Abdominal:      General: Bowel sounds are normal  There is no distension  Palpations: Abdomen is soft  There is no hepatomegaly, splenomegaly or mass  Tenderness: There is no abdominal tenderness  Musculoskeletal:         General: Normal range of motion  Cervical back: Full passive range of motion without pain, normal range of motion and neck supple  Right lower leg: No edema  Left lower leg: No edema  Skin:     General: Skin is warm  Capillary Refill: Capillary refill takes less than 2 seconds  Findings: No erythema, lesion or rash  Neurological:      Mental Status: She is alert and oriented to person, place, and time  GCS: GCS eye subscore is 4  GCS verbal subscore is 5  GCS motor subscore is 6  Comments: +psychomotor agitation   Psychiatric:         Behavior: Behavior is cooperative           Vital Signs  ED Triage Vitals   Temperature Pulse Respirations Blood Pressure SpO2   07/11/21 0349 07/11/21 0319 07/11/21 0317 07/11/21 0317 07/11/21 0321   98 3 °F (36 8 °C) (!) 110 20 133/79 99 %      Temp Source Heart Rate Source Patient Position - Orthostatic VS BP Location FiO2 (%)   07/11/21 0349 07/11/21 0317 07/11/21 0317 07/11/21 0317 --   Oral Monitor Sitting Right arm       Pain Score       07/11/21 0317       8           Vitals:    07/14/21 0801 07/14/21 1120 07/14/21 1600 07/14/21 1950   BP: 120/78 116/75 110/70 119/70   Pulse:  100 96 86   Patient Position - Orthostatic VS: Sitting Sitting Lying Sitting         Visual Acuity      ED Medications  Medications   acetaminophen (TYLENOL) tablet 650 mg ( Oral MAR Unhold 7/11/21 1518)   aspirin chewable tablet 81 mg (81 mg Oral Given 7/14/21 1118)   metoprolol succinate (TOPROL-XL) 24 hr tablet 12 5 mg (12 5 mg Oral Given 7/14/21 1122)   melatonin tablet 6 mg (6 mg Oral Given 7/13/21 2139)   levothyroxine tablet 100 mcg (100 mcg Oral Given 7/14/21 0537)   enoxaparin (LOVENOX) subcutaneous injection 40 mg (40 mg Subcutaneous Given 7/14/21 1634)   sodium chloride 0 9 % infusion (has no administration in time range)   atorvastatin (LIPITOR) tablet 80 mg (has no administration in time range)   ondansetron (ZOFRAN) injection 4 mg (4 mg Intravenous Given 7/11/21 0330)   nitroglycerin (NITRO-BID) 2 % TD ointment 1 inch (1 inch Topical Given 7/11/21 0332)   fentanyl citrate (PF) 100 MCG/2ML 25 mcg (25 mcg Intravenous Given 7/11/21 0346)   potassium chloride (K-DUR,KLOR-CON) CR tablet 60 mEq (60 mEq Oral Given 7/11/21 0412)   potassium chloride 20 mEq IVPB (premix) (0 mEq Intravenous Stopped 7/11/21 0856)   calcium gluconate 1 g in sodium chloride 0 9% 50 mL (premix) (0 g Intravenous Stopped 7/11/21 0443)   heparin (porcine) injection 4,000 Units (4,000 Units Intravenous Given 7/11/21 0427)   potassium chloride (K-DUR,KLOR-CON) CR tablet 40 mEq (40 mEq Oral Given 7/14/21 1118)   regadenoson (LEXISCAN) injection 0 4 mg (0 4 mg Intravenous Given 7/14/21 1000)       Diagnostic Studies  Results Reviewed     Procedure Component Value Units Date/Time    Troponin I [[id number]]  (Abnormal) Collected: 07/11/21 1242    Lab Status: Final result Specimen: Blood from Arm, Left Updated: 07/11/21 1313     Troponin I 0 43 ng/mL     Troponin I [[id number]]  (Abnormal) Collected: 07/11/21 0945    Lab Status: Final result Specimen: Blood from Arm, Left Updated: 07/11/21 1008     Troponin I 0 38 ng/mL     Rapid drug screen, urine [[id number]]  (Abnormal) Collected: 07/11/21 0829    Lab Status: Final result Specimen: Urine, Other Updated: 07/11/21 0858     Amph/Meth UR Negative     Barbiturate Ur Negative     Benzodiazepine Urine Negative     Cocaine Urine Negative     Methadone Urine Negative     Opiate Urine Negative     PCP Ur Positive     THC Urine Negative     Oxycodone Urine Negative    Narrative:      Presumptive report  If requested, specimen will be sent to reference lab for confirmation  FOR MEDICAL PURPOSES ONLY  IF CONFIRMATION NEEDED PLEASE CONTACT THE LAB WITHIN 5 DAYS      Drug Screen Cutoff Levels:  AMPHETAMINE/METHAMPHETAMINES  1000 ng/mL  BARBITURATES     200 ng/mL  BENZODIAZEPINES     200 ng/mL  COCAINE      300 ng/mL  METHADONE      300 ng/mL  OPIATES      300 ng/mL  PHENCYCLIDINE     25 ng/mL  THC       50 ng/mL  OXYCODONE      100 ng/mL    Calcium, ionized [[id number]]  (Abnormal) Collected: 07/11/21 0754    Lab Status: Final result Specimen: Blood from Arm, Right Updated: 07/11/21 0833     Calcium, Ionized 0 92 mmol/L     Basic metabolic panel [[id number]]  (Abnormal) Collected: 07/11/21 0754    Lab Status: Final result Specimen: Blood from Arm, Right Updated: 07/11/21 0826     Sodium 142 mmol/L      Potassium 4 5 mmol/L      Chloride 107 mmol/L      CO2 26 mmol/L      ANION GAP 9 mmol/L      BUN 21 mg/dL      Creatinine 1 95 mg/dL      Glucose 93 mg/dL      Calcium 6 8 mg/dL      eGFR 29 ml/min/1 73sq m     Narrative:      [place] guidelines for Chronic Kidney Disease (CKD):     Stage 1 with normal or high GFR (GFR > 90 mL/min/1 73 square meters)    Stage 2 Mild CKD (GFR = 60-89 mL/min/1 73 square meters)    Stage 3A Moderate CKD (GFR = 45-59 mL/min/1 73 square meters)    Stage 3B Moderate CKD (GFR = 30-44 mL/min/1 73 square meters)    Stage 4 Severe CKD (GFR = 15-29 mL/min/1 73 square meters)    Stage 5 End Stage CKD (GFR <15 mL/min/1 73 square meters)  Note: GFR calculation is accurate only with a steady state creatinine    Magnesium [[id number]]  (Normal) Collected: 07/11/21 0754 Lab Status: Final result Specimen: Blood from Arm, Right Updated: 07/11/21 0826     Magnesium 1 9 mg/dL     Phosphorus [[id number]]  (Normal) Collected: 07/11/21 0754    Lab Status: Final result Specimen: Blood from Arm, Right Updated: 07/11/21 0826     Phosphorus 3 8 mg/dL     Basic metabolic panel [[id number]]  (Abnormal) Collected: 07/11/21 0643    Lab Status: Final result Specimen: Blood from Arm, Right Updated: 07/11/21 0749     Sodium 142 mmol/L      Potassium 4 4 mmol/L      Chloride 107 mmol/L      CO2 24 mmol/L      ANION GAP 11 mmol/L      BUN 22 mg/dL      Creatinine 2 06 mg/dL      Glucose 95 mg/dL      Calcium 6 8 mg/dL      eGFR 27 ml/min/1 73sq m     Narrative:      [place] guidelines for Chronic Kidney Disease (CKD):     Stage 1 with normal or high GFR (GFR > 90 mL/min/1 73 square meters)    Stage 2 Mild CKD (GFR = 60-89 mL/min/1 73 square meters)    Stage 3A Moderate CKD (GFR = 45-59 mL/min/1 73 square meters)    Stage 3B Moderate CKD (GFR = 30-44 mL/min/1 73 square meters)    Stage 4 Severe CKD (GFR = 15-29 mL/min/1 73 square meters)    Stage 5 End Stage CKD (GFR <15 mL/min/1 73 square meters)  Note: GFR calculation is accurate only with a steady state creatinine    TSH, 3rd generation with Free T4 reflex [[id number]]  (Abnormal) Collected: 07/11/21 0643    Lab Status: Final result Specimen: Blood from Arm, Right Updated: 07/11/21 0748     TSH 3RD GENERATON 0 014 uIU/mL     Narrative:      Patients undergoing fluorescein dye angiography may retain small amounts of fluorescein in the body for 48-72 hours post procedure  Samples containing fluorescein can produce falsely depressed TSH values  If the patient had this procedure,a specimen should be resubmitted post fluorescein clearance        Troponin I [[id number]]  (Abnormal) Collected: 07/11/21 0643    Lab Status: Final result Specimen: Blood from Arm, Right Updated: 07/11/21 0743     Troponin I 0 31 ng/mL     Troponin I [[id number]]  (Abnormal) Collected: 07/11/21 0521    Lab Status: Final result Specimen: Blood from Arm, Right Updated: 07/11/21 0600     Troponin I 0 21 ng/mL     Magnesium [[id number]]  (Normal) Collected: 07/11/21 0435    Lab Status: Final result Specimen: Blood from Arm, Right Updated: 07/11/21 0452     Magnesium 1 8 mg/dL     Calcium, ionized [[id number]]  (Abnormal) Collected: 07/11/21 0435    Lab Status: Final result Specimen: Blood from Arm, Right Updated: 07/11/21 0451     Calcium, Ionized 1 03 mmol/L     Novel Coronavirus (Covid-19),PCR [facility] - 2 Hour Stat [[id number]]  (Normal) Collected: 07/11/21 0346    Lab Status: Final result Specimen: Nares from Nose Updated: 07/11/21 0447     SARS-CoV-2 Negative    Narrative: The specimen collection materials, transport medium, and/or testing methodology utilized in the production of these test results have been proven to be reliable in a limited validation with an abbreviated program under the Emergency Utilization Authorization provided by the FDA  Testing reported as "Presumptive positive" will be confirmed with secondary testing to ensure result accuracy  Clinical caution and judgement should be used with the interpretation of these results with consideration of the clinical impression and other laboratory testing  Testing reported as "Positive" or "Negative" has been proven to be accurate according to standard laboratory validation requirements  All testing is performed with control materials showing appropriate reactivity at standard intervals        APTT six (6) hours after Heparin bolus/drip initiation or dosing change [[id number]]  (Normal) Collected: 07/11/21 0409    Lab Status: Final result Specimen: Blood from Arm, Left Updated: 07/11/21 0433     PTT 26 seconds     Protime-INR [[id number]]  (Normal) Collected: 07/11/21 0409    Lab Status: Final result Specimen: Blood from Arm, Left Updated: 07/11/21 0433     Protime 13 7 seconds      INR 1 07    NT-BNP PRO [[id number]]  (Abnormal) Collected: 07/11/21 0327    Lab Status: Final result Specimen: Blood from Arm, Left Updated: 07/11/21 0419     NT-proBNP 16,077 pg/mL     Basic metabolic panel [[id number]]  (Abnormal) Collected: 07/11/21 0327    Lab Status: Final result Specimen: Blood from Arm, Left Updated: 07/11/21 0403     Sodium 119 mmol/L      Potassium 2 3 mmol/L      Chloride 87 mmol/L      CO2 23 mmol/L      ANION GAP 9 mmol/L      BUN 23 mg/dL      Creatinine 2 13 mg/dL      Glucose 137 mg/dL      Calcium 6 4 mg/dL      eGFR 26 ml/min/1 73sq m     Narrative:      [place] guidelines for Chronic Kidney Disease (CKD):     Stage 1 with normal or high GFR (GFR > 90 mL/min/1 73 square meters)    Stage 2 Mild CKD (GFR = 60-89 mL/min/1 73 square meters)    Stage 3A Moderate CKD (GFR = 45-59 mL/min/1 73 square meters)    Stage 3B Moderate CKD (GFR = 30-44 mL/min/1 73 square meters)    Stage 4 Severe CKD (GFR = 15-29 mL/min/1 73 square meters)    Stage 5 End Stage CKD (GFR <15 mL/min/1 73 square meters)  Note: GFR calculation is accurate only with a steady state creatinine    Hepatic function panel [[id number]]  (Abnormal) Collected: 07/11/21 0327    Lab Status: Final result Specimen: Blood from Arm, Left Updated: 07/11/21 0400     Total Bilirubin 0 24 mg/dL      Bilirubin, Direct 0 11 mg/dL      Alkaline Phosphatase 126 U/L      AST 31 U/L      ALT 34 U/L      Total Protein 6 2 g/dL      Albumin 3 0 g/dL     Troponin I [[id number]]  (Abnormal) Collected: 07/11/21 0327    Lab Status: Final result Specimen: Blood from Arm, Left Updated: 07/11/21 0358     Troponin I 0 24 ng/mL     D-Dimer [[id number]]  (Abnormal) Collected: 07/11/21 0327    Lab Status: Final result Specimen: Blood from Arm, Left Updated: 07/11/21 0354     D-Dimer, Quant 1 17 ug/ml FEU     CBC and differential [[id number]]  (Abnormal) Collected: 07/11/21 0327    Lab Status: Final result Specimen: Blood from Arm, Left Updated: 07/11/21 0339     WBC 7 43 Thousand/uL      RBC 4 46 Million/uL      Hemoglobin 11 5 g/dL      Hematocrit 37 8 %      MCV 85 fL      MCH 25 8 pg      MCHC 30 4 g/dL      RDW 16 1 %      MPV 11 0 fL      Platelets 747 Thousands/uL      nRBC 0 /100 WBCs      Neutrophils Relative 67 %      Immat GRANS % 0 %      Lymphocytes Relative 21 %      Monocytes Relative 8 %      Eosinophils Relative 3 %      Basophils Relative 1 %      Neutrophils Absolute 4 99 Thousands/µL      Immature Grans Absolute 0 02 Thousand/uL      Lymphocytes Absolute 1 58 Thousands/µL      Monocytes Absolute 0 58 Thousand/µL      Eosinophils Absolute 0 19 Thousand/µL      Basophils Absolute 0 07 Thousands/µL     Blood gas, venous [[id number]]  (Abnormal) Collected: 07/11/21 0327    Lab Status: Final result Specimen: Blood from Arm, Left Updated: 07/11/21 0337     pH, Flavio 7 392     pCO2, Flavio 37 0 mm Hg      pO2, Flavio 46 9 mm Hg      HCO3, Flavio 22 0 mmol/L      Base Excess, Flavio -2 5 mmol/L      O2 Content, Flavio 12 8 ml/dL      O2 HGB, VENOUS 78 5 %                  XR chest 1 view portable   Final Result by Willy Mock MD (07/11 1938)      Moderate congestive changes  Possible infiltrates in the lung bases  Workstation performed: [id number]                    Procedures  Procedures         ED Course  ED Course as of Jul 14 2253   Mia Banuelos Jul 11, 2021   0320 EKG shows sinus tachycardia, rate 105, no acute ST segment elevation, mild ST segment depression in lead V6 not meeting criteria, T-wave inversions in leads I, aVL, and V6, left bundle-branch block pattern, , QRS duration 164, WV interval 122      0330 New LBBB on EKG  Cardiology TT      6767 On reassessment, patient reports pain is improving, still feels short of breath  CXR suspicious for CHF versus pulm edema  Has slight crackles on exam   Elevated dimer, will proceed with PE study to r/o PE causing pulm edema         7372 ICU TT      0410 Unable to get PE study due to MARINA 2866 Per ICU AP, unsure if they can accept patient due to short staffing situation, will consult hospital sup  Recommends no fluids at this time, no Lasix, replete electrolytes only  2683 Patient accepted to critical care                                              MDM  Number of Diagnoses or Management Options  MARINA (acute kidney injury) ([facility] [address] )  Chest pain  Elevated troponin  Hypocalcemia  Hypokalemia  Hyponatremia  Positive D dimer  Shortness of breath  Diagnosis management comments: On exam, well-appearing female, no acute distress, nontoxic appearance, tachycardic, vitals otherwise unremarkable, awake alert and oriented, + psychomotor agitation, no leg swelling, no abdominal distention, slight crackles throughout all lung fields upon expiration, mild tachypnea, no other signs of respiratory distress, dry cough, exam as above  CP, SOB  Large cardiac hx  New LBBB on EKG  Crackles on exam   Concerning for ACS versus CHF versus PE causing pulmonary edema  Will TT cardiology on call  Will initiate pain control, nitro paste and reassess  Patient tachypneic but not requiring O2 at this time, no hypoxia  Of note, patient has acrylic nails and pulse ox does not read accurately while on continuous monitoring  Hx of hypocalcemia  STAT BMP  Psychometer agitation, moving around freqeuntly  May be patient's normal   Reports she does not feel anxious  No hx of drug abuse, will obtain urine drug screen  Consider hypoxia as cause, will check vbg      Cough with sob  Will send COVID test   Afebrile          Amount and/or Complexity of Data Reviewed  Clinical lab tests: ordered and reviewed  Tests in the radiology section of CPT®: ordered and reviewed  Tests in the medicine section of CPT®: ordered and reviewed  Decide to obtain previous medical records or to obtain history from someone other than the patient: yes  Obtain history from someone other than the patient: yes  Review and summarize past medical records: yes  Discuss the patient with other providers: yes    Patient Progress  Patient progress: stable (See ED course note for dispo and plan  I reviewed and discussed all lab and imaging findings with the patient at bedside  I answered any and all questions the patient had regarding emergency department course of evaluation and treatment  The patient verbalized understanding of and agreement with plan   )      Disposition  Final diagnoses:   Chest pain   Shortness of breath   Hypokalemia   Hypocalcemia   Hyponatremia   Elevated troponin   Positive D dimer   MARINA (acute kidney injury) ([facility] [address] )     Time reflects when diagnosis was documented in both MDM as applicable and the Disposition within this note     Time User Action Codes Description Comment    7/11/2021  4:54 AM [place] Add [R07 9] Chest pain     7/11/2021  4:54 AM [place] Add [R06 02] Shortness of breath     7/11/2021  4:55 AM [place] Add [E87 6] Hypokalemia     7/11/2021  4:55 AM Rika  [E83 51] Hypocalcemia     7/11/2021  4:55 AM [place] Add [E87 1] Hyponatremia     7/11/2021  4:56 AM [place] Add [R77 8] Elevated troponin     7/11/2021  4:56 AM [place] Add [R79 89] Positive D dimer     7/11/2021  4:56 AM [place] Add [N17 9] MARINA (acute kidney injury) [facility])       ED Disposition     ED Disposition Condition Date/Time Comment    Admit Stable Sun Jul 11, 2021  4:54 AM Case was discussed with Rosalina Bradley and the patient's admission status was agreed to be Admission Status: inpatient status to the service of Dr Marcela Cavanaugh           Follow-up Information    None         Current Discharge Medication List      CONTINUE these medications which have NOT CHANGED    Details   carvedilol (COREG) 12 5 mg tablet Take 1 tablet (12 5 mg total) by mouth 2 (two) times a day with meals  Qty: 60 tablet, Refills: 0    Associated Diagnoses: Cardiomyopathy (HCC)      ferrous gluconate (FERGON) 324 mg tablet TAKE 1 TABLET (324 MG TOTAL) BY MOUTH 2 (TWO) TIMES A DAY BEFORE MEALS  Qty: 60 tablet, Refills: 0    Associated Diagnoses: Anemia      furosemide (LASIX) 20 mg tablet TAKE 1 TABLET BY MOUTH ONCE DAILY EVERY Monday, Wednesday, and Friday  Qty: 90 tablet, Refills: 1    Associated Diagnoses: Ischemic cardiomyopathy      levothyroxine 150 mcg tablet Take 1 tablet (150 mcg total) by mouth daily  Qty: 30 tablet, Refills: 5    Associated Diagnoses: Disease of thyroid gland      ticagrelor (BRILINTA) 90 MG Take 1 tablet (90 mg total) by mouth 2 (two) times a day  Qty: 60 tablet, Refills: 11    Associated Diagnoses: Ischemic cardiomyopathy      acetaminophen (TYLENOL) 325 mg tablet Take 2 tablets (650 mg total) by mouth every 4 (four) hours as needed for mild pain, headaches or fever  Qty: 30 tablet, Refills: 0    Associated Diagnoses: Acute congestive heart failure, unspecified heart failure type (HCC)      aspirin 81 mg chewable tablet Chew 1 tablet (81 mg total) daily  Qty: 30 tablet, Refills: 0    Associated Diagnoses: Ischemic cardiomyopathy      atorvastatin (LIPITOR) 40 mg tablet Take 1 tablet (40 mg total) by mouth daily  Qty: 90 tablet, Refills: 2    Comments: Do not fill script for 80 mg Lipitor called in earlier  Was in error as patient is on colchicine  Please feel script for Lipitor 40 mg q h s  Associated Diagnoses: Ischemic cardiomyopathy      colchicine (COLCRYS) 0 6 mg tablet Take 1 tablet (0 6 mg total) by mouth 2 (two) times a day  Qty: 60 tablet, Refills: 1    Associated Diagnoses: Acute myopericarditis      nitroglycerin (NITROSTAT) 0 4 mg SL tablet Place 1 tablet (0 4 mg total) under the tongue every 5 (five) minutes as needed for chest pain  Qty: 30 tablet, Refills: 0    Associated Diagnoses: Ischemic cardiomyopathy           No discharge procedures on file      PDMP Review       Value Time User    PDMP Reviewed  Yes 6/27/2020  8:57 PM Lai Thakur PA-C          ED Provider  Electronically Signed by           Salma Santos PA-C  07/14/21 4905

## 2021-07-11 NOTE — ASSESSMENT & PLAN NOTE
· EKG without signs of infarction  · Trop 0 24 trend  · Continue ASA  · Consider cardiology consult  · Heparin infusion started in the ER for ACS - can likely discontinue

## 2021-07-11 NOTE — ASSESSMENT & PLAN NOTE
· Patient was admitted in June 2020 with CAD s/p stent placement to be continued on ASA and brilinta for 1 year  · June 22, 2020 Echo at that time revealed an EF 25% with severe global hypokinesis and grade 1 diastolic heart failure  · Patient was placed in a lifevest to follow up with cardiology - discontinued in august 2020  · August 2020 echo showed improvement of EF to 43% with only mild hypokinesis  · Patient has not followed up with cardiology since July 2020  · Patient was to continue coreg and lipitor and lasix  · Check echocardiogram

## 2021-07-12 ENCOUNTER — APPOINTMENT (INPATIENT)
Dept: NON INVASIVE DIAGNOSTICS | Facility: HOSPITAL | Age: 56
DRG: 246 | End: 2021-07-12
Payer: COMMERCIAL

## 2021-07-12 LAB
ANION GAP SERPL CALCULATED.3IONS-SCNC: 10 MMOL/L (ref 4–13)
APTT PPP: 78 SECONDS (ref 23–37)
APTT PPP: 90 SECONDS (ref 23–37)
BUN SERPL-MCNC: 22 MG/DL (ref 5–25)
CALCIUM SERPL-MCNC: 7 MG/DL (ref 8.3–10.1)
CHLORIDE SERPL-SCNC: 105 MMOL/L (ref 100–108)
CO2 SERPL-SCNC: 26 MMOL/L (ref 21–32)
CREAT SERPL-MCNC: 1.52 MG/DL (ref 0.6–1.3)
GFR SERPL CREATININE-BSD FRML MDRD: 39 ML/MIN/1.73SQ M
GLUCOSE SERPL-MCNC: 94 MG/DL (ref 65–140)
POTASSIUM SERPL-SCNC: 3.8 MMOL/L (ref 3.5–5.3)
SODIUM SERPL-SCNC: 141 MMOL/L (ref 136–145)
TROPONIN I SERPL-MCNC: 0.46 NG/ML

## 2021-07-12 PROCEDURE — 85730 THROMBOPLASTIN TIME PARTIAL: CPT | Performed by: INTERNAL MEDICINE

## 2021-07-12 PROCEDURE — 99232 SBSQ HOSP IP/OBS MODERATE 35: CPT | Performed by: STUDENT IN AN ORGANIZED HEALTH CARE EDUCATION/TRAINING PROGRAM

## 2021-07-12 PROCEDURE — 93306 TTE W/DOPPLER COMPLETE: CPT | Performed by: INTERNAL MEDICINE

## 2021-07-12 PROCEDURE — 99233 SBSQ HOSP IP/OBS HIGH 50: CPT | Performed by: INTERNAL MEDICINE

## 2021-07-12 PROCEDURE — 93306 TTE W/DOPPLER COMPLETE: CPT

## 2021-07-12 PROCEDURE — 80048 BASIC METABOLIC PNL TOTAL CA: CPT | Performed by: NURSE PRACTITIONER

## 2021-07-12 PROCEDURE — 84484 ASSAY OF TROPONIN QUANT: CPT | Performed by: INTERNAL MEDICINE

## 2021-07-12 RX ORDER — FUROSEMIDE 10 MG/ML
20 INJECTION INTRAMUSCULAR; INTRAVENOUS
Status: DISCONTINUED | OUTPATIENT
Start: 2021-07-12 | End: 2021-07-13

## 2021-07-12 RX ORDER — ATORVASTATIN CALCIUM 20 MG/1
20 TABLET, FILM COATED ORAL DAILY
Status: DISCONTINUED | OUTPATIENT
Start: 2021-07-12 | End: 2021-07-14

## 2021-07-12 RX ADMIN — MELATONIN 6 MG: at 21:40

## 2021-07-12 RX ADMIN — HEPARIN SODIUM 16 UNITS/KG/HR: 10000 INJECTION, SOLUTION INTRAVENOUS at 02:43

## 2021-07-12 RX ADMIN — LEVOTHYROXINE SODIUM 150 MCG: 75 TABLET ORAL at 06:38

## 2021-07-12 RX ADMIN — METOPROLOL SUCCINATE 12.5 MG: 25 TABLET, EXTENDED RELEASE ORAL at 21:37

## 2021-07-12 RX ADMIN — ATORVASTATIN CALCIUM 20 MG: 20 TABLET, FILM COATED ORAL at 15:31

## 2021-07-12 RX ADMIN — ASPIRIN 81 MG CHEWABLE TABLET 81 MG: 81 TABLET CHEWABLE at 09:20

## 2021-07-12 RX ADMIN — HEPARIN SODIUM 16 UNITS/KG/HR: 10000 INJECTION, SOLUTION INTRAVENOUS at 23:00

## 2021-07-12 RX ADMIN — METOPROLOL SUCCINATE 12.5 MG: 25 TABLET, EXTENDED RELEASE ORAL at 09:20

## 2021-07-12 RX ADMIN — FUROSEMIDE 20 MG: 10 INJECTION, SOLUTION INTRAMUSCULAR; INTRAVENOUS at 15:16

## 2021-07-12 RX ADMIN — FUROSEMIDE 20 MG: 10 INJECTION, SOLUTION INTRAMUSCULAR; INTRAVENOUS at 09:22

## 2021-07-12 NOTE — ASSESSMENT & PLAN NOTE
Wt Readings from Last 3 Encounters:   07/12/21 70 2 kg (154 lb 12 2 oz)   05/19/21 64 4 kg (142 lb)   05/07/21 64 4 kg (142 lb)     Continue IV Lasix 20 mg b i d  as per Cardiology  Cardiology following  Continue Toprol-XL  Monitor renal functions, currently improving

## 2021-07-12 NOTE — ASSESSMENT & PLAN NOTE
Patient presented with chest pain  Troponins elevated  Heparin drip  Continue aspirin, statin  Cardiology planning tentatively for stress test tomorrow

## 2021-07-12 NOTE — PLAN OF CARE
Problem: Potential for Falls  Goal: Patient will remain free of falls  Description: INTERVENTIONS:  - Educate patient/family on patient safety including physical limitations  - Instruct patient to call for assistance with activity   - Consult OT/PT to assist with strengthening/mobility   - Keep Call bell within reach  - Keep bed low and locked with side rails adjusted as appropriate  - Keep care items and personal belongings within reach  - Initiate and maintain comfort rounds  - Make Fall Risk Sign visible to staff  - Apply yellow socks and bracelet for high fall risk patients  - Consider moving patient to room near nurses station  Outcome: Progressing     Problem: Nutrition/Hydration-ADULT  Goal: Nutrient/Hydration intake appropriate for improving, restoring or maintaining nutritional needs  Description: Monitor and assess patient's nutrition/hydration status for malnutrition  Collaborate with interdisciplinary team and initiate plan and interventions as ordered  Monitor patient's weight and dietary intake as ordered or per policy  Utilize nutrition screening tool and intervene as necessary  Determine patient's food preferences and provide high-protein, high-caloric foods as appropriate       INTERVENTIONS:  - Monitor oral intake, urinary output, labs, and treatment plans  - Assess nutrition and hydration status and recommend course of action  - Evaluate amount of meals eaten  - Assist patient with eating if necessary   - Allow adequate time for meals  - Recommend/ encourage appropriate diets, oral nutritional supplements, and vitamin/mineral supplements  - Order, calculate, and assess calorie counts as needed  - Recommend, monitor, and adjust tube feedings and TPN/PPN based on assessed needs  - Assess need for intravenous fluids  - Provide specific nutrition/hydration education as appropriate  - Include patient/family/caregiver in decisions related to nutrition  Outcome: Progressing

## 2021-07-12 NOTE — PROGRESS NOTES
[facility] [facility]  Progress Note - Jeannette Uriarte 1965, 49 y o  female MRN: [id number]  Unit/Bed#: E2 -01 Encounter: [id number]  Primary Care Provider: Duyen Jaramillo PA-C   Date and time admitted to hospital: 7/11/2021  3:16 AM    * Chest pain  Assessment & Plan  Patient presented with chest pain  Troponins elevated  Heparin drip  Continue aspirin, statin  Cardiology planning tentatively for stress test tomorrow    Hyponatremia  Assessment & Plan  Initially presented with low sodium on admission, found to be lab error on repeat    Cardiomyopathy [facility])  Assessment & Plan  History of ischemic cardiomyopathy with EF 25% on last echo  Repeat echo pending    MARINA (acute kidney injury) [facility])  Assessment & Plan  Monitor BMP  Renal functions improving  Creatinine of 2 on admission, currently 1 5 today while diuresing    Acute systolic congestive heart failure (HCC)  Assessment & Plan  Wt Readings from Last 3 Encounters:   07/12/21 70 2 kg (154 lb 12 2 oz)   05/19/21 64 4 kg (142 lb)   05/07/21 64 4 kg (142 lb)     Continue IV Lasix 20 mg b i d  as per Cardiology  Cardiology following  Continue Toprol-XL  Monitor renal functions, currently improving          VTE Pharmacologic Prophylaxis:   Pharmacologic: Heparin Drip  Mechanical VTE Prophylaxis in Place: Yes    Patient Centered Rounds: I have performed bedside rounds with nursing staff today  Discussions with Specialists or Other Care Team Provider: Cardiology    Education and Discussions with Family / Patient: Patient    Time Spent for Care: 30 minutes  More than 50% of total time spent on counseling and coordination of care as described above      Current Length of Stay: 1 day(s)    Current Patient Status: Inpatient   Certification Statement: The patient will continue to require additional inpatient hospital stay due to Cardiology evaluation, possible stress test tmr    Discharge Plan: 1-2 days    Code Status: Level 1 - Full Code      Subjective:   No events overnight  Denies any CP or SOB  Swelling has improved  Objective:     Vitals:   Temp (24hrs), Av 9 °F (36 6 °C), Min:97 5 °F (36 4 °C), Max:98 8 °F (37 1 °C)    Temp:  [97 5 °F (36 4 °C)-98 8 °F (37 1 °C)] 98 2 °F (36 8 °C)  HR:  [] 77  Resp:  [18] 18  BP: (102-120)/(65-83) 119/66  SpO2:  [93 %-98 %] 98 %  Body mass index is 25 75 kg/m²  Input and Output Summary (last 24 hours): Intake/Output Summary (Last 24 hours) at 2021 1519  Last data filed at 2021 0900  Gross per 24 hour   Intake 325 96 ml   Output --   Net 325 96 ml       Physical Exam:     Physical Exam  Vitals and nursing note reviewed  Constitutional:       Appearance: She is normal weight  HENT:      Head: Normocephalic and atraumatic  Eyes:      General: No scleral icterus  Conjunctiva/sclera: Conjunctivae normal    Cardiovascular:      Rate and Rhythm: Normal rate and regular rhythm  Heart sounds: Normal heart sounds  Pulmonary:      Breath sounds: Normal breath sounds  No wheezing or rhonchi  Abdominal:      General: Bowel sounds are normal  There is no distension  Palpations: Abdomen is soft  Tenderness: There is no abdominal tenderness  Musculoskeletal:         General: No swelling  Normal range of motion  Right lower leg: No edema  Left lower leg: No edema  Skin:     General: Skin is warm and dry  Neurological:      General: No focal deficit present  Mental Status: She is alert  Mental status is at baseline             Additional Data:     Labs:    Results from last 7 days   Lab Units 21  0327   WBC Thousand/uL 7 43   HEMOGLOBIN g/dL 11 5   HEMATOCRIT % 37 8   PLATELETS Thousands/uL 259   NEUTROS PCT % 67   LYMPHS PCT % 21   MONOS PCT % 8   EOS PCT % 3     Results from last 7 days   Lab Units 21  0637 21  0327   SODIUM mmol/L 141 119*   POTASSIUM mmol/L 3 8 2 3*   CHLORIDE mmol/L 105 87*   CO2 mmol/L 26 23   BUN mg/dL 22 23   CREATININE mg/dL 1 52* 2 13*   ANION GAP mmol/L 10 9   CALCIUM mg/dL 7 0* 6 4*   ALBUMIN g/dL  --  3 0*   TOTAL BILIRUBIN mg/dL  --  0 24   ALK PHOS U/L  --  126*   ALT U/L  --  34   AST U/L  --  31   GLUCOSE RANDOM mg/dL 94 137     Results from last 7 days   Lab Units 07/11/21  0409   INR  1 07                       * I Have Reviewed All Lab Data Listed Above  * Additional Pertinent Lab Tests Reviewed: All Labs For Current Hospital Admission Reviewed    Imaging:    XR chest 1 view portable    Result Date: 7/11/2021  Impression: Moderate congestive changes  Possible infiltrates in the lung bases  Workstation performed: [id number]       Recent Cultures (last 7 days):           Last 24 Hours Medication List:   Current Facility-Administered Medications   Medication Dose Route Frequency Provider Last Rate    acetaminophen  650 mg Oral Q4H PRN Nicolette Maramec, CRNP      aspirin  81 mg Oral Daily Nicolette Maramec, CRNP      atorvastatin  20 mg Oral Daily Hannah Ryan MD      diphenhydrAMINE  50 mg Oral HS PRN Brandin Else, CRNP      furosemide  20 mg Intravenous BID (diuretic) Jaun Ogden MD      heparin (porcine)  3-20 Units/kg/hr (Order-Specific) Intravenous Titrated Nicolette Maramec, CRNP 16 Units/kg/hr (07/12/21 0243)    levothyroxine  150 mcg Oral Early Morning Nicolette Maramec, CRNP      melatonin  6 mg Oral HS Brandin Else, CRNP      metoprolol succinate  12 5 mg Oral BID Nicolette Maramec, CRNP          Today, Patient Was Seen By: Hannah Ryan MD    ** Please Note: Dictation voice to text software may have been used in the creation of this document   **

## 2021-07-12 NOTE — PROGRESS NOTES
CARDIOLOGY INPATIENT FOLLOW-UP PROGRESS NOTE  *-*-*-*-*-*-*-*-*-*-*-*-*-*-*-*-*-*-*-*-*-*-*-*-*-*-*-*-*-*-*-*-*-*-*-*-*-*-*-*-*-*-*-*-*-*-*-*-*-*-*-*-*-*-  [id number] DATE: 07/12/21 9:41 AM   AUTHOR: Sunny Rudolph MD  PATIENT: Fabian Listen   1965    [id number]   89 y o    female  INPATIENT HOSPITALIST PHYSICIAN: Pretty Saravia*  DATE OF ADMISSION: 7/11/2021  3:16 AM; LENGTH OF STAY: 1 days  *-*-*-*-*-*-*-*-*-*-*-*-*-*-*-*-*-*-*-*-*-*-*-*-*-*-*-*-*-*-*-*-*-*-*-*-*-*-*-*-*-*-*-*-*-*-*-*-*-*-*-*-*-*-    CARDIOLOGY ASSESSMENT  1  Decompensated acute on chronic systolic and diastolic heart failure  2  Coronary artery disease with angina, history of PCI of LAD June 2020  3  Persistent sinus tachycardia  4  Non MI troponin elevation  5  Stage 4 chronic kidney disease  6  Left bundle-branch block  7  Hypothyroidism  8  History of lacunar cerebral infarctions    Patient is clinically and feeling better  Urine output has not been recorded  Pressure is stable  Heart rate is borderline high  Renal function appears to be improved  Patient Active Problem List   Diagnosis    Acute systolic congestive heart failure (HCC)    Postoperative hypothyroidism    [facility] [facility]    Cardiomyopathy ([facility] )    LBBB (left bundle branch block)    Anemia    Prediabetes    s/p ORIF of left distal radius    Acute myopericarditis    CAD (coronary artery disease)    Chronic systolic (congestive) heart failure (HCC)    Iron deficiency anemia    Syncope    Elevated d-dimer    Hypocalcemia    Decreased GFR    Hypoparathyroidism (HCC)    Abnormal CT of brain    Ischemic brain damage    Multiple lacunar infarcts (HCC)    Hyponatremia    Hypokalemia    Chest pain        PLAN:  -- increased furosemide frequency to 20 mg IV   -- ordering another set of cardiac enzyme  If it is lower than what it was at presentation then heparin drip can be discontinued    -- continue metoprolol succinate at twice daily   -- if cardiac enzymes are trending down will plan for Lexiscan nuclear stress test tomorrow  -- will repeat renal function and electrolytes tomorrow  *-*-*-*-*-*-*-*-*-*-*-*-*-*-*-*-*-*-*-*-*-*-*-*-*-*-*-*-*-*-*-*-*-*-*-*-*-*-*-*-*-*-*-*-*-*-*-*-*-*-*-*-*-*-  INTERVAL CHANGES / HISTORY OF PRESENT ILLNESS:  No acute events overnight  Patient reports feeling better with respect to shortness of breath and chest pain  Reports that she feels much better with respect to her overall feeling  Still gets intermittent pain when getting up suddenly from lying down position  *-*-*-*-*-*-*-*-*-*-*-*-*-*-*-*-*-*-*-*-*-*-*-*-*-*-*-*-*-*-*-*-*-*-*-*-*-*-*-*-*-*-*-*-*-*-*-*-*-*-*-*-*-*  REVIEW OF SYMPTOMS:    Positive for:  Improved shortness of breath and chest pain  Negative for: All remaining as reviewed below and in HPI   SYSTEM SYMPTOMS REVIEWED:  General--weight change, fever, night sweats  Respiratoryl-- Wheezing, shortness of breath, cough, URI symptoms, sputum, blood  Cardiovascular--chest pain, syncope, dyspnea on exertion, edema, decline in exercise tolerance, claudication   Gastrointestinal--persistent vomiting, diarrhea, abdominal distention, blood in stool   Muscular or skeletal--joint pain or swelling   Neurologic--headaches, syncope, abnormal movement  Hematologic--history of easy bruising and bleeding   Endocrine--thyroid enlargement, heat or cold intolerance, polyuria   Psychiatric--anxiety, depression      *-*-*-*-*-*-*-*-*-*-*-*-*-*-*-*-*-*-*-*-*-*-*-*-*-*-*-*-*-*-*-*-*-*-*-*-*-*-*-*-*-*-*-*-*-*-*-*-*-*-*-*-*-*-  VITAL SIGNS:  Vitals:    07/11/21 2140 07/12/21 0009 07/12/21 0106 07/12/21 0715   BP: 120/83 102/65 102/65 103/67   BP Location: Right arm Left arm Left arm Left arm   Pulse: 80 92 92 77   Resp: 18 18 18 18   Temp:  97 5 °F (36 4 °C) 97 5 °F (36 4 °C) 97 5 °F (36 4 °C)   TempSrc:  Tympanic Tympanic Tympanic   SpO2: 98% 93%  97%   Weight:   70 2 kg (154 lb 12 2 oz)    Height:   5' 5" (1 651 m)       Temp (24hrs), Av 9 °F (36 6 °C), Min:97 5 °F (36 4 °C), Max:98 8 °F (37 1 °C)  Current: Temperature: 97 5 °F (36 4 °C)    Weight (last 2 days)     Date/Time   Weight    21 0106   70 2 (154 76)    21 0317   70 2 (154 76)            Body mass index is 25 75 kg/m²  Wt Readings from Last 3 Encounters:   21 70 2 kg (154 lb 12 2 oz)   21 64 4 kg (142 lb)   21 64 4 kg (142 lb)      Intake/Output Summary (Last 24 hours) at 2021 0941  Last data filed at 2021 0900  Gross per 24 hour   Intake 1074 71 ml   Output --   Net 1074 71 ml        *-*-*-*-*-*-*-*-*-*-*-*-*-*-*-*-*-*-*-*-*-*-*-*-*-*-*-*-*-*-*-*-*-*-*-*-*-*-*-*-*-*-*-*-*-*-*-*-*-*-*-*-*-*-  PHYSICAL EXAM:  General Appearance:    Alert, cooperative, no distress, appears stated age, normal built   Head, Eyes, ENT:    No obvious abnormality, moist mucous mebranes  Neck:   Supple, no carotid bruit  Has mild jugular venous distension in lower 1/3 of neck   Back:     Symmetric, no curvature  Lungs:     Respirations unlabored  Clear to auscultation bilaterally,    Chest wall:    No tenderness or deformity   Heart:    Regular rate and rhythm, S1 and S2 normal, no murmur, rub  or gallop  Abdomen:     Soft, non-tender, No obvious masses, or organomegaly   Extremities:   Extremities warm, no cyanosis or edema    Skin:   Skin color, texture, turgor normal, no rashes or lesions     *-*-*-*-*-*-*-*-*-*-*-*-*-*-*-*-*-*-*-*-*-*-*-*-*-*-*-*-*-*-*-*-*-*-*-*-*-*-*-*-*-*-*-*-*-*-*-*-*-*-*-*-*-*-  TELEMETRY, LAST ECG:  Telemetry reviewed    Sinus rhythm with heart rate in 90s, left bundle-branch block, possible atrial enlargement   Results for orders placed or performed during the hospital encounter of 21   ECG 12 lead   Result Value    Ventricular Rate 101    Atrial Rate 101    NV Interval 140    QRSD Interval 158    QT Interval 444    QTC Interval 575    P Axis 44    QRS Axis -52    T Wave Axis 104    Narrative Sinus tachycardia  Possible Left atrial enlargement  Left axis deviation  Left bundle branch block  Abnormal ECG  When compared with ECG of 11-JUL-2021 03:50, (unconfirmed)  No significant change was found  Confirmed by Ernie Quiroz ([id number]) on 7/11/2021 7:00:34 AM      *-*-*-*-*-*-*-*-*-*-*-*-*-*-*-*-*-*-*-*-*-*-*-*-*-*-*-*-*-*-*-*-*-*-*-*-*-*-*-*-*-*-*-*-*-*-*-*-*-*-*-*-*-*-    CURRENT SCHEDULED MEDICATIONS:    Current Facility-Administered Medications:     acetaminophen (TYLENOL) tablet 650 mg, 650 mg, Oral, Q4H PRN, Pattricia Stall, CRNP    aspirin chewable tablet 81 mg, 81 mg, Oral, Daily, Pattricia Stall, CRNP, 81 mg at 07/12/21 0920    diphenhydrAMINE (BENADRYL) tablet 50 mg, 50 mg, Oral, HS PRN, Sandra Rizo, CRNP    furosemide (LASIX) injection 20 mg, 20 mg, Intravenous, Daily, Pattricia Stall, CRNP, 20 mg at 07/12/21 0001    heparin (porcine) 25,000 units in 0 45% NaCl 250 mL infusion (premix), 3-20 Units/kg/hr (Order-Specific), Intravenous, Titrated, Pattricia Stall, CRNP, Last Rate: 11 2 mL/hr at 07/12/21 0243, 16 Units/kg/hr at 07/12/21 0243    levothyroxine tablet 150 mcg, 150 mcg, Oral, Early Morning, Pattricia Stall, CRNP, 150 mcg at 07/12/21 9620    melatonin tablet 6 mg, 6 mg, Oral, HS, Sandra Rizo, CRNP, 6 mg at 07/11/21 2245    metoprolol succinate (TOPROL-XL) 24 hr tablet 12 5 mg, 12 5 mg, Oral, BID, Pattricia Stall, CRNP, 12 5 mg at 07/12/21 0920 ALLERGIES:  Allergies   Allergen Reactions    Penicillins Rash        *-*-*-*-*-*-*-*-*-*-*-*-*-*-*-*-*-*-*-*-*-*-*-*-*-*-*-*-*-*-*-*-*-*-*-*-*-*-*-*-*-*-*-*-*-*-*-*-*-*-*-*-*-*  LABORATORY DATA:  I have personally reviewed pertinent labs      CMP:  Results from last 7 days   Lab Units 07/12/21  0637 07/11/21  1323 07/11/21  0754 07/11/21  0327   SODIUM mmol/L 141 143 142 119*   POTASSIUM mmol/L 3 8 4 1 4 5 2 3*   CHLORIDE mmol/L 105 107 107 87*   CO2 mmol/L 26 28 26 23   BUN mg/dL 22 21 21 23   CREATININE mg/dL 1 52* 1 82* 1 95* 2 13* CALCIUM mg/dL 7 0* 7 2* 6 8* 6 4*   ALK PHOS U/L  --   --   --  126*   ALT U/L  --   --   --  34   AST U/L  --   --   --  31        Results from last 7 days   Lab Units 21  0754 21  0435   MAGNESIUM mg/dL 1 9 1 8     Results from last 7 days   Lab Units 21  0754   PHOSPHORUS mg/dL 3 8    Cardiac Profile:   Results from last 7 days   Lab Units 21  1242 21  0945 21  0643 21  0327   TROPONIN I ng/mL 0 43* 0 38* 0 31* 0 24*   NT-PRO BNP pg/mL  --   --   --  16,077*                CBC:   Results from last 7 days   Lab Units 21  0327   WBC Thousand/uL 7 43   HEMOGLOBIN g/dL 11 5   HEMATOCRIT % 37 8   PLATELETS Thousands/uL 259     PT/INR: No results found for: PT, INR, Microbiology:          *-*-*-*-*-*-*-*-*-*-*-*-*-*-*-*-*-*-*-*-*-*-*-*-*-*-*-*-*-*-*-*-*-*-*-*-*-*-*-*-*-*-*-*-*-*-*-*-*-*-*-*-*-*-  IMAGING STUDIES REPORTS: Imaging studies results reviewed    No valid procedures specified  No Chest XR results available for this patient  *-*-*-*-*-*-*-*-*-*-*-*-*-*-*-*-*-*-*-*-*-*-*-*-*-*-*-*-*-*-*-*-*-*-*-*-*-*-*-*-*-*-*-*-*-*-*-*-*-*-*-*-*-*-  ECHOCARDIOGRAM AND OTHER CARDIAC TESTS RESULTS:  Results for orders placed during the hospital encounter of 20    Echo complete with contrast if indicated    Narrative  [facility]  [address]  [facility], [address]  [phone]    Transthoracic Echocardiogram  2D, M-mode, Doppler, and Color Doppler    Study date:  2020    Patient: Olena Suggs  MR number: [id number]  Account number: [de-identified]  : 1965  Age: 48 years  Gender: Female  Status: Inpatient  Location: Bedside  Height: 65 in  Weight: 145 6 lb  BP: 130/ 109 mmHg    Indications: Heart failure  New bundle branch block      Diagnoses: I50 9 - Heart failure, unspecified    Sonographer:  CARLTON Fuentes  Primary Physician:  Lizz Brandt MD  Referring Physician:  Norma Ordaz DO  Group:  [facility]  Interpreting Physician:  Cathryne Collet, DO    SUMMARY    SUMMARY:  This is a technically adequate study  1  Left ventricle is moderately dilated with severely reduced systolic function  Left ventricular ejection fraction is estimated at 29%  2  Mild concentric left ventricular hypertrophy  3  Grade 1 diastolic dysfunction is present  4  Severe global hypokinesis with wall motion consistent with conduction abnormality  5  Left atrium is moderately dilated and right atrium is mildly dilated  6  Aortic valve sclerotic with adequate separation  Mild aortic regurgitation  7  Mild mitral annular calcification with mild mitral regurgitation  8  Pulmonary artery systolic pressures cannot be estimated due to lack of tricuspid regurgitation jet  9  Small pericardial effusion without echocardiographic indications of tamponade physiology    SUMMARY MEASUREMENTS  2D measurements:  Unspecified Anatomy:   %FS was 10 7 %  EDV(Teich) was 171 6 ml   EF Biplane was 28 7 %  EF(Teich) was 23 %  ESV(Teich) was 132 1 ml  IVSd was 1 cm  LAAs A2C was 24 2 cm2  LAAs A4C was 21 cm2  LAESV A-L A2C was 90 3 ml  LAESV A-L  A4C was 63 ml  LAESV Index (A-L) was 45 4 ml/m2  LAESV MOD A2C was 87 5 ml  LAESV MOD A4C was 58 1 ml  LAESV(A-L) was 78 5 ml  LAESV(MOD BP) was 74 ml  LAESVInd MOD BP was 42 8 ml/m2  LALs A2C was 5 5 cm  LALs A4C was 6 cm  LVEDV  MOD A2C was 155 1 ml  LVEDV MOD A4C was 149 9 ml  LVEDV MOD BP was 153 3 ml  LVEDVInd MOD BP was 88 6 ml/m2  LVEF MOD A2C was 30 6 %  LVEF MOD A4C was 27 9 %  LVESV MOD A2C was 107 7 ml  LVESV MOD A4C was 108 ml  LVESV MOD BP was  109 3 ml  LVESVInd MOD BP was 63 2 ml/m2  LVIDd was 5 9 cm  LVIDs was 5 2 cm  LVLd A2C was 9 7 cm  LVLd A4C was 9 6 cm  LVLs A2C was 9 2 cm  LVLs A4C was 8 9 cm  LVPWd was 1 2 cm  Lorrie A4C was 16 4 cm2  RAEDV A-L was 47 5 ml  RAEDV MOD was 46 7 ml  RALd was 4 8 cm  RVIDd was 3 8 cm  RWT was 0 4     SV MOD A2C was 47 4 ml   SV MOD A4C was 41 8 ml   SV(Teich) was 39 5 ml   CW measurements:  Unspecified Anatomy:   AV Env  Ti was 234 4 ms   AV VTI was 27 4 cm  AV Vmax was 1 5 m/s  AV Vmean was 1 2 m/s  AV maxPG was 8 5 mmHg  AV meanPG was 5 9 mmHg  MM measurements:  Unspecified Anatomy:   TAPSE was 3 3 cm  PW measurements:  Unspecified Anatomy:   DVI was 0 6   LVOT Env  Ti was 238 5 ms  LVOT VTI was 15 5 cm  LVOT Vmax was 0 9 m/s  LVOT Vmean was 0 7 m/s  LVOT maxPG was 3 3 mmHg  LVOT meanPG was 1 9 mmHg  HISTORY: PRIOR HISTORY: hypothyroid  PROCEDURE: The procedure was performed at the bedside  This was a routine study  The transthoracic approach was used  The study included complete 2D imaging, M-mode, complete spectral Doppler, and color Doppler  The heart rate was 108 bpm,  at the start of the study  Image quality was adequate  LEFT VENTRICLE: Left ventricle is moderately dilated with severely reduced systolic function  Left ventricular ejection fraction is estimated at 29%  There is mild concentric left ventricular hypertrophy  Grade 1 diastolic dysfunction  There is severe global hypokinesis with wall motion consistent with conduction abnormality  RIGHT VENTRICLE: Right ventricle is normal size and function  LEFT ATRIUM: Left atrium is moderately dilated  ATRIAL SEPTUM: Interatrial septum is bowing toward the right atrium consistent with elevated left atrial pressures  RIGHT ATRIUM: Right atrium is mildly dilated  MITRAL VALVE: Mitral valve opens well  There is mild mitral annular calcification posteriorly  Mild mitral regurgitation  AORTIC VALVE: Aortic valve is minimally sclerotic with adequate separation  There is no evidence of aortic stenosis  Mild aortic regurgitation  LVOT diameter 2 cm, LVOT VTI 15 5, stroke volume 48 9 mL    TRICUSPID VALVE: Tricuspid valve opens well  There is no evidence of tricuspid regurgitation   Pulmonary artery systolic pressures cannot be estimated due to lack of tricuspid regurgitation jet  PULMONIC VALVE: Pulmonic valve not seen on the study  PERICARDIUM: There is a small pericardial effusion without echocardiographic indications of tamponade physiology  AORTA: The aortic root is normal in size at 3 3 cm  SYSTEMIC VEINS: IVC: The IVC is normal size with collapse  SYSTEM MEASUREMENT TABLES    2D  %FS: 10 7 %  EDV(Teich): 171 6 ml  EF Biplane: 28 7 %  EF(Teich): 23 %  ESV(Teich): 132 1 ml  IVSd: 1 cm  LAAs A2C: 24 2 cm2  LAAs A4C: 21 cm2  LAESV A-L A2C: 90 3 ml  LAESV A-L A4C: 63 ml  LAESV Index (A-L): 45 4 ml/m2  LAESV MOD A2C: 87 5 ml  LAESV MOD A4C: 58 1 ml  LAESV(A-L): 78 5 ml  LAESV(MOD BP): 74 ml  LAESVInd MOD BP: 42 8 ml/m2  LALs A2C: 5 5 cm  LALs A4C: 6 cm  LVEDV MOD A2C: 155 1 ml  LVEDV MOD A4C: 149 9 ml  LVEDV MOD BP: 153 3 ml  LVEDVInd MOD BP: 88 6 ml/m2  LVEF MOD A2C: 30 6 %  LVEF MOD A4C: 27 9 %  LVESV MOD A2C: 107 7 ml  LVESV MOD A4C: 108 ml  LVESV MOD BP: 109 3 ml  LVESVInd MOD BP: 63 2 ml/m2  LVIDd: 5 9 cm  LVIDs: 5 2 cm  LVLd A2C: 9 7 cm  LVLd A4C: 9 6 cm  LVLs A2C: 9 2 cm  LVLs A4C: 8 9 cm  LVPWd: 1 2 cm  Lorrie A4C: 16 4 cm2  RAEDV A-L: 47 5 ml  RAEDV MOD: 46 7 ml  RALd: 4 8 cm  RVIDd: 3 8 cm  RWT: 0 4  SV MOD A2C: 47 4 ml  SV MOD A4C: 41 8 ml  SV(Teich): 39 5 ml    CW  AV Env  Ti: 234 4 ms  AV VTI: 27 4 cm  AV Vmax: 1 5 m/s  AV Vmean: 1 2 m/s  AV maxP 5 mmHg  AV meanP 9 mmHg    MM  TAPSE: 3 3 cm    PW  DVI: 0 6  LVOT Env  Ti: 238 5 ms  LVOT VTI: 15 5 cm  LVOT Vmax: 0 9 m/s  LVOT Vmean: 0 7 m/s  LVOT maxPG: 3 3 mmHg  LVOT meanP 9 mmHg    [facility]    Prepared and electronically signed by    Verna Ramesh DO  Signed 2020 15:04:09    No results found for this or any previous visit  No results found for this or any previous visit  No results found for this or any previous visit  *-*-*-*-*-*-*-*-*-*-*-*-*-*-*-*-*-*-*-*-*-*-*-*-*-*-*-*-*-*-*-*-*-*-*-*-*-*-*-*-*-*-*-*-*-*-*-*-*-*-*-*-*-*-  SIGNATURES:   [unfilled]   Loren Landers MD

## 2021-07-12 NOTE — ASSESSMENT & PLAN NOTE
Monitor BMP  Renal functions improving  Creatinine of 2 on admission, currently 1 5 today while diuresing

## 2021-07-12 NOTE — UTILIZATION REVIEW
Initial Clinical Review    Admission: Date/Time/Statement:   Admission Orders (From admission, onward)     Ordered        07/11/21 0455  Inpatient Admission  Once         07/11/21 0457  Inpatient Admission  Once                   07/11/21 0456  Inpatient Admission Once     Question Answer Comment   Level of Care Critical Care    Estimated length of stay More than 2 Midnights    Certification I certify that inpatient services are medically necessary for this patient for a duration of greater than two midnights  See H&P and MD Progress Notes for additional information about the patient's course of treatment  07/11/21 0455       ED Arrival Information     Expected Arrival Acuity    - 7/11/2021 03:15 Urgent         Means of arrival Escorted by Service Admission type    [facility] ([address]) General Medicine Urgent         Arrival complaint    chest pain, sob        Chief Complaint   Patient presents with    Chest Pain     pt  reports chest pain and sob and diaphoretic  prt  reports started this morning and getting worwse  pt  denies other symptoms at this time  pt  recieved 324 aspirin and 1 nitro        Initial Presentation:   49  Y O female  Presents to ED via  EMS from home with chest pain and shortness of  Breath  For past  1 day, persistent and getting worse  Pain is in midsternum, nonradiating and  Accompanied  By  Shortness of breath, lightheadedness, near syncope and  Diaphoresis  PMH  Is   CAD, S/P  Stent, cardiomyopathy, CHF, hypothyroidism and hypocalcemia  Labs reveal low sodium, low  Calcium, elevated  BNP and  Creatinine  EKG  Shows no signs of infarction  Troponin   0 24  CXR shows pulmonary edema/ bilateral interstitial infiltrates     Admit  Ip with hyponatremia, possible lab eror, MARINA, acute systolic congestive heart failure, elevated D dimer, Cardiomyopathy, hypokalemia and hypocalcemia and plan is  Cardiology consult, monitor labs,  Replace electrolytes,   2 DE, IVF And monitor for signs of  Volume overload  Cardiology consult  Angina type discomfort provoked by  Severe  CHF  Non Mi troponin elevation secondary to  CHF  BNP  16, 077  Suspect t his  Is  Pulmonary edema given description of  Symptoms over the past week  Persistent  ST is of concern in setting  Of  CHF  Start IV lasix   BID and begin metoprolol  Needs nuclear stress test when more stable  Wait  2  DE  Patient is  More ill than  She appears and more ill than she  Realizes  Date:    7/12      Day 2:   Urine output  Has  Not been recorded  BP   Acceptable  Heart rate borderline high  Renal function seems  Improved  Continue  IV lasix  BID and increased  To 20 mg  If   Enzymes  Trend down, can plan  Stress test   7/13  Has  URI  Symptoms, cough, wheezing, shortness of breath  Continue tele  EKG  SR  HR  In the 90's, left  BBB, possible atrial enlargement  Sodium much improved, likely  Lab error initially        ED Triage Vitals   Temperature Pulse Respirations Blood Pressure SpO2   07/11/21 0349 07/11/21 0319 07/11/21 0317 07/11/21 0317 07/11/21 0321   98 3 °F (36 8 °C) (!) 110 20 133/79 99 %      Temp Source Heart Rate Source Patient Position - Orthostatic VS BP Location FiO2 (%)   07/11/21 0349 07/11/21 0317 07/11/21 0317 07/11/21 0317 --   Oral Monitor Sitting Right arm       Pain Score       07/11/21 0317       8          Wt Readings from Last 1 Encounters:   07/12/21 70 2 kg (154 lb 12 2 oz)     Additional Vital Signs:   07/12/21 1100  98 2 °F (36 8 °C)  77  18  119/66  84  98 %  None (Room air)  Lying   07/12/21 0715  97 5 °F (36 4 °C)  77  18  103/67  78  97 %  None (Room air)  Lying   07/12/21 0106  97 5 °F (36 4 °C)  92  18  102/65  --  --  --  --   07/12/21 0009  97 5 °F (36 4 °C)  92  18  102/65  72  93 %  None (Room air)  Lying   07/11/21 2140  --  80  18  120/83  --  98 %  None (Room air)  Lying   07/11/21 1536  98 8 °F (37 1 °C)  100  18  119/79  --  98 %  None (Room air) Sitting   07/11/21 1206  --  --  --  --  --  99 %  None (Room air)  --   07/11/21 1200  --  --  17  --  --  --  --  --   07/11/21 1106  98 3 °F (36 8 °C)  --  --  --  --  --  --  --   07/11/21 1100  --  100  --  100/69  79  --  --  --   07/11/21 0700  --  102  39Abnormal   119/80  93  95 %  Nasal cannula  --   07/11/21 0604  98 5 °F (36 9 °C)  --  --  --  --  --  --  --   07/11/21 0515  --  101  20  121/73  92  93 %  None (Room air)  Lying   07/11/21 0445  --  100  24Abnormal   101/68  78  94 %  None (Room air)  Lying   07/11/21 0401  --  106Abnormal   24Abnormal   112/63  --  97 %  None (Room air)  Lying   07/11/21 0359  --  --  --  --  --  --  None (Room air)  --   07/11/21 0349  98 3 °F (36 8 °C)  --  --  --  --  --  --  --   07/11/21 0321  --  --  --  --  --  99 %  --  --   07/11/21 0319  --  110Abnormal   --  --  --  --  --  --   07/11/21 0317  --  --  20  133/79  --  --  None (Room air)           Pertinent Labs/Diagnostic Test Results:   CXR  ( 7/11)     Moderate congestive changes   Possible infiltrates in the lung bases  EKG   SR           No signs  Of  infarction  Results from last 7 days   Lab Units 07/11/21  0346   SARS-COV-2  Negative     Results from last 7 days   Lab Units 07/11/21  0327   WBC Thousand/uL 7 43   HEMOGLOBIN g/dL 11 5   HEMATOCRIT % 37 8   PLATELETS Thousands/uL 259   NEUTROS ABS Thousands/µL 4 99         Results from last 7 days   Lab Units 07/12/21  0637 07/11/21  1323 07/11/21  0754 07/11/21  0643 07/11/21  0435 07/11/21  0327   SODIUM mmol/L 141 143 142 142  --  119*   POTASSIUM mmol/L 3 8 4 1 4 5 4 4  --  2 3*   CHLORIDE mmol/L 105 107 107 107  --  87*   CO2 mmol/L 26 28 26 24  --  23   ANION GAP mmol/L 10 8 9 11  --  9   BUN mg/dL 22 21 21 22  --  23   CREATININE mg/dL 1 52* 1 82* 1 95* 2 06*  --  2 13*   EGFR ml/min/1 73sq m 39 31 29 27  --  26   CALCIUM mg/dL 7 0* 7 2* 6 8* 6 8*  --  6 4*   CALCIUM, IONIZED mmol/L  --   --  0 92*  --  1 03*  --    MAGNESIUM mg/dL  --   --  1 9 --  1 8  --    PHOSPHORUS mg/dL  --   --  3 8  --   --   --      Results from last 7 days   Lab Units 07/11/21  0327   AST U/L 31   ALT U/L 34   ALK PHOS U/L 126*   TOTAL PROTEIN g/dL 6 2*   ALBUMIN g/dL 3 0*   TOTAL BILIRUBIN mg/dL 0 24   BILIRUBIN DIRECT mg/dL 0 11         Results from last 7 days   Lab Units 07/12/21  0637 07/11/21  1323 07/11/21  0754 07/11/21  0643 07/11/21  0327   GLUCOSE RANDOM mg/dL 94 86 93 95 137         Results from last 7 days   Lab Units 07/11/21  0327   PH MARY  7 392   PCO2 MARY mm Hg 37 0*   PO2 MARY mm Hg 46 9*   HCO3 MARY mmol/L 22 0*   BASE EXC MARY mmol/L -2 5   O2 CONTENT MARY ml/dL 12 8   O2 HGB, VENOUS % 78 5             Results from last 7 days   Lab Units 07/12/21  1033 07/11/21  1242 07/11/21  0945 07/11/21  0643 07/11/21  0521 07/11/21  0327   TROPONIN I ng/mL 0 46* 0 43* 0 38* 0 31* 0 21* 0 24*     Results from last 7 days   Lab Units 07/11/21  0327   D-DIMER QUANTITATIVE ug/ml FEU 1 17*     Results from last 7 days   Lab Units 07/12/21  0637 07/12/21  0037 07/11/21  1812 07/11/21  0409   PROTIME seconds  --   --   --  13 7   INR   --   --   --  1 07   PTT seconds 78* 90* 56* 26     Results from last 7 days   Lab Units 07/11/21  0643   TSH 3RD GENERATON uIU/mL 0 014*                     Results from last 7 days   Lab Units 07/11/21  0327   NT-PRO BNP pg/mL 16,077*                   Results from last 7 days   Lab Units 07/11/21  0829   AMPH/METH  Negative   BARBITURATE UR  Negative   BENZODIAZEPINE UR  Negative   COCAINE UR  Negative   METHADONE URINE  Negative   OPIATE UR  Negative   PCP UR  Positive*   THC UR  Negative         ED Treatment:   Medication Administration from 07/11/2021 0315 to 07/11/2021 0548       Date/Time Order Dose Route Action Comments     07/11/2021 0330 ondansetron (ZOFRAN) injection 4 mg 4 mg Intravenous Given      07/11/2021 0332 nitroglycerin (NITRO-BID) 2 % TD ointment 1 inch 1 inch Topical Given      07/11/2021 0346 fentanyl citrate (PF) 100 MCG/2ML 25 mcg 25 mcg Intravenous Given      07/11/2021 0412 potassium chloride (K-DUR,KLOR-CON) CR tablet 60 mEq 60 mEq Oral Given      07/11/2021 0419 potassium chloride 20 mEq IVPB (premix) 20 mEq Intravenous New Bag      07/11/2021 0443 calcium gluconate 1 g in sodium chloride 0 9% 50 mL (premix) 0 g Intravenous Stopped      07/11/2021 0413 calcium gluconate 1 g in sodium chloride 0 9% 50 mL (premix) 1 g Intravenous New Bag      07/11/2021 0427 heparin (porcine) injection 4,000 Units 4,000 Units Intravenous Given      07/11/2021 0429 heparin (porcine) 25,000 units in 0 45% NaCl 250 mL infusion (premix) 12 Units/kg/hr Intravenous New Bag         Present on Admission:   Acute systolic congestive heart failure (HCC)   Postoperative hypothyroidism   [facility]   Cardiomyopathy ([facility] )   CAD (coronary artery disease)   Hypocalcemia   Elevated d-dimer      Admitting Diagnosis: Hypocalcemia [E83 51]  Shortness of breath [R06 02]  Hypokalemia [E87 6]  Hyponatremia [E87 1]  Chest pain [R07 9]  Positive D dimer [R79 89]  Elevated troponin [R77 8]  MARINA (acute kidney injury) ([facility] ) [N17 9]  Age/Sex: 49 y o  female  Admission Orders:  Scheduled Medications:  aspirin, 81 mg, Oral, Daily  furosemide, 20 mg, Intravenous, BID (diuretic)  levothyroxine, 150 mcg, Oral, Early Morning  melatonin, 6 mg, Oral, HS  metoprolol succinate, 12 5 mg, Oral, BID      Continuous IV Infusions:  heparin (porcine), 3-20 Units/kg/hr (Order-Specific), Intravenous, Titrated      PRN Meds:  acetaminophen, 650 mg, Oral, Q4H PRN  diphenhydrAMINE, 50 mg, Oral, HS PRN        IP CONSULT TO CARDIOLOGY     48  Hr  tele    Network Utilization Review Department  ATTENTION: Please call with any questions or concerns to [phone] and carefully listen to the prompts so that you are directed to the right person   All voicemails are confidential   [employer] all requests for admission clinical reviews, approved or denied determinations and any other requests to dedicated fax number below belonging to the campus where the patient is receiving treatment   List of dedicated fax numbers for the Facilities:  [address] [place] [address] DENIALS (Administrative/Medical Necessity) [phone]   [address] (Maternity/NICU/Pediatrics) [phone]   [address] [facility] [address] [place] [address] [address] [place] [address] [phone]

## 2021-07-13 LAB
ALBUMIN SERPL BCP-MCNC: 3 G/DL (ref 3.5–5)
ALP SERPL-CCNC: 120 U/L (ref 46–116)
ALT SERPL W P-5'-P-CCNC: 37 U/L (ref 12–78)
ANION GAP SERPL CALCULATED.3IONS-SCNC: 10 MMOL/L (ref 4–13)
ANION GAP SERPL CALCULATED.3IONS-SCNC: 20 MMOL/L (ref 4–13)
APTT PPP: 83 SECONDS (ref 23–37)
AST SERPL W P-5'-P-CCNC: 39 U/L (ref 5–45)
ATRIAL RATE: 111 BPM
ATRIAL RATE: 77 BPM
BASOPHILS # BLD AUTO: 0.04 THOUSANDS/ΜL (ref 0–0.1)
BASOPHILS NFR BLD AUTO: 1 % (ref 0–1)
BILIRUB SERPL-MCNC: 0.39 MG/DL (ref 0.2–1)
BUN SERPL-MCNC: 29 MG/DL (ref 5–25)
BUN SERPL-MCNC: 29 MG/DL (ref 5–25)
CA-I BLD-SCNC: 0.87 MMOL/L (ref 1.12–1.32)
CALCIUM ALBUM COR SERPL-MCNC: 7.6 MG/DL (ref 8.3–10.1)
CALCIUM SERPL-MCNC: 6.8 MG/DL (ref 8.3–10.1)
CALCIUM SERPL-MCNC: <5 MG/DL (ref 8.3–10.1)
CHLORIDE SERPL-SCNC: 101 MMOL/L (ref 100–108)
CHLORIDE SERPL-SCNC: 97 MMOL/L (ref 100–108)
CO2 SERPL-SCNC: 26 MMOL/L (ref 21–32)
CO2 SERPL-SCNC: 30 MMOL/L (ref 21–32)
CREAT SERPL-MCNC: 1.46 MG/DL (ref 0.6–1.3)
CREAT SERPL-MCNC: 1.52 MG/DL (ref 0.6–1.3)
EOSINOPHIL # BLD AUTO: 0.17 THOUSAND/ΜL (ref 0–0.61)
EOSINOPHIL NFR BLD AUTO: 3 % (ref 0–6)
ERYTHROCYTE [DISTWIDTH] IN BLOOD BY AUTOMATED COUNT: 16.2 % (ref 11.6–15.1)
GFR SERPL CREATININE-BSD FRML MDRD: 39 ML/MIN/1.73SQ M
GFR SERPL CREATININE-BSD FRML MDRD: 41 ML/MIN/1.73SQ M
GLUCOSE SERPL-MCNC: 84 MG/DL (ref 65–140)
GLUCOSE SERPL-MCNC: 95 MG/DL (ref 65–140)
HCT VFR BLD AUTO: 36.6 % (ref 34.8–46.1)
HGB BLD-MCNC: 11.5 G/DL (ref 11.5–15.4)
IMM GRANULOCYTES # BLD AUTO: 0.02 THOUSAND/UL (ref 0–0.2)
IMM GRANULOCYTES NFR BLD AUTO: 0 % (ref 0–2)
LYMPHOCYTES # BLD AUTO: 1.39 THOUSANDS/ΜL (ref 0.6–4.47)
LYMPHOCYTES NFR BLD AUTO: 23 % (ref 14–44)
MCH RBC QN AUTO: 26.1 PG (ref 26.8–34.3)
MCHC RBC AUTO-ENTMCNC: 31.4 G/DL (ref 31.4–37.4)
MCV RBC AUTO: 83 FL (ref 82–98)
MONOCYTES # BLD AUTO: 0.48 THOUSAND/ΜL (ref 0.17–1.22)
MONOCYTES NFR BLD AUTO: 8 % (ref 4–12)
NEUTROPHILS # BLD AUTO: 3.95 THOUSANDS/ΜL (ref 1.85–7.62)
NEUTS SEG NFR BLD AUTO: 65 % (ref 43–75)
NRBC BLD AUTO-RTO: 0 /100 WBCS
P AXIS: 42 DEGREES
P AXIS: 46 DEGREES
PLATELET # BLD AUTO: 245 THOUSANDS/UL (ref 149–390)
PMV BLD AUTO: 11.9 FL (ref 8.9–12.7)
POTASSIUM SERPL-SCNC: 3.6 MMOL/L (ref 3.5–5.3)
POTASSIUM SERPL-SCNC: 5.4 MMOL/L (ref 3.5–5.3)
PR INTERVAL: 130 MS
PR INTERVAL: 138 MS
PROT SERPL-MCNC: 6.7 G/DL (ref 6.4–8.2)
QRS AXIS: -32 DEGREES
QRS AXIS: -8 DEGREES
QRSD INTERVAL: 150 MS
QRSD INTERVAL: 96 MS
QT INTERVAL: 392 MS
QT INTERVAL: 484 MS
QTC INTERVAL: 533 MS
QTC INTERVAL: 547 MS
RBC # BLD AUTO: 4.41 MILLION/UL (ref 3.81–5.12)
SODIUM SERPL-SCNC: 141 MMOL/L (ref 136–145)
SODIUM SERPL-SCNC: 143 MMOL/L (ref 136–145)
T WAVE AXIS: 127 DEGREES
T WAVE AXIS: 258 DEGREES
TROPONIN I SERPL-MCNC: 0.3 NG/ML
TROPONIN I SERPL-MCNC: 0.38 NG/ML
VENTRICULAR RATE: 111 BPM
VENTRICULAR RATE: 77 BPM
WBC # BLD AUTO: 6.05 THOUSAND/UL (ref 4.31–10.16)

## 2021-07-13 PROCEDURE — 80048 BASIC METABOLIC PNL TOTAL CA: CPT | Performed by: STUDENT IN AN ORGANIZED HEALTH CARE EDUCATION/TRAINING PROGRAM

## 2021-07-13 PROCEDURE — 84484 ASSAY OF TROPONIN QUANT: CPT | Performed by: INTERNAL MEDICINE

## 2021-07-13 PROCEDURE — 93005 ELECTROCARDIOGRAM TRACING: CPT

## 2021-07-13 PROCEDURE — 84484 ASSAY OF TROPONIN QUANT: CPT | Performed by: STUDENT IN AN ORGANIZED HEALTH CARE EDUCATION/TRAINING PROGRAM

## 2021-07-13 PROCEDURE — 80053 COMPREHEN METABOLIC PANEL: CPT | Performed by: PHYSICIAN ASSISTANT

## 2021-07-13 PROCEDURE — 93010 ELECTROCARDIOGRAM REPORT: CPT | Performed by: INTERNAL MEDICINE

## 2021-07-13 PROCEDURE — 82330 ASSAY OF CALCIUM: CPT | Performed by: PHYSICIAN ASSISTANT

## 2021-07-13 PROCEDURE — 85025 COMPLETE CBC W/AUTO DIFF WBC: CPT | Performed by: STUDENT IN AN ORGANIZED HEALTH CARE EDUCATION/TRAINING PROGRAM

## 2021-07-13 PROCEDURE — 99231 SBSQ HOSP IP/OBS SF/LOW 25: CPT | Performed by: INTERNAL MEDICINE

## 2021-07-13 PROCEDURE — 85730 THROMBOPLASTIN TIME PARTIAL: CPT | Performed by: STUDENT IN AN ORGANIZED HEALTH CARE EDUCATION/TRAINING PROGRAM

## 2021-07-13 PROCEDURE — 99232 SBSQ HOSP IP/OBS MODERATE 35: CPT | Performed by: STUDENT IN AN ORGANIZED HEALTH CARE EDUCATION/TRAINING PROGRAM

## 2021-07-13 RX ORDER — LISINOPRIL 5 MG/1
5 TABLET ORAL DAILY
Status: DISCONTINUED | OUTPATIENT
Start: 2021-07-14 | End: 2021-07-13

## 2021-07-13 RX ORDER — LEVOTHYROXINE SODIUM 0.1 MG/1
100 TABLET ORAL
Status: DISCONTINUED | OUTPATIENT
Start: 2021-07-14 | End: 2021-07-17 | Stop reason: HOSPADM

## 2021-07-13 RX ORDER — LISINOPRIL 5 MG/1
2.5 TABLET ORAL DAILY
Status: DISCONTINUED | OUTPATIENT
Start: 2021-07-14 | End: 2021-07-14

## 2021-07-13 RX ORDER — FUROSEMIDE 40 MG/1
40 TABLET ORAL DAILY
Status: DISCONTINUED | OUTPATIENT
Start: 2021-07-14 | End: 2021-07-14

## 2021-07-13 RX ADMIN — ATORVASTATIN CALCIUM 20 MG: 20 TABLET, FILM COATED ORAL at 08:00

## 2021-07-13 RX ADMIN — METOPROLOL SUCCINATE 12.5 MG: 25 TABLET, EXTENDED RELEASE ORAL at 07:54

## 2021-07-13 RX ADMIN — METOPROLOL SUCCINATE 12.5 MG: 25 TABLET, EXTENDED RELEASE ORAL at 20:57

## 2021-07-13 RX ADMIN — FUROSEMIDE 20 MG: 10 INJECTION, SOLUTION INTRAMUSCULAR; INTRAVENOUS at 07:49

## 2021-07-13 RX ADMIN — FUROSEMIDE 20 MG: 10 INJECTION, SOLUTION INTRAMUSCULAR; INTRAVENOUS at 16:56

## 2021-07-13 RX ADMIN — MELATONIN 6 MG: at 21:39

## 2021-07-13 RX ADMIN — LEVOTHYROXINE SODIUM 150 MCG: 75 TABLET ORAL at 06:15

## 2021-07-13 RX ADMIN — ASPIRIN 81 MG CHEWABLE TABLET 81 MG: 81 TABLET CHEWABLE at 07:54

## 2021-07-13 NOTE — PROGRESS NOTES
CARDIOLOGY INPATIENT FOLLOW-UP PROGRESS NOTE  *-*-*-*-*-*-*-*-*-*-*-*-*-*-*-*-*-*-*-*-*-*-*-*-*-*-*-*-*-*-*-*-*-*-*-*-*-*-*-*-*-*-*-*-*-*-*-*-*-*-*-*-*-*-  Zarinadamir Steffi DATE: 07/13/21 5:27 PM   AUTHOR: Donald Botello MD  PATIENT: Yasmany Deshpande   1965    [id number]   29 y o    female  INPATIENT HOSPITALIST PHYSICIAN: Francine Escalona*  DATE OF ADMISSION: 7/11/2021  3:16 AM; LENGTH OF STAY: 2 days  *-*-*-*-*-*-*-*-*-*-*-*-*-*-*-*-*-*-*-*-*-*-*-*-*-*-*-*-*-*-*-*-*-*-*-*-*-*-*-*-*-*-*-*-*-*-*-*-*-*-*-*-*-*-    CARDIOLOGY ASSESSMENT  1  Decompensated acute on chronic systolic and diastolic heart failure  2  Coronary artery disease with angina, history of PCI of LAD June 2020  3  Persistent sinus tachycardia  4  Non MI troponin elevation  5  Stage 4 chronic kidney disease  6  Left bundle-branch block  7  Hypothyroidism  8  History of lacunar cerebral infarctions    Patient is clinically and feeling better  On exam no evidence of pulmonary or peripheral vascular congestion at this time  Renal function and electrolytes stable  Troponin remains flat at 0 38  Patient Active Problem List   Diagnosis    Acute systolic congestive heart failure (HCC)    Postoperative hypothyroidism    [facility]) [facility] [address] [facility]    Cardiomyopathy ([facility] [address] )    LBBB (left bundle branch block)    Anemia    Prediabetes    s/p ORIF of left distal radius    Acute myopericarditis    CAD (coronary artery disease)    Chronic systolic (congestive) heart failure (HCC)    Iron deficiency anemia    Syncope    Elevated d-dimer    Hypocalcemia    Decreased GFR    Hypoparathyroidism (HCC)    Abnormal CT of brain    Ischemic brain damage    Multiple lacunar infarcts (HCC)    Hyponatremia    Hypokalemia    Chest pain        PLAN:  --   Will transition to furosemide  P  o  40 mg daily following nuclear stress test tomorrow  --    Add ACE inhibitor therapy with enalapril 5 mg daily tomorrow     Will continue metoprolol succinate at current dose  Hope to up titrated if blood pressure would allow  --   Continue aspirin therapy and statin therapy  Recheck lipids were tomorrow's labs  May have to increase statin therapy prior to discharge  --  Anticipated discharge  Tomorrow if nuclear stress test does not identify significant ischemia  *-*-*-*-*-*-*-*-*-*-*-*-*-*-*-*-*-*-*-*-*-*-*-*-*-*-*-*-*-*-*-*-*-*-*-*-*-*-*-*-*-*-*-*-*-*-*-*-*-*-*-*-*-*-  INTERVAL CHANGES / HISTORY OF PRESENT ILLNESS:  No acute events overnight  Patient reports feeling better with respect to shortness of breath and chest pain  Reports that she feels much better with respect to her overall feeling  Has had no recurrence of chest pain    *-*-*-*-*-*-*-*-*-*-*-*-*-*-*-*-*-*-*-*-*-*-*-*-*-*-*-*-*-*-*-*-*-*-*-*-*-*-*-*-*-*-*-*-*-*-*-*-*-*-*-*-*-*  REVIEW OF SYMPTOMS:    Positive for:  Improved shortness of breath and chest pain  Negative for: All remaining as reviewed below and in HPI   SYSTEM SYMPTOMS REVIEWED:  General--weight change, fever, night sweats  Respiratoryl-- Wheezing, shortness of breath, cough, URI symptoms, sputum, blood  Cardiovascular--chest pain, syncope, dyspnea on exertion, edema, decline in exercise tolerance, claudication   Gastrointestinal--persistent vomiting, diarrhea, abdominal distention, blood in stool   Muscular or skeletal--joint pain or swelling   Neurologic--headaches, syncope, abnormal movement  Hematologic--history of easy bruising and bleeding   Endocrine--thyroid enlargement, heat or cold intolerance, polyuria   Psychiatric--anxiety, depression      *-*-*-*-*-*-*-*-*-*-*-*-*-*-*-*-*-*-*-*-*-*-*-*-*-*-*-*-*-*-*-*-*-*-*-*-*-*-*-*-*-*-*-*-*-*-*-*-*-*-*-*-*-*-  VITAL SIGNS:  Vitals:    07/13/21 0758 07/13/21 1100 07/13/21 1500 07/13/21 1653   BP: 120/73 129/75 107/66 118/70   BP Location: Right arm Right arm Left arm Right arm   Pulse: 76 82 85 86   Resp:  18 18    Temp:  97 8 °F (36 6 °C) 98 1 °F (36 7 °C)    TempSrc: Temporal Temporal    SpO2:  98% 93% 99%   Weight:       Height:          Temp (24hrs), Av 6 °F (36 4 °C), Min:96 7 °F (35 9 °C), Max:98 1 °F (36 7 °C)  Current: Temperature: 98 1 °F (36 7 °C)    Weight (last 2 days)     Date/Time   Weight    21 0106   70 2 (154 76)    21 0317   70 2 (154 76)            Body mass index is 25 75 kg/m²  Wt Readings from Last 3 Encounters:   21 70 2 kg (154 lb 12 2 oz)   21 64 4 kg (142 lb)   21 64 4 kg (142 lb)      Intake/Output Summary (Last 24 hours) at 2021 1727  Last data filed at 2021 0900  Gross per 24 hour   Intake 300 ml   Output --   Net 300 ml        *-*-*-*-*-*-*-*-*-*-*-*-*-*-*-*-*-*-*-*-*-*-*-*-*-*-*-*-*-*-*-*-*-*-*-*-*-*-*-*-*-*-*-*-*-*-*-*-*-*-*-*-*-*-  PHYSICAL EXAM:  General Appearance:    Alert, cooperative, no distress, appears stated age, normal built   Head, Eyes, ENT:    No obvious abnormality, moist mucous mebranes  Neck:   Supple, no carotid bruit  Has mild jugular venous distension in lower 1/3 of neck   Back:     Symmetric, some age-related kyphosis noted  Lungs:     Respirations unlabored  Clear to auscultation bilaterally,    Chest wall:    No tenderness or deformity   Heart:    Regular rate and rhythm, S1 and S2 normal, no murmur, rub  or gallop  Abdomen:     Soft, non-tender, No obvious masses, or organomegaly   Extremities:   Extremities warm, no cyanosis or edema    Skin:   Skin color, texture, turgor normal, no rashes or lesions     *-*-*-*-*-*-*-*-*-*-*-*-*-*-*-*-*-*-*-*-*-*-*-*-*-*-*-*-*-*-*-*-*-*-*-*-*-*-*-*-*-*-*-*-*-*-*-*-*-*-*-*-*-*-  TELEMETRY, LAST ECG:  Telemetry reviewed    Sinus rhythm with heart rate in 90s, left bundle-branch block, atrial enlargement   Results for orders placed or performed during the hospital encounter of 21   ECG 12 lead   Result Value    Ventricular Rate 101    Atrial Rate 101    MO Interval 140    QRSD Interval 158    QT Interval 444    QTC Interval 575    P Axis 44    QRS Axis -52    T Wave Axis 104    Narrative    Sinus tachycardia  Possible Left atrial enlargement  Left axis deviation  Left bundle branch block  Abnormal ECG  When compared with ECG of 11-JUL-2021 03:50, (unconfirmed)  No significant change was found  Confirmed by Owen Colorado ([id number]) on 7/11/2021 7:00:34 AM      *-*-*-*-*-*-*-*-*-*-*-*-*-*-*-*-*-*-*-*-*-*-*-*-*-*-*-*-*-*-*-*-*-*-*-*-*-*-*-*-*-*-*-*-*-*-*-*-*-*-*-*-*-*-    CURRENT SCHEDULED MEDICATIONS:    Current Facility-Administered Medications:     acetaminophen (TYLENOL) tablet 650 mg, 650 mg, Oral, Q4H PRN, DELORES Irvin    aspirin chewable tablet 81 mg, 81 mg, Oral, Daily, DELORES Irvin, 81 mg at 07/13/21 0754    atorvastatin (LIPITOR) tablet 20 mg, 20 mg, Oral, Daily, Frances Gautam MD, 20 mg at 07/13/21 0800    furosemide (LASIX) injection 20 mg, 20 mg, Intravenous, BID (diuretic), Jaun Ogden MD, 20 mg at 07/13/21 1656    [START ON 7/14/2021] levothyroxine tablet 100 mcg, 100 mcg, Oral, Early Morning, Frances Gautam MD    melatonin tablet 6 mg, 6 mg, Oral, HS, DELORES Hoover, 6 mg at 07/12/21 2140    metoprolol succinate (TOPROL-XL) 24 hr tablet 12 5 mg, 12 5 mg, Oral, BID, DELORES Irvin, 12 5 mg at 07/13/21 9387 ALLERGIES:  Allergies   Allergen Reactions    Penicillins Rash        *-*-*-*-*-*-*-*-*-*-*-*-*-*-*-*-*-*-*-*-*-*-*-*-*-*-*-*-*-*-*-*-*-*-*-*-*-*-*-*-*-*-*-*-*-*-*-*-*-*-*-*-*-*  LABORATORY DATA:  I have personally reviewed pertinent labs      CMP:  Results from last 7 days   Lab Units 07/13/21  0625 07/13/21  0436 07/12/21  0637 07/11/21  0327   SODIUM mmol/L 141 143 141 119*   POTASSIUM mmol/L 3 6 5 4* 3 8 2 3*   CHLORIDE mmol/L 101 97* 105 87*   CO2 mmol/L 30 26 26 23   BUN mg/dL 29* 29* 22 23   CREATININE mg/dL 1 46* 1 52* 1 52* 2 13*   CALCIUM mg/dL 6 8* <5 0* 7 0* 6 4*   ALK PHOS U/L 120*  --   --  126*   ALT U/L 37  --   --  34   AST U/L 39  --   --  31        Results from last 7 days   Lab Units 21  0754 21  0435   MAGNESIUM mg/dL 1 9 1 8     Results from last 7 days   Lab Units 21  0754   PHOSPHORUS mg/dL 3 8    Cardiac Profile:   Results from last 7 days   Lab Units 21  0845 21  0415 21  1033 21  0327   TROPONIN I ng/mL 0 38* 0 30* 0 46* 0 24*   NT-PRO BNP pg/mL  --   --   --  16,077*                CBC:   Results from last 7 days   Lab Units 21  0436 21  0327   WBC Thousand/uL 6 05 7 43   HEMOGLOBIN g/dL 11 5 11 5   HEMATOCRIT % 36 6 37 8   PLATELETS Thousands/uL 245 259     PT/INR: No results found for: PT, INR, Microbiology:          *-*-*-*-*-*-*-*-*-*-*-*-*-*-*-*-*-*-*-*-*-*-*-*-*-*-*-*-*-*-*-*-*-*-*-*-*-*-*-*-*-*-*-*-*-*-*-*-*-*-*-*-*-*-  IMAGING STUDIES REPORTS: Imaging studies results reviewed    No valid procedures specified  No Chest XR results available for this patient  *-*-*-*-*-*-*-*-*-*-*-*-*-*-*-*-*-*-*-*-*-*-*-*-*-*-*-*-*-*-*-*-*-*-*-*-*-*-*-*-*-*-*-*-*-*-*-*-*-*-*-*-*-*-  ECHOCARDIOGRAM AND OTHER CARDIAC TESTS RESULTS:  Results for orders placed during the hospital encounter of 20    Echo complete with contrast if indicated    Narrative  [address] [facility]  [place], [address]  [phone]    Transthoracic Echocardiogram  2D, M-mode, Doppler, and Color Doppler    Study date:  2020    Patient: Enid Evans  MR number: [id number]  Account number: [de-identified]  : 1965  Age: 48 years  Gender: Female  Status: Inpatient  Location: Bedside  Height: 65 in  Weight: 145 6 lb  BP: 130/ 109 mmHg    Indications: Heart failure  New bundle branch block  Diagnoses: I50 9 - Heart failure, unspecified    Sonographer:  CARLTON Thomas  Primary Physician:  Carson Nieves MD  Referring Physician:  Darron Hansen DO  Group:  [facility]  Interpreting Physician:  Darron Hansen DO    SUMMARY    SUMMARY:  This is a technically adequate study  1   Left ventricle is moderately dilated with severely reduced systolic function  Left ventricular ejection fraction is estimated at 29%  2  Mild concentric left ventricular hypertrophy  3  Grade 1 diastolic dysfunction is present  4  Severe global hypokinesis with wall motion consistent with conduction abnormality  5  Left atrium is moderately dilated and right atrium is mildly dilated  6  Aortic valve sclerotic with adequate separation  Mild aortic regurgitation  7  Mild mitral annular calcification with mild mitral regurgitation  8  Pulmonary artery systolic pressures cannot be estimated due to lack of tricuspid regurgitation jet  9  Small pericardial effusion without echocardiographic indications of tamponade physiology    SUMMARY MEASUREMENTS  2D measurements:  Unspecified Anatomy:   %FS was 10 7 %  EDV(Teich) was 171 6 ml   EF Biplane was 28 7 %  EF(Teich) was 23 %  ESV(Teich) was 132 1 ml  IVSd was 1 cm  LAAs A2C was 24 2 cm2  LAAs A4C was 21 cm2  LAESV A-L A2C was 90 3 ml  LAESV A-L  A4C was 63 ml  LAESV Index (A-L) was 45 4 ml/m2  LAESV MOD A2C was 87 5 ml  LAESV MOD A4C was 58 1 ml  LAESV(A-L) was 78 5 ml  LAESV(MOD BP) was 74 ml  LAESVInd MOD BP was 42 8 ml/m2  LALs A2C was 5 5 cm  LALs A4C was 6 cm  LVEDV  MOD A2C was 155 1 ml  LVEDV MOD A4C was 149 9 ml  LVEDV MOD BP was 153 3 ml  LVEDVInd MOD BP was 88 6 ml/m2  LVEF MOD A2C was 30 6 %  LVEF MOD A4C was 27 9 %  LVESV MOD A2C was 107 7 ml  LVESV MOD A4C was 108 ml  LVESV MOD BP was  109 3 ml  LVESVInd MOD BP was 63 2 ml/m2  LVIDd was 5 9 cm  LVIDs was 5 2 cm  LVLd A2C was 9 7 cm  LVLd A4C was 9 6 cm  LVLs A2C was 9 2 cm  LVLs A4C was 8 9 cm  LVPWd was 1 2 cm  Lorrie A4C was 16 4 cm2  RAEDV A-L was 47 5 ml  RAEDV MOD was 46 7 ml  RALd was 4 8 cm  RVIDd was 3 8 cm  RWT was 0 4   SV MOD A2C was 47 4 ml   SV MOD A4C was 41 8 ml   SV(Teich) was 39 5 ml   CW measurements:  Unspecified Anatomy:   AV Env  Ti was 234 4 ms   AV VTI was 27 4 cm  AV Vmax was 1 5 m/s  AV Vmean was 1 2 m/s  AV maxPG was 8 5 mmHg  AV meanPG was 5 9 mmHg  MM measurements:  Unspecified Anatomy:   TAPSE was 3 3 cm  PW measurements:  Unspecified Anatomy:   DVI was 0 6   LVOT Env  Ti was 238 5 ms  LVOT VTI was 15 5 cm  LVOT Vmax was 0 9 m/s  LVOT Vmean was 0 7 m/s  LVOT maxPG was 3 3 mmHg  LVOT meanPG was 1 9 mmHg  HISTORY: PRIOR HISTORY: hypothyroid  PROCEDURE: The procedure was performed at the bedside  This was a routine study  The transthoracic approach was used  The study included complete 2D imaging, M-mode, complete spectral Doppler, and color Doppler  The heart rate was 108 bpm,  at the start of the study  Image quality was adequate  LEFT VENTRICLE: Left ventricle is moderately dilated with severely reduced systolic function  Left ventricular ejection fraction is estimated at 29%  There is mild concentric left ventricular hypertrophy  Grade 1 diastolic dysfunction  There is severe global hypokinesis with wall motion consistent with conduction abnormality  RIGHT VENTRICLE: Right ventricle is normal size and function  LEFT ATRIUM: Left atrium is moderately dilated  ATRIAL SEPTUM: Interatrial septum is bowing toward the right atrium consistent with elevated left atrial pressures  RIGHT ATRIUM: Right atrium is mildly dilated  MITRAL VALVE: Mitral valve opens well  There is mild mitral annular calcification posteriorly  Mild mitral regurgitation  AORTIC VALVE: Aortic valve is minimally sclerotic with adequate separation  There is no evidence of aortic stenosis  Mild aortic regurgitation  LVOT diameter 2 cm, LVOT VTI 15 5, stroke volume 48 9 mL    TRICUSPID VALVE: Tricuspid valve opens well  There is no evidence of tricuspid regurgitation  Pulmonary artery systolic pressures cannot be estimated due to lack of tricuspid regurgitation jet  PULMONIC VALVE: Pulmonic valve not seen on the study      PERICARDIUM: There is a small pericardial effusion without echocardiographic indications of tamponade physiology  AORTA: The aortic root is normal in size at 3 3 cm  SYSTEMIC VEINS: IVC: The IVC is normal size with collapse  SYSTEM MEASUREMENT TABLES    2D  %FS: 10 7 %  EDV(Teich): 171 6 ml  EF Biplane: 28 7 %  EF(Teich): 23 %  ESV(Teich): 132 1 ml  IVSd: 1 cm  LAAs A2C: 24 2 cm2  LAAs A4C: 21 cm2  LAESV A-L A2C: 90 3 ml  LAESV A-L A4C: 63 ml  LAESV Index (A-L): 45 4 ml/m2  LAESV MOD A2C: 87 5 ml  LAESV MOD A4C: 58 1 ml  LAESV(A-L): 78 5 ml  LAESV(MOD BP): 74 ml  LAESVInd MOD BP: 42 8 ml/m2  LALs A2C: 5 5 cm  LALs A4C: 6 cm  LVEDV MOD A2C: 155 1 ml  LVEDV MOD A4C: 149 9 ml  LVEDV MOD BP: 153 3 ml  LVEDVInd MOD BP: 88 6 ml/m2  LVEF MOD A2C: 30 6 %  LVEF MOD A4C: 27 9 %  LVESV MOD A2C: 107 7 ml  LVESV MOD A4C: 108 ml  LVESV MOD BP: 109 3 ml  LVESVInd MOD BP: 63 2 ml/m2  LVIDd: 5 9 cm  LVIDs: 5 2 cm  LVLd A2C: 9 7 cm  LVLd A4C: 9 6 cm  LVLs A2C: 9 2 cm  LVLs A4C: 8 9 cm  LVPWd: 1 2 cm  Lorrie A4C: 16 4 cm2  RAEDV A-L: 47 5 ml  RAEDV MOD: 46 7 ml  RALd: 4 8 cm  RVIDd: 3 8 cm  RWT: 0 4  SV MOD A2C: 47 4 ml  SV MOD A4C: 41 8 ml  SV(Teich): 39 5 ml    CW  AV Env  Ti: 234 4 ms  AV VTI: 27 4 cm  AV Vmax: 1 5 m/s  AV Vmean: 1 2 m/s  AV maxP 5 mmHg  AV meanP 9 mmHg    MM  TAPSE: 3 3 cm    PW  DVI: 0 6  LVOT Env  Ti: 238 5 ms  LVOT VTI: 15 5 cm  LVOT Vmax: 0 9 m/s  LVOT Vmean: 0 7 m/s  LVOT maxPG: 3 3 mmHg  LVOT meanP 9 mmHg    [facility]    Prepared and electronically signed by    Lillian Miramontes DO  Signed 2020 15:04:09    No results found for this or any previous visit  No results found for this or any previous visit  No results found for this or any previous visit         *-*-*-*-*-*-*-*-*-*-*-*-*-*-*-*-*-*-*-*-*-*-*-*-*-*-*-*-*-*-*-*-*-*-*-*-*-*-*-*-*-*-*-*-*-*-*-*-*-*-*-*-*-*-  SIGNATURES:   [unfilled]   Anny Rasmussen MD

## 2021-07-13 NOTE — PROGRESS NOTES
[address] [facility]  Progress Note - Trang Jin 1965, 49 y o  female MRN: [id number]  Unit/Bed#: E2 -01 Encounter: [id number]  Primary Care Provider: Bryan Mijares PA-C   Date and time admitted to hospital: 2021  3:16 AM    * Chest pain  Assessment & Plan  Patient presented with chest pain  Troponins elevated  Heparin drip  Continue aspirin, statin  Stress test pending    Cardiomyopathy [facility])  Assessment & Plan  History of ischemic cardiomyopathy with EF 25% on last echo  Current 2D echo shows ef of 38%, no wall motion abn    MARINA (acute kidney injury) ([facility] [address] )  Assessment & Plan  Monitor BMP  Renal functions improving  Creatinine of 2 on admission, currently 1 46 today while diuresing    Acute systolic congestive heart failure (HCC)  Assessment & Plan  Wt Readings from Last 3 Encounters:   21 70 2 kg (154 lb 12 2 oz)   21 64 4 kg (142 lb)   21 64 4 kg (142 lb)     Continue IV Lasix 20 mg b i d  as per Cardiology  Cardiology following  Continue Toprol-XL  Monitor renal functions, currently improving          VTE Pharmacologic Prophylaxis:   Pharmacologic: Heparin Drip  Mechanical VTE Prophylaxis in Place: Yes    Patient Centered Rounds: I have performed bedside rounds with nursing staff today  Discussions with Specialists or Other Care Team Provider: Cardiology    Education and Discussions with Family / Patient: Patient    Time Spent for Care: 30 minutes  More than 50% of total time spent on counseling and coordination of care as described above  Current Length of Stay: 2 day(s)    Current Patient Status: Inpatient   Certification Statement: The patient will continue to require additional inpatient hospital stay due to pending stress test    Discharge Plan: Tmr    Code Status: Level 1 - Full Code      Subjective:   No events overnight  Denies any chest pains or SOB      Objective:     Vitals:   Temp (24hrs), Av 5 °F (36 4 °C), Min:96 7 °F (35 9 °C), Max:97 9 °F (36 6 °C)    Temp:  [96 7 °F (35 9 °C)-97 9 °F (36 6 °C)] 97 8 °F (36 6 °C)  HR:  [70-90] 82  Resp:  [18-20] 18  BP: (109-149)/(62-75) 129/75  SpO2:  [93 %-98 %] 98 %  Body mass index is 25 75 kg/m²  Input and Output Summary (last 24 hours): Intake/Output Summary (Last 24 hours) at 7/13/2021 1225  Last data filed at 7/13/2021 0900  Gross per 24 hour   Intake 300 ml   Output --   Net 300 ml       Physical Exam:     Physical Exam  Vitals and nursing note reviewed  Constitutional:       Appearance: She is normal weight  HENT:      Head: Normocephalic and atraumatic  Eyes:      General: No scleral icterus  Conjunctiva/sclera: Conjunctivae normal    Cardiovascular:      Rate and Rhythm: Normal rate and regular rhythm  Heart sounds: Normal heart sounds  Pulmonary:      Breath sounds: Normal breath sounds  No wheezing or rhonchi  Abdominal:      General: Bowel sounds are normal  There is no distension  Palpations: Abdomen is soft  Tenderness: There is no abdominal tenderness  Musculoskeletal:         General: No swelling  Normal range of motion  Right lower leg: No edema  Left lower leg: No edema  Skin:     General: Skin is warm and dry  Neurological:      General: No focal deficit present  Mental Status: She is alert  Mental status is at baseline             Additional Data:     Labs:    Results from last 7 days   Lab Units 07/13/21  0436   WBC Thousand/uL 6 05   HEMOGLOBIN g/dL 11 5   HEMATOCRIT % 36 6   PLATELETS Thousands/uL 245   NEUTROS PCT % 65   LYMPHS PCT % 23   MONOS PCT % 8   EOS PCT % 3     Results from last 7 days   Lab Units 07/13/21  0625   SODIUM mmol/L 141   POTASSIUM mmol/L 3 6   CHLORIDE mmol/L 101   CO2 mmol/L 30   BUN mg/dL 29*   CREATININE mg/dL 1 46*   ANION GAP mmol/L 10   CALCIUM mg/dL 6 8*   ALBUMIN g/dL 3 0*   TOTAL BILIRUBIN mg/dL 0 39   ALK PHOS U/L 120*   ALT U/L 37   AST U/L 39   GLUCOSE RANDOM mg/dL 95 Results from last 7 days   Lab Units 07/11/21  0409   INR  1 07                       * I Have Reviewed All Lab Data Listed Above  * Additional Pertinent Lab Tests Reviewed: All Labs For Current Hospital Admission Reviewed    Imaging:    XR chest 1 view portable    Result Date: 7/11/2021  Impression: Moderate congestive changes  Possible infiltrates in the lung bases  Workstation performed: [id number]       Recent Cultures (last 7 days):           Last 24 Hours Medication List:   Current Facility-Administered Medications   Medication Dose Route Frequency Provider Last Rate    acetaminophen  650 mg Oral Q4H PRN DELORES Last      aspirin  81 mg Oral Daily DELORES Last      atorvastatin  20 mg Oral Daily Hayder Victor MD      furosemide  20 mg Intravenous BID (diuretic) MD Chica Duran [facility] [START ON 7/14/2021] levothyroxine  100 mcg Oral Early Morning Hayder Victor MD      melatonin  6 mg Oral HS DELORES Huber      metoprolol succinate  12 5 mg Oral BID DELORES Last          Today, Patient Was Seen By: Hayder Victor MD    ** Please Note: Dictation voice to text software may have been used in the creation of this document   **

## 2021-07-13 NOTE — ASSESSMENT & PLAN NOTE
Low TSH, elevated T4  Will decrease levothyroxine to 100mcg, repeat TSH in 4-6 weeks for further titration if needed

## 2021-07-13 NOTE — ASSESSMENT & PLAN NOTE
History of ischemic cardiomyopathy with EF 25% on last echo  Current 2D echo shows ef of 38%, no wall motion abn

## 2021-07-13 NOTE — PLAN OF CARE
Problem: Potential for Falls  Goal: Patient will remain free of falls  Description: INTERVENTIONS:  - Educate patient/family on patient safety including physical limitations  - Instruct patient to call for assistance with activity   - Consult OT/PT to assist with strengthening/mobility   - Keep Call bell within reach  - Keep bed low and locked with side rails adjusted as appropriate  - Keep care items and personal belongings within reach  - Initiate and maintain comfort rounds  - Make Fall Risk Sign visible to staff  - Offer Toileting every 2 Hours, in advance of need  - Initiate/Maintain bed alarm  - Apply yellow socks and bracelet for high fall risk patients  - Consider moving patient to room near nurses station  Outcome: Progressing     Problem: Nutrition/Hydration-ADULT  Goal: Nutrient/Hydration intake appropriate for improving, restoring or maintaining nutritional needs  Description: Monitor and assess patient's nutrition/hydration status for malnutrition  Collaborate with interdisciplinary team and initiate plan and interventions as ordered  Monitor patient's weight and dietary intake as ordered or per policy  Utilize nutrition screening tool and intervene as necessary  Determine patient's food preferences and provide high-protein, high-caloric foods as appropriate       INTERVENTIONS:  - Monitor oral intake, urinary output, labs, and treatment plans  - Assess nutrition and hydration status and recommend course of action  - Evaluate amount of meals eaten  - Assist patient with eating if necessary   - Allow adequate time for meals  - Recommend/ encourage appropriate diets, oral nutritional supplements, and vitamin/mineral supplements  - Order, calculate, and assess calorie counts as needed  - Recommend, monitor, and adjust tube feedings and TPN/PPN based on assessed needs  - Assess need for intravenous fluids  - Provide specific nutrition/hydration education as appropriate  - Include patient/family/caregiver in decisions related to nutrition  Outcome: Progressing     Problem: CARDIOVASCULAR - ADULT  Goal: Maintains optimal cardiac output and hemodynamic stability  Description: INTERVENTIONS:  - Monitor I/O, vital signs and rhythm  - Monitor for S/S and trends of decreased cardiac output  - Administer and titrate ordered vasoactive medications to optimize hemodynamic stability  - Assess quality of pulses, skin color and temperature  - Assess for signs of decreased coronary artery perfusion  - Instruct patient to report change in severity of symptoms  Outcome: Progressing  Goal: Absence of cardiac dysrhythmias or at baseline rhythm  Description: INTERVENTIONS:  - Continuous cardiac monitoring, vital signs, obtain 12 lead EKG if ordered  - Administer antiarrhythmic and heart rate control medications as ordered  - Monitor electrolytes and administer replacement therapy as ordered  Outcome: Progressing     Problem: HEMATOLOGIC - ADULT  Goal: Maintains hematologic stability  Description: INTERVENTIONS  - Assess for signs and symptoms of bleeding or hemorrhage  - Monitor labs  - Administer supportive blood products/factors as ordered and appropriate  Outcome: Progressing

## 2021-07-13 NOTE — ASSESSMENT & PLAN NOTE
Monitor BMP  Renal functions improving  Creatinine of 2 on admission, currently 1 46 today while diuresing

## 2021-07-13 NOTE — ASSESSMENT & PLAN NOTE
Patient presented with chest pain  Troponins elevated  Heparin drip  Continue aspirin, statin  Stress test pending

## 2021-07-14 ENCOUNTER — APPOINTMENT (INPATIENT)
Dept: NUCLEAR MEDICINE | Facility: HOSPITAL | Age: 56
DRG: 246 | End: 2021-07-14
Payer: COMMERCIAL

## 2021-07-14 ENCOUNTER — APPOINTMENT (INPATIENT)
Dept: NON INVASIVE DIAGNOSTICS | Facility: HOSPITAL | Age: 56
DRG: 246 | End: 2021-07-14
Payer: COMMERCIAL

## 2021-07-14 LAB
ANION GAP SERPL CALCULATED.3IONS-SCNC: 10 MMOL/L (ref 4–13)
BASOPHILS # BLD AUTO: 0.03 THOUSANDS/ΜL (ref 0–0.1)
BASOPHILS NFR BLD AUTO: 1 % (ref 0–1)
BUN SERPL-MCNC: 28 MG/DL (ref 5–25)
CALCIUM SERPL-MCNC: 7.2 MG/DL (ref 8.3–10.1)
CHLORIDE SERPL-SCNC: 100 MMOL/L (ref 100–108)
CO2 SERPL-SCNC: 30 MMOL/L (ref 21–32)
CREAT SERPL-MCNC: 1.32 MG/DL (ref 0.6–1.3)
EOSINOPHIL # BLD AUTO: 0.17 THOUSAND/ΜL (ref 0–0.61)
EOSINOPHIL NFR BLD AUTO: 4 % (ref 0–6)
ERYTHROCYTE [DISTWIDTH] IN BLOOD BY AUTOMATED COUNT: 16.3 % (ref 11.6–15.1)
GFR SERPL CREATININE-BSD FRML MDRD: 46 ML/MIN/1.73SQ M
GLUCOSE SERPL-MCNC: 98 MG/DL (ref 65–140)
HCT VFR BLD AUTO: 41.9 % (ref 34.8–46.1)
HGB BLD-MCNC: 13 G/DL (ref 11.5–15.4)
IMM GRANULOCYTES # BLD AUTO: 0.01 THOUSAND/UL (ref 0–0.2)
IMM GRANULOCYTES NFR BLD AUTO: 0 % (ref 0–2)
LYMPHOCYTES # BLD AUTO: 1.06 THOUSANDS/ΜL (ref 0.6–4.47)
LYMPHOCYTES NFR BLD AUTO: 23 % (ref 14–44)
MCH RBC QN AUTO: 25.7 PG (ref 26.8–34.3)
MCHC RBC AUTO-ENTMCNC: 31 G/DL (ref 31.4–37.4)
MCV RBC AUTO: 83 FL (ref 82–98)
MONOCYTES # BLD AUTO: 0.4 THOUSAND/ΜL (ref 0.17–1.22)
MONOCYTES NFR BLD AUTO: 9 % (ref 4–12)
NEUTROPHILS # BLD AUTO: 2.91 THOUSANDS/ΜL (ref 1.85–7.62)
NEUTS SEG NFR BLD AUTO: 63 % (ref 43–75)
NRBC BLD AUTO-RTO: 0 /100 WBCS
PLATELET # BLD AUTO: 243 THOUSANDS/UL (ref 149–390)
PMV BLD AUTO: 11.1 FL (ref 8.9–12.7)
POTASSIUM SERPL-SCNC: 3.4 MMOL/L (ref 3.5–5.3)
RBC # BLD AUTO: 5.06 MILLION/UL (ref 3.81–5.12)
SODIUM SERPL-SCNC: 140 MMOL/L (ref 136–145)
WBC # BLD AUTO: 4.58 THOUSAND/UL (ref 4.31–10.16)

## 2021-07-14 PROCEDURE — 99232 SBSQ HOSP IP/OBS MODERATE 35: CPT | Performed by: INTERNAL MEDICINE

## 2021-07-14 PROCEDURE — A9502 TC99M TETROFOSMIN: HCPCS

## 2021-07-14 PROCEDURE — 93017 CV STRESS TEST TRACING ONLY: CPT

## 2021-07-14 PROCEDURE — 78452 HT MUSCLE IMAGE SPECT MULT: CPT | Performed by: INTERNAL MEDICINE

## 2021-07-14 PROCEDURE — 78452 HT MUSCLE IMAGE SPECT MULT: CPT

## 2021-07-14 PROCEDURE — 80048 BASIC METABOLIC PNL TOTAL CA: CPT | Performed by: STUDENT IN AN ORGANIZED HEALTH CARE EDUCATION/TRAINING PROGRAM

## 2021-07-14 PROCEDURE — 99232 SBSQ HOSP IP/OBS MODERATE 35: CPT | Performed by: STUDENT IN AN ORGANIZED HEALTH CARE EDUCATION/TRAINING PROGRAM

## 2021-07-14 PROCEDURE — 93016 CV STRESS TEST SUPVJ ONLY: CPT | Performed by: INTERNAL MEDICINE

## 2021-07-14 PROCEDURE — G1004 CDSM NDSC: HCPCS

## 2021-07-14 PROCEDURE — 85025 COMPLETE CBC W/AUTO DIFF WBC: CPT | Performed by: STUDENT IN AN ORGANIZED HEALTH CARE EDUCATION/TRAINING PROGRAM

## 2021-07-14 PROCEDURE — 93018 CV STRESS TEST I&R ONLY: CPT | Performed by: INTERNAL MEDICINE

## 2021-07-14 RX ORDER — ATORVASTATIN CALCIUM 80 MG/1
80 TABLET, FILM COATED ORAL
Status: DISCONTINUED | OUTPATIENT
Start: 2021-07-15 | End: 2021-07-17 | Stop reason: HOSPADM

## 2021-07-14 RX ORDER — POTASSIUM CHLORIDE 20 MEQ/1
40 TABLET, EXTENDED RELEASE ORAL ONCE
Status: COMPLETED | OUTPATIENT
Start: 2021-07-14 | End: 2021-07-14

## 2021-07-14 RX ORDER — SODIUM CHLORIDE 9 MG/ML
50 INJECTION, SOLUTION INTRAVENOUS ONCE
Status: COMPLETED | OUTPATIENT
Start: 2021-07-15 | End: 2021-07-15

## 2021-07-14 RX ADMIN — SODIUM CHLORIDE 50 ML/HR: 0.9 INJECTION, SOLUTION INTRAVENOUS at 23:26

## 2021-07-14 RX ADMIN — LISINOPRIL 2.5 MG: 5 TABLET ORAL at 11:22

## 2021-07-14 RX ADMIN — METOPROLOL SUCCINATE 12.5 MG: 25 TABLET, EXTENDED RELEASE ORAL at 23:12

## 2021-07-14 RX ADMIN — LEVOTHYROXINE SODIUM 100 MCG: 100 TABLET ORAL at 05:37

## 2021-07-14 RX ADMIN — ATORVASTATIN CALCIUM 20 MG: 20 TABLET, FILM COATED ORAL at 11:18

## 2021-07-14 RX ADMIN — POTASSIUM CHLORIDE 40 MEQ: 1500 TABLET, EXTENDED RELEASE ORAL at 11:18

## 2021-07-14 RX ADMIN — METOPROLOL SUCCINATE 12.5 MG: 25 TABLET, EXTENDED RELEASE ORAL at 11:22

## 2021-07-14 RX ADMIN — FUROSEMIDE 40 MG: 40 TABLET ORAL at 11:22

## 2021-07-14 RX ADMIN — ASPIRIN 81 MG CHEWABLE TABLET 81 MG: 81 TABLET CHEWABLE at 11:18

## 2021-07-14 RX ADMIN — REGADENOSON 0.4 MG: 0.08 INJECTION, SOLUTION INTRAVENOUS at 10:00

## 2021-07-14 RX ADMIN — MELATONIN 6 MG: at 23:13

## 2021-07-14 RX ADMIN — ENOXAPARIN SODIUM 40 MG: 40 INJECTION SUBCUTANEOUS at 16:34

## 2021-07-14 NOTE — ASSESSMENT & PLAN NOTE
Wt Readings from Last 3 Encounters:   07/12/21 70 2 kg (154 lb 12 2 oz)   05/19/21 64 4 kg (142 lb)   05/07/21 64 4 kg (142 lb)     Discontinue IV Lasix  Start p o   Lasix 40 mg daily  Continue Toprol-XL  Monitor renal functions, currently improving  Appreciate cardiology evaluation

## 2021-07-14 NOTE — ASSESSMENT & PLAN NOTE
Patient presented with chest pain, now resolved  Troponins elevated  Heparin drip discontinued  Continue aspirin, statin  Stress test results pending  Cardiology following

## 2021-07-14 NOTE — PROGRESS NOTES
[facility]  Progress Note - [facility] 1965, 49 y o  female MRN: [id number]  Unit/Bed#: E2 -01 Encounter: [id number]  Primary Care Provider: Jamal Urrutia PA-C   Date and time admitted to hospital: 7/11/2021  3:16 AM    * Chest pain  Assessment & Plan  Patient presented with chest pain, now resolved  Troponins elevated  Heparin drip discontinued  Continue aspirin, statin  Stress test results pending  Cardiology following    Cardiomyopathy [facility])  Assessment & Plan  History of ischemic cardiomyopathy with EF 25% on last echo  Current 2D echo shows ef of 38%, no wall motion abn    MARINA (acute kidney injury) ([facility] [address] )  Assessment & Plan  Monitor BMP  Continued to have some improvement with creatinine 1 32 today    Postoperative hypothyroidism  Assessment & Plan  Low TSH, elevated T4  Will decrease levothyroxine to 100mcg, repeat TSH in 4-6 weeks for further titration if needed    Acute systolic congestive heart failure (HCC)  Assessment & Plan  Wt Readings from Last 3 Encounters:   07/12/21 70 2 kg (154 lb 12 2 oz)   05/19/21 64 4 kg (142 lb)   05/07/21 64 4 kg (142 lb)     Discontinue IV Lasix  Start p o  Lasix 40 mg daily  Continue Toprol-XL  Monitor renal functions, currently improving  Appreciate cardiology evaluation        VTE Pharmacologic Prophylaxis:   Pharmacologic: Enoxaparin (Lovenox)  Mechanical VTE Prophylaxis in Place: Yes    Patient Centered Rounds: I have performed bedside rounds with nursing staff today  Discussions with Specialists or Other Care Team Provider: Cardiology    Education and Discussions with Family / Patient: Patient    Time Spent for Care: 30 minutes  More than 50% of total time spent on counseling and coordination of care as described above      Current Length of Stay: 3 day(s)    Current Patient Status: Inpatient   Certification Statement: The patient will continue to require additional inpatient hospital stay due to pending stress test    Discharge Plan: Tomorrow    Code Status: Level 1 - Full Code      Subjective: Tolerated stress test well today  Had some chest discomfort during test  No complaints at this time  Objective:     Vitals:   Temp (24hrs), Av 7 °F (36 5 °C), Min:97 5 °F (36 4 °C), Max:97 8 °F (36 6 °C)    Temp:  [97 5 °F (36 4 °C)-97 8 °F (36 6 °C)] 97 5 °F (36 4 °C)  HR:  [] 100  Resp:  [18-20] 18  BP: ()/(70-80) 116/75  SpO2:  [95 %-99 %] 95 %  Body mass index is 25 75 kg/m²  Input and Output Summary (last 24 hours):     No intake or output data in the 24 hours ending 21 1550    Physical Exam:     Physical Exam  Vitals and nursing note reviewed  Constitutional:       Appearance: She is normal weight  HENT:      Head: Normocephalic and atraumatic  Eyes:      General: No scleral icterus  Conjunctiva/sclera: Conjunctivae normal    Cardiovascular:      Rate and Rhythm: Normal rate and regular rhythm  Heart sounds: Normal heart sounds  Pulmonary:      Breath sounds: Normal breath sounds  No wheezing or rhonchi  Abdominal:      General: Bowel sounds are normal  There is no distension  Palpations: Abdomen is soft  Tenderness: There is no abdominal tenderness  Musculoskeletal:         General: No swelling  Normal range of motion  Right lower leg: No edema  Left lower leg: No edema  Skin:     General: Skin is warm and dry  Neurological:      General: No focal deficit present  Mental Status: She is alert  Mental status is at baseline             Additional Data:     Labs:    Results from last 7 days   Lab Units 21  0430   WBC Thousand/uL 4 58   HEMOGLOBIN g/dL 13 0   HEMATOCRIT % 41 9   PLATELETS Thousands/uL 243   NEUTROS PCT % 63   LYMPHS PCT % 23   MONOS PCT % 9   EOS PCT % 4     Results from last 7 days   Lab Units 21  0430 21  0625   SODIUM mmol/L 140 141   POTASSIUM mmol/L 3 4* 3 6   CHLORIDE mmol/L 100 101   CO2 mmol/L 30 30   BUN mg/dL 28* 29*   CREATININE mg/dL 1 32* 1 46*   ANION GAP mmol/L 10 10   CALCIUM mg/dL 7 2* 6 8*   ALBUMIN g/dL  --  3 0*   TOTAL BILIRUBIN mg/dL  --  0 39   ALK PHOS U/L  --  120*   ALT U/L  --  37   AST U/L  --  39   GLUCOSE RANDOM mg/dL 98 95     Results from last 7 days   Lab Units 07/11/21  0409   INR  1 07                       * I Have Reviewed All Lab Data Listed Above  * Additional Pertinent Lab Tests Reviewed: Carlos Eduardo 68 Admission Reviewed    Imaging:    None    Recent Cultures (last 7 days):           Last 24 Hours Medication List:   Current Facility-Administered Medications   Medication Dose Route Frequency Provider Last Rate    acetaminophen  650 mg Oral Q4H PRN DELORES Wolfe      aspirin  81 mg Oral Daily DELORES Wolfe      atorvastatin  20 mg Oral Daily Cecilia Edouard MD      furosemide  40 mg Oral Daily Jaun Ogden MD      levothyroxine  100 mcg Oral Early Morning Cecilia Edouard MD      lisinopril  2 5 mg Oral Daily Jaun Ogden MD      melatonin  6 mg Oral HS DELORES Loomis      metoprolol succinate  12 5 mg Oral BID DELORES Wolfe          Today, Patient Was Seen By: Cecilia Edouard MD    ** Please Note: Dictation voice to text software may have been used in the creation of this document   **

## 2021-07-15 ENCOUNTER — APPOINTMENT (INPATIENT)
Dept: NON INVASIVE DIAGNOSTICS | Facility: HOSPITAL | Age: 56
DRG: 246 | End: 2021-07-15
Attending: INTERNAL MEDICINE
Payer: COMMERCIAL

## 2021-07-15 LAB
ANION GAP SERPL CALCULATED.3IONS-SCNC: 4 MMOL/L (ref 4–13)
BUN SERPL-MCNC: 29 MG/DL (ref 5–25)
CALCIUM SERPL-MCNC: 7.2 MG/DL (ref 8.3–10.1)
CHEST PAIN STATEMENT: NORMAL
CHLORIDE SERPL-SCNC: 104 MMOL/L (ref 100–108)
CHOLEST SERPL-MCNC: 167 MG/DL (ref 50–200)
CO2 SERPL-SCNC: 32 MMOL/L (ref 21–32)
CREAT SERPL-MCNC: 1.21 MG/DL (ref 0.6–1.3)
GFR SERPL CREATININE-BSD FRML MDRD: 51 ML/MIN/1.73SQ M
GLUCOSE SERPL-MCNC: 102 MG/DL (ref 65–140)
HDLC SERPL-MCNC: 61 MG/DL
KCT BLD-ACNC: 261 SEC (ref 89–137)
LDLC SERPL CALC-MCNC: 87 MG/DL (ref 0–100)
MAX DIASTOLIC BP: 70 MMHG
MAX HEART RATE: 129 BPM
MAX PREDICTED HEART RATE: 166 BPM
MAX. SYSTOLIC BP: 112 MMHG
POTASSIUM SERPL-SCNC: 4.6 MMOL/L (ref 3.5–5.3)
PROTOCOL NAME: NORMAL
REASON FOR TERMINATION: NORMAL
SODIUM SERPL-SCNC: 140 MMOL/L (ref 136–145)
SPECIMEN SOURCE: ABNORMAL
TARGET HR FORMULA: NORMAL
TIME IN EXERCISE PHASE: NORMAL
TRIGL SERPL-MCNC: 93 MG/DL

## 2021-07-15 PROCEDURE — 85347 COAGULATION TIME ACTIVATED: CPT

## 2021-07-15 PROCEDURE — C1894 INTRO/SHEATH, NON-LASER: HCPCS | Performed by: INTERNAL MEDICINE

## 2021-07-15 PROCEDURE — 99152 MOD SED SAME PHYS/QHP 5/>YRS: CPT | Performed by: INTERNAL MEDICINE

## 2021-07-15 PROCEDURE — 92928 PRQ TCAT PLMT NTRAC ST 1 LES: CPT | Performed by: INTERNAL MEDICINE

## 2021-07-15 PROCEDURE — 99232 SBSQ HOSP IP/OBS MODERATE 35: CPT | Performed by: INTERNAL MEDICINE

## 2021-07-15 PROCEDURE — 99153 MOD SED SAME PHYS/QHP EA: CPT | Performed by: INTERNAL MEDICINE

## 2021-07-15 PROCEDURE — C1887 CATHETER, GUIDING: HCPCS | Performed by: INTERNAL MEDICINE

## 2021-07-15 PROCEDURE — C9600 PERC DRUG-EL COR STENT SING: HCPCS | Performed by: INTERNAL MEDICINE

## 2021-07-15 PROCEDURE — C1725 CATH, TRANSLUMIN NON-LASER: HCPCS | Performed by: INTERNAL MEDICINE

## 2021-07-15 PROCEDURE — C1874 STENT, COATED/COV W/DEL SYS: HCPCS

## 2021-07-15 PROCEDURE — C1769 GUIDE WIRE: HCPCS | Performed by: INTERNAL MEDICINE

## 2021-07-15 PROCEDURE — 93458 L HRT ARTERY/VENTRICLE ANGIO: CPT | Performed by: INTERNAL MEDICINE

## 2021-07-15 PROCEDURE — 80061 LIPID PANEL: CPT | Performed by: INTERNAL MEDICINE

## 2021-07-15 PROCEDURE — 99232 SBSQ HOSP IP/OBS MODERATE 35: CPT | Performed by: STUDENT IN AN ORGANIZED HEALTH CARE EDUCATION/TRAINING PROGRAM

## 2021-07-15 PROCEDURE — 80048 BASIC METABOLIC PNL TOTAL CA: CPT | Performed by: INTERNAL MEDICINE

## 2021-07-15 RX ORDER — ASPIRIN 81 MG/1
TABLET, CHEWABLE ORAL CODE/TRAUMA/SEDATION MEDICATION
Status: COMPLETED | OUTPATIENT
Start: 2021-07-15 | End: 2021-07-15

## 2021-07-15 RX ORDER — SODIUM CHLORIDE 9 MG/ML
INJECTION, SOLUTION INTRAVENOUS
Status: DISCONTINUED | OUTPATIENT
Start: 2021-07-15 | End: 2021-07-15

## 2021-07-15 RX ORDER — NITROGLYCERIN 20 MG/100ML
INJECTION INTRAVENOUS CODE/TRAUMA/SEDATION MEDICATION
Status: COMPLETED | OUTPATIENT
Start: 2021-07-15 | End: 2021-07-15

## 2021-07-15 RX ORDER — SODIUM CHLORIDE 9 MG/ML
75 INJECTION, SOLUTION INTRAVENOUS CONTINUOUS
Status: DISPENSED | OUTPATIENT
Start: 2021-07-15 | End: 2021-07-15

## 2021-07-15 RX ORDER — FENTANYL CITRATE 50 UG/ML
INJECTION, SOLUTION INTRAMUSCULAR; INTRAVENOUS CODE/TRAUMA/SEDATION MEDICATION
Status: COMPLETED | OUTPATIENT
Start: 2021-07-15 | End: 2021-07-15

## 2021-07-15 RX ORDER — LIDOCAINE HYDROCHLORIDE 10 MG/ML
INJECTION, SOLUTION EPIDURAL; INFILTRATION; INTRACAUDAL; PERINEURAL CODE/TRAUMA/SEDATION MEDICATION
Status: COMPLETED | OUTPATIENT
Start: 2021-07-15 | End: 2021-07-15

## 2021-07-15 RX ORDER — HEPARIN SODIUM 1000 [USP'U]/ML
INJECTION, SOLUTION INTRAVENOUS; SUBCUTANEOUS CODE/TRAUMA/SEDATION MEDICATION
Status: COMPLETED | OUTPATIENT
Start: 2021-07-15 | End: 2021-07-15

## 2021-07-15 RX ORDER — VERAPAMIL HYDROCHLORIDE 2.5 MG/ML
INJECTION, SOLUTION INTRAVENOUS CODE/TRAUMA/SEDATION MEDICATION
Status: COMPLETED | OUTPATIENT
Start: 2021-07-15 | End: 2021-07-15

## 2021-07-15 RX ORDER — MIDAZOLAM HYDROCHLORIDE 2 MG/2ML
INJECTION, SOLUTION INTRAMUSCULAR; INTRAVENOUS CODE/TRAUMA/SEDATION MEDICATION
Status: COMPLETED | OUTPATIENT
Start: 2021-07-15 | End: 2021-07-15

## 2021-07-15 RX ADMIN — LIDOCAINE HYDROCHLORIDE 1 ML: 10 INJECTION, SOLUTION EPIDURAL; INFILTRATION; INTRACAUDAL; PERINEURAL at 10:42

## 2021-07-15 RX ADMIN — MIDAZOLAM 2 MG: 1 INJECTION INTRAMUSCULAR; INTRAVENOUS at 10:39

## 2021-07-15 RX ADMIN — FENTANYL CITRATE 50 MCG: 50 INJECTION INTRAMUSCULAR; INTRAVENOUS at 10:39

## 2021-07-15 RX ADMIN — IOHEXOL 90 ML: 350 INJECTION, SOLUTION INTRAVENOUS at 11:10

## 2021-07-15 RX ADMIN — FENTANYL CITRATE 50 MCG: 50 INJECTION INTRAMUSCULAR; INTRAVENOUS at 10:43

## 2021-07-15 RX ADMIN — METOPROLOL SUCCINATE 12.5 MG: 25 TABLET, EXTENDED RELEASE ORAL at 08:47

## 2021-07-15 RX ADMIN — MELATONIN 6 MG: at 21:46

## 2021-07-15 RX ADMIN — ATORVASTATIN CALCIUM 80 MG: 80 TABLET, FILM COATED ORAL at 18:04

## 2021-07-15 RX ADMIN — TICAGRELOR 90 MG: 90 TABLET ORAL at 20:09

## 2021-07-15 RX ADMIN — SODIUM CHLORIDE 75 ML/HR: 0.9 INJECTION, SOLUTION INTRAVENOUS at 11:49

## 2021-07-15 RX ADMIN — SODIUM CHLORIDE 100 ML/HR: 0.9 INJECTION, SOLUTION INTRAVENOUS at 10:30

## 2021-07-15 RX ADMIN — MIDAZOLAM 1 MG: 1 INJECTION INTRAMUSCULAR; INTRAVENOUS at 10:43

## 2021-07-15 RX ADMIN — HEPARIN SODIUM 2000 UNITS: 1000 INJECTION INTRAVENOUS; SUBCUTANEOUS at 10:59

## 2021-07-15 RX ADMIN — TICAGRELOR 180 MG: 90 TABLET ORAL at 10:30

## 2021-07-15 RX ADMIN — NITROGLYCERIN 100 MCG: 20 INJECTION INTRAVENOUS at 10:57

## 2021-07-15 RX ADMIN — ASPIRIN 81 MG CHEWABLE TABLET 81 MG: 81 TABLET CHEWABLE at 10:30

## 2021-07-15 RX ADMIN — HEPARIN SODIUM 4000 UNITS: 1000 INJECTION INTRAVENOUS; SUBCUTANEOUS at 10:50

## 2021-07-15 RX ADMIN — Medication 200 MCG: at 10:45

## 2021-07-15 RX ADMIN — METOPROLOL SUCCINATE 12.5 MG: 25 TABLET, EXTENDED RELEASE ORAL at 20:09

## 2021-07-15 RX ADMIN — HEPARIN SODIUM 4000 UNITS: 1000 INJECTION INTRAVENOUS; SUBCUTANEOUS at 10:46

## 2021-07-15 RX ADMIN — VERAPAMIL HYDROCHLORIDE 2.5 MG: 2.5 INJECTION INTRAVENOUS at 10:46

## 2021-07-15 RX ADMIN — ACETAMINOPHEN 650 MG: 325 TABLET, FILM COATED ORAL at 20:09

## 2021-07-15 RX ADMIN — LEVOTHYROXINE SODIUM 100 MCG: 100 TABLET ORAL at 05:54

## 2021-07-15 NOTE — ASSESSMENT & PLAN NOTE
Wt Readings from Last 3 Encounters:   07/12/21 70 2 kg (154 lb 12 2 oz)   05/19/21 64 4 kg (142 lb)   05/07/21 64 4 kg (142 lb)     Discontinue IV Lasix  Start p o   Lasix 40 mg daily tomorrow  Continue Toprol-XL, plan to add low dose ACEi on discharge  Appreciate cardiology evaluation

## 2021-07-15 NOTE — PROGRESS NOTES
Cardiology Progress Note   MD Ene Rowe MD Maile Candy, DO, [facility]  MD April Duran DO, Loraine Daniels DO, [facility]  ----------------------------------------------------------------  [address] [address]    Georgesjessica Bryant 49 y o  female MRN: [id number]  Unit/Bed#: E4 -01 Encounter: [id number]      ASSESSMENT:   · Acute on chronic systolic heart failure - resolved  · CAD s/p cath w/ SEJAL-OM1, patent SEJAL-LAD x 3 (6/2020), with residual 50% pLAD, 40% mRCA, luminal and irregularities of LM, July 15, 2021  · LVEF 38% with inferior hypokinesis, diastolic dysfunction, mild LA dilatation, AV sclerosis, mild AR, moderate MR, mild TR, trace to small circumferential pericardial effusion, July 2021  · Abnormal pharmacologic nuclear stress test with fixed inferior and apical defect consistent with infarct, gated EF 28%, July 2021  · MARINA  · Left bundle branch block - rate related  · History of ischemic and non-ischemic cardiomyopathy  · Anemia  · Goiter s/p thyroidectomy with hypothyroidism  · History of syncope secondary orthostasis/volume depletion  · History of myopericarditis    PLAN:  Patient underwent cardiac catheterization demonstrating severe OM disease and received drug-eluting stent  Minimum 1 year dual anti-platelet therapy with aspirin and Brilinta  Continue high-intensity statin and Toprol-XL  Patient was not taking her Lipitor home  Goal LDL is less than 50 mg/dL; check repeat lipid panel today and then in 3-6 months  Plan to restart diuretic tomorrow as long with low-dose ACE-inhibitor  Probable discharge from CV perspective tomorrow    Signed: Jessica Garza DO, [facility], FACP      History of Present Illness:  Patient seen and examined  Patient is status post cardiac catheterization and feeling well  She denies any chest pain, pressure, tightness or squeezing  Denies lightheadedness, dizziness or palpitations  Denies lower extremity swelling, orthopnea the or paroxysmal nocturnal dyspnea  Patient had obstructive coronary disease and received stent to the OM1      Review of Systems:  Review of Systems   Constitutional: Negative for decreased appetite, fever, weight gain and weight loss  HENT: Negative for congestion and sore throat  Eyes: Negative for visual disturbance  Cardiovascular: Negative for chest pain, dyspnea on exertion, leg swelling, near-syncope and palpitations  Respiratory: Negative for cough and shortness of breath  Hematologic/Lymphatic: Negative for bleeding problem  Skin: Negative for rash  Musculoskeletal: Negative for myalgias and neck pain  Gastrointestinal: Negative for abdominal pain and nausea  Neurological: Negative for light-headedness and weakness  Psychiatric/Behavioral: Negative for depression  Allergies   Allergen Reactions    Penicillins Rash       No current facility-administered medications on file prior to encounter       Current Outpatient Medications on File Prior to Encounter   Medication Sig    carvedilol (COREG) 12 5 mg tablet Take 1 tablet (12 5 mg total) by mouth 2 (two) times a day with meals (Patient taking differently: Take 6 25 mg by mouth 2 (two) times a day with meals )    ferrous gluconate (FERGON) 324 mg tablet TAKE 1 TABLET (324 MG TOTAL) BY MOUTH 2 (TWO) TIMES A DAY BEFORE MEALS    furosemide (LASIX) 20 mg tablet TAKE 1 TABLET BY MOUTH ONCE DAILY EVERY Monday, Wednesday, and Friday    levothyroxine 150 mcg tablet Take 1 tablet (150 mcg total) by mouth daily    ticagrelor (BRILINTA) 90 MG Take 1 tablet (90 mg total) by mouth 2 (two) times a day    acetaminophen (TYLENOL) 325 mg tablet Take 2 tablets (650 mg total) by mouth every 4 (four) hours as needed for mild pain, headaches or fever    aspirin 81 mg chewable tablet Chew 1 tablet (81 mg total) daily (Patient not taking: Reported on 7/11/2021)    atorvastatin (LIPITOR) 40 mg tablet Take 1 tablet (40 mg total) by mouth daily (Patient taking differently: Take 20 mg by mouth daily )    colchicine (COLCRYS) 0 6 mg tablet Take 1 tablet (0 6 mg total) by mouth 2 (two) times a day    nitroglycerin (NITROSTAT) 0 4 mg SL tablet Place 1 tablet (0 4 mg total) under the tongue every 5 (five) minutes as needed for chest pain        Current Facility-Administered Medications   Medication Dose Route Frequency Provider Last Rate    acetaminophen  650 mg Oral Q4H PRN Reather Idol, CRNP      aspirin  81 mg Oral Daily Reather Idol, CRNP      atorvastatin  80 mg Oral After [employer], DO      enoxaparin  40 mg Subcutaneous Q24H Jonas Ralph MD      levothyroxine  100 mcg Oral Early Morning Priya White MD      melatonin  6 mg Oral HS Ming Hemp, CRNP      metoprolol succinate  12 5 mg Oral BID Reather Idol, CRNP      sodium chloride  75 mL/hr Intravenous Continuous Osvaldo Howard MD 75 mL/hr (07/15/21 1149)    ticagrelor  90 mg Oral BID Osvaldo Howard MD         sodium chloride, 75 mL/hr, Last Rate: 75 mL/hr (07/15/21 1149)        Vitals:    07/15/21 1150 07/15/21 1205 07/15/21 1220 07/15/21 1250   BP: 104/63 99/64 116/77 111/68   BP Location: Left arm Left arm     Pulse:  78 78 77   Resp: 18  18 18   Temp:       TempSrc:       SpO2: 96%      Weight:       Height:           No intake or output data in the 24 hours ending 07/15/21 1349    Weight change:     PHYSICAL EXAMINATION:  Gen: Awake, Alert, NAD  Head/eyes: AT/NC, pupils equal and round, Anicteric  ENT: mmm  Neck: Supple, No elevated JVP, trachea midline  Resp: CTA bilaterally no w/r/r  CV: RRR +S1, S2, No m/r/g  Abd: Soft, NT/ND + BS  Ext: no LE edema bilaterally; R radial TR band c/d/i  Neuro:  Follows commands, moves all extermities  Psych: Appropriate affect, normal mood, pleasant attitude, non-combative  Skin: warm; no rash, erythema or venous stasis changes on exposed skin    Lab Results:  Results from last 7 days Lab Units 07/14/21  0430   WBC Thousand/uL 4 58   HEMOGLOBIN g/dL 13 0   HEMATOCRIT % 41 9   PLATELETS Thousands/uL 243     Results from last 7 days   Lab Units 07/15/21  0939 07/13/21  0625   POTASSIUM mmol/L 4 6 3 6   CHLORIDE mmol/L 104 101   CO2 mmol/L 32 30   BUN mg/dL 29* 29*   CREATININE mg/dL 1 21 1 46*   CALCIUM mg/dL 7 2* 6 8*   ALK PHOS U/L  --  120*   ALT U/L  --  37   AST U/L  --  39     No results found for: TROPONINT      Results from last 7 days   Lab Units 07/13/21  0845 07/13/21  0415 07/12/21  1033   TROPONIN I ng/mL 0 38* 0 30* 0 46*     Results from last 7 days   Lab Units 07/11/21  0409   INR  1 07       Tele: SR w/ rate related LBBB    This note was completed in part utilizing [employer] Software  Grammatical errors, random word insertions, spelling mistakes, and incomplete sentences may be an occasional consequence of this system secondary to software limitations, ambient noise, and hardware issues  If you have any questions or concerns about the content, text, or information contained within the body of this dictation, please contact the provider for clarification

## 2021-07-15 NOTE — PROGRESS NOTES
[facility]  Progress Note - Camilla Rodriguez 1965, 49 y o  female MRN: [id number]  Unit/Bed#: E4 -01 Encounter: [id number]  Primary Care Provider: Yokasta Esteves PA-C   Date and time admitted to hospital: 7/11/2021  3:16 AM    * Chest pain  Assessment & Plan  Patient presented with chest pain, now resolved  Troponins elevated  Heparin drip discontinued  S/P cardiac cath with PCI to OM1  Continue aspirin, statin and brilinta  Cardiology following, likely discharge tmr    Cardiomyopathy [facility])  Assessment & Plan  History of ischemic cardiomyopathy  Current 2D echo shows ef of 38%, no wall motion abn    MARINA (acute kidney injury) ([facility] [address] )  Assessment & Plan  Resolved    Postoperative hypothyroidism  Assessment & Plan  Low TSH, elevated T4  Will decrease levothyroxine to 100mcg, repeat TSH in 4-6 weeks for further titration if needed    Acute systolic congestive heart failure (HCC)  Assessment & Plan  Wt Readings from Last 3 Encounters:   07/12/21 70 2 kg (154 lb 12 2 oz)   05/19/21 64 4 kg (142 lb)   05/07/21 64 4 kg (142 lb)     Discontinue IV Lasix  Start p o  Lasix 40 mg daily tomorrow  Continue Toprol-XL, plan to add low dose ACEi on discharge  Appreciate cardiology evaluation        VTE Pharmacologic Prophylaxis:   Pharmacologic: Enoxaparin (Lovenox)  Mechanical VTE Prophylaxis in Place: Yes    Patient Centered Rounds: I have performed bedside rounds with nursing staff today  Discussions with Specialists or Other Care Team Provider: Cardiology    Education and Discussions with Family / Patient: Patient    Time Spent for Care: 30 minutes  More than 50% of total time spent on counseling and coordination of care as described above      Current Length of Stay: 4 day(s)    Current Patient Status: Inpatient   Certification Statement: The patient will continue to require additional inpatient hospital stay due to monitor overnight    Discharge Plan: Tomorrow    Code Status: Level 1 - Full Code      Subjective:   S/P cath  Tolerated procedure well  Denies any CP or SOB  Objective:     Vitals:   Temp (24hrs), Av 9 °F (36 6 °C), Min:97 3 °F (36 3 °C), Max:98 4 °F (36 9 °C)    Temp:  [97 3 °F (36 3 °C)-98 4 °F (36 9 °C)] 98 °F (36 7 °C)  HR:  [76-86] 76  Resp:  [18] 18  BP: ()/(56-79) 114/75  SpO2:  [94 %-100 %] 95 %  Body mass index is 25 75 kg/m²  Input and Output Summary (last 24 hours):     No intake or output data in the 24 hours ending 07/15/21 1650    Physical Exam:     Physical Exam  Vitals and nursing note reviewed  Constitutional:       Appearance: She is normal weight  HENT:      Head: Normocephalic and atraumatic  Eyes:      General: No scleral icterus  Conjunctiva/sclera: Conjunctivae normal    Cardiovascular:      Rate and Rhythm: Normal rate and regular rhythm  Heart sounds: Normal heart sounds  Pulmonary:      Breath sounds: Normal breath sounds  No wheezing or rhonchi  Abdominal:      General: Bowel sounds are normal  There is no distension  Palpations: Abdomen is soft  Tenderness: There is no abdominal tenderness  Musculoskeletal:         General: No swelling  Normal range of motion  Right lower leg: No edema  Left lower leg: No edema  Skin:     General: Skin is warm and dry  Neurological:      General: No focal deficit present  Mental Status: She is alert  Mental status is at baseline             Additional Data:     Labs:    Results from last 7 days   Lab Units 21  0430   WBC Thousand/uL 4 58   HEMOGLOBIN g/dL 13 0   HEMATOCRIT % 41 9   PLATELETS Thousands/uL 243   NEUTROS PCT % 63   LYMPHS PCT % 23   MONOS PCT % 9   EOS PCT % 4     Results from last 7 days   Lab Units 07/15/21  0939 21  0625   SODIUM mmol/L 140 141   POTASSIUM mmol/L 4 6 3 6   CHLORIDE mmol/L 104 101   CO2 mmol/L 32 30   BUN mg/dL 29* 29*   CREATININE mg/dL 1 21 1 46*   ANION GAP mmol/L 4 10   CALCIUM mg/dL 7 2* 6 8* ALBUMIN g/dL  --  3 0*   TOTAL BILIRUBIN mg/dL  --  0 39   ALK PHOS U/L  --  120*   ALT U/L  --  37   AST U/L  --  39   GLUCOSE RANDOM mg/dL 102 95     Results from last 7 days   Lab Units 07/11/21  0409   INR  1 07                       * I Have Reviewed All Lab Data Listed Above  * Additional Pertinent Lab Tests Reviewed: All Labs For Current Hospital Admission Reviewed    Imaging:    XR chest 1 view portable    Result Date: 7/11/2021  Impression: Moderate congestive changes  Possible infiltrates in the lung bases  Workstation performed: [id number]       Recent Cultures (last 7 days):           Last 24 Hours Medication List:   Current Facility-Administered Medications   Medication Dose Route Frequency Provider Last Rate    acetaminophen  650 mg Oral Q4H PRN DELORES Daniels      aspirin  81 mg Oral Daily DELORES Daniels      atorvastatin  80 mg Oral After [employer], DO      enoxaparin  40 mg Subcutaneous Q24H Vasu Barroso MD      levothyroxine  100 mcg Oral Early Morning Marsha Mendes MD      melatonin  6 mg Oral HS DELORES Ca      metoprolol succinate  12 5 mg Oral BID DELORES Daniels      ticagrelor  90 mg Oral BID Good Rogel MD          Today, Patient Was Seen By: Marsha eMndes MD    ** Please Note: Dictation voice to text software may have been used in the creation of this document   **

## 2021-07-15 NOTE — ASSESSMENT & PLAN NOTE
Patient presented with chest pain, now resolved  Troponins elevated  Heparin drip discontinued  S/P cardiac cath with PCI to OM1  Continue aspirin, statin and brilinta  Cardiology following, likely discharge tmr

## 2021-07-15 NOTE — PROGRESS NOTES
Cardiology Progress Note   MD Jesus Read MD Hermenia Staggers, DO, [facility]  MD Carlos Duran DO, Yessy Schmitt DO, [facility]  ----------------------------------------------------------------  [address] 49 y o  female MRN: [id number]  Unit/Bed#: E2 -01 Encounter: [id number]      ASSESSMENT:   · Acute on chronic systolic heart failure  · CAD s/p cath w/ SEJAL-LAD x 3, with residual 40% pLAD, 50% OM1, 40% mRCA, luminal and irregularities of LM, June 17, 2020  · LVEF 38% with inferior hypokinesis, diastolic dysfunction, mild LA dilatation, AV sclerosis, mild AR, moderate MR, mild TR, trace to small circumferential pericardial effusion, July 2021  · Abnormal pharmacologic nuclear stress test with fixed inferior and apical defect consistent with infarct, gated EF 28%, July 2021  · MARINA  · Left bundle branch block - rate related  · History of ischemic and non-ischemic cardiomyopathy  · Anemia  · Goiter s/p thyroidectomy with hypothyroidism  · History of syncope secondary orthostasis/volume depletion  · History of myopericarditis    PLAN:  Patient is now chest pain-free, but has had ECG findings that demonstrate evidence of potential ischemia  Echocardiogram shows new inferior regional wall motion abnormality compared to prior echo and nuclear stress test shows possible inferior wall infarct  She examines to be euvolemic today and creatinine has improved  Due to abnormal troponin with acute heart failure and new regional wall motion abnormality, I believe it is reasonable to repeat cardiac catheterization  Risk, benefits and alternatives to cardiac catheterization have been discussed at length including the risk of bleeding, infection, CVA, renal failure, myocardial infarction and death; patient understands these risks and wishes to proceed  Nothing by mouth after midnight for procedure  IVFs prior to cath  Hold Lasix and lisinopril in the morning  Continue aspirin, statin and beta-blocker  Plan to increase Lipitor to 80 mg daily at bedtime if able to be tolerated  Further recommendations to follow catheterization    Signed: Denese Homans DO, [facility] [place], [facility]      History of Present Illness:  Patient seen and examined  Denies chest pain, pressure, tightness or squeezing  Denies M this, dizziness or palpitations  Denies lower extremity swelling, orthopnea or paroxysmal nocturnal dyspnea  Admits to feeling back to her baseline  Family at bedside  Review of Systems:  Review of Systems   Constitutional: Negative for decreased appetite, fever, weight gain and weight loss  HENT: Negative for congestion and sore throat  Eyes: Negative for visual disturbance  Cardiovascular: Negative for chest pain, dyspnea on exertion, leg swelling, near-syncope and palpitations  Respiratory: Negative for cough and shortness of breath  Hematologic/Lymphatic: Negative for bleeding problem  Skin: Negative for rash  Musculoskeletal: Negative for myalgias and neck pain  Gastrointestinal: Negative for abdominal pain and nausea  Neurological: Negative for light-headedness and weakness  Psychiatric/Behavioral: Negative for depression  Allergies   Allergen Reactions    Penicillins Rash       No current facility-administered medications on file prior to encounter       Current Outpatient Medications on File Prior to Encounter   Medication Sig    carvedilol (COREG) 12 5 mg tablet Take 1 tablet (12 5 mg total) by mouth 2 (two) times a day with meals (Patient taking differently: Take 6 25 mg by mouth 2 (two) times a day with meals )    ferrous gluconate (FERGON) 324 mg tablet TAKE 1 TABLET (324 MG TOTAL) BY MOUTH 2 (TWO) TIMES A DAY BEFORE MEALS    furosemide (LASIX) 20 mg tablet TAKE 1 TABLET BY MOUTH ONCE DAILY EVERY Monday, Wednesday, and Friday    levothyroxine 150 mcg tablet Take 1 tablet (150 mcg total) by mouth daily    ticagrelor (BRILINTA) 90 MG Take 1 tablet (90 mg total) by mouth 2 (two) times a day    acetaminophen (TYLENOL) 325 mg tablet Take 2 tablets (650 mg total) by mouth every 4 (four) hours as needed for mild pain, headaches or fever    aspirin 81 mg chewable tablet Chew 1 tablet (81 mg total) daily (Patient not taking: Reported on 7/11/2021)    atorvastatin (LIPITOR) 40 mg tablet Take 1 tablet (40 mg total) by mouth daily (Patient taking differently: Take 20 mg by mouth daily )    colchicine (COLCRYS) 0 6 mg tablet Take 1 tablet (0 6 mg total) by mouth 2 (two) times a day    nitroglycerin (NITROSTAT) 0 4 mg SL tablet Place 1 tablet (0 4 mg total) under the tongue every 5 (five) minutes as needed for chest pain        Current Facility-Administered Medications   Medication Dose Route Frequency Provider Last Rate    acetaminophen  650 mg Oral Q4H PRN Dorena Saltness, CRNP      aspirin  81 mg Oral Daily Dorena Saltness, CRNP      atorvastatin  20 mg Oral Daily Washington Samuel MD      enoxaparin  40 mg Subcutaneous Q24H [address] Washington Samuel MD      levothyroxine  100 mcg Oral Early Morning Washington Samuel MD      melatonin  6 mg Oral HS DELORES Rene      metoprolol succinate  12 5 mg Oral BID Dorena Saltness, CRNP      [START ON 7/15/2021] sodium chloride  50 mL/hr Intravenous Once Felipa Hamptoning, DO              Vitals:    07/14/21 0801 07/14/21 1120 07/14/21 1600 07/14/21 1950   BP: 120/78 116/75 110/70 119/70   BP Location: Right arm Left arm Right arm Left arm   Pulse:  100 96 86   Resp:   18 18   Temp:   97 9 °F (36 6 °C) 97 8 °F (36 6 °C)   TempSrc:   Temporal Temporal   SpO2:   96% 94%   Weight:       Height:           No intake or output data in the 24 hours ending 07/14/21 2116    Weight change:     PHYSICAL EXAMINATION:  Gen: Awake, Alert, NAD  Head/eyes: AT/NC, pupils equal and round, Anicteric  ENT: mmm  Neck: Supple, No elevated JVP, trachea midline  Resp: CTA bilaterally no w/r/r  CV: RRR +S1, S2, No m/r/g  Abd: Soft, NT/ND + BS  Ext: no LE edema bilaterally  Neuro: Follows commands, moves all extermities  Psych: Appropriate affect, normal mood, pleasant attitude, non-combative  Skin: warm; no rash, erythema or venous stasis changes on exposed skin    Lab Results:  Results from last 7 days   Lab Units 07/14/21  0430   WBC Thousand/uL 4 58   HEMOGLOBIN g/dL 13 0   HEMATOCRIT % 41 9   PLATELETS Thousands/uL 243     Results from last 7 days   Lab Units 07/14/21  0430 07/13/21  0625   POTASSIUM mmol/L 3 4* 3 6   CHLORIDE mmol/L 100 101   CO2 mmol/L 30 30   BUN mg/dL 28* 29*   CREATININE mg/dL 1 32* 1 46*   CALCIUM mg/dL 7 2* 6 8*   ALK PHOS U/L  --  120*   ALT U/L  --  37   AST U/L  --  39     No results found for: TROPONINT      Results from last 7 days   Lab Units 07/13/21  0845 07/13/21  0415 07/12/21  1033   TROPONIN I ng/mL 0 38* 0 30* 0 46*     Results from last 7 days   Lab Units 07/11/21  0409   INR  1 07       Tele: SR w/ rate related LBBB    This note was completed in part utilizing M-Modal Fluency Direct Software  Grammatical errors, random word insertions, spelling mistakes, and incomplete sentences may be an occasional consequence of this system secondary to software limitations, ambient noise, and hardware issues  If you have any questions or concerns about the content, text, or information contained within the body of this dictation, please contact the provider for clarification

## 2021-07-16 LAB
ANION GAP SERPL CALCULATED.3IONS-SCNC: 8 MMOL/L (ref 4–13)
ATRIAL RATE: 75 BPM
BASOPHILS # BLD AUTO: 0.04 THOUSANDS/ΜL (ref 0–0.1)
BASOPHILS NFR BLD AUTO: 1 % (ref 0–1)
BUN SERPL-MCNC: 27 MG/DL (ref 5–25)
CALCIUM SERPL-MCNC: 6.7 MG/DL (ref 8.3–10.1)
CHLORIDE SERPL-SCNC: 104 MMOL/L (ref 100–108)
CO2 SERPL-SCNC: 25 MMOL/L (ref 21–32)
CREAT SERPL-MCNC: 1.37 MG/DL (ref 0.6–1.3)
EOSINOPHIL # BLD AUTO: 0.19 THOUSAND/ΜL (ref 0–0.61)
EOSINOPHIL NFR BLD AUTO: 3 % (ref 0–6)
ERYTHROCYTE [DISTWIDTH] IN BLOOD BY AUTOMATED COUNT: 16.1 % (ref 11.6–15.1)
GFR SERPL CREATININE-BSD FRML MDRD: 44 ML/MIN/1.73SQ M
GLUCOSE SERPL-MCNC: 95 MG/DL (ref 65–140)
HCT VFR BLD AUTO: 35.7 % (ref 34.8–46.1)
HGB BLD-MCNC: 11 G/DL (ref 11.5–15.4)
IMM GRANULOCYTES # BLD AUTO: 0.01 THOUSAND/UL (ref 0–0.2)
IMM GRANULOCYTES NFR BLD AUTO: 0 % (ref 0–2)
LYMPHOCYTES # BLD AUTO: 1.01 THOUSANDS/ΜL (ref 0.6–4.47)
LYMPHOCYTES NFR BLD AUTO: 18 % (ref 14–44)
MAGNESIUM SERPL-MCNC: 2.1 MG/DL (ref 1.6–2.6)
MCH RBC QN AUTO: 25.8 PG (ref 26.8–34.3)
MCHC RBC AUTO-ENTMCNC: 30.8 G/DL (ref 31.4–37.4)
MCV RBC AUTO: 84 FL (ref 82–98)
MONOCYTES # BLD AUTO: 0.56 THOUSAND/ΜL (ref 0.17–1.22)
MONOCYTES NFR BLD AUTO: 10 % (ref 4–12)
NEUTROPHILS # BLD AUTO: 3.81 THOUSANDS/ΜL (ref 1.85–7.62)
NEUTS SEG NFR BLD AUTO: 68 % (ref 43–75)
NRBC BLD AUTO-RTO: 0 /100 WBCS
P AXIS: 26 DEGREES
PLATELET # BLD AUTO: 199 THOUSANDS/UL (ref 149–390)
PMV BLD AUTO: 11.1 FL (ref 8.9–12.7)
POTASSIUM SERPL-SCNC: 4.4 MMOL/L (ref 3.5–5.3)
PR INTERVAL: 120 MS
QRS AXIS: -12 DEGREES
QRSD INTERVAL: 92 MS
QT INTERVAL: 478 MS
QTC INTERVAL: 533 MS
RBC # BLD AUTO: 4.27 MILLION/UL (ref 3.81–5.12)
SODIUM SERPL-SCNC: 137 MMOL/L (ref 136–145)
T WAVE AXIS: 248 DEGREES
TROPONIN I SERPL-MCNC: 1.1 NG/ML
TROPONIN I SERPL-MCNC: 1.15 NG/ML
TROPONIN I SERPL-MCNC: 1.27 NG/ML
VENTRICULAR RATE: 75 BPM
WBC # BLD AUTO: 5.62 THOUSAND/UL (ref 4.31–10.16)

## 2021-07-16 PROCEDURE — 83735 ASSAY OF MAGNESIUM: CPT | Performed by: STUDENT IN AN ORGANIZED HEALTH CARE EDUCATION/TRAINING PROGRAM

## 2021-07-16 PROCEDURE — 80048 BASIC METABOLIC PNL TOTAL CA: CPT | Performed by: STUDENT IN AN ORGANIZED HEALTH CARE EDUCATION/TRAINING PROGRAM

## 2021-07-16 PROCEDURE — 85025 COMPLETE CBC W/AUTO DIFF WBC: CPT | Performed by: STUDENT IN AN ORGANIZED HEALTH CARE EDUCATION/TRAINING PROGRAM

## 2021-07-16 PROCEDURE — 84484 ASSAY OF TROPONIN QUANT: CPT | Performed by: PHYSICIAN ASSISTANT

## 2021-07-16 PROCEDURE — 93005 ELECTROCARDIOGRAM TRACING: CPT

## 2021-07-16 PROCEDURE — 99232 SBSQ HOSP IP/OBS MODERATE 35: CPT | Performed by: STUDENT IN AN ORGANIZED HEALTH CARE EDUCATION/TRAINING PROGRAM

## 2021-07-16 PROCEDURE — 93010 ELECTROCARDIOGRAM REPORT: CPT | Performed by: INTERNAL MEDICINE

## 2021-07-16 PROCEDURE — 99232 SBSQ HOSP IP/OBS MODERATE 35: CPT | Performed by: INTERNAL MEDICINE

## 2021-07-16 RX ORDER — NITROGLYCERIN 0.4 MG/1
0.4 TABLET SUBLINGUAL
Status: DISCONTINUED | OUTPATIENT
Start: 2021-07-16 | End: 2021-07-17 | Stop reason: HOSPADM

## 2021-07-16 RX ORDER — FUROSEMIDE 40 MG/1
40 TABLET ORAL DAILY
Status: DISCONTINUED | OUTPATIENT
Start: 2021-07-16 | End: 2021-07-17 | Stop reason: HOSPADM

## 2021-07-16 RX ORDER — LISINOPRIL 5 MG/1
5 TABLET ORAL DAILY
Status: DISCONTINUED | OUTPATIENT
Start: 2021-07-16 | End: 2021-07-17 | Stop reason: HOSPADM

## 2021-07-16 RX ADMIN — ASPIRIN 81 MG CHEWABLE TABLET 81 MG: 81 TABLET CHEWABLE at 09:09

## 2021-07-16 RX ADMIN — LISINOPRIL 5 MG: 5 TABLET ORAL at 14:01

## 2021-07-16 RX ADMIN — LEVOTHYROXINE SODIUM 100 MCG: 100 TABLET ORAL at 05:10

## 2021-07-16 RX ADMIN — ATORVASTATIN CALCIUM 80 MG: 80 TABLET, FILM COATED ORAL at 18:04

## 2021-07-16 RX ADMIN — TICAGRELOR 90 MG: 90 TABLET ORAL at 09:09

## 2021-07-16 RX ADMIN — TICAGRELOR 90 MG: 90 TABLET ORAL at 18:05

## 2021-07-16 RX ADMIN — METOPROLOL SUCCINATE 12.5 MG: 25 TABLET, EXTENDED RELEASE ORAL at 09:09

## 2021-07-16 RX ADMIN — MELATONIN 6 MG: at 21:04

## 2021-07-16 RX ADMIN — ACETAMINOPHEN 650 MG: 325 TABLET, FILM COATED ORAL at 05:14

## 2021-07-16 RX ADMIN — ENOXAPARIN SODIUM 40 MG: 40 INJECTION SUBCUTANEOUS at 09:09

## 2021-07-16 RX ADMIN — FUROSEMIDE 40 MG: 40 TABLET ORAL at 14:01

## 2021-07-16 RX ADMIN — MORPHINE SULFATE 2 MG: 2 INJECTION, SOLUTION INTRAMUSCULAR; INTRAVENOUS at 09:09

## 2021-07-16 RX ADMIN — NITROGLYCERIN 0.4 MG: 0.4 TABLET SUBLINGUAL at 21:08

## 2021-07-16 RX ADMIN — METOPROLOL SUCCINATE 12.5 MG: 25 TABLET, EXTENDED RELEASE ORAL at 21:04

## 2021-07-16 NOTE — PLAN OF CARE
Problem: Potential for Falls  Goal: Patient will remain free of falls  Description: INTERVENTIONS:  - Educate patient/family on patient safety including physical limitations  - Instruct patient to call for assistance with activity   - Consult OT/PT to assist with strengthening/mobility   - Keep Call bell within reach  - Keep bed low and locked with side rails adjusted as appropriate  - Keep care items and personal belongings within reach  - Initiate and maintain comfort rounds  - Make Fall Risk Sign visible to staff  - Offer Toileting every  Hours, in advance of need  - Initiate/Maintain alarm  - Obtain necessary fall risk management equipment:   - Apply yellow socks and bracelet for high fall risk patients  - Consider moving patient to room near nurses station  Outcome: Progressing     Problem: Nutrition/Hydration-ADULT  Goal: Nutrient/Hydration intake appropriate for improving, restoring or maintaining nutritional needs  Description: Monitor and assess patient's nutrition/hydration status for malnutrition  Collaborate with interdisciplinary team and initiate plan and interventions as ordered  Monitor patient's weight and dietary intake as ordered or per policy  Utilize nutrition screening tool and intervene as necessary  Determine patient's food preferences and provide high-protein, high-caloric foods as appropriate       INTERVENTIONS:  - Monitor oral intake, urinary output, labs, and treatment plans  - Assess nutrition and hydration status and recommend course of action  - Evaluate amount of meals eaten  - Assist patient with eating if necessary   - Allow adequate time for meals  - Recommend/ encourage appropriate diets, oral nutritional supplements, and vitamin/mineral supplements  - Order, calculate, and assess calorie counts as needed  - Recommend, monitor, and adjust tube feedings and TPN/PPN based on assessed needs  - Assess need for intravenous fluids  - Provide specific nutrition/hydration education as appropriate  - Include patient/family/caregiver in decisions related to nutrition  Outcome: Progressing     Problem: CARDIOVASCULAR - ADULT  Goal: Maintains optimal cardiac output and hemodynamic stability  Description: INTERVENTIONS:  - Monitor I/O, vital signs and rhythm  - Monitor for S/S and trends of decreased cardiac output  - Administer and titrate ordered vasoactive medications to optimize hemodynamic stability  - Assess quality of pulses, skin color and temperature  - Assess for signs of decreased coronary artery perfusion  - Instruct patient to report change in severity of symptoms  Outcome: Progressing  Goal: Absence of cardiac dysrhythmias or at baseline rhythm  Description: INTERVENTIONS:  - Continuous cardiac monitoring, vital signs, obtain 12 lead EKG if ordered  - Administer antiarrhythmic and heart rate control medications as ordered  - Monitor electrolytes and administer replacement therapy as ordered  Outcome: Progressing     Problem: HEMATOLOGIC - ADULT  Goal: Maintains hematologic stability  Description: INTERVENTIONS  - Assess for signs and symptoms of bleeding or hemorrhage  - Monitor labs  - Administer supportive blood products/factors as ordered and appropriate  Outcome: Progressing

## 2021-07-16 NOTE — PROGRESS NOTES
[address] [place] [address]  Progress Note - Beena [place] 1965, 49 y o  female MRN: [id number]  Unit/Bed#: E4 -01 Encounter: [id number]  Primary Care Provider: Tino Sauer PA-C   Date and time admitted to hospital: 7/11/2021  3:16 AM    * Chest pain  Assessment & Plan  Patient presented with chest pain, now resolved  Troponins elevated  Heparin drip discontinued  S/P cardiac cath 07/15 with PCI to OM1  Continue aspirin, statin and brilinta  Reports some cp today, repeat trops trending down  Monitor tonight, D/C tmr    Cardiomyopathy ([facility] [address] )  Assessment & Plan  History of ischemic cardiomyopathy  Current 2D echo shows ef of 38%, no wall motion abn    MARINA (acute kidney injury) ([facility] [address] )  Assessment & Plan  Resolved    Postoperative hypothyroidism  Assessment & Plan  Low TSH, elevated T4  Will decrease levothyroxine to 100mcg, repeat TSH in 4-6 weeks for further titration if needed    Acute systolic congestive heart failure (HCC)  Assessment & Plan  Wt Readings from Last 3 Encounters:   07/12/21 70 2 kg (154 lb 12 2 oz)   05/19/21 64 4 kg (142 lb)   05/07/21 64 4 kg (142 lb)     Discontinue IV Lasix  Continue Lasix 40 mg daily  Continue Toprol-XL, add lisinopril 2 5mg  euvolemic on exam        VTE Pharmacologic Prophylaxis:   Pharmacologic: Enoxaparin (Lovenox)  Mechanical VTE Prophylaxis in Place: Yes    Patient Centered Rounds: I have performed bedside rounds with nursing staff today  Discussions with Specialists or Other Care Team Provider: Cardiology    Education and Discussions with Family / Patient: patient    Time Spent for Care: 30 minutes  More than 50% of total time spent on counseling and coordination of care as described above      Current Length of Stay: 5 day(s)    Current Patient Status: Inpatient   Certification Statement: The patient will continue to require additional inpatient hospital stay due to cardiac monitoring    Discharge Plan: tmr    Code Status: Level 1 - Full Code      Subjective:   Patient reported some substernal pressure in her chest overnight  Repeat troponins were done and EKG reviewed  Patient reports that her chest pain had improved when revisited later that afternoon  Tolerating diet  Objective:     Vitals:   Temp (24hrs), Av 8 °F (36 6 °C), Min:97 3 °F (36 3 °C), Max:98 4 °F (36 9 °C)    Temp:  [97 3 °F (36 3 °C)-98 4 °F (36 9 °C)] 97 8 °F (36 6 °C)  HR:  [76-90] 76  Resp:  [18-19] 18  BP: (114-142)/(73-80) 119/78  SpO2:  [92 %-99 %] 99 %  Body mass index is 25 75 kg/m²  Input and Output Summary (last 24 hours): Intake/Output Summary (Last 24 hours) at 2021 1510  Last data filed at 2021 0817  Gross per 24 hour   Intake 120 ml   Output --   Net 120 ml       Physical Exam:     Physical Exam  Vitals and nursing note reviewed  Constitutional:       Appearance: She is normal weight  HENT:      Head: Normocephalic and atraumatic  Eyes:      General: No scleral icterus  Conjunctiva/sclera: Conjunctivae normal    Cardiovascular:      Rate and Rhythm: Normal rate and regular rhythm  Heart sounds: Normal heart sounds  Pulmonary:      Breath sounds: Normal breath sounds  No wheezing or rhonchi  Abdominal:      General: Bowel sounds are normal  There is no distension  Palpations: Abdomen is soft  Tenderness: There is no abdominal tenderness  Musculoskeletal:         General: No swelling  Normal range of motion  Right lower leg: No edema  Left lower leg: No edema  Skin:     General: Skin is warm and dry  Neurological:      General: No focal deficit present  Mental Status: She is alert  Mental status is at baseline         Additional Data:     Labs:    Results from last 7 days   Lab Units 21  0453   WBC Thousand/uL 5 62   HEMOGLOBIN g/dL 11 0*   HEMATOCRIT % 35 7   PLATELETS Thousands/uL 199   NEUTROS PCT % 68   LYMPHS PCT % 18   MONOS PCT % 10   EOS PCT % 3     Results from last 7 days Lab Units 07/16/21  0453 07/13/21  0625   SODIUM mmol/L 137 141   POTASSIUM mmol/L 4 4 3 6   CHLORIDE mmol/L 104 101   CO2 mmol/L 25 30   BUN mg/dL 27* 29*   CREATININE mg/dL 1 37* 1 46*   ANION GAP mmol/L 8 10   CALCIUM mg/dL 6 7* 6 8*   ALBUMIN g/dL  --  3 0*   TOTAL BILIRUBIN mg/dL  --  0 39   ALK PHOS U/L  --  120*   ALT U/L  --  37   AST U/L  --  39   GLUCOSE RANDOM mg/dL 95 95     Results from last 7 days   Lab Units 07/11/21  0409   INR  1 07                       * I Have Reviewed All Lab Data Listed Above  * Additional Pertinent Lab Tests Reviewed: Carlos Eduardo 68 Admission Reviewed    Imaging:    none    Recent Cultures (last 7 days):           Last 24 Hours Medication List:   Current Facility-Administered Medications   Medication Dose Route Frequency Provider Last Rate    acetaminophen  650 mg Oral Q4H PRN [place] John, CRNP      aspirin  81 mg Oral Daily Chuy John, CRNP      atorvastatin  80 mg Oral After 1spire, DO      enoxaparin  40 mg Subcutaneous Q24H Calvin Jean MD      furosemide  40 mg Oral Daily Doraine Necessary, MD      levothyroxine  100 mcg Oral Early Morning Doraine Necessary, MD      lisinopril  5 mg Oral Daily Doraine Necessary, MD      melatonin  6 mg Oral HS Michelle Juarez, DELORES      metoprolol succinate  12 5 mg Oral BID [place] John, CRNP      ticagrelor  90 mg Oral BID Loyda Grace MD          Today, Patient Was Seen By: David Farnsworth MD    ** Please Note: Dictation voice to text software may have been used in the creation of this document   **

## 2021-07-16 NOTE — ASSESSMENT & PLAN NOTE
Wt Readings from Last 3 Encounters:   07/12/21 70 2 kg (154 lb 12 2 oz)   05/19/21 64 4 kg (142 lb)   05/07/21 64 4 kg (142 lb)     Discontinue IV Lasix  Continue Lasix 40 mg daily  Continue Toprol-XL, add lisinopril 2 5mg  euvolemic on exam

## 2021-07-16 NOTE — ASSESSMENT & PLAN NOTE
Patient presented with chest pain, now resolved  Troponins elevated  Heparin drip discontinued  S/P cardiac cath 07/15 with PCI to OM1  Continue aspirin, statin and brilinta  Reports some cp today, repeat trops trending down  Monitor tonight, D/DEMETRI tmr

## 2021-07-16 NOTE — PROGRESS NOTES
Cardiology Progress Note   MD Ene Rowe MD Maile Candy, DO, [facility]  MD April Duran DO, Loraine Daniels DO, [facility]  ----------------------------------------------------------------  [address] [address]    Georgesjessica Bryant 49 y o  female MRN: [id number]  Unit/Bed#: E4 -01 Encounter: [id number]      ASSESSMENT:   · Acute on chronic systolic heart failure - resolved  · CAD s/p cath w/ SEJAL-OM1, patent SEJAL-LAD x 3 (6/2020), with residual 50% pLAD, 40% mRCA, luminal and irregularities of LM, July 15, 2021  · LVEF 38% with inferior hypokinesis, diastolic dysfunction, mild LA dilatation, AV sclerosis, mild AR, moderate MR, mild TR, trace to small circumferential pericardial effusion, July 2021  · Abnormal pharmacologic nuclear stress test with fixed inferior and apical defect consistent with infarct, gated EF 28%, July 2021  · MARINA  · Left bundle branch block - rate related  · History of ischemic and non-ischemic cardiomyopathy  · Anemia  · Goiter s/p thyroidectomy with hypothyroidism  · History of syncope secondary orthostasis/volume depletion  · History of myopericarditis    PLAN:  Patient had episode of chest discomfort this morning  ECG looks similar to prior, but with T-waves somewhat more pronounced  Cardiac enzymes checked and troponin is trending down from 1 27 down to 1 15;  I believe that troponin is procedure early related and discomfort is due to arterial stretch post catheterization  Like to observe the patient in trended additional set troponins  If chest pain resolves and troponins continue to trend down, would be reasonable from CV perspective for discharge later today  Continue high-intensity statin, beta-blocker  Restart ACE-inhibitor and diuretic today  Minimum 1 year dual anti-platelet therapy  Outpatient follow-up within 2 weeks of discharge for additional CV testing as clinically indicated    Signed: Kimo Perez DO, [facility] [place], FACP      History of Present Illness:  Patient seen and examined  Admits to improving chest discomfort  Denies shortness of breath, lower extremity swelling, orthopnea or paroxysmal nocturnal dyspnea  Denies lightheadedness, dizziness or palpitations  Review of Systems:  Review of Systems   Constitutional: Negative for decreased appetite, fever, weight gain and weight loss  HENT: Negative for congestion and sore throat  Eyes: Negative for visual disturbance  Cardiovascular: Negative for chest pain, dyspnea on exertion, leg swelling, near-syncope and palpitations  Respiratory: Negative for cough and shortness of breath  Hematologic/Lymphatic: Negative for bleeding problem  Skin: Negative for rash  Musculoskeletal: Negative for myalgias and neck pain  Gastrointestinal: Negative for abdominal pain and nausea  Neurological: Negative for light-headedness and weakness  Psychiatric/Behavioral: Negative for depression  Allergies   Allergen Reactions    Penicillins Rash       No current facility-administered medications on file prior to encounter       Current Outpatient Medications on File Prior to Encounter   Medication Sig    carvedilol (COREG) 12 5 mg tablet Take 1 tablet (12 5 mg total) by mouth 2 (two) times a day with meals (Patient taking differently: Take 6 25 mg by mouth 2 (two) times a day with meals )    ferrous gluconate (FERGON) 324 mg tablet TAKE 1 TABLET (324 MG TOTAL) BY MOUTH 2 (TWO) TIMES A DAY BEFORE MEALS    furosemide (LASIX) 20 mg tablet TAKE 1 TABLET BY MOUTH ONCE DAILY EVERY Monday, Wednesday, and Friday    levothyroxine 150 mcg tablet Take 1 tablet (150 mcg total) by mouth daily    ticagrelor (BRILINTA) 90 MG Take 1 tablet (90 mg total) by mouth 2 (two) times a day    acetaminophen (TYLENOL) 325 mg tablet Take 2 tablets (650 mg total) by mouth every 4 (four) hours as needed for mild pain, headaches or fever    aspirin 81 mg chewable tablet Chew 1 tablet (81 mg total) daily (Patient not taking: Reported on 7/11/2021)    atorvastatin (LIPITOR) 40 mg tablet Take 1 tablet (40 mg total) by mouth daily (Patient taking differently: Take 20 mg by mouth daily )    colchicine (COLCRYS) 0 6 mg tablet Take 1 tablet (0 6 mg total) by mouth 2 (two) times a day    nitroglycerin (NITROSTAT) 0 4 mg SL tablet Place 1 tablet (0 4 mg total) under the tongue every 5 (five) minutes as needed for chest pain        Current Facility-Administered Medications   Medication Dose Route Frequency Provider Last Rate    acetaminophen  650 mg Oral Q4H PRN DELORES aWy      aspirin  81 mg Oral Daily DELORES Way      atorvastatin  80 mg Oral After Dinner Bulmaro Bobby DO      enoxaparin  40 mg Subcutaneous Q24H Tesha Escobedo MD      levothyroxine  100 mcg Oral Early Morning Biran Parra MD      melatonin  6 mg Oral HS DELORES Browne      metoprolol succinate  12 5 mg Oral BID DELORES Way      ticagrelor  90 mg Oral BID Lindsey Keys MD              Vitals:    07/15/21 2029 07/15/21 2332 07/16/21 0310 07/16/21 0815   BP:  135/76 128/80 117/78   BP Location:  Left arm Left arm Right arm   Pulse: 80 81 80 80   Resp: 19 19 18 18   Temp: 98 4 °F (36 9 °C) 97 6 °F (36 4 °C)  97 8 °F (36 6 °C)   TempSrc: Temporal Temporal  Temporal   SpO2: 97% 92% 95% 98%   Weight:       Height:             Intake/Output Summary (Last 24 hours) at 7/16/2021 1018  Last data filed at 7/16/2021 0817  Gross per 24 hour   Intake 120 ml   Output --   Net 120 ml       Weight change:     PHYSICAL EXAMINATION:  Gen: Awake, Alert, NAD  Head/eyes: AT/NC, pupils equal and round, Anicteric  ENT: mmm  Neck: Supple, No elevated JVP, trachea midline  Resp: CTA bilaterally no w/r/r  CV: RRR +S1, S2, No m/r/g  Abd: Soft, NT/ND + BS  Ext: no LE edema bilaterally; R radial site c/d/i  Neuro:  Follows commands, moves all extermities  Psych: Appropriate affect, normal mood, pleasant attitude, non-combative  Skin: warm; no rash, erythema or venous stasis changes on exposed skin    Lab Results:  Results from last 7 days   Lab Units 07/16/21  0453   WBC Thousand/uL 5 62   HEMOGLOBIN g/dL 11 0*   HEMATOCRIT % 35 7   PLATELETS Thousands/uL 199     Results from last 7 days   Lab Units 07/16/21  0453 07/13/21  0625   POTASSIUM mmol/L 4 4 3 6   CHLORIDE mmol/L 104 101   CO2 mmol/L 25 30   BUN mg/dL 27* 29*   CREATININE mg/dL 1 37* 1 46*   CALCIUM mg/dL 6 7* 6 8*   ALK PHOS U/L  --  120*   ALT U/L  --  37   AST U/L  --  39     No results found for: TROPONINT      Results from last 7 days   Lab Units 07/16/21  0853 07/16/21  0645 07/13/21  0845   TROPONIN I ng/mL 1 15* 1 27* 0 38*     Results from last 7 days   Lab Units 07/11/21  0409   INR  1 07       Tele:     This note was completed in part utilizing [employer] Fluency Direct Software  Grammatical errors, random word insertions, spelling mistakes, and incomplete sentences may be an occasional consequence of this system secondary to software limitations, ambient noise, and hardware issues  If you have any questions or concerns about the content, text, or information contained within the body of this dictation, please contact the provider for clarification

## 2021-07-16 NOTE — UTILIZATION REVIEW
Continued Stay Review    Date:  7/16/2021                      Current Patient Class:  IP_ Current Level of Care: [facility]     HPI:56 y o  female initially admitted on 7/11 to Inpatient hyponatremia, possible lab eror, MARNIA, acute systolic congestive heart failure, elevated D dimer, Cardiomyopathy, hypokalemia and hypocalcemia  Per Cardio: Angina type discomfort provoked by  Severe  CHF  Non Mi troponin elevation secondary to  CHF  Needs nuclear stress test when more stable    Assessment/Plan:     7/15 Cardiac Cath: CORONARY CIRCULATION:  Proximal LAD: There was a tubular 50 % stenosis  1st obtuse marginal: There was a 90 % stenosis  This is a likely culprit for the patient's clinical presentation  An intervention was performed  1ST LESION INTERVENTIONS:  A successful balloon angioplasty with stent procedure was performed on the 90 % lesion in the 1st obtuse marginal  Following intervention there was an excellent angiographic appearance with a 0 % residual stenosis  A Xience Elle Rx 3 0 x 23mm drug-eluting stent was placed across the lesion and deployed at a maximum inflation pressure of 18 elsa  Plan: Minimum 1 year dual anti-platelet therapy with aspirin and Brilinta  Continue high-intensity statin and Toprol-XL  Patient was not taking her Lipitor home  Goal LDL is less than 50 mg/dL; check repeat lipid panel today and then in 3-6 months  Plan to restart diuretic tomorrow as long with low-dose ACE-inhibitor    7/16:  S/P Cardiac cath   7/15 with PCI to OM1  Pt with episode chest discomfort this morning  ECG looks similar to prior, but with T-waves somewhat more pronounced  Cardiac enzymes checked and troponin is trending down from 1 27 down to 1 15; likely  troponin is procedure early related and discomfort is due to arterial stretch post catheterization  Continue to trend another set of trops    Add lisinpril 5 mg po qd  patient will continue to require additional inpatient hospital stay due to cardiac monitoring    Vital Signs:   07/16/21 1212  --  76  --  119/78  --  99 %  --  --  --  --   07/16/21 0815  97 8 °F (36 6 °C)  80  18  117/78  93  98 %  --  --  None (Room air)  Sitting   07/16/21 0310  --  80  18  128/80  --  95 %  --  --  None (Room air)  Lying   07/15/21 2332  97 6 °F (36 4 °C)  81  19  135/76  --  92 %  --  --  None (Room air)  Lying   07/15/21 2029  98 4 °F (36 9 °C)  80  19  --  --  97 %  --  --  None (Room air)  --   07/15/21 2009  --  78  --  142/74  --  --  --  --  --  --   07/15/21 1720  97 7 °F (36 5 °C)  90  18  116/73  80  97 %  --  --  None (Room air)  Lying   07/15/21 1620  98 °F (36 7 °C)  76  --  114/75  --  95 %  --  --  None (Room air)  Lying   07/15/21 1520  97 3 °F (36 3 °C)  84  --  117/77  --  97 %  --  --  None (Room air)  Lying   07/15/21 1420  --  --  --  122/66  74  --  --  --  --  Lying   07/15/21 1320  --  --  --  101/65  75  --  --  --  --  Lying   07/15/21 1250  --  77  18  111/68  77  --  --  --  --  --   07/15/21 1220  --  78  18  116/77  83  --  --  --  --  --   07/15/21 1205  --  78  --  99/64  73  --  --  --  --  Lying   07/15/21 1150  --  --  18  104/63  78  96 %  --  --  None (Room air)  Lying   07/15/21 1135  98 °F (36 7 °C)  80  18  92/56  80  95 %  --  --  None (Room air)  Lying   07/15/21 11:11:28  --  --  --  --  --  100 %  --  --  None (Room air)  --   07/15/21 10:39:30  --  --  --  --  --  --  28  2 L/min   Nasal cannula  --   Nasal Cannula O2 Flow Rate (L/min): APPLIED WITH SEDATION at 07/15/21 1039   07/15/21 0842  --  --  --  112/58  --  --  --  --  --  Lying   07/15/21 0705  --  --  --  110/70  --  --  --  --  --  --   07/15/21 0700  98 4 °F (36 9 °C)  86  18  95/66  --  100 %  --  --  None (Room air)  Sitting       Pertinent Labs/Diagnostic Results:   7/14 Perfusion Stress Test:  1  Abnormal pharmacologic nuclear stress test with fixed inferior and apical defects consistent with infarct  There is some improvement of inferior wall with stress  2   ECG portion of stress test is nondiagnostic for myocardial ischemia due to LBBB  3  No reported symptoms  4  No significant arrhythmias were noted  5  Left ventricle is mild-to-moderately dilated with inferior wall and apical hypokinesis; gated EF 28%  6  There is no study for comparison    7/12 ECHO:1  Dilated left ventricular cavity, moderately reduced left ventricular systolic function with global hypokinesis with regional variability  Ejection fraction is estimated as around 38 percent  Indeterminate diastolic dysfunction grade  2  Normal right ventricular size and systolic function  3  Mild left atrial cavity enlargement  4  Aortic valve sclerosis, mild aortic valve regurgitation  5  Mitral valve leaflet sclerosis, moderate mitral valve regurgitation  6  Mild tricuspid valve regurgitation    7  No obvious pulmonary hypertension but estimated right ventricular systolic pressure is close to upper limit of normal   8  Trace to small circumferential pericardial effusion        Results from last 7 days   Lab Units 07/11/21  0346   SARS-COV-2  Negative     Results from last 7 days   Lab Units 07/16/21  0453 07/14/21  0430 07/13/21  0436 07/11/21  0327   WBC Thousand/uL 5 62 4 58 6 05 7 43   HEMOGLOBIN g/dL 11 0* 13 0 11 5 11 5   HEMATOCRIT % 35 7 41 9 36 6 37 8   PLATELETS Thousands/uL 199 243 245 259   NEUTROS ABS Thousands/µL 3 81 2 91 3 95 4 99     Results from last 7 days   Lab Units 07/16/21  0453 07/15/21  0939 07/14/21  0430 07/13/21  0625 07/13/21  0436 07/11/21  0754 07/11/21  0435   SODIUM mmol/L 137 140 140 141 143 142  --    POTASSIUM mmol/L 4 4 4 6 3 4* 3 6 5 4* 4 5  --    CHLORIDE mmol/L 104 104 100 101 97* 107  --    CO2 mmol/L 25 32 30 30 26 26  --    ANION GAP mmol/L 8 4 10 10 20* 9  --    BUN mg/dL 27* 29* 28* 29* 29* 21  --    CREATININE mg/dL 1 37* 1 21 1 32* 1 46* 1 52* 1 95*  --    EGFR ml/min/1 73sq m 44 51 46 41 39 29  --    CALCIUM mg/dL 6 7* 7 2* 7 2* 6 8* <5 0* 6 8*  --    CALCIUM, IONIZED mmol/L  --   --   --  0 87*  --  0 92* 1 03*   MAGNESIUM mg/dL 2 1  --   --   --   --  1 9 1 8   PHOSPHORUS mg/dL  --   --   --   --   --  3 8  --      Results from last 7 days   Lab Units 07/13/21  0625 07/11/21  0327   AST U/L 39 31   ALT U/L 37 34   ALK PHOS U/L 120* 126*   TOTAL PROTEIN g/dL 6 7 6 2*   ALBUMIN g/dL 3 0* 3 0*   TOTAL BILIRUBIN mg/dL 0 39 0 24   BILIRUBIN DIRECT mg/dL  --  0 11     Results from last 7 days   Lab Units 07/16/21  0453 07/15/21  0939 07/14/21  0430 07/13/21  0625 07/13/21  0436 07/12/21  0637 07/11/21  1323 07/11/21  0754 07/11/21  0643 07/11/21  0327   GLUCOSE RANDOM mg/dL 95 102 98 95 84 94 86 93 95 137     Results from last 7 days   Lab Units 07/11/21  0327   PH MARY  7 392   PCO2 MARY mm Hg 37 0*   PO2 MARY mm Hg 46 9*   HCO3 MARY mmol/L 22 0*   BASE EXC MARY mmol/L -2 5   O2 CONTENT MARY ml/dL 12 8   O2 HGB, VENOUS % 78 5     Results from last 7 days   Lab Units 07/16/21  1225 07/16/21  0853 07/16/21  0645 07/13/21  0845 07/13/21  0415   TROPONIN I ng/mL 1 10* 1 15* 1 27* 0 38* 0 30*     Results from last 7 days   Lab Units 07/11/21  0327   D-DIMER QUANTITATIVE ug/ml FEU 1 17*     Results from last 7 days   Lab Units 07/13/21  0626 07/12/21  0637 07/12/21  0037 07/11/21  0409   PROTIME seconds  --   --   --  13 7   INR   --   --   --  1 07   PTT seconds 83* 78* 90* 26     Results from last 7 days   Lab Units 07/11/21  0643   TSH 3RD GENERATON uIU/mL 0 014*     Results from last 7 days   Lab Units 07/11/21  0327   NT-PRO BNP pg/mL 16,077*     Results from last 7 days   Lab Units 07/11/21  0829   AMPH/METH  Negative   BARBITURATE UR  Negative   BENZODIAZEPINE UR  Negative   COCAINE UR  Negative   METHADONE URINE  Negative   OPIATE UR  Negative   PCP UR  Positive*   THC UR  Negative     Medications:   Scheduled Medications:  aspirin, 81 mg, Oral, Daily  atorvastatin, 80 mg, Oral, After Dinner  enoxaparin, 40 mg, Subcutaneous, Q24H FERNANDO  furosemide, 40 mg, Oral, Daily  levothyroxine, 100 mcg, Oral, Early Morning  lisinopril, 5 mg, Oral, Daily  melatonin, 6 mg, Oral, HS  metoprolol succinate, 12 5 mg, Oral, BID  ticagrelor, 90 mg, Oral, BID    Continuous IV Infusions:     PRN Meds:  acetaminophen, 650 mg, Oral, Q4H PRN  morphine injection 2 mg   IV x 1 7/16     Discharge Plan:  TBD but anticipate home once medically stable  Network Utilization Review Department  ATTENTION: Please call with any questions or concerns to [phone] and carefully listen to the prompts so that you are directed to the right person  All voicemails are confidential   Cloyde Havers all requests for admission clinical reviews, approved or denied determinations and any other requests to dedicated fax number below belonging to the campus where the patient is receiving treatment   List of dedicated fax numbers for the Facilities:  [address] DENIALS (Administrative/Medical Necessity) [phone]   [address] (Maternity/NICU/Pediatrics) [phone] [address] [place] [address] [id number] [place] [address] [address] [facility] [place] [address] [phone]

## 2021-07-17 VITALS
RESPIRATION RATE: 18 BRPM | WEIGHT: 154.76 LBS | SYSTOLIC BLOOD PRESSURE: 122 MMHG | HEART RATE: 84 BPM | TEMPERATURE: 97.6 F | BODY MASS INDEX: 25.79 KG/M2 | HEIGHT: 65 IN | DIASTOLIC BLOOD PRESSURE: 73 MMHG | OXYGEN SATURATION: 95 %

## 2021-07-17 LAB
ANION GAP SERPL CALCULATED.3IONS-SCNC: 11 MMOL/L (ref 4–13)
BASOPHILS # BLD AUTO: 0.05 THOUSANDS/ΜL (ref 0–0.1)
BASOPHILS NFR BLD AUTO: 1 % (ref 0–1)
BUN SERPL-MCNC: 31 MG/DL (ref 5–25)
CALCIUM SERPL-MCNC: 7.6 MG/DL (ref 8.3–10.1)
CHLORIDE SERPL-SCNC: 101 MMOL/L (ref 100–108)
CO2 SERPL-SCNC: 26 MMOL/L (ref 21–32)
CREAT SERPL-MCNC: 1.39 MG/DL (ref 0.6–1.3)
EOSINOPHIL # BLD AUTO: 0.17 THOUSAND/ΜL (ref 0–0.61)
EOSINOPHIL NFR BLD AUTO: 3 % (ref 0–6)
ERYTHROCYTE [DISTWIDTH] IN BLOOD BY AUTOMATED COUNT: 16.1 % (ref 11.6–15.1)
GFR SERPL CREATININE-BSD FRML MDRD: 43 ML/MIN/1.73SQ M
GLUCOSE SERPL-MCNC: 96 MG/DL (ref 65–140)
HCT VFR BLD AUTO: 39.4 % (ref 34.8–46.1)
HGB BLD-MCNC: 12.2 G/DL (ref 11.5–15.4)
IMM GRANULOCYTES # BLD AUTO: 0.02 THOUSAND/UL (ref 0–0.2)
IMM GRANULOCYTES NFR BLD AUTO: 0 % (ref 0–2)
LYMPHOCYTES # BLD AUTO: 1.15 THOUSANDS/ΜL (ref 0.6–4.47)
LYMPHOCYTES NFR BLD AUTO: 19 % (ref 14–44)
MCH RBC QN AUTO: 26.1 PG (ref 26.8–34.3)
MCHC RBC AUTO-ENTMCNC: 31 G/DL (ref 31.4–37.4)
MCV RBC AUTO: 84 FL (ref 82–98)
MONOCYTES # BLD AUTO: 0.58 THOUSAND/ΜL (ref 0.17–1.22)
MONOCYTES NFR BLD AUTO: 10 % (ref 4–12)
NEUTROPHILS # BLD AUTO: 4.01 THOUSANDS/ΜL (ref 1.85–7.62)
NEUTS SEG NFR BLD AUTO: 67 % (ref 43–75)
NRBC BLD AUTO-RTO: 0 /100 WBCS
PLATELET # BLD AUTO: 215 THOUSANDS/UL (ref 149–390)
PMV BLD AUTO: 10.8 FL (ref 8.9–12.7)
POTASSIUM SERPL-SCNC: 4.5 MMOL/L (ref 3.5–5.3)
RBC # BLD AUTO: 4.68 MILLION/UL (ref 3.81–5.12)
SODIUM SERPL-SCNC: 138 MMOL/L (ref 136–145)
WBC # BLD AUTO: 5.98 THOUSAND/UL (ref 4.31–10.16)

## 2021-07-17 PROCEDURE — B2111ZZ FLUOROSCOPY OF MULTIPLE CORONARY ARTERIES USING LOW OSMOLAR CONTRAST: ICD-10-PCS | Performed by: INTERNAL MEDICINE

## 2021-07-17 PROCEDURE — 85025 COMPLETE CBC W/AUTO DIFF WBC: CPT | Performed by: STUDENT IN AN ORGANIZED HEALTH CARE EDUCATION/TRAINING PROGRAM

## 2021-07-17 PROCEDURE — 4A023N7 MEASUREMENT OF CARDIAC SAMPLING AND PRESSURE, LEFT HEART, PERCUTANEOUS APPROACH: ICD-10-PCS | Performed by: INTERNAL MEDICINE

## 2021-07-17 PROCEDURE — 99232 SBSQ HOSP IP/OBS MODERATE 35: CPT | Performed by: INTERNAL MEDICINE

## 2021-07-17 PROCEDURE — 99239 HOSP IP/OBS DSCHRG MGMT >30: CPT | Performed by: STUDENT IN AN ORGANIZED HEALTH CARE EDUCATION/TRAINING PROGRAM

## 2021-07-17 PROCEDURE — 027034Z DILATION OF CORONARY ARTERY, ONE ARTERY WITH DRUG-ELUTING INTRALUMINAL DEVICE, PERCUTANEOUS APPROACH: ICD-10-PCS | Performed by: INTERNAL MEDICINE

## 2021-07-17 PROCEDURE — 80048 BASIC METABOLIC PNL TOTAL CA: CPT | Performed by: STUDENT IN AN ORGANIZED HEALTH CARE EDUCATION/TRAINING PROGRAM

## 2021-07-17 RX ORDER — LISINOPRIL 5 MG/1
5 TABLET ORAL DAILY
Qty: 30 TABLET | Refills: 0 | Status: SHIPPED | OUTPATIENT
Start: 2021-07-18 | End: 2021-08-12

## 2021-07-17 RX ORDER — OXYCODONE HYDROCHLORIDE 5 MG/1
5 TABLET ORAL ONCE
Status: COMPLETED | OUTPATIENT
Start: 2021-07-17 | End: 2021-07-17

## 2021-07-17 RX ORDER — METOPROLOL SUCCINATE 25 MG/1
12.5 TABLET, EXTENDED RELEASE ORAL 2 TIMES DAILY
Qty: 30 TABLET | Refills: 0 | Status: SHIPPED | OUTPATIENT
Start: 2021-07-17 | End: 2021-08-12 | Stop reason: SDUPTHER

## 2021-07-17 RX ORDER — METHOCARBAMOL 500 MG/1
500 TABLET, FILM COATED ORAL EVERY 6 HOURS PRN
Status: DISCONTINUED | OUTPATIENT
Start: 2021-07-17 | End: 2021-07-17 | Stop reason: HOSPADM

## 2021-07-17 RX ORDER — LEVOTHYROXINE SODIUM 0.1 MG/1
100 TABLET ORAL
Qty: 30 TABLET | Refills: 0 | Status: SHIPPED | OUTPATIENT
Start: 2021-07-18 | End: 2021-08-20 | Stop reason: SDUPTHER

## 2021-07-17 RX ORDER — ATORVASTATIN CALCIUM 80 MG/1
80 TABLET, FILM COATED ORAL
Qty: 30 TABLET | Refills: 0 | Status: SHIPPED | OUTPATIENT
Start: 2021-07-17 | End: 2021-08-12 | Stop reason: SDUPTHER

## 2021-07-17 RX ORDER — FUROSEMIDE 40 MG/1
40 TABLET ORAL DAILY
Qty: 30 TABLET | Refills: 0 | Status: SHIPPED | OUTPATIENT
Start: 2021-07-18 | End: 2021-08-12 | Stop reason: SDUPTHER

## 2021-07-17 RX ADMIN — METHOCARBAMOL 500 MG: 500 TABLET ORAL at 03:02

## 2021-07-17 RX ADMIN — OXYCODONE HYDROCHLORIDE 5 MG: 5 TABLET ORAL at 01:46

## 2021-07-17 RX ADMIN — ASPIRIN 81 MG CHEWABLE TABLET 81 MG: 81 TABLET CHEWABLE at 08:08

## 2021-07-17 RX ADMIN — LEVOTHYROXINE SODIUM 100 MCG: 100 TABLET ORAL at 06:35

## 2021-07-17 RX ADMIN — LISINOPRIL 5 MG: 5 TABLET ORAL at 08:08

## 2021-07-17 RX ADMIN — ACETAMINOPHEN 650 MG: 325 TABLET, FILM COATED ORAL at 03:02

## 2021-07-17 RX ADMIN — METHOCARBAMOL 500 MG: 500 TABLET ORAL at 10:15

## 2021-07-17 RX ADMIN — METOPROLOL SUCCINATE 12.5 MG: 25 TABLET, EXTENDED RELEASE ORAL at 08:09

## 2021-07-17 RX ADMIN — TICAGRELOR 90 MG: 90 TABLET ORAL at 08:08

## 2021-07-17 RX ADMIN — FUROSEMIDE 40 MG: 40 TABLET ORAL at 08:09

## 2021-07-17 RX ADMIN — ENOXAPARIN SODIUM 40 MG: 40 INJECTION SUBCUTANEOUS at 08:09

## 2021-07-17 RX ADMIN — NITROGLYCERIN 0.4 MG: 0.4 TABLET SUBLINGUAL at 01:07

## 2021-07-17 NOTE — PROGRESS NOTES
Cardiology Progress Note   MD Nain Bermudez MD Floretta Min, DO, [facility]  MD Mae Duran DO, Veronica Sanchez DO, [facility]  ----------------------------------------------------------------  [address] [address]    Camilla Rodriguez 49 y o  female MRN: [id number]  Unit/Bed#: E4 -01 Encounter: [id number]      ASSESSMENT:   · Acute on chronic systolic heart failure - resolved  · CAD s/p cath w/ SEJAL-OM1, patent SEJAL-LAD x 3 (6/2020), with residual 50% pLAD, 40% mRCA, luminal and irregularities of LM, July 15, 2021  · LVEF 38% with inferior hypokinesis, diastolic dysfunction, mild LA dilatation, AV sclerosis, mild AR, moderate MR, mild TR, trace to small circumferential pericardial effusion, July 2021  · Abnormal pharmacologic nuclear stress test with fixed inferior and apical defect consistent with infarct, gated EF 28%, July 2021  · MARINA  · Left bundle branch block - rate related  · History of ischemic and non-ischemic cardiomyopathy  · Anemia  · Goiter s/p thyroidectomy with hypothyroidism  · History of syncope secondary orthostasis/volume depletion  · History of myopericarditis    PLAN:  Patient had episode of chest discomfort yesterday which resolved overnight  The pain came back when she rotated her torso and is reproducible to palpation; appears to be musculoskeletal  Cardiac enzymes trending down  Minimum 1 year dual anti-platelet therapy; risks of noncompliance with therapy have been discussed extensively  Continue high-intensity statin with goal LDL of less than 50 mg/dL  She is tolerating the ACE-inhibitor and diuretic; continue current dosing  Ambulate patient and see if she is feeling back to her baseline  Should the patient have chest discomfort is non reproducible worsening in frequency or severity or change in quality, or significant shortness of breath, I recommend she seek immediate medical attention/dial 911  Recommend follow-up within 1-2 weeks of discharge for additional CV testing as clinically indicated    Signed: Magalys Winston DO, [facility], [facility]      History of Present Illness:  Patient seen and examined  Admits to chest discomfort that comes on when she rotates her torso and is reproducible to palpation  Denies lower extremity swelling, orthopnea or paroxysmal nocturnal dyspnea  Denies lightheadedness, dizziness or palpitations  Review of Systems:  Review of Systems   Constitutional: Negative for decreased appetite, fever, weight gain and weight loss  HENT: Negative for congestion and sore throat  Eyes: Negative for visual disturbance  Cardiovascular: Negative for chest pain, dyspnea on exertion, leg swelling, near-syncope and palpitations  Respiratory: Negative for cough and shortness of breath  Hematologic/Lymphatic: Negative for bleeding problem  Skin: Negative for rash  Musculoskeletal: Negative for myalgias and neck pain  Gastrointestinal: Negative for abdominal pain and nausea  Neurological: Negative for light-headedness and weakness  Psychiatric/Behavioral: Negative for depression  Allergies   Allergen Reactions    Penicillins Rash       No current facility-administered medications on file prior to encounter       Current Outpatient Medications on File Prior to Encounter   Medication Sig    carvedilol (COREG) 12 5 mg tablet Take 1 tablet (12 5 mg total) by mouth 2 (two) times a day with meals (Patient taking differently: Take 6 25 mg by mouth 2 (two) times a day with meals )    ferrous gluconate (FERGON) 324 mg tablet TAKE 1 TABLET (324 MG TOTAL) BY MOUTH 2 (TWO) TIMES A DAY BEFORE MEALS    furosemide (LASIX) 20 mg tablet TAKE 1 TABLET BY MOUTH ONCE DAILY EVERY Monday, Wednesday, and Friday    levothyroxine 150 mcg tablet Take 1 tablet (150 mcg total) by mouth daily    ticagrelor (BRILINTA) 90 MG Take 1 tablet (90 mg total) by mouth 2 (two) times a day    acetaminophen (TYLENOL) 325 mg tablet Take 2 tablets (650 mg total) by mouth every 4 (four) hours as needed for mild pain, headaches or fever    aspirin 81 mg chewable tablet Chew 1 tablet (81 mg total) daily (Patient not taking: Reported on 7/11/2021)    atorvastatin (LIPITOR) 40 mg tablet Take 1 tablet (40 mg total) by mouth daily (Patient taking differently: Take 20 mg by mouth daily )    colchicine (COLCRYS) 0 6 mg tablet Take 1 tablet (0 6 mg total) by mouth 2 (two) times a day    nitroglycerin (NITROSTAT) 0 4 mg SL tablet Place 1 tablet (0 4 mg total) under the tongue every 5 (five) minutes as needed for chest pain        Current Facility-Administered Medications   Medication Dose Route Frequency Provider Last Rate    acetaminophen  650 mg Oral Q4H PRN Maria R Kay, CRNP      aspirin  81 mg Oral Daily Maria R Eliza, CRNP      atorvastatin  80 mg Oral After Marilee Telles, DO      enoxaparin  40 mg Subcutaneous Q24H [facility], MD      furosemide  40 mg Oral Daily Kristin Ibarra MD      levothyroxine  100 mcg Oral Early Morning Kristin Ibarra MD      lisinopril  5 mg Oral Daily Kristin Ibarra MD      melatonin  6 mg Oral HS Rubi Dress, CRNP      methocarbamol  500 mg Oral Q6H PRN DELORES Ontiveros      metoprolol succinate  12 5 mg Oral BID Maria R Kay, CRNP      nitroglycerin  0 4 mg Sublingual Q5 Min PRN Karly Tam,       ticagrelor  90 mg Oral BID Adama Diaz MD              Vitals:    07/16/21 1500 07/16/21 1900 07/16/21 2300 07/17/21 0700   BP: 115/79 105/70 121/68 122/73   BP Location: Right arm Right arm Right arm Right arm   Pulse: 68 93 83 84   Resp: 18 18 16 18   Temp: (!) 96 9 °F (36 1 °C) (!) 96 4 °F (35 8 °C) 98 1 °F (36 7 °C) 97 6 °F (36 4 °C)   TempSrc: Temporal Temporal Temporal Tympanic   SpO2: 96% 97% 94% 95%   Weight:       Height:             Intake/Output Summary (Last 24 hours) at 7/17/2021 0857  Last data filed at 7/16/2021 1930  Gross per 24 hour   Intake 120 ml   Output --   Net 120 ml       Weight change:     PHYSICAL EXAMINATION:  Gen: Awake, Alert, NAD  Head/eyes: AT/NC, pupils equal and round, Anicteric  ENT: mmm  Neck: Supple, No elevated JVP, trachea midline  Resp: CTA bilaterally no w/r/r  CV: RRR +S1, S2, No m/r/g  Abd: Soft, NT/ND + BS  Ext: no LE edema bilaterally; R radial site c/d/i  Neuro: Follows commands, moves all extermities  Psych: Appropriate affect, normal mood, pleasant attitude, non-combative  Skin: warm; no rash, erythema or venous stasis changes on exposed skin    Lab Results:  Results from last 7 days   Lab Units 07/17/21  0543   WBC Thousand/uL 5 98   HEMOGLOBIN g/dL 12 2   HEMATOCRIT % 39 4   PLATELETS Thousands/uL 215     Results from last 7 days   Lab Units 07/17/21  0543 07/13/21  0625   POTASSIUM mmol/L 4 5 3 6   CHLORIDE mmol/L 101 101   CO2 mmol/L 26 30   BUN mg/dL 31* 29*   CREATININE mg/dL 1 39* 1 46*   CALCIUM mg/dL 7 6* 6 8*   ALK PHOS U/L  --  120*   ALT U/L  --  37   AST U/L  --  39     No results found for: TROPONINT      Results from last 7 days   Lab Units 07/16/21  1225 07/16/21  0853 07/16/21  0645   TROPONIN I ng/mL 1 10* 1 15* 1 27*     Results from last 7 days   Lab Units 07/11/21  0409   INR  1 07       Tele:     This note was completed in part utilizing M-Modal Fluency Direct Software  Grammatical errors, random word insertions, spelling mistakes, and incomplete sentences may be an occasional consequence of this system secondary to software limitations, ambient noise, and hardware issues  If you have any questions or concerns about the content, text, or information contained within the body of this dictation, please contact the provider for clarification

## 2021-07-17 NOTE — ASSESSMENT & PLAN NOTE
Wt Readings from Last 3 Encounters:   07/12/21 70 2 kg (154 lb 12 2 oz)   05/19/21 64 4 kg (142 lb)   05/07/21 64 4 kg (142 lb)     Discontinue IV Lasix  Continue Lasix 40 mg daily  Continue Toprol-XL, add lisinopril

## 2021-07-17 NOTE — ASSESSMENT & PLAN NOTE
Patient presented with chest pain, now resolved  Troponins elevated  Heparin drip discontinued  S/P cardiac cath 07/15 with PCI to OM1  Continue aspirin, statin and brilinta  Reports some cp today, improved and discharged home

## 2021-07-17 NOTE — DISCHARGE SUMMARY
[address] [facility]  Discharge- Stacey Davis 1965, 49 y o  female MRN: [id number]  Unit/Bed#: E4 -01 Encounter: [id number]  Primary Care Provider: Anisa Guillen PA-C   Date and time admitted to hospital: 7/11/2021  3:16 AM    * Chest pain  Assessment & Plan  Patient presented with chest pain, now resolved  Troponins elevated  Heparin drip discontinued  S/P cardiac cath 07/15 with PCI to OM1  Continue aspirin, statin and brilinta  Reports some cp today, improved and discharged home    Cardiomyopathy [facility])  Assessment & Plan  History of ischemic cardiomyopathy  Current 2D echo shows ef of 38%, no wall motion abn    MARINA (acute kidney injury) ([facility] [address] )  Assessment & Plan  Resolved    Postoperative hypothyroidism  Assessment & Plan  Low TSH, elevated T4  Will decrease levothyroxine to 100mcg, repeat TSH in 4-6 weeks for further titration if needed    Acute systolic congestive heart failure (HCC)  Assessment & Plan  Wt Readings from Last 3 Encounters:   07/12/21 70 2 kg (154 lb 12 2 oz)   05/19/21 64 4 kg (142 lb)   05/07/21 64 4 kg (142 lb)     Discontinue IV Lasix  Continue Lasix 40 mg daily  Continue Toprol-XL, add lisinopril         Discharging Physician / Practitioner: Frances Gautam MD  PCP: Anisa Guillen PA-C  Admission Date:   Admission Orders (From admission, onward)     Ordered        07/11/21 0455  Inpatient Admission  Once         07/11/21 0457  Inpatient Admission  Once                   Discharge Date: 07/17/21    Medical Problems     Resolved Problems  Date Reviewed: 7/17/2021    None                Consultations During Hospital Stay:  · Cardiology    Procedures Performed:   · Cardiac catheterization  CORONARY VESSELS:   --  The coronary circulation is right dominant  --  Left main: The vessel was large sized  Angiography showed minor luminal irregularities  --  LAD: The vessel was large sized  Angiography showed the vessel to wrap around the cardiac apex   There was one major diagonal branch  --  Proximal LAD: There was a tubular 50 % stenosis  --  Mid LAD: There was a 0 % stenosis at the site of a prior stent  --  1st diagonal: The vessel was medium sized  --  Circumflex: The vessel was medium sized  There were two major obtuse marginals  --  1st obtuse marginal: There was a 90 % stenosis  This is a likely culprit for the patient's clinical presentation  An intervention was performed  --  RCA: The vessel was medium sized and moderately tortuous  --  Mid RCA: There was a 40 % stenosis      IMPRESSIONS:  There is significant double vessel coronary artery disease      RECOMMENDATIONS  Patient management should include increasing lipid lowering therapy  Patient management to include dual antiplatelet therapy  Significant Findings / Test Results:   XR chest 1 view portable    Result Date: 7/11/2021  Impression: Moderate congestive changes  Possible infiltrates in the lung bases  Workstation performed: [id number]       Incidental Findings:   · None     Test Results Pending at Discharge (will require follow up): · None     Outpatient Tests Requested:  · None    Complications:  None    Reason for Admission: Chest Pains    Hospital Course:     Stacey Davis is a 49 y o  female patient who originally presented to the hospital on 7/11/2021 due to chest pain and shortness of breath  Found to have elevated troponins and was started on heparin drip  Patient was diuresed with IV Lasix  Cardiology evaluated patient and stresses showed a fixed defect but given patient's chest pain and elevated troponins with some EKG changes  Cardiac catheterization was ordered and patient did have stenosis in her OM 1 requiring PCI  Patient discharged home to complete 1 year of therapy of Brilinta, aspirin and high-intensity statin  Patient does have history of systolic heart failure and recommended that patient continue Toprol-XL 12 5 mg b i d  And lisinopril 5 mg on discharge  With patient's diuretics she is to be on Lasix 40 mg once daily  Follow-up with cardiology in 1-2 weeks  Cardiac rehab referral given  TSH was remarkably suppressed with elevated free T4  Recommending decreasing dose of levothyroxine from 150-100 mcg  To follow-up with PCP and repeat TSH in 4-6 weeks  Please see above list of diagnoses and related plan for additional information  Condition at Discharge: fair     Discharge Day Visit / Exam:     Subjective:  Patient reports doing well today  Denies any chest pain or shortness of breath  Vitals: Blood Pressure: 122/73 (07/17/21 0700)  Pulse: 84 (07/17/21 0700)  Temperature: 97 6 °F (36 4 °C) (07/17/21 0700)  Temp Source: Tympanic (07/17/21 0700)  Respirations: 18 (07/17/21 0700)  Height: 5' 5" (165 1 cm) (07/12/21 0106)  Weight - Scale: 70 2 kg (154 lb 12 2 oz) (07/12/21 0106)  SpO2: 95 % (07/17/21 0700)  Exam:   Physical Exam  Vitals and nursing note reviewed  Constitutional:       Appearance: She is normal weight  HENT:      Head: Normocephalic and atraumatic  Eyes:      General: No scleral icterus  Conjunctiva/sclera: Conjunctivae normal    Cardiovascular:      Rate and Rhythm: Normal rate and regular rhythm  Heart sounds: Normal heart sounds  Pulmonary:      Breath sounds: Normal breath sounds  No wheezing or rhonchi  Abdominal:      General: Bowel sounds are normal  There is no distension  Palpations: Abdomen is soft  Tenderness: There is no abdominal tenderness  Musculoskeletal:         General: No swelling  Normal range of motion  Right lower leg: No edema  Left lower leg: No edema  Skin:     General: Skin is warm and dry  Neurological:      General: No focal deficit present  Mental Status: She is alert  Mental status is at baseline           Discussion with Family:  Patient    Discharge instructions/Information to patient and family:   See after visit summary for information provided to patient and family  Provisions for Follow-Up Care:  See after visit summary for information related to follow-up care and any pertinent home health orders  Disposition:     Home    For Discharges to [facility] SNF:   · Not Applicable to this Patient - Not Applicable to this Patient    Planned Readmission:  None     Discharge Statement:  I spent 35 minutes discharging the patient  This time was spent on the day of discharge  I had direct contact with the patient on the day of discharge  Greater than 50% of the total time was spent examining patient, answering all patient questions, arranging and discussing plan of care with patient as well as directly providing post-discharge instructions  Additional time then spent on discharge activities  Discharge Medications:  See after visit summary for reconciled discharge medications provided to patient and family        ** Please Note: This note has been constructed using a voice recognition system **

## 2021-07-17 NOTE — PLAN OF CARE
Problem: Potential for Falls  Goal: Patient will remain free of falls  Description: INTERVENTIONS:  - Educate patient/family on patient safety including physical limitations  - Instruct patient to call for assistance with activity   - Consult OT/PT to assist with strengthening/mobility   - Keep Call bell within reach  - Keep bed low and locked with side rails adjusted as appropriate  - Keep care items and personal belongings within reach  - Initiate and maintain comfort rounds  - Make Fall Risk Sign visible to staff  - Offer Toileting every  Hours, in advance of need  - Initiate/Maintain alarm  - Obtain necessary fall risk management equipment:  - Apply yellow socks and bracelet for high fall risk patients  - Consider moving patient to room near nurses station  7/17/2021 1110 by Hemal Narvaez RN  Outcome: Adequate for Discharge  7/17/2021 0934 by Hemal Narvaez RN  Outcome: Progressing     Problem: Nutrition/Hydration-ADULT  Goal: Nutrient/Hydration intake appropriate for improving, restoring or maintaining nutritional needs  Description: Monitor and assess patient's nutrition/hydration status for malnutrition  Collaborate with interdisciplinary team and initiate plan and interventions as ordered  Monitor patient's weight and dietary intake as ordered or per policy  Utilize nutrition screening tool and intervene as necessary  Determine patient's food preferences and provide high-protein, high-caloric foods as appropriate       INTERVENTIONS:  - Monitor oral intake, urinary output, labs, and treatment plans  - Assess nutrition and hydration status and recommend course of action  - Evaluate amount of meals eaten  - Assist patient with eating if necessary   - Allow adequate time for meals  - Recommend/ encourage appropriate diets, oral nutritional supplements, and vitamin/mineral supplements  - Order, calculate, and assess calorie counts as needed  - Recommend, monitor, and adjust tube feedings and TPN/PPN based on assessed needs  - Assess need for intravenous fluids  - Provide specific nutrition/hydration education as appropriate  - Include patient/family/caregiver in decisions related to nutrition  7/17/2021 1110 by Ligia Titus RN  Outcome: Adequate for Discharge  7/17/2021 0934 by Ligia Titus RN  Outcome: Progressing     Problem: CARDIOVASCULAR - ADULT  Goal: Maintains optimal cardiac output and hemodynamic stability  Description: INTERVENTIONS:  - Monitor I/O, vital signs and rhythm  - Monitor for S/S and trends of decreased cardiac output  - Administer and titrate ordered vasoactive medications to optimize hemodynamic stability  - Assess quality of pulses, skin color and temperature  - Assess for signs of decreased coronary artery perfusion  - Instruct patient to report change in severity of symptoms  7/17/2021 1110 by Ligia Titus RN  Outcome: Adequate for Discharge  7/17/2021 0934 by Ligia Titus RN  Outcome: Progressing  Goal: Absence of cardiac dysrhythmias or at baseline rhythm  Description: INTERVENTIONS:  - Continuous cardiac monitoring, vital signs, obtain 12 lead EKG if ordered  - Administer antiarrhythmic and heart rate control medications as ordered  - Monitor electrolytes and administer replacement therapy as ordered  7/17/2021 1110 by Ligia Titus RN  Outcome: Adequate for Discharge  7/17/2021 0934 by Ligia Titus RN  Outcome: Progressing     Problem: HEMATOLOGIC - ADULT  Goal: Maintains hematologic stability  Description: INTERVENTIONS  - Assess for signs and symptoms of bleeding or hemorrhage  - Monitor labs  - Administer supportive blood products/factors as ordered and appropriate  7/17/2021 1110 by Ligia Titus RN  Outcome: Adequate for Discharge  7/17/2021 0934 by Ligia Titus RN  Outcome: Progressing

## 2021-07-19 ENCOUNTER — TELEPHONE (OUTPATIENT)
Dept: FAMILY MEDICINE CLINIC | Facility: CLINIC | Age: 56
End: 2021-07-19

## 2021-07-19 NOTE — TELEPHONE ENCOUNTER
Received this message from team before pt d/c  Can we call pt and set her up with an apt  To review her admission please  HAKEEM? ?  -KB        ----- Message from Cuba Cameron MD sent at 7/17/2021  1:32 PM EDT -----  Thank you for allowing us to participate in the care of your patient, Alicia Aguilar, who was hospitalized from 7/11/2021 through 7/17/2021 with the admitting diagnosis of NSTEMI, systolic heart failure exacerbation  Patient was diuresed with diuretics and once stable patient had cardiac catheterization requiring PCI  Follow-up to make sure that patient is compliant with all her medications, Lasix 40 mg, lisinopril 5 mg, Toprol-XL 12 5 b i d , Brilinta 90 mg b i d , aspirin 81 mg, and Lipitor 80 mg  Also noted, decrease levothyroxine to 100 mcg will likely need repeat TSH outpatient in 6-8 weeks  If you have any additional questions or would like to discuss further, please feel free to contact me      Cuba Cameron MD  [place] [address] Internal Medicine, Hospitalist  [phone]

## 2021-07-20 ENCOUNTER — TRANSITIONAL CARE MANAGEMENT (OUTPATIENT)
Dept: FAMILY MEDICINE CLINIC | Facility: CLINIC | Age: 56
End: 2021-07-20

## 2021-07-21 ENCOUNTER — CLINICAL SUPPORT (OUTPATIENT)
Dept: CARDIAC REHAB | Facility: HOSPITAL | Age: 56
End: 2021-07-21
Attending: STUDENT IN AN ORGANIZED HEALTH CARE EDUCATION/TRAINING PROGRAM
Payer: COMMERCIAL

## 2021-07-21 DIAGNOSIS — Z95.5 S/P CORONARY ARTERY STENT PLACEMENT: Primary | ICD-10-CM

## 2021-07-21 DIAGNOSIS — R07.9 CHEST PAIN: ICD-10-CM

## 2021-07-21 PROCEDURE — 93797 PHYS/QHP OP CAR RHAB WO ECG: CPT

## 2021-07-21 NOTE — PROGRESS NOTES
Cardiac Rehabilitation Plan of Care   Initial Care Plan          Today's date: 2021   # of Exercise Sessions Completed: 1-evaluation  Patient name: Deepak Pineda      : 1965  Age: 49 y o  MRN: [id number]  Referring Physician: Ilene Hernandez MD  Cardiologist: Dr Dowell Record  Provider: Narciso  Clinician: Dillon Peres MS, CEP      Dx:   Encounter Diagnoses   Name Primary?  Chest pain     S/P coronary artery stent placement Yes     Date of onset: 2021      SUMMARY OF PROGRESS:  Mrs Chely Javed is here today for her cardiac rehabilitation evaluation after recent stenting procedure  She reports she also had stenting done about a year ago  She also has cardiomyopathy  She reports she has been feeling tired and does not have much energy overall  She works from home and is sedentary most of the time  Her main goals for her program are to increase energy levels to be able to walk 1-2 miles, increase overall strength and endurance, and follow a low salt diet  She has started working on dietary changes, and would like to learn more about heart healthy eating  She reports her biggest struggle is decreasing salt, and she enjoys soda  She denies depression (PHQ-9=7), but does report some anxiety GGAD-7= 6)  She reports she had good support from her family, including children and her parents  She completed TM ETT today reaching THR at 4 3 METs at 9 min  Will plan to start exercise at 3 0-3 5 METs and increase as tolerated by patient over first 30 days of rehab  She is in agreement to attend cardiac rehab sessions 3x/week for 12 weeks, or 36 sessions starting 2021        Medication compliance: Yes   Comments: Pt reports to be compliant with medications  Fall Risk: Low   Comments: Ambulates with a steady gait with no assist device    EKG Interpretation: NSR      EXERCISE ASSESSMENT and PLAN    Current Exercise Program in Rehab:       Frequency: 3 days/week Supplement with home exercise 2+ days/wk as tolerated       Minutes: 30-40         METS: 3 0-3 5            HR: 30 beats above resting   RPE: 4-6         Modalities: Treadmill, UBE, NuStep and Recumbent bike      Exercise Progression 30 Day Goals :    Frequency: 3 days/week of cardiac rehab     Supplement with home exercise 2+ days/wk as tolerated    Minutes: 40                            >150 mins/wk of moderate intensity exercise   METS: 3 5   HR: 30 beats above resting    RPE: 4-6   Modalities: Treadmill, UBE, NuStep and Recumbent bike    Strength trainin-3 days / week  12-15 repetitions  1-2 sets per modality   Will be added following 2-3 weeks of monitored exercise sessions   Modalities: Pull Downs, Lateral Raise, Arm Extension, Arm Curl, Front Raises, Shoulder Shrugs and leg ext    Home Exercise: none    Goals: 10% improvement in functional capacity - based on max METs achieved in fitness assessment, Reduced dyspnea with physical activity  0-1/10, improved DASI score by 10%, Increase in exercise capacity by 40% - based on peak METs tolerated in cardiac rehab exercise session, Exercise 5 days/wk, >150mins/wk of moderate intensity exercise, Resume ADLs with increased strength, Attend Rehab regularly, Decrease sitting time and Start a walking program    Progression Toward Goals:  Reviewed Pt goals and determined plan of care    Education: benefit of exercise for CAD risk factors, home exercise guidelines, AHA guidelines to achieve >150 mins/wk of moderate exercise and RPE scale   Plan:education on home exercise guidelines and home exercise 30+ mins 2 days opposite CR  Readiness to change: Preparation:  (Getting ready to change)       NUTRITION ASSESSMENT AND PLAN    Weight control:    Starting weight: 134 lb   Current weight:     Waist circumference:    Startin"   Current:      Diabetes: N/A  A1c: n/a    last measured: n/a    Lipid management: Discussed diet and lipid management and Last lipid profile 7/15/2021  Chol 167 TRG 93  HDL 61  LDL 87    Goals:LDL <100, HDL >40, TRG <150, CHOL <200, eliminate processed meats and increase intake of fish, shellfish, low sodium options    Progression Toward Goals: Reviewed Pt goals and determined plan of care    Education: heart healthy eating  low sodium diet  hydration  nutrition for  lipid management  Plan: Education class: Reading Food Labels and Education Class: Heart Healthy Eating  Readiness to change: Action:  (Changing behavior)      PSYCHOSOCIAL ASSESSMENT AND PLAN    Emotional:  Depression assessment:  PHQ-9 = 1-4 = Minimal Depression            Anxiety measure:  CHATA-7 = 0-4  = Not anxious  Self-reported stress level:  3  Social support: Excellent    Goals:   Increased interest in doing things, improved concentration and increased energy    Progression Toward Goals: Reviewed Pt goals and determined plan of care    Education: signs/sxs of depression and benefits of a positive support system  Plan: Class: Stress and Your Health and Class: Relaxation  Readiness to change: Action:  (Changing behavior)      OTHER CORE COMPONENTS     Tobacco:   Social History     Tobacco Use   Smoking Status Never Smoker   Smokeless Tobacco Never Used       Tobacco Use Intervention:   N/A:  Patient is a non-smoker     Anginal Symptoms:  None   NTG use: No prescription    Blood pressure:    Restin/58   Exercise: 100/62    Goals: consistent BP < 130/80, reduced dietary sodium <2300mg, moderate intensity exercise >150 mins/wk and medication compliance    Progression Toward Goals: Reviewed Pt goals and determined plan of care    Education:  understanding high blood pressure and it's relationship to CAD, low sodium diet and HTN, Education class:  Common Heart Medications and Education class: Understanding Heart Disease  Plan: Avoid Processed foods, engage in regular exercise, eliminate salt shaker at the table and use salt substitutes  Readiness to change: Action:  (Changing behavior)

## 2021-07-21 NOTE — PROGRESS NOTES
CARDIAC REHAB ASSESSMENT    Today's date: 2021  Patient name: Kavon Brumfield     : 1965       MRN: [id number]  PCP: Reymundo Sotelo PA-C  Referring Physician: Dr Lili Joaquin  Cardiologist: Dr Lili Joaquin  Surgeon: n/a  Dx: stent      Date of onset: 2021  Cultural needs: n/a    Weight:  134 lb     Height:   Ht Readings from Last 1 Encounters:   21 5' 5" (1 651 m)     Medical History:   Past Medical History:   Diagnosis Date    Allergies     CHF (congestive heart failure) ([facility] [address] )     Coronary artery disease     Disease of thyroid gland     hypo    Renal disorder          Physical Limitations: none    Fall Risk: Low   Comments: Ambulates with a steady gait with no assist device    Anginal Equivalent: None/denies angina   NTG use: No prescription    Risk Factors   Cholesterol: No  Smoking: Never used  HTN: No  DM: No  Obesity: No   Inactivity: Yes  Stress:  perceived  stress: 3/10   Stressors:reports minimal stress   Goals for Stress Management:Practice Relaxation Techniques    Family History:  Family History   Problem Relation Age of Onset    No Known Problems Mother     No Known Problems Father     Cancer Family        Allergies: Penicillins  ETOH:   Social History     Substance and Sexual Activity   Alcohol Use Yes    Comment: only on special ocassions            Current Medications:   Current Outpatient Medications   Medication Sig Dispense Refill    acetaminophen (TYLENOL) 325 mg tablet Take 2 tablets (650 mg total) by mouth every 4 (four) hours as needed for mild pain, headaches or fever 30 tablet 0    aspirin 81 mg chewable tablet Chew 1 tablet (81 mg total) daily (Patient not taking: Reported on 2021) 30 tablet 0    atorvastatin (LIPITOR) 80 mg tablet Take 1 tablet (80 mg total) by mouth daily after dinner 30 tablet 0    ferrous gluconate (FERGON) 324 mg tablet TAKE 1 TABLET (324 MG TOTAL) BY MOUTH 2 (TWO) TIMES A DAY BEFORE MEALS 60 tablet 0    furosemide (LASIX) 40 mg tablet Take 1 tablet (40 mg total) by mouth daily 30 tablet 0    levothyroxine 100 mcg tablet Take 1 tablet (100 mcg total) by mouth daily in the early morning 30 tablet 0    lisinopril (ZESTRIL) 5 mg tablet Take 1 tablet (5 mg total) by mouth daily 30 tablet 0    metoprolol succinate (TOPROL-XL) 25 mg 24 hr tablet Take 0 5 tablets (12 5 mg total) by mouth 2 (two) times a day 30 tablet 0    nitroglycerin (NITROSTAT) 0 4 mg SL tablet Place 1 tablet (0 4 mg total) under the tongue every 5 (five) minutes as needed for chest pain 30 tablet 0    ticagrelor (BRILINTA) 90 MG Take 1 tablet (90 mg total) by mouth 2 (two) times a day 60 tablet 11    ticagrelor (BRILINTA) 90 MG Take 1 tablet (90 mg total) by mouth 2 (two) times a day 60 tablet 0     No current facility-administered medications for this visit  Functional Status Prior to Diagnosis for Treatment   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  Recreation: none  ADLs: No limitations  [place]: No limitations  Exercise: some walking until felt unable to walk far distances due to shortness of breath and weakness  Other: n/a    Current Functional Status  Occupation: full time job -on short term disability  Recreation: none  ADLs:No limitations  [place]: No limitations  Exercise: no regular aerobic exercise  Other: n/a    Patient Specific Goals:   Increase energy levels to be able to walk 1-2 miles, increase overall strength and stamina, follow lower salt diet    Short Term Program Goals: dietary modifications increased strength improved energy/stamina with ADLs exercise 120-150 mins/wk    Long Term Goals: increased maximal walking duration  increased intial training workload  Improved [facility] Activity Status score  Improved functional capacity  Improved Quality of Life - [facility] score reduced    Ability to reach goals/rehabilitation potential:  Excellent    Projected return to function: 12 weeks  Objective tests: sub-max TM ETT      Nutritional   Reviewed details of Rate your Plate  Discussed key elements of heart healthy eating  Reviewed patient goals for dietary modifications and their clinical implications  Reviewed most recent lipid profile       Goals for dietary modification: choose lean cuts of meat  poultry without the skin  low fat ground meat and poultry  eliminate processed meats  reduce portions of meat to 3 oz  increase fish intake  more meatless meals  low fat dairy   reduced fat cheese  increase whole grains  increase fruits and vegetables  eliminate butter  low sodium  improved snack choices  more nuts/seeds  reduce sweets/frozen desserts      Emotional/Social  Patient reports he/she is coping well with good social support and denies depression or anxiety  Reports sufficient emotional support    Marital status:     Domestic Violence Screening: No    Comments: n/a

## 2021-07-22 ENCOUNTER — CLINICAL SUPPORT (OUTPATIENT)
Dept: CARDIAC REHAB | Facility: HOSPITAL | Age: 56
End: 2021-07-22
Attending: STUDENT IN AN ORGANIZED HEALTH CARE EDUCATION/TRAINING PROGRAM
Payer: COMMERCIAL

## 2021-07-22 DIAGNOSIS — Z95.5 S/P CORONARY ARTERY STENT PLACEMENT: ICD-10-CM

## 2021-07-22 PROCEDURE — 93798 PHYS/QHP OP CAR RHAB W/ECG: CPT

## 2021-07-23 ENCOUNTER — CLINICAL SUPPORT (OUTPATIENT)
Dept: CARDIAC REHAB | Facility: HOSPITAL | Age: 56
End: 2021-07-23
Attending: STUDENT IN AN ORGANIZED HEALTH CARE EDUCATION/TRAINING PROGRAM
Payer: COMMERCIAL

## 2021-07-23 DIAGNOSIS — Z95.5 S/P CORONARY ARTERY STENT PLACEMENT: ICD-10-CM

## 2021-07-23 PROCEDURE — 93798 PHYS/QHP OP CAR RHAB W/ECG: CPT

## 2021-07-27 ENCOUNTER — CLINICAL SUPPORT (OUTPATIENT)
Dept: CARDIAC REHAB | Facility: HOSPITAL | Age: 56
End: 2021-07-27
Attending: STUDENT IN AN ORGANIZED HEALTH CARE EDUCATION/TRAINING PROGRAM
Payer: COMMERCIAL

## 2021-07-27 DIAGNOSIS — Z95.5 S/P CORONARY ARTERY STENT PLACEMENT: ICD-10-CM

## 2021-07-27 PROCEDURE — 93798 PHYS/QHP OP CAR RHAB W/ECG: CPT

## 2021-07-29 ENCOUNTER — CLINICAL SUPPORT (OUTPATIENT)
Dept: CARDIAC REHAB | Facility: HOSPITAL | Age: 56
End: 2021-07-29
Attending: STUDENT IN AN ORGANIZED HEALTH CARE EDUCATION/TRAINING PROGRAM
Payer: COMMERCIAL

## 2021-07-29 DIAGNOSIS — Z95.5 S/P CORONARY ARTERY STENT PLACEMENT: ICD-10-CM

## 2021-07-29 PROCEDURE — 93798 PHYS/QHP OP CAR RHAB W/ECG: CPT

## 2021-07-30 ENCOUNTER — CLINICAL SUPPORT (OUTPATIENT)
Dept: CARDIAC REHAB | Facility: HOSPITAL | Age: 56
End: 2021-07-30
Attending: STUDENT IN AN ORGANIZED HEALTH CARE EDUCATION/TRAINING PROGRAM
Payer: COMMERCIAL

## 2021-07-30 DIAGNOSIS — Z95.5 S/P CORONARY ARTERY STENT PLACEMENT: ICD-10-CM

## 2021-07-30 PROCEDURE — 93798 PHYS/QHP OP CAR RHAB W/ECG: CPT

## 2021-08-02 ENCOUNTER — CLINICAL SUPPORT (OUTPATIENT)
Dept: CARDIAC REHAB | Facility: CLINIC | Age: 56
End: 2021-08-02
Attending: STUDENT IN AN ORGANIZED HEALTH CARE EDUCATION/TRAINING PROGRAM
Payer: COMMERCIAL

## 2021-08-02 DIAGNOSIS — Z95.5 S/P CORONARY ARTERY STENT PLACEMENT: ICD-10-CM

## 2021-08-02 PROCEDURE — 93798 PHYS/QHP OP CAR RHAB W/ECG: CPT

## 2021-08-04 ENCOUNTER — CLINICAL SUPPORT (OUTPATIENT)
Dept: CARDIAC REHAB | Facility: CLINIC | Age: 56
End: 2021-08-04
Attending: STUDENT IN AN ORGANIZED HEALTH CARE EDUCATION/TRAINING PROGRAM
Payer: COMMERCIAL

## 2021-08-04 DIAGNOSIS — Z95.5 S/P PRIMARY ANGIOPLASTY WITH CORONARY STENT: ICD-10-CM

## 2021-08-04 PROCEDURE — 93798 PHYS/QHP OP CAR RHAB W/ECG: CPT

## 2021-08-06 ENCOUNTER — APPOINTMENT (OUTPATIENT)
Dept: CARDIAC REHAB | Facility: CLINIC | Age: 56
End: 2021-08-06
Attending: STUDENT IN AN ORGANIZED HEALTH CARE EDUCATION/TRAINING PROGRAM
Payer: COMMERCIAL

## 2021-08-09 ENCOUNTER — CLINICAL SUPPORT (OUTPATIENT)
Dept: CARDIAC REHAB | Facility: CLINIC | Age: 56
End: 2021-08-09
Attending: STUDENT IN AN ORGANIZED HEALTH CARE EDUCATION/TRAINING PROGRAM
Payer: COMMERCIAL

## 2021-08-09 DIAGNOSIS — Z95.5 S/P PRIMARY ANGIOPLASTY WITH CORONARY STENT: ICD-10-CM

## 2021-08-09 PROCEDURE — 93798 PHYS/QHP OP CAR RHAB W/ECG: CPT

## 2021-08-11 ENCOUNTER — CLINICAL SUPPORT (OUTPATIENT)
Dept: CARDIAC REHAB | Facility: CLINIC | Age: 56
End: 2021-08-11
Attending: STUDENT IN AN ORGANIZED HEALTH CARE EDUCATION/TRAINING PROGRAM
Payer: COMMERCIAL

## 2021-08-11 DIAGNOSIS — Z95.5 S/P PRIMARY ANGIOPLASTY WITH CORONARY STENT: ICD-10-CM

## 2021-08-11 PROCEDURE — 93798 PHYS/QHP OP CAR RHAB W/ECG: CPT

## 2021-08-12 ENCOUNTER — OFFICE VISIT (OUTPATIENT)
Dept: CARDIOLOGY CLINIC | Facility: CLINIC | Age: 56
End: 2021-08-12
Payer: COMMERCIAL

## 2021-08-12 VITALS
OXYGEN SATURATION: 96 % | BODY MASS INDEX: 21.66 KG/M2 | HEART RATE: 102 BPM | WEIGHT: 130 LBS | DIASTOLIC BLOOD PRESSURE: 60 MMHG | HEIGHT: 65 IN | SYSTOLIC BLOOD PRESSURE: 110 MMHG

## 2021-08-12 DIAGNOSIS — R55 SYNCOPE, UNSPECIFIED SYNCOPE TYPE: ICD-10-CM

## 2021-08-12 DIAGNOSIS — I50.22 CHRONIC SYSTOLIC CONGESTIVE HEART FAILURE (HCC): ICD-10-CM

## 2021-08-12 DIAGNOSIS — I50.21 ACUTE SYSTOLIC CONGESTIVE HEART FAILURE (HCC): ICD-10-CM

## 2021-08-12 DIAGNOSIS — R07.9 CHEST PAIN: ICD-10-CM

## 2021-08-12 DIAGNOSIS — I25.5 ISCHEMIC CARDIOMYOPATHY: ICD-10-CM

## 2021-08-12 DIAGNOSIS — I25.10 CORONARY ARTERY DISEASE INVOLVING NATIVE CORONARY ARTERY OF NATIVE HEART WITHOUT ANGINA PECTORIS: Primary | ICD-10-CM

## 2021-08-12 PROCEDURE — 3008F BODY MASS INDEX DOCD: CPT | Performed by: INTERNAL MEDICINE

## 2021-08-12 PROCEDURE — 1036F TOBACCO NON-USER: CPT | Performed by: INTERNAL MEDICINE

## 2021-08-12 PROCEDURE — 99214 OFFICE O/P EST MOD 30 MIN: CPT | Performed by: INTERNAL MEDICINE

## 2021-08-12 RX ORDER — LISINOPRIL 5 MG/1
5 TABLET ORAL DAILY
Qty: 90 TABLET | Refills: 1 | Status: SHIPPED | OUTPATIENT
Start: 2021-08-12 | End: 2021-09-04 | Stop reason: HOSPADM

## 2021-08-12 RX ORDER — ATORVASTATIN CALCIUM 80 MG/1
80 TABLET, FILM COATED ORAL
Qty: 90 TABLET | Refills: 1 | Status: SHIPPED | OUTPATIENT
Start: 2021-08-12 | End: 2021-11-10

## 2021-08-12 RX ORDER — FUROSEMIDE 40 MG/1
40 TABLET ORAL DAILY
Qty: 90 TABLET | Refills: 1 | Status: SHIPPED | OUTPATIENT
Start: 2021-08-12 | End: 2021-09-04 | Stop reason: HOSPADM

## 2021-08-12 RX ORDER — METOPROLOL SUCCINATE 25 MG/1
25 TABLET, EXTENDED RELEASE ORAL 2 TIMES DAILY
Qty: 180 TABLET | Refills: 1 | Status: SHIPPED | OUTPATIENT
Start: 2021-08-12 | End: 2021-08-12

## 2021-08-12 RX ORDER — METOPROLOL SUCCINATE 25 MG/1
TABLET, EXTENDED RELEASE ORAL
Qty: 180 TABLET | Refills: 1 | Status: SHIPPED | OUTPATIENT
Start: 2021-08-12 | End: 2021-08-13

## 2021-08-12 NOTE — PROGRESS NOTES
Cardiology Office Note  MD Jesus Read MD Hermenia Staggers, DO, MD Carlos East DO, Yessy Schmitt DO, [facility] [place]  ----------------------------------------------------------------  [address] [address]    Aida Zelaya 49 y o  female MRN: [id number]  Unit/Bed#:  Encounter: [id number]      History of Present Illness: It was a please to see Aida Zelaya in the office today for follow-up CV evaluation  She was recently hospitalized in June of 2020 for significant shortness of breath and bilateral airspace disease  The patient was found to be in acute heart failure  She underwent aggressive IV diuresis  Echocardiogram showed have severe left ventricular dysfunction  She has a known history of left ventricular dysfunction with recovered LV function before in the past   Her EF was now down to 25%  She underwent cardiac catheterization and was found to have LAD disease  She received 3 stents to the LAD  Subsequently, she was discharged home  She then came back with episodes of chest discomfort  The chest discomfort she was experiencing was pleuritic in nature  Her symptoms markedly improved on colchicine  She was treated as possible myopericarditis  Once her symptoms of chest discomfort were resolved, she was discharged home  Several days later, she had experienced some lightheadedness and dizziness with loss of consciousness  She came in with orthostatic hypotension  Creatinine was as high as 1 7  IV fluids were given and she had improvement in her creatinine  Lasix was decreased to 20 mg every other day and she was discharged home  Since discharge, she denies any further lightheadedness or dizziness or loss of consciousness  Denies lower extremity swelling, orthopnea or paroxysmal nocturnal dyspnea  She states she is back to her usual state of health    At discharge, she was discharged with a life vest and has been using it appropriately  Denies any shocks  She has been ambulating without any problems  She has plan for cardiac rehab in the near future  She was hospitalized in July 2021 due to some chest discomfort and shortness of breath  Cardiac enzymes were found to be elevated and she was started on heparin drip  Subsequently, heparin drip was discontinued as it was felt to be non MI related troponin elevation  Stress test was performed showing fixed defect  With the abnormal stress test, she was sent for cardiac catheterization  Cardiac catheterization demonstrated severe stenosis of the OM1 branch  Drug-eluting stent was placed to this branch  Chest discomfort symptoms resolved and she was subsequently discharged home  Since discharge, she has been doing well cardiovascular rehabilitation  She has been pushing up her physical activity level with improvement of her cardiovascular endurance and resolution of her symptoms  She denies lower extremity swelling, orthopnea, lightheadedness, dizziness or palpitations  Review of Systems:  Review of Systems   Constitutional: Negative for decreased appetite, fever, weight gain and weight loss  HENT: Negative for congestion and sore throat  Eyes: Negative for visual disturbance  Cardiovascular: Negative for chest pain, dyspnea on exertion, leg swelling, near-syncope and palpitations  Respiratory: Negative for cough and shortness of breath  Hematologic/Lymphatic: Negative for bleeding problem  Skin: Negative for rash  Musculoskeletal: Negative for myalgias and neck pain  Gastrointestinal: Negative for abdominal pain and nausea  Neurological: Negative for light-headedness and weakness  Psychiatric/Behavioral: Negative for depression         Past Medical History:   Diagnosis Date    Allergies     CHF (congestive heart failure) (HCC)     Coronary artery disease     Disease of thyroid gland     hypo    Renal disorder Past Surgical History:   Procedure Laterality Date    CARDIAC CATHETERIZATION       SECTION      x2    FRACTURE SURGERY      MI OPEN TX RADIAL & ULNAR SHAFT FX FIX RADIUS AND ULNA Left 6/3/2020    Procedure: Open reduction internal fixation left distal radius fracture;  Surgeon: Mary Jo Landin MD;  Location:  MAIN OR;  Service: Orthopedics    TOTAL THYROIDECTOMY      TUBAL LIGATION         Social History     Socioeconomic History    Marital status:      Spouse name: Not on file    Number of children: Not on file    Years of education: Not on file    Highest education level: Not on file   Occupational History    Not on file   Tobacco Use    Smoking status: Never Smoker    Smokeless tobacco: Never Used   Vaping Use    Vaping Use: Never used   Substance and Sexual Activity    Alcohol use: Yes     Comment: only on special ocassions   Drug use: Never    Sexual activity: Not on file   Other Topics Concern    Not on file   Social History Narrative    Not on file     Social Determinants of Health     Financial Resource Strain:     Difficulty of Paying Living Expenses:    Food Insecurity:     Worried About Running Out of Food in the Last Year:     [address] in the Last Year:    Transportation Needs:     Lack of Transportation (Medical):      Lack of Transportation (Non-Medical):    Physical Activity:     Days of Exercise per Week:     Minutes of Exercise per Session:    Stress:     Feeling of Stress :    Social Connections:     Frequency of Communication with Friends and Family:     Frequency of Social Gatherings with Friends and Family:     Attends Religion Services:     Active Member of Clubs or Organizations:     Attends Club or Organization Meetings:     Marital Status:    Intimate Partner Violence:     Fear of Current or Ex-Partner:     Emotionally Abused:     Physically Abused:     Sexually Abused:        Family History   Problem Relation Age of Onset    No Known Problems Mother     No Known Problems Father     Cancer Family        Allergies   Allergen Reactions    Penicillins Rash         Current Outpatient Medications:     acetaminophen (TYLENOL) 325 mg tablet, Take 2 tablets (650 mg total) by mouth every 4 (four) hours as needed for mild pain, headaches or fever, Disp: 30 tablet, Rfl: 0    aspirin 81 mg chewable tablet, Chew 1 tablet (81 mg total) daily, Disp: 30 tablet, Rfl: 0    atorvastatin (LIPITOR) 80 mg tablet, Take 1 tablet (80 mg total) by mouth daily after dinner, Disp: 90 tablet, Rfl: 1    ferrous gluconate (FERGON) 324 mg tablet, TAKE 1 TABLET (324 MG TOTAL) BY MOUTH 2 (TWO) TIMES A DAY BEFORE MEALS, Disp: 60 tablet, Rfl: 0    furosemide (LASIX) 40 mg tablet, Take 1 tablet (40 mg total) by mouth daily, Disp: 90 tablet, Rfl: 1    levothyroxine 100 mcg tablet, Take 1 tablet (100 mcg total) by mouth daily in the early morning, Disp: 30 tablet, Rfl: 0    lisinopril (ZESTRIL) 5 mg tablet, Take 1 tablet (5 mg total) by mouth daily, Disp: 90 tablet, Rfl: 1    metoprolol succinate (TOPROL-XL) 25 mg 24 hr tablet, Take 1 tablet (25 mg total) by mouth 2 (two) times a day, Disp: 180 tablet, Rfl: 1    nitroglycerin (NITROSTAT) 0 4 mg SL tablet, Place 1 tablet (0 4 mg total) under the tongue every 5 (five) minutes as needed for chest pain, Disp: 30 tablet, Rfl: 0    ticagrelor (BRILINTA) 90 MG, Take 1 tablet (90 mg total) by mouth 2 (two) times a day, Disp: 180 tablet, Rfl: 2    Vitals:    08/12/21 1336   BP: 110/60   Pulse: 102   SpO2: 96%   Weight: 59 kg (130 lb)   Height: 5' 5" (1 651 m)       PHYSICAL EXAMINATION:  Gen: Awake, Alert, NAD  Head/eyes: AT/NC, pupils equal and round, Anicteric  ENT: mmm  Neck: Supple, No elevated JVP, trachea midline  Resp: CTA bilaterally no w/r/r  CV: RRR +S1, S2, No m/r/g  Abd: Soft, NT/ND + BS  Ext: no LE edema bilaterally  Neuro:  Follows commands, moves all extermities  Psych: Appropriate affect, normal mood, pleasant attitude, non-combative  Skin: warm; no rash, erythema or venous stasis changes on exposed skin    --------------------------------------------------------------------------------  TREADMILL STRESS  No results found for this or any previous visit    --------------------------------------------------------------------------------  NUCLEAR STRESS TEST: No results found for this or any previous visit  No results found for this or any previous visit     --------------------------------------------------------------------------------  CATH:    Results for orders placed during the hospital encounter of 20   Cardiac catheterization    Narrative [address]  Jessica Enma [address]  [facility], [address] [facility]  [phone]    [facility]    Invasive Cardiovascular Lab Complete Report    Patient: Enid Evans  MR number: [id number]  Account number: [de-identified]  Study date: 2020  Gender: Female  : 1965  Height: 65 in  Weight: 141 9 lb  BSA: 1 71 mï¾²    Allergies: PENICILLINS    Diagnostic Cardiologist:  Robert Morrow MD  Interventional Cardiologist:  Robert Morrow MD  Primary Physician:  Carson Nieves MD    SUMMARY    CORONARY CIRCULATION:  Mid LAD: There was a diffuse 60 % stenosis  This is a likely culprit for the patient's clinical presentation  An intervention was performed  Positive iFR 0 85  1st obtuse marginal: There was a 50 % stenosis  It appears amenable to percutaneous intervention  Negative iFR    CARDIAC STRUCTURES:  Global left ventricular function was moderately depressed  EF calculated by contrast ventriculography was 35 %  1ST LESION INTERVENTIONS:  A successful drug-eluting stent with balloon angioplasty procedure was performed on the 60 % lesion in the mid LAD  Following intervention there was an excellent angiographic appearance with a 0 % residual stenosis    A Resolute Casper Rx 2 5 x 22mm drug-eluting stent was placed across the lesion and deployed at a maximum inflation pressure of 16 elsa  A Resolute Stevenson Rx 3 0 x 26mm drug-eluting stent was placed across the lesion and deployed at a maximum inflation pressure of 16 elsa  A Resolute Stevenson Rx 3 5 x 12mm drug-eluting stent was placed across the lesion and deployed at a maximum inflation pressure of 12 elsa  INDICATIONS:  --  Possible CAD: myocardial infarction without ST elevation (NSTEMI)  --  Congestive heart failure with cardiomyopathy  PROCEDURES PERFORMED    --  Left heart catheterization with ventriculography  --  Left coronary angiography  --  Right coronary angiography  --  Diagnostic myocardial fractional flow reserve  --  Diagnostic myocardial fractional flow reserve  --  Inpatient  --  Mod Sedation Same Physician Initial 15min  --  Myocardial Flow [place]  --  Coronary Catheterization (w/ LHC)  --  Myocardial Flow Reserve Additional Vessel  --  Mod Sedation Same Physician Add 15min  --  Mod Sedation Same Physician Add 15min  --  Mod Sedation Same Physician Add 15min  --  --  Coronary Drug Eluting Stent w/PTCA  --  Intervention on mid LAD: drug-eluting stent, balloon angioplasty  PROCEDURE: The risks and alternatives of the procedures and conscious sedation were explained to the patient and informed consent was obtained  The patient was brought to the cath lab and placed on the table  The planned puncture sites  were prepped and draped in the usual sterile fashion  --  Right radial artery access  After performing an Otoniel's test to verify adequate ulnar artery supply to the hand, the radial site was prepped  The puncture site was infiltrated with local anesthetic  The vessel was accessed using the  modified Seldinger technique, a wire was advanced into the vessel, and a sheath was advanced over the wire into the vessel  --  Left heart catheterization with ventriculography  A catheter was advanced over a guidewire into the ascending aorta   After recording ascending aortic pressure, the catheter was advanced across the aortic valve and left ventricular  pressure was recorded  Ventriculography was performed  The catheter was pulled back across the aortic valve and into the ascending aorta and pullback pressures were obtained  --  Left coronary artery angiography  A catheter was advanced over a guidewire into the aorta and positioned in the left coronary artery ostium under fluoroscopic guidance  Angiography was performed  --  Right coronary artery angiography  A catheter was advanced over a guidewire into the aorta and positioned in the right coronary artery ostium under fluoroscopic guidance  Angiography was performed  --  Myocardial fractional flow reserve  Flow reserve was measured using a 0 014" pressure-monitoring guide-wire  Steady baseline values were obtained  Mean arterial pressure and mean distal coronary pressures were then obtained at maximum  hyperemia  --  Myocardial fractional flow reserve  Flow reserve was measured using a 0 014" pressure-monitoring guide-wire  Steady baseline values were obtained  Mean arterial pressure and mean distal coronary pressures were then obtained at maximum  hyperemia  --  Inpatient  --  Mod Sedation Same Physician Initial 15min  --  Myocardial Flow [place]  --  Coronary Catheterization (w/ LHC)  --  Myocardial Flow Reserve Additional Vessel  --  Mod Sedation Same Physician Add 15min  --  Mod Sedation Same Physician Add 15min  --  Mod Sedation Same Physician Add 15min  --  Instant Flow Reserve was measured using 0 014 pressure-monitoring guide-wire  Steady baseline values were obtained by measuring the ratio of distal coronary pressure (Pd) to the aortic pressure (Pa) during a wave-free segment of  diastole  LAD and OM1    LESION INTERVENTION: A successful drug-eluting stent with balloon angioplasty procedure was performed on the 60 % lesion in the mid LAD   Following intervention there was an excellent angiographic appearance with a 0 % residual stenosis  There was LALO 3 flow before the procedure and LALO 3 flow after the procedure  There was no dissection  --  Vessel setup was performed  A Launcher 6Fr Ebu 3 5 guiding catheter was used to cannulate the vessel  --  Vessel setup was performed  A Runthrough NS 180cm wire was used to cross the lesion  --  A Resolute Morro Rx 2 5 x 22mm drug-eluting stent was placed across the lesion and deployed at a maximum inflation pressure of 16 elsa  --  A Resolute Morro Rx 3 0 x 26mm drug-eluting stent was placed across the lesion and deployed at a maximum inflation pressure of 16 elsa  --  Balloon angioplasty was performed, with 1 inflations and a maximum inflation pressure of 18 elsa  --  A Resolute [place]er[place] Rx 3 5 x 12mm drug-eluting stent was placed across the lesion and deployed at a maximum inflation pressure of 12 elsa  --  Balloon angioplasty was performed, with 1 inflations and a maximum inflation pressure of 16 elsa  --  Balloon angioplasty was performed, using a NC Trek Rx 3 5 x 12mm balloon, with 6 inflations and a maximum inflation pressure of 20 elsa  INTERVENTIONS:  --  Coronary Drug Eluting Stent w/PTCA  PROCEDURE COMPLETION: The patient tolerated the procedure well and was discharged from the cath lab  TIMING: Test started at 11:17  Test concluded at 12:17  HEMOSTASIS: The sheath was removed  The site was compressed with a Hemoband  device  Hemostasis was obtained  MEDICATIONS GIVEN: Versed (2mg/2ml), 1 mg, IV, at 11:23  Fentanyl (1OOmcg/2 ml), 50 mcg, IV, at 11:23  1% Lidocaine, 1 ml, subcutaneously, at 11:25  Heparin 1000 units/ml, 4,000 units, IV, at 11:27  Verapamil (5mg/2ml), 2 5 mg, IV, at 11:28  Nitroglycerin (200mcg/ml), 200 mcg, at 11:28  Heparin 1000 units/ml, 4,000 units, IV, at 11:31  Ticagrelor, 180 mg, PO, at 11:33  Versed (2mg/2ml), 1 mg, IV, at 11:41  Fentanyl (1OOmcg/2 ml), 50  mcg, IV, at 11:41   Nitroglycerine (200mcg/ml), 200 mcg, at 11:42  Heparin 1000 units/ml, 4,000 units, IV, at 11:46  Versed (2mg/2ml), 1 mg, IV, at 12:03  Fentanyl (1OOmcg/2 ml), 50 mcg, IV, at 12:04  CONTRAST GIVEN: 100 ml Omnipaque (350mg  I /ml)  36 ml Omnipaque (350mg I/ml)  10 ml Omnipaque (350mg I /ml)  RADIATION EXPOSURE: Fluoroscopy time: 8 18 min  HEMODYNAMICS: Hemodynamic assessment demonstrated normal LVEDP  VENTRICLES:   --  Global left ventricular function was moderately depressed  EF calculated by contrast ventriculography was 35 %  VALVES:  AORTIC VALVE:   --  There was no aortic stenosis  MITRAL VALVE:   --  The mitral valve exhibited no regurgitation  CORONARY VESSELS:   --  The coronary circulation is right dominant  --  Left main: The vessel was medium to large sized  Angiography showed minor luminal irregularities  --  LAD: The vessel was large sized and mildly calcified  Angiography showed the vessel to wrap around the cardiac apex and moderate atherosclerosis  There was one major diagonal branch  --  Proximal LAD: There was a 40 % stenosis  --  Mid LAD: There was a diffuse 60 % stenosis  This is a likely culprit for the patient's clinical presentation  An intervention was performed  Positive iFR 0 85  --  Circumflex: The vessel was medium sized  There were two major obtuse marginals  --  1st obtuse marginal: There was a 50 % stenosis  It appears amenable to percutaneous intervention  Negative iFR  --  RCA: The vessel was medium sized and moderately tortuous  Angiography showed mild atherosclerosis  --  Mid RCA: There was a discrete 40 % stenosis  IMPRESSIONS:  There is significant double vessel coronary artery disease  RECOMMENDATIONS  The patient should continue with the present medications  DISPOSITION:  The patient left the catheterization laboratory in stable condition      Prepared and signed by    Noni Hassan MD  Signed 06/17/2020 12:28:47    Study diagram    Angiographic findings  Native coronary lesions:  ï¾·Proximal LAD: Lesion 1: 40 % stenosis  ï¾·Mid LAD: Lesion 1: diffuse, 60 % stenosis  ï¾·OM1: Lesion 1: 50 % stenosis  ï¾·Mid RCA: Lesion 1: discrete, 40 % stenosis  Intervention results  Native coronary lesions:  ï¾·Successful drug-eluting stent and balloon angioplasty of the 60 % stenosis in mid LAD  Appearance excellent with 0 % residual stenosis  Stent: Resolute Morro Rx 2 5 x 22mm drug-eluting  Stent: Resolute [place] Rx 3 0 x 26mm drug-eluting  Stent: Resolute Morro Rx 3 5 x 12mm drug-eluting  Hemodynamic tables    Pressures:  Baseline  Pressures:  - HR: 86  Pressures:  - Rhythm:  Pressures:  -- Aortic Pressure (S/D/M): 119/78/70  Pressures:  -- Left Ventricle (s/edp): 128/20/--    Outputs:  Baseline  Outputs:  -- CALCULATIONS: [age]  Outputs:  -- CALCULATIONS: Body Surface Area: 1 71  Outputs:  -- CALCULATIONS: Height in cm: 165 00  Outputs:  -- CALCULATIONS: Sex: Female  Outputs:  -- CALCULATIONS: Weight in k 50       --------------------------------------------------------------------------------  ECHO:   Results for orders placed during the hospital encounter of 20   Echo complete with contrast if indicated    Narrative [address] [place] [address], Formerly named [facility]  [phone]    Transthoracic Echocardiogram  2D, M-mode, Doppler, and Color Doppler    Study date:  2020    Patient: Ender Nolan  MR number: [id number]  Account number: [de-identified]  : 1965  Age: 48 years  Gender: Female  Status: Inpatient  Location: Bedside  Height: 65 in  Weight: 145 6 lb  BP: 130/ 109 mmHg    Indications: Heart failure  New bundle branch block      Diagnoses: I50 9 - Heart failure, unspecified    Sonographer:  CARLTON Starks  Primary Physician:  Jeanna Rao MD  Referring Physician:  Ana Maria Odom DO  Group:  [facility]  Interpreting Physician:  Ana Maria Odom DO    SUMMARY    SUMMARY:  This is a technically adequate study  1  Left ventricle is moderately dilated with severely reduced systolic function  Left ventricular ejection fraction is estimated at 29%  2  Mild concentric left ventricular hypertrophy  3  Grade 1 diastolic dysfunction is present  4  Severe global hypokinesis with wall motion consistent with conduction abnormality  5  Left atrium is moderately dilated and right atrium is mildly dilated  6  Aortic valve sclerotic with adequate separation  Mild aortic regurgitation  7  Mild mitral annular calcification with mild mitral regurgitation  8  Pulmonary artery systolic pressures cannot be estimated due to lack of tricuspid regurgitation jet  9  Small pericardial effusion without echocardiographic indications of tamponade physiology    SUMMARY MEASUREMENTS  2D measurements:  Unspecified Anatomy:   %FS was 10 7 %  EDV(Teich) was 171 6 ml   EF Biplane was 28 7 %  EF(Teich) was 23 %  ESV(Teich) was 132 1 ml  IVSd was 1 cm  LAAs A2C was 24 2 cm2  LAAs A4C was 21 cm2  LAESV A-L A2C was 90 3 ml  LAESV A-L  A4C was 63 ml  LAESV Index (A-L) was 45 4 ml/m2  LAESV MOD A2C was 87 5 ml  LAESV MOD A4C was 58 1 ml  LAESV(A-L) was 78 5 ml  LAESV(MOD BP) was 74 ml  LAESVInd MOD BP was 42 8 ml/m2  LALs A2C was 5 5 cm  LALs A4C was 6 cm  LVEDV  MOD A2C was 155 1 ml  LVEDV MOD A4C was 149 9 ml  LVEDV MOD BP was 153 3 ml  LVEDVInd MOD BP was 88 6 ml/m2  LVEF MOD A2C was 30 6 %  LVEF MOD A4C was 27 9 %  LVESV MOD A2C was 107 7 ml  LVESV MOD A4C was 108 ml  LVESV MOD BP was  109 3 ml  LVESVInd MOD BP was 63 2 ml/m2  LVIDd was 5 9 cm  LVIDs was 5 2 cm  LVLd A2C was 9 7 cm  LVLd A4C was 9 6 cm  LVLs A2C was 9 2 cm  LVLs A4C was 8 9 cm  LVPWd was 1 2 cm  Lorrie A4C was 16 4 cm2  RAEDV A-L was 47 5 ml  RAEDV MOD was 46 7 ml  RALd was 4 8 cm  RVIDd was 3 8 cm  RWT was 0 4     SV MOD A2C was 47 4 ml   SV MOD A4C was 41 8 ml   SV(Teich) was 39 5 ml   CW measurements:  Unspecified Anatomy: AV Env  Ti was 234 4 ms   AV VTI was 27 4 cm  AV Vmax was 1 5 m/s  AV Vmean was 1 2 m/s  AV maxPG was 8 5 mmHg  AV meanPG was 5 9 mmHg  MM measurements:  Unspecified Anatomy:   TAPSE was 3 3 cm  PW measurements:  Unspecified Anatomy:   DVI was 0 6   LVOT Env  Ti was 238 5 ms  LVOT VTI was 15 5 cm  LVOT Vmax was 0 9 m/s  LVOT Vmean was 0 7 m/s  LVOT maxPG was 3 3 mmHg  LVOT meanPG was 1 9 mmHg  HISTORY: PRIOR HISTORY: hypothyroid  PROCEDURE: The procedure was performed at the bedside  This was a routine study  The transthoracic approach was used  The study included complete 2D imaging, M-mode, complete spectral Doppler, and color Doppler  The heart rate was 108 bpm,  at the start of the study  Image quality was adequate  LEFT VENTRICLE: Left ventricle is moderately dilated with severely reduced systolic function  Left ventricular ejection fraction is estimated at 29%  There is mild concentric left ventricular hypertrophy  Grade 1 diastolic dysfunction  There is severe global hypokinesis with wall motion consistent with conduction abnormality  RIGHT VENTRICLE: Right ventricle is normal size and function  LEFT ATRIUM: Left atrium is moderately dilated  ATRIAL SEPTUM: Interatrial septum is bowing toward the right atrium consistent with elevated left atrial pressures  RIGHT ATRIUM: Right atrium is mildly dilated  MITRAL VALVE: Mitral valve opens well  There is mild mitral annular calcification posteriorly  Mild mitral regurgitation  AORTIC VALVE: Aortic valve is minimally sclerotic with adequate separation  There is no evidence of aortic stenosis  Mild aortic regurgitation  LVOT diameter 2 cm, LVOT VTI 15 5, stroke volume 48 9 mL    TRICUSPID VALVE: Tricuspid valve opens well  There is no evidence of tricuspid regurgitation  Pulmonary artery systolic pressures cannot be estimated due to lack of tricuspid regurgitation jet      PULMONIC VALVE: Pulmonic valve not seen on the study     PERICARDIUM: There is a small pericardial effusion without echocardiographic indications of tamponade physiology  AORTA: The aortic root is normal in size at 3 3 cm  SYSTEMIC VEINS: IVC: The IVC is normal size with collapse  SYSTEM MEASUREMENT TABLES    2D  %FS: 10 7 %  EDV(Teich): 171 6 ml  EF Biplane: 28 7 %  EF(Teich): 23 %  ESV(Teich): 132 1 ml  IVSd: 1 cm  LAAs A2C: 24 2 cm2  LAAs A4C: 21 cm2  LAESV A-L A2C: 90 3 ml  LAESV A-L A4C: 63 ml  LAESV Index (A-L): 45 4 ml/m2  LAESV MOD A2C: 87 5 ml  LAESV MOD A4C: 58 1 ml  LAESV(A-L): 78 5 ml  LAESV(MOD BP): 74 ml  LAESVInd MOD BP: 42 8 ml/m2  LALs A2C: 5 5 cm  LALs A4C: 6 cm  LVEDV MOD A2C: 155 1 ml  LVEDV MOD A4C: 149 9 ml  LVEDV MOD BP: 153 3 ml  LVEDVInd MOD BP: 88 6 ml/m2  LVEF MOD A2C: 30 6 %  LVEF MOD A4C: 27 9 %  LVESV MOD A2C: 107 7 ml  LVESV MOD A4C: 108 ml  LVESV MOD BP: 109 3 ml  LVESVInd MOD BP: 63 2 ml/m2  LVIDd: 5 9 cm  LVIDs: 5 2 cm  LVLd A2C: 9 7 cm  LVLd A4C: 9 6 cm  LVLs A2C: 9 2 cm  LVLs A4C: 8 9 cm  LVPWd: 1 2 cm  Lorrie A4C: 16 4 cm2  RAEDV A-L: 47 5 ml  RAEDV MOD: 46 7 ml  RALd: 4 8 cm  RVIDd: 3 8 cm  RWT: 0 4  SV MOD A2C: 47 4 ml  SV MOD A4C: 41 8 ml  SV(Teich): 39 5 ml    CW  AV Env  Ti: 234 4 ms  AV VTI: 27 4 cm  AV Vmax: 1 5 m/s  AV Vmean: 1 2 m/s  AV maxP 5 mmHg  AV meanP 9 mmHg    MM  TAPSE: 3 3 cm    PW  DVI: 0 6  LVOT Env  Ti: 238 5 ms  LVOT VTI: 15 5 cm  LVOT Vmax: 0 9 m/s  LVOT Vmean: 0 7 m/s  LVOT maxPG: 3 3 mmHg  LVOT meanP 9 mmHg    [facility]    Prepared and electronically signed by    Emilia Amaya DO  Signed 2020 15:04:09       No results found for this or any previous visit   --------------------------------------------------------------------------------  HOLTER  No results found for this or any previous visit    No results found for this or any previous visit   --------------------------------------------------------------------------------  CAROTIDS  No results found for this or any previous visit    --------------------------------------------------------------------------------  ECGs:  No results found for this visit on 08/12/21  Lab Results   Component Value Date    WBC 5 98 07/17/2021    HGB 12 2 07/17/2021    HCT 39 4 07/17/2021    MCV 84 07/17/2021     07/17/2021      Lab Results   Component Value Date    SODIUM 138 07/17/2021    K 4 5 07/17/2021     07/17/2021    CO2 26 07/17/2021    BUN 31 (H) 07/17/2021    CREATININE 1 39 (H) 07/17/2021    GLUC 96 07/17/2021    CALCIUM 7 6 (L) 07/17/2021      Lab Results   Component Value Date    HGBA1C 6 0 (H) 06/13/2020      No results found for: CHOL  Lab Results   Component Value Date    HDL 61 07/15/2021    HDL 52 06/13/2020     Lab Results   Component Value Date    LDLCALC 87 07/15/2021    LDLCALC 97 06/13/2020     Lab Results   Component Value Date    TRIG 93 07/15/2021    TRIG 89 06/13/2020     No results found for: CHOLHDL   Lab Results   Component Value Date    INR 1 07 07/11/2021    INR 1 05 06/19/2020    PROTIME 13 7 07/11/2021    PROTIME 13 8 06/19/2020        1  Coronary artery disease involving native coronary artery of native heart without angina pectoris    2  Chronic systolic congestive heart failure ([address] )    3  Ischemic cardiomyopathy    4  Syncope, unspecified syncope type    5  Chest pain  -     atorvastatin (LIPITOR) 80 mg tablet; Take 1 tablet (80 mg total) by mouth daily after dinner  -     ticagrelor (BRILINTA) 90 MG; Take 1 tablet (90 mg total) by mouth 2 (two) times a day    6  Acute systolic congestive heart failure (HCC)  -     lisinopril (ZESTRIL) 5 mg tablet; Take 1 tablet (5 mg total) by mouth daily  -     metoprolol succinate (TOPROL-XL) 25 mg 24 hr tablet; Take 1 tablet (25 mg total) by mouth 2 (two) times a day  -     furosemide (LASIX) 40 mg tablet;  Take 1 tablet (40 mg total) by mouth daily        IMPRESSION:  · Chronic systolic heart failure - resolved  · CAD s/p cath w/ SEJAL-OM1, patent SEJAL-LAD x 3 (6/2020), with residual 50% pLAD, 40% mRCA, luminal and irregularities of LM, July 15, 2021  · LVEF 38% with inferior hypokinesis, diastolic dysfunction, mild LA dilatation, AV sclerosis, mild AR, moderate MR, mild TR, trace to small circumferential pericardial effusion, July 2021  · Abnormal pharmacologic nuclear stress test with fixed inferior and apical defect consistent with infarct, gated EF 28%, July 2021  · MARINA  · Left bundle branch block - rate related  · History of ischemic and non-ischemic cardiomyopathy  · Anemia  · Goiter s/p thyroidectomy with hypothyroidism  · History of syncope secondary orthostasis/volume depletion  · History of myopericarditis    PLAN:  It was a pleasure to see Gab Avina in the office today for follow-up CV evaluation  She is here today after her recent hospitalization for borderline troponin elevation with probable unstable angina and subsequent placement of a drug-eluting stent to the OM1 branch  Since completion of her PCI, her symptoms have substantially improved  Echocardiogram showed some improvement of her left ventricular functions with ejection fraction ranging from 38-43%  She has no symptoms concerning for angina and no signs or symptoms of heart failure  She examines euvolemic in the office today  She has made great strides with her cardiovascular rehabilitation which I believe to be of substantial benefit to her cardiac health  She is taking all her medications and reports compliance  Denies adverse effects on the medications  Based on her clinical presentation, I have the following recommendations:    1  Recommend aggressive risk factor and lifestyle modifications with dietary changes and increasing physical activity  2   I believe that it would be of the greatest benefit for her to continue with cardiovascular rehabilitation as he continues to augment her cardiovascular health  She schedule 3 times a week which I believe is reasonable  We will continue to monitor her through the completion of her cardiac rehab   3  Minimum 1 year of dual anti-platelet therapy with aspirin and Brilinta  When 1 year is completed, I would consider either decreasing her Brilinta to 60 mg b i d  Or initiation of Xarelto 2 5 mg b i d  4  Continue high-intensity statin, beta-blocker and Lasix  5  Goal LDL less than 50 mg/dL given her history of stents in the past and her current stenting  Repeat lipid panel in 3-6 months  6  Repeat echocardiogram in 1 year to reassess mitral regurgitation  7  As always, I recommend a heart healthy diet low in sodium  8  We will follow up with her in 2 months  As always, please do not hesitate to call with any questions  Portions of the record may have been created with voice recognition software  Occasional wrong word or "sound a like" substitutions may have occurred due to the inherent limitations of voice recognition software  Read the chart carefully and recognize, using context, where substitutions have occurred        Signed: Melanie Chavez DO, Soundra Scheuermann

## 2021-08-13 ENCOUNTER — CLINICAL SUPPORT (OUTPATIENT)
Dept: CARDIAC REHAB | Facility: CLINIC | Age: 56
End: 2021-08-13
Attending: STUDENT IN AN ORGANIZED HEALTH CARE EDUCATION/TRAINING PROGRAM
Payer: COMMERCIAL

## 2021-08-13 DIAGNOSIS — I50.21 ACUTE SYSTOLIC CONGESTIVE HEART FAILURE (HCC): ICD-10-CM

## 2021-08-13 DIAGNOSIS — Z95.5 S/P PRIMARY ANGIOPLASTY WITH CORONARY STENT: ICD-10-CM

## 2021-08-13 PROCEDURE — 93798 PHYS/QHP OP CAR RHAB W/ECG: CPT

## 2021-08-13 RX ORDER — METOPROLOL SUCCINATE 25 MG/1
TABLET, EXTENDED RELEASE ORAL
Qty: 180 TABLET | Refills: 1 | Status: SHIPPED | OUTPATIENT
Start: 2021-08-13 | End: 2021-08-14

## 2021-08-14 RX ORDER — METOPROLOL SUCCINATE 50 MG/1
TABLET, EXTENDED RELEASE ORAL
Qty: 90 TABLET | Refills: 0 | Status: SHIPPED | OUTPATIENT
Start: 2021-08-14 | End: 2021-11-23

## 2021-08-16 ENCOUNTER — CLINICAL SUPPORT (OUTPATIENT)
Dept: CARDIAC REHAB | Facility: CLINIC | Age: 56
End: 2021-08-16
Attending: STUDENT IN AN ORGANIZED HEALTH CARE EDUCATION/TRAINING PROGRAM
Payer: COMMERCIAL

## 2021-08-16 DIAGNOSIS — Z95.5 S/P PRIMARY ANGIOPLASTY WITH CORONARY STENT: ICD-10-CM

## 2021-08-16 PROCEDURE — 93798 PHYS/QHP OP CAR RHAB W/ECG: CPT

## 2021-08-18 ENCOUNTER — APPOINTMENT (OUTPATIENT)
Dept: CARDIAC REHAB | Facility: CLINIC | Age: 56
End: 2021-08-18
Attending: STUDENT IN AN ORGANIZED HEALTH CARE EDUCATION/TRAINING PROGRAM
Payer: COMMERCIAL

## 2021-08-20 ENCOUNTER — CLINICAL SUPPORT (OUTPATIENT)
Dept: CARDIAC REHAB | Facility: CLINIC | Age: 56
End: 2021-08-20
Attending: STUDENT IN AN ORGANIZED HEALTH CARE EDUCATION/TRAINING PROGRAM
Payer: COMMERCIAL

## 2021-08-20 DIAGNOSIS — E89.0 POSTOPERATIVE HYPOTHYROIDISM: ICD-10-CM

## 2021-08-20 DIAGNOSIS — Z95.5 S/P PRIMARY ANGIOPLASTY WITH CORONARY STENT: ICD-10-CM

## 2021-08-20 PROCEDURE — 93798 PHYS/QHP OP CAR RHAB W/ECG: CPT

## 2021-08-20 RX ORDER — LEVOTHYROXINE SODIUM 0.1 MG/1
100 TABLET ORAL
Qty: 30 TABLET | Refills: 0 | Status: SHIPPED | OUTPATIENT
Start: 2021-08-20 | End: 2021-09-14

## 2021-08-20 NOTE — TELEPHONE ENCOUNTER
Pt called and needs a refill for levothyroxine 100 mcg she schedule a apt but first we had was sept 23rd can we refill for one month

## 2021-08-23 ENCOUNTER — APPOINTMENT (OUTPATIENT)
Dept: CARDIAC REHAB | Facility: CLINIC | Age: 56
End: 2021-08-23
Attending: STUDENT IN AN ORGANIZED HEALTH CARE EDUCATION/TRAINING PROGRAM
Payer: COMMERCIAL

## 2021-08-23 NOTE — PROGRESS NOTES
Cardiac Rehabilitation Plan of Care   30 Day Reassessment          Today's date: 2021   # of Exercise Sessions Completed: 14  Patient name: Fabian Listen      : 1965  Age: 49 y o  MRN: [id number]  Referring Physician: Dontae Harmon MD  Cardiologist: Dr Timothy Harding  Provider: [facility]  Clinician: Dori Munoz MS      Dx:   Encounter Diagnosis   Name Primary?  S/P primary angioplasty with coronary stent      Date of onset: 2021      SUMMARY OF PROGRESS:    I did not personally speak with patient today due to her absent  Isa Akin is compliant attending cardiac rehab exercise sessions 3x/wk  She tolerates 35-40 mins at 3 0-3 3  METs  Weight training will be added within the next few sessions  Resting BP always well controlled, /55-70 with appropriate response to exercise reaching 120-130/60-70  Rate related LBBB on tele observed  RHR 85 - 100 ExHR 105 - 120  She is tolerating progression of intensity levels to maintain RPE 4-6  No cardiac complaints  She is progressing toward wt loss goals with a loss of 2 pounds  Patient has been working on  dietary modifications with the goal of rare red/processed meats, low fat dairy, reduced added sugars and refined flours  The patient is a non-smoker  Depression/ anxiety screening using the PHQ-9 and CHATA 7 was not reassessed today but will be in the future if needed    Patient reports excellent social/emotional support  Patient attends group educational classes on cardiac risk factor modification  She has added home exercise but reports being active at home  Her exercise program will be progressed as tolerated to maintain RPE 4-6  The patient has the following personal goals he hopes to achieved by discharge: wants to increase strength and stamina  Pt will continue to be educated on lifestyle modifications and encouraged to supplement with a home exercise program to reach her goals   Will be progressed over the next 30 days as tolerated    Medication compliance: Yes   Comments: Pt reports to be compliant with medications  Fall Risk: Low   Comments: Ambulates with a steady gait with no assist device    EKG Interpretation: Rate related LBBB      EXERCISE ASSESSMENT and PLAN    Current Exercise Program in Rehab:       Frequency: 3 days/week Supplement with home exercise 2+ days/wk as tolerated       Minutes: 35-40        METS: 3 0-3 3           HR: 30 beats above resting   RPE: 4-6         Modalities: Treadmill, UBE, NuStep and Recumbent bike      Exercise Progression 30 Day Goals :    Frequency: 3 days/week of cardiac rehab     Supplement with home exercise 2+ days/wk as tolerated    Minutes: 40-45                            >150 mins/wk of moderate intensity exercise   METS: 3 3-3 8   HR: 30 beats above resting    RPE: 4-6   Modalities: Treadmill, UBE, Rower, NuStep and Recumbent bike    Strength trainin-3 days / week  12-15 repetitions  1-2 sets per modality   Will be added following 2-3 weeks of monitored exercise sessions   Modalities: Leg Press, Chest Press, Pull Downs, Lateral Raise, Arm Extension, Arm Curl and Front Raises    Home Exercise: none    Goals: 10% improvement in functional capacity - based on max METs achieved in fitness assessment, Reduced dyspnea with physical activity  0-1/10, improved DASI score by 10%, Increase in exercise capacity by 40% - based on peak METs tolerated in cardiac rehab exercise session, Exercise 5 days/wk, >150mins/wk of moderate intensity exercise, Resume ADLs with increased strength, Attend Rehab regularly, Decrease sitting time and Start a walking program    Progression Toward Goals: Will continue to educate and progress as tolerated      Education: benefit of exercise for CAD risk factors, home exercise guidelines, AHA guidelines to achieve >150 mins/wk of moderate exercise and RPE scale   Plan:education on home exercise guidelines, home exercise 30+ mins 2 days opposite CR and Education class: Risk Factors for Heart Disease  Readiness to change: Action:  (Changing behavior)      NUTRITION ASSESSMENT AND PLAN    Weight control:    Starting weight: 134 lb   Current weight:   132 6  Waist circumference:    Startin"   Current:      Diabetes: N/A  A1c: n/a    last measured: n/a    Lipid management: Discussed diet and lipid management and Last lipid profile 7/15/2021  Chol 167  TRG 93  HDL 61  LDL 87    Goals:LDL <100, HDL >40, TRG <150, CHOL <200, eliminate processed meats and increase intake of fish, shellfish, low sodium options    Progression Toward Goals: Will continue to educate and progress as tolerated  Education: heart healthy eating  low sodium diet  hydration  nutrition for  lipid management  Plan: Education class: Reading Food Labels, Education Class: Heart Healthy Eating, drink more water, keep added daily sugar <25g/day and decrease carbonated beverages  Readiness to change: Action:  (Changing behavior)      PSYCHOSOCIAL ASSESSMENT AND PLAN    Emotional:  Depression assessment:  PHQ-9 = 1-4 = Minimal Depression            Anxiety measure:  CHATA-7 = 0-4  = Not anxious  Self-reported stress level:  3  Social support: Excellent    Goals: Increased interest in doing things, improved concentration and increased energy    Progression Toward Goals: Will continue to educate and progress as tolerated      Education: signs/sxs of depression, benefits of a positive support system, stress management techniques and depression and CAD  Plan: Class: Stress and Your Health, Class: Relaxation, Enjoy family and Repeat PHQ-9 every 30 days if score >5  Readiness to change: Action:  (Changing behavior)      OTHER CORE COMPONENTS     Tobacco:   Social History     Tobacco Use   Smoking Status Never Smoker   Smokeless Tobacco Never Used       Tobacco Use Intervention:   N/A:  Patient is a non-smoker     Anginal Symptoms:  None   NTG use: No prescription    Blood pressure:    Resting: /55-70   Exercise: 120-130/60-70    Goals: consistent BP < 130/80, reduced dietary sodium <2300mg, moderate intensity exercise >150 mins/wk and medication compliance    Progression Toward Goals: Will continue to educate and progress as tolerated      Education:  understanding high blood pressure and it's relationship to CAD and low sodium diet and HTN  Plan: Class: Understanding Heart Disease, Class: Common Heart Medications, Avoid Processed foods, engage in regular exercise, eliminate salt shaker at the table, use salt substitutes, check labels for sodium content and monitor home BP  Readiness to change: Action:  (Changing behavior)

## 2021-08-25 ENCOUNTER — APPOINTMENT (OUTPATIENT)
Dept: CARDIAC REHAB | Facility: CLINIC | Age: 56
End: 2021-08-25
Attending: STUDENT IN AN ORGANIZED HEALTH CARE EDUCATION/TRAINING PROGRAM
Payer: COMMERCIAL

## 2021-08-27 ENCOUNTER — APPOINTMENT (OUTPATIENT)
Dept: CARDIAC REHAB | Facility: CLINIC | Age: 56
End: 2021-08-27
Attending: STUDENT IN AN ORGANIZED HEALTH CARE EDUCATION/TRAINING PROGRAM
Payer: COMMERCIAL

## 2021-08-28 ENCOUNTER — HOSPITAL ENCOUNTER (INPATIENT)
Facility: HOSPITAL | Age: 56
LOS: 7 days | Discharge: HOME | DRG: 683 | End: 2021-09-04
Attending: EMERGENCY MEDICINE | Admitting: INTERNAL MEDICINE
Payer: COMMERCIAL

## 2021-08-28 ENCOUNTER — APPOINTMENT (INPATIENT)
Dept: ULTRASOUND IMAGING | Facility: HOSPITAL | Age: 56
DRG: 683 | End: 2021-08-28
Payer: COMMERCIAL

## 2021-08-28 ENCOUNTER — APPOINTMENT (EMERGENCY)
Dept: RADIOLOGY | Facility: HOSPITAL | Age: 56
DRG: 683 | End: 2021-08-28
Payer: COMMERCIAL

## 2021-08-28 DIAGNOSIS — I47.2 NSVT (NONSUSTAINED VENTRICULAR TACHYCARDIA) (HCC): ICD-10-CM

## 2021-08-28 DIAGNOSIS — N17.9 ACUTE RENAL FAILURE (ARF) (HCC): Primary | ICD-10-CM

## 2021-08-28 DIAGNOSIS — N17.9 AKI (ACUTE KIDNEY INJURY) (HCC): ICD-10-CM

## 2021-08-28 DIAGNOSIS — R77.8 ELEVATED TROPONIN: ICD-10-CM

## 2021-08-28 DIAGNOSIS — R53.1 GENERALIZED WEAKNESS: ICD-10-CM

## 2021-08-28 LAB
ALBUMIN SERPL BCP-MCNC: 2.7 G/DL (ref 3.5–5)
ALP SERPL-CCNC: 117 U/L (ref 46–116)
ALT SERPL W P-5'-P-CCNC: 83 U/L (ref 12–78)
ANION GAP SERPL CALCULATED.3IONS-SCNC: 20 MMOL/L (ref 4–13)
AST SERPL W P-5'-P-CCNC: 120 U/L (ref 5–45)
BACTERIA UR QL AUTO: ABNORMAL /HPF
BASOPHILS # BLD AUTO: 0.05 THOUSANDS/ΜL (ref 0–0.1)
BASOPHILS NFR BLD AUTO: 1 % (ref 0–1)
BILIRUB SERPL-MCNC: 0.31 MG/DL (ref 0.2–1)
BILIRUB UR QL STRIP: NEGATIVE
BUN SERPL-MCNC: 111 MG/DL (ref 5–25)
CALCIUM ALBUM COR SERPL-MCNC: 8.3 MG/DL (ref 8.3–10.1)
CALCIUM SERPL-MCNC: 7.3 MG/DL (ref 8.3–10.1)
CHLORIDE SERPL-SCNC: 92 MMOL/L (ref 100–108)
CLARITY UR: ABNORMAL
CO2 SERPL-SCNC: 20 MMOL/L (ref 21–32)
COLOR UR: YELLOW
CREAT SERPL-MCNC: 7.06 MG/DL (ref 0.6–1.3)
EOSINOPHIL # BLD AUTO: 0.03 THOUSAND/ΜL (ref 0–0.61)
EOSINOPHIL NFR BLD AUTO: 0 % (ref 0–6)
ERYTHROCYTE [DISTWIDTH] IN BLOOD BY AUTOMATED COUNT: 17.5 % (ref 11.6–15.1)
FINE GRAN CASTS URNS QL MICRO: ABNORMAL /LPF
GFR SERPL CREATININE-BSD FRML MDRD: 6 ML/MIN/1.73SQ M
GLUCOSE SERPL-MCNC: 120 MG/DL (ref 65–140)
GLUCOSE UR STRIP-MCNC: NEGATIVE MG/DL
HCT VFR BLD AUTO: 32 % (ref 34.8–46.1)
HGB BLD-MCNC: 10.3 G/DL (ref 11.5–15.4)
HGB UR QL STRIP.AUTO: ABNORMAL
IMM GRANULOCYTES # BLD AUTO: 0.13 THOUSAND/UL (ref 0–0.2)
IMM GRANULOCYTES NFR BLD AUTO: 1 % (ref 0–2)
KETONES UR STRIP-MCNC: NEGATIVE MG/DL
LEUKOCYTE ESTERASE UR QL STRIP: ABNORMAL
LYMPHOCYTES # BLD AUTO: 0.35 THOUSANDS/ΜL (ref 0.6–4.47)
LYMPHOCYTES NFR BLD AUTO: 3 % (ref 14–44)
MCH RBC QN AUTO: 26.3 PG (ref 26.8–34.3)
MCHC RBC AUTO-ENTMCNC: 32.2 G/DL (ref 31.4–37.4)
MCV RBC AUTO: 82 FL (ref 82–98)
MONOCYTES # BLD AUTO: 0.99 THOUSAND/ΜL (ref 0.17–1.22)
MONOCYTES NFR BLD AUTO: 9 % (ref 4–12)
NEUTROPHILS # BLD AUTO: 9.21 THOUSANDS/ΜL (ref 1.85–7.62)
NEUTS SEG NFR BLD AUTO: 86 % (ref 43–75)
NITRITE UR QL STRIP: NEGATIVE
NON-SQ EPI CELLS URNS QL MICRO: ABNORMAL /HPF
NRBC BLD AUTO-RTO: 0 /100 WBCS
NT-PROBNP SERPL-MCNC: 7919 PG/ML
PH UR STRIP.AUTO: 5.5 [PH]
PLATELET # BLD AUTO: 121 THOUSANDS/UL (ref 149–390)
PMV BLD AUTO: 13.1 FL (ref 8.9–12.7)
POTASSIUM SERPL-SCNC: 3.1 MMOL/L (ref 3.5–5.3)
PROT SERPL-MCNC: 7.2 G/DL (ref 6.4–8.2)
PROT UR STRIP-MCNC: ABNORMAL MG/DL
RBC # BLD AUTO: 3.92 MILLION/UL (ref 3.81–5.12)
RBC #/AREA URNS AUTO: ABNORMAL /HPF
SODIUM SERPL-SCNC: 132 MMOL/L (ref 136–145)
SP GR UR STRIP.AUTO: 1.02 (ref 1–1.03)
TROPONIN I SERPL-MCNC: 0.17 NG/ML
UROBILINOGEN UR QL STRIP.AUTO: 0.2 E.U./DL
WBC # BLD AUTO: 10.76 THOUSAND/UL (ref 4.31–10.16)
WBC #/AREA URNS AUTO: ABNORMAL /HPF

## 2021-08-28 PROCEDURE — 80053 COMPREHEN METABOLIC PANEL: CPT | Performed by: EMERGENCY MEDICINE

## 2021-08-28 PROCEDURE — 93005 ELECTROCARDIOGRAM TRACING: CPT

## 2021-08-28 PROCEDURE — 81001 URINALYSIS AUTO W/SCOPE: CPT | Performed by: INTERNAL MEDICINE

## 2021-08-28 PROCEDURE — 71045 X-RAY EXAM CHEST 1 VIEW: CPT

## 2021-08-28 PROCEDURE — 99285 EMERGENCY DEPT VISIT HI MDM: CPT | Performed by: EMERGENCY MEDICINE

## 2021-08-28 PROCEDURE — 99285 EMERGENCY DEPT VISIT HI MDM: CPT

## 2021-08-28 PROCEDURE — 83880 ASSAY OF NATRIURETIC PEPTIDE: CPT | Performed by: EMERGENCY MEDICINE

## 2021-08-28 PROCEDURE — 76770 US EXAM ABDO BACK WALL COMP: CPT

## 2021-08-28 PROCEDURE — 36415 COLL VENOUS BLD VENIPUNCTURE: CPT | Performed by: EMERGENCY MEDICINE

## 2021-08-28 PROCEDURE — 84484 ASSAY OF TROPONIN QUANT: CPT | Performed by: EMERGENCY MEDICINE

## 2021-08-28 PROCEDURE — 99223 1ST HOSP IP/OBS HIGH 75: CPT | Performed by: INTERNAL MEDICINE

## 2021-08-28 PROCEDURE — 85025 COMPLETE CBC W/AUTO DIFF WBC: CPT | Performed by: EMERGENCY MEDICINE

## 2021-08-28 RX ORDER — ASPIRIN 81 MG/1
81 TABLET, CHEWABLE ORAL DAILY
Status: DISCONTINUED | OUTPATIENT
Start: 2021-08-29 | End: 2021-09-04 | Stop reason: HOSPADM

## 2021-08-28 RX ORDER — SODIUM CHLORIDE 9 MG/ML
100 INJECTION, SOLUTION INTRAVENOUS CONTINUOUS
Status: DISCONTINUED | OUTPATIENT
Start: 2021-08-28 | End: 2021-08-29

## 2021-08-28 RX ORDER — LEVOTHYROXINE SODIUM 0.1 MG/1
100 TABLET ORAL
Status: DISCONTINUED | OUTPATIENT
Start: 2021-08-29 | End: 2021-09-04 | Stop reason: HOSPADM

## 2021-08-28 RX ORDER — POTASSIUM CHLORIDE 20 MEQ/1
40 TABLET, EXTENDED RELEASE ORAL ONCE
Status: COMPLETED | OUTPATIENT
Start: 2021-08-28 | End: 2021-08-28

## 2021-08-28 RX ORDER — ATORVASTATIN CALCIUM 80 MG/1
80 TABLET, FILM COATED ORAL
Status: DISCONTINUED | OUTPATIENT
Start: 2021-08-28 | End: 2021-09-04 | Stop reason: HOSPADM

## 2021-08-28 RX ORDER — LANOLIN ALCOHOL/MO/W.PET/CERES
6 CREAM (GRAM) TOPICAL
Status: DISCONTINUED | OUTPATIENT
Start: 2021-08-28 | End: 2021-09-04 | Stop reason: HOSPADM

## 2021-08-28 RX ORDER — ACETAMINOPHEN 325 MG/1
650 TABLET ORAL EVERY 4 HOURS PRN
Status: DISCONTINUED | OUTPATIENT
Start: 2021-08-28 | End: 2021-09-04 | Stop reason: HOSPADM

## 2021-08-28 RX ORDER — DOXYCYCLINE HYCLATE 50 MG/1
324 CAPSULE, GELATIN COATED ORAL
Status: DISCONTINUED | OUTPATIENT
Start: 2021-08-28 | End: 2021-09-04 | Stop reason: HOSPADM

## 2021-08-28 RX ADMIN — MELATONIN 6 MG: at 21:19

## 2021-08-28 RX ADMIN — POTASSIUM CHLORIDE 40 MEQ: 1500 TABLET, EXTENDED RELEASE ORAL at 16:28

## 2021-08-28 RX ADMIN — ATORVASTATIN CALCIUM 80 MG: 80 TABLET, FILM COATED ORAL at 18:27

## 2021-08-28 RX ADMIN — SODIUM CHLORIDE 100 ML/HR: 0.9 INJECTION, SOLUTION INTRAVENOUS at 17:39

## 2021-08-28 RX ADMIN — TICAGRELOR 90 MG: 90 TABLET ORAL at 18:26

## 2021-08-28 RX ADMIN — FERROUS GLUCONATE 324 MG: 324 TABLET ORAL at 18:27

## 2021-08-28 NOTE — H&P
[address] [facility]  H&P- Kristin Graver 1965, 49 y o  female MRN: [id number]  Unit/Bed#: ED 31 Encounter: [id number]  Primary Care Provider: Windy Post PA-C   Date and time admitted to hospital: 8/28/2021  1:03 PM    * MARINA (acute kidney injury) [facility])  Assessment & Plan  · Due to prerenal azotemia from diuretics, decreased renal perfusion from CHF and hypotension on top of decreased autoregulation from lisinopril use  · Check UA  · Check ultrasound r//o obstruction (low suspicion)  · Hold lisinopril and Lasix  · Avoid hypotension  · Start normal saline at 100 mL/hour  · Check labs in a m  · Low threshold for nephrology evaluation    CAD (coronary artery disease)  Assessment & Plan  · Status post recent stent placement on July 15, 2021  · Continue aspirin 81 mg daily and Brilinta 90 mg b i d   · Continue Lipitor  · Hold Lopressor due to borderline low blood pressure    Chronic systolic (congestive) heart failure (HCC)  Assessment & Plan  Wt Readings from Last 3 Encounters:   08/12/21 59 kg (130 lb)   07/12/21 70 2 kg (154 lb 12 2 oz)   05/19/21 64 4 kg (142 lb)     Patient is dry  She will be able to tolerate fluids at this time for her MARINA  Hold lisinopril and furosemide due to MARINA  Hold metoprolol due to borderline low blood pressure    Postoperative hypothyroidism  Assessment & Plan  · Continue levothyroxine 100 mcg daily    VTE Prophylaxis:  Low risk per vte screen  Ambulate as tolerated   Code Status:  Full code except feeding tube per patient  POLST: There is no POLST form on file for this patient (pre-hospital)    Anticipated Length of Stay:  Patient will be admitted on an Inpatient basis with an anticipated length of stay of  at least 2 midnights  Justification for Hospital Stay:  MARINA    Total Time for Visit, including Counseling / Coordination of Care: 45 minutes    Greater than 50% of this total time spent on direct patient counseling and coordination of care     Chief Complaint:   Weakness    History of Present Illness:    Bev Clark is a 49 y o  female who presents with generalized weakness and easy fatigability  She was admitted last month for a myocardial infarction and underwent a cardiac catheterization which showed a 90% stenosis of the 1st obtuse marginal branch of circumflex artery  This was stenosis was felt to be the culprit of her clinical presentation  This was treated with balloon angioplasty and drug-eluting stent placement  During that admission she was found to have ischemic cardiomyopathy with an EF of 38%  She was sent home in stable condition and has been undergoing cardiac rehab  She was doing well until recently when her furosemide was increased from 20 mg 3 times a week to 40 mg daily  Since then she developed weakness,fatigue, and decreased exercise tolerance  She denied any chest pain  She denied difficulty urinating, abdominal pain, fever or chills  She denied leg swelling  Review of Systems:    Review of Systems   Constitutional: Positive for activity change and fatigue  HENT: Negative  Respiratory: Negative  Cardiovascular: Negative for chest pain, palpitations and leg swelling  Gastrointestinal: Negative  Genitourinary: Negative  Musculoskeletal: Negative  Skin: Negative  Neurological: Negative  Psychiatric/Behavioral: Negative  All other systems reviewed and are negative        Past Medical and Surgical History:     Past Medical History:   Diagnosis Date    Allergies     [facility]F (congestive heart failure) ([facility] [address] )     Coronary artery disease     Disease of thyroid gland     hypo    Renal disorder        Past Surgical History:   Procedure Laterality Date    CARDIAC CATHETERIZATION       SECTION      x2    FRACTURE SURGERY      VT OPEN TX RADIAL & ULNAR SHAFT FX FIX RADIUS AND ULNA Left 6/3/2020    Procedure: Open reduction internal fixation left distal radius fracture; Surgeon: Beti Vázquez MD;  Location:  MAIN OR;  Service: Orthopedics    TOTAL THYROIDECTOMY      TUBAL LIGATION         Meds/Allergies:    Prior to Admission medications    Medication Sig Start Date End Date Taking? Authorizing Provider   acetaminophen (TYLENOL) 325 mg tablet Take 2 tablets (650 mg total) by mouth every 4 (four) hours as needed for mild pain, headaches or fever 6/19/20   Mabel Erickson, DO   aspirin 81 mg chewable tablet Chew 1 tablet (81 mg total) daily 6/19/20   Mabel Erickson, DO   atorvastatin (LIPITOR) 80 mg tablet Take 1 tablet (80 mg total) by mouth daily after dinner 8/12/21 11/10/21  Felipa Fearing, DO   ferrous gluconate (FERGON) 324 mg tablet TAKE 1 TABLET (324 MG TOTAL) BY MOUTH 2 (TWO) TIMES A DAY BEFORE MEALS 9/5/20   Mark Frazier PA-C   furosemide (LASIX) 40 mg tablet Take 1 tablet (40 mg total) by mouth daily 8/12/21 11/10/21  Felipa Fearing, DO   levothyroxine 100 mcg tablet Take 1 tablet (100 mcg total) by mouth daily in the early morning 8/20/21 9/19/21  DELORES Ornelas   lisinopril (ZESTRIL) 5 mg tablet Take 1 tablet (5 mg total) by mouth daily 8/12/21 11/10/21  Felipa Fearing, DO   metoprolol succinate (TOPROL-XL) 50 mg 24 hr tablet 1/2 TABLET BY MOUTH TWICE A DAY 8/14/21   Felipa Fearing, DO   nitroglycerin (NITROSTAT) 0 4 mg SL tablet Place 1 tablet (0 4 mg total) under the tongue every 5 (five) minutes as needed for chest pain 6/19/20   Mabel Erickson, DO   ticagrelor (BRILINTA) 90 MG Take 1 tablet (90 mg total) by mouth 2 (two) times a day 8/12/21 11/10/21  Felipa Fearing, DO     I have reviewed home medications with a medical source (PCP, Pharmacy, other)  Allergies:    Allergies   Allergen Reactions    Penicillins Rash       Social History:     Marital Status:    Patient Pre-hospital Living Situation:  Independent  Patient Pre-hospital Level of Mobility:  Independent  Patient Pre-hospital Diet Restrictions:  None  Substance Use History: Social History     Substance and Sexual Activity   Alcohol Use Yes    Comment: only on special ocassions  Social History     Tobacco Use   Smoking Status Never Smoker   Smokeless Tobacco Never Used     Social History     Substance and Sexual Activity   Drug Use Never       Family History:    Family History   Problem Relation Age of Onset    No Known Problems Mother     No Known Problems Father     Cancer Family        Physical Exam:     Vitals:   Blood Pressure: 91/58 (08/28/21 1550)  Pulse: 85 (08/28/21 1550)  Temperature: 99 °F (37 2 °C) (08/28/21 1251)  Temp Source: Oral (08/28/21 1251)  Respirations: 18 (08/28/21 1550)  SpO2: 98 % (08/28/21 1550)    Physical Exam  Constitutional:       Appearance: She is not ill-appearing or diaphoretic  HENT:      Head: Normocephalic and atraumatic  Comments: Temporal wasting     Nose: No rhinorrhea  Mouth/Throat:      Comments: Dry lips and oral mucosa  Eyes:      General: No scleral icterus  Cardiovascular:      Rate and Rhythm: Rhythm irregular  Heart sounds: No murmur heard  No friction rub  No gallop  Pulmonary:      Effort: Pulmonary effort is normal  No respiratory distress  Breath sounds: No wheezing or rales  Abdominal:      General: Abdomen is flat  There is no distension  Palpations: Abdomen is soft  Tenderness: There is no abdominal tenderness  There is no guarding  Musculoskeletal:      Cervical back: Neck supple  Right lower leg: No edema  Left lower leg: No edema  Skin:     General: Skin is warm and dry  Neurological:      Mental Status: She is alert and oriented to person, place, and time  Mental status is at baseline  Psychiatric:         Mood and Affect: Mood normal          Behavior: Behavior normal      Additional Data:     Lab Results: I have personally reviewed pertinent reports        Results from last 7 days   Lab Units 08/28/21  1418   WBC Thousand/uL 10 76*   HEMOGLOBIN g/dL 10 3* HEMATOCRIT % 32 0*   PLATELETS Thousands/uL 121*   NEUTROS PCT % 86*   LYMPHS PCT % 3*   MONOS PCT % 9   EOS PCT % 0     Results from last 7 days   Lab Units 08/28/21  1418   SODIUM mmol/L 132*   POTASSIUM mmol/L 3 1*   CHLORIDE mmol/L 92*   CO2 mmol/L 20*   BUN mg/dL 111*   CREATININE mg/dL 7 06*   ANION GAP mmol/L 20*   CALCIUM mg/dL 7 3*   ALBUMIN g/dL 2 7*   TOTAL BILIRUBIN mg/dL 0 31   ALK PHOS U/L 117*   ALT U/L 83*   AST U/L 120*   GLUCOSE RANDOM mg/dL 120                       Imaging: I have personally reviewed pertinent films in PACS    XR chest 1 view portable   Final Result by Mina Cowan MD (08/28 1639)      No acute cardiopulmonary disease  Workstation performed: [id number]             EKG, Pathology, and Other Studies Reviewed on Admission:   · Cardiac catheterization report    Allscripts / Epic Records Reviewed: Yes     ** Please Note: This note has been constructed using a voice recognition system   **

## 2021-08-28 NOTE — ASSESSMENT & PLAN NOTE
Wt Readings from Last 3 Encounters:   08/12/21 59 kg (130 lb)   07/12/21 70 2 kg (154 lb 12 2 oz)   05/19/21 64 4 kg (142 lb)     Patient is dry  She will be able to tolerate fluids at this time for her MARINA  Hold lisinopril and furosemide due to MARINA  Hold metoprolol due to borderline low blood pressure

## 2021-08-28 NOTE — ASSESSMENT & PLAN NOTE
· Due to prerenal azotemia from diuretics, decreased renal perfusion from CHF and hypotension on top of decreased autoregulation from lisinopril use  · Check UA  · Check ultrasound r//o obstruction (low suspicion)  · Hold lisinopril and Lasix  · Avoid hypotension  · Start normal saline at 100 mL/hour  · Check labs in a m    · Low threshold for nephrology evaluation

## 2021-08-28 NOTE — PLAN OF CARE
Problem: PAIN - ADULT  Goal: Verbalizes/displays adequate comfort level or baseline comfort level  Description: Interventions:  - Encourage patient to monitor pain and request assistance  - Assess pain using appropriate pain scale  - Administer analgesics based on type and severity of pain and evaluate response  - Implement non-pharmacological measures as appropriate and evaluate response  - Consider cultural and social influences on pain and pain management  - Notify physician/advanced practitioner if interventions unsuccessful or patient reports new pain  Outcome: Progressing     Problem: SAFETY ADULT  Goal: Patient will remain free of falls  Description: INTERVENTIONS:  - Educate patient/family on patient safety including physical limitations  - Instruct patient to call for assistance with activity   - Consult OT/PT to assist with strengthening/mobility   - Keep Call bell within reach  - Keep bed low and locked with side rails adjusted as appropriate  - Keep care items and personal belongings within   - Apply yellow socks and bracelet for high fall risk patients  - Consider moving patient to room near nurses station  Outcome: Progressing  Goal: Maintain or return to baseline ADL function  Description: INTERVENTIONS:  -  Assess patient's ability to carry out ADLs; assess patient's baseline for ADL function and identify physical deficits which impact ability to perform ADLs (bathing, care of mouth/teeth, toileting, grooming, dressing, etc )  - Assess/evaluate cause of self-care deficits   - Assess range of motion  - Assess patient's mobility; develop plan if impaired  - Assess patient's need for assistive devices and provide as appropriate  - Encourage maximum independence but intervene and supervise when necessary  - Involve family in performance of ADLs  - Assess for home care needs following discharge   - Consider OT consult to assist with ADL evaluation and planning for discharge  - Provide patient education as appropriate  Outcome: Progressing  Goal: Maintains/Returns to pre admission functional level  Description: INTERVENTIONS:  - Perform BMAT or MOVE assessment daily    - Set and communicate daily mobility goal to care team and patient/family/caregiver  - Collaborate with rehabilitation services on mobility goals if consulted  - Record patient progress and toleration of activity level   Outcome: Progressing     Problem: DISCHARGE PLANNING  Goal: Discharge to home or other facility with appropriate resources  Description: INTERVENTIONS:  - Identify barriers to discharge w/patient and caregiver  - Arrange for needed discharge resources and transportation as appropriate  - Identify discharge learning needs (meds, wound care, etc )  - Arrange for interpretive services to assist at discharge as needed  - Refer to Case Management Department for coordinating discharge planning if the patient needs post-hospital services based on physician/advanced practitioner order or complex needs related to functional status, cognitive ability, or social support system  Outcome: Progressing     Problem: Knowledge Deficit  Goal: Patient/family/caregiver demonstrates understanding of disease process, treatment plan, medications, and discharge instructions  Description: Complete learning assessment and assess knowledge base    Interventions:  - Provide teaching at level of understanding  - Provide teaching via preferred learning methods  Outcome: Progressing     Problem: Potential for Falls  Goal: Patient will remain free of falls  Description: INTERVENTIONS:  - Educate patient/family on patient safety including physical limitations  - Instruct patient to call for assistance with activity   - Consult OT/PT to assist with strengthening/mobility   - Keep Call bell within reach  - Keep bed low and locked with side rails adjusted as appropriate  - Keep care items and personal belongings within reach  - Initiate and maintain comfort rounds  - Make Fall Risk Sign visible to staff  - Apply yellow socks and bracelet for high fall risk patients  - Consider moving patient to room near nurses station  Outcome: Progressing

## 2021-08-28 NOTE — ASSESSMENT & PLAN NOTE
· Status post recent stent placement on July 15, 2021  · Continue aspirin 81 mg daily and Brilinta 90 mg b i d   · Continue Lipitor  · Hold Lopressor due to borderline low blood pressure

## 2021-08-29 PROBLEM — I47.2 NSVT (NONSUSTAINED VENTRICULAR TACHYCARDIA) (HCC): Status: ACTIVE | Noted: 2021-08-29

## 2021-08-29 PROBLEM — I47.29 NSVT (NONSUSTAINED VENTRICULAR TACHYCARDIA): Status: ACTIVE | Noted: 2021-08-29

## 2021-08-29 LAB
ANION GAP SERPL CALCULATED.3IONS-SCNC: 17 MMOL/L (ref 4–13)
BASOPHILS # BLD AUTO: 0.03 THOUSANDS/ΜL (ref 0–0.1)
BASOPHILS NFR BLD AUTO: 0 % (ref 0–1)
BUN SERPL-MCNC: 102 MG/DL (ref 5–25)
CALCIUM SERPL-MCNC: 6.7 MG/DL (ref 8.3–10.1)
CHLORIDE SERPL-SCNC: 98 MMOL/L (ref 100–108)
CO2 SERPL-SCNC: 17 MMOL/L (ref 21–32)
CREAT SERPL-MCNC: 6.14 MG/DL (ref 0.6–1.3)
EOSINOPHIL # BLD AUTO: 0.09 THOUSAND/ΜL (ref 0–0.61)
EOSINOPHIL NFR BLD AUTO: 1 % (ref 0–6)
ERYTHROCYTE [DISTWIDTH] IN BLOOD BY AUTOMATED COUNT: 17.4 % (ref 11.6–15.1)
GFR SERPL CREATININE-BSD FRML MDRD: 7 ML/MIN/1.73SQ M
GLUCOSE SERPL-MCNC: 92 MG/DL (ref 65–140)
HCT VFR BLD AUTO: 27.1 % (ref 34.8–46.1)
HGB BLD-MCNC: 8.9 G/DL (ref 11.5–15.4)
IMM GRANULOCYTES # BLD AUTO: 0.09 THOUSAND/UL (ref 0–0.2)
IMM GRANULOCYTES NFR BLD AUTO: 1 % (ref 0–2)
LYMPHOCYTES # BLD AUTO: 0.54 THOUSANDS/ΜL (ref 0.6–4.47)
LYMPHOCYTES NFR BLD AUTO: 5 % (ref 14–44)
MCH RBC QN AUTO: 27 PG (ref 26.8–34.3)
MCHC RBC AUTO-ENTMCNC: 32.8 G/DL (ref 31.4–37.4)
MCV RBC AUTO: 82 FL (ref 82–98)
MONOCYTES # BLD AUTO: 0.98 THOUSAND/ΜL (ref 0.17–1.22)
MONOCYTES NFR BLD AUTO: 10 % (ref 4–12)
NEUTROPHILS # BLD AUTO: 8.24 THOUSANDS/ΜL (ref 1.85–7.62)
NEUTS SEG NFR BLD AUTO: 83 % (ref 43–75)
NRBC BLD AUTO-RTO: 0 /100 WBCS
PLATELET # BLD AUTO: 119 THOUSANDS/UL (ref 149–390)
PMV BLD AUTO: 11.8 FL (ref 8.9–12.7)
POTASSIUM SERPL-SCNC: 3.9 MMOL/L (ref 3.5–5.3)
RBC # BLD AUTO: 3.3 MILLION/UL (ref 3.81–5.12)
SODIUM SERPL-SCNC: 132 MMOL/L (ref 136–145)
WBC # BLD AUTO: 9.97 THOUSAND/UL (ref 4.31–10.16)

## 2021-08-29 PROCEDURE — 80048 BASIC METABOLIC PNL TOTAL CA: CPT | Performed by: INTERNAL MEDICINE

## 2021-08-29 PROCEDURE — 99232 SBSQ HOSP IP/OBS MODERATE 35: CPT | Performed by: INTERNAL MEDICINE

## 2021-08-29 PROCEDURE — 85025 COMPLETE CBC W/AUTO DIFF WBC: CPT | Performed by: INTERNAL MEDICINE

## 2021-08-29 RX ORDER — SODIUM CHLORIDE 9 MG/ML
75 INJECTION, SOLUTION INTRAVENOUS CONTINUOUS
Status: DISCONTINUED | OUTPATIENT
Start: 2021-08-29 | End: 2021-08-30

## 2021-08-29 RX ADMIN — FERROUS GLUCONATE 324 MG: 324 TABLET ORAL at 06:21

## 2021-08-29 RX ADMIN — ASPIRIN 81 MG: 81 TABLET, CHEWABLE ORAL at 09:55

## 2021-08-29 RX ADMIN — LEVOTHYROXINE SODIUM 100 MCG: 100 TABLET ORAL at 06:21

## 2021-08-29 RX ADMIN — ATORVASTATIN CALCIUM 80 MG: 80 TABLET, FILM COATED ORAL at 17:33

## 2021-08-29 RX ADMIN — TICAGRELOR 90 MG: 90 TABLET ORAL at 09:56

## 2021-08-29 RX ADMIN — MELATONIN 6 MG: at 23:07

## 2021-08-29 RX ADMIN — SODIUM CHLORIDE 100 ML/HR: 0.9 INJECTION, SOLUTION INTRAVENOUS at 03:36

## 2021-08-29 RX ADMIN — TICAGRELOR 90 MG: 90 TABLET ORAL at 17:33

## 2021-08-29 RX ADMIN — FERROUS GLUCONATE 324 MG: 324 TABLET ORAL at 17:33

## 2021-08-29 RX ADMIN — SODIUM CHLORIDE 75 ML/HR: 0.9 INJECTION, SOLUTION INTRAVENOUS at 11:34

## 2021-08-29 NOTE — UTILIZATION REVIEW
Initial Clinical Review    Admission: Date/Time/Statement:   Admission Orders (From admission, onward)     Ordered        08/28/21 1535  INPATIENT ADMISSION  Once                   Orders Placed This Encounter   Procedures    INPATIENT ADMISSION     Standing Status:   Standing     Number of Occurrences:   1     Order Specific Question:   Level of Care     Answer:   Med Surg [16]     Order Specific Question:   Estimated length of stay     Answer:   More than 2 Midnights     Order Specific Question:   Certification     Answer:   I certify that inpatient services are medically necessary for this patient for a duration of greater than two midnights  See H&P and MD Progress Notes for additional information about the patient's course of treatment  ED Arrival Information     Expected Arrival Acuity    - 8/28/2021 12:39 Emergent         Means of arrival Escorted by Service Admission type    [facility] Emergency         Arrival complaint    chest pain        Chief Complaint   Patient presents with    Shortness of Breath     pt reports SOB that started overnight  reports cardiac stent placement 2 months ago  also reporting dizziness  Initial Presentation: 48 yo female presented to ED from home as inpatient admission for MARINA  As per patient c/o generalized weakness easy fatigability  She was admitted last month for a myocardial infarction and underwent a cardiac catheterization which showed a 90% stenosis of the 1st obtuse marginal branch of circumflex artery  This was stenosis was felt to be the culprit of her clinical presentation  This was treated with balloon angioplasty and drug-eluting stent placement  During that admission she was found to have ischemic cardiomyopathy with an EF of 38%  She was sent home in stable condition and has been undergoing cardiac rehab  PMHx CAD,CHF,hypothyroidism  Her lasix has been increased from 20 mg 3 times a week to 40 mg daily    And according to patient this is when s/s began  On  Exam irregular rhythm noted     Date:08-28-21   MARINA secondary to diuresis, hypotension, and ACE-inhibitor use Continue holding metoprolol and lisinopril    Continue holding diuretics IVF @ 75 ml /hr Patient noted to have a run of NSVT this AM  Consult cardiology continue to monitor bun/creatinine and e-lytes    ED Triage Vitals   Temperature Pulse Respirations Blood Pressure SpO2   08/28/21 1251 08/28/21 1251 08/28/21 1251 08/28/21 1251 08/28/21 1251   99 °F (37 2 °C) (!) 109 19 90/56 99 %      Temp Source Heart Rate Source Patient Position - Orthostatic VS BP Location FiO2 (%)   08/28/21 1251 08/28/21 1251 08/28/21 1251 08/28/21 1251 --   Oral Monitor Sitting Right arm       Pain Score       08/28/21 1744       No Pain          Wt Readings from Last 1 Encounters:   08/28/21 58 8 kg (129 lb 10 1 oz)     Additional Vital Signs:   08/29/21 1215  97 9 °F (36 6 °C)  81  18  94/50  --  98 %  None (Room air)  Lying   08/29/21 0720  --  --  --  --  --  --  None (Room air)  --   08/29/21 0700  98 5 °F (36 9 °C)  86  18  95/50  --  98 %  None (Room air)  Lying   08/29/21 0100  99 4 °F (37 4 °C)  104  18  93/55  61  97 %  None (Room air)  Lying   08/28/21 1904  98 9 °F (37 2 °C)  93  18  97/64  69  --  --  Lying   08/28/21 1741  98 4 °F (36 9 °C)  97  18  112/75  86  --  --  Lying   08/28/21 1714  --  94  16  120/60  --  97 %  None (Room air)  Sitting   08/28/21 1550  --  85  18  91/58  --  98 %  None (Room air)  Lying   08/28/21 1400  --  --  --  --  --  --  None          Pertinent Labs/Diagnostic Test Results:       Results from last 7 days   Lab Units 08/29/21  0554 08/28/21  1418   WBC Thousand/uL 9 97 10 76*   HEMOGLOBIN g/dL 8 9* 10 3*   HEMATOCRIT % 27 1* 32 0*   PLATELETS Thousands/uL 119* 121*   NEUTROS ABS Thousands/µL 8 24* 9 21*         Results from last 7 days   Lab Units 08/29/21  0554 08/28/21  1418   SODIUM mmol/L 132* 132*   POTASSIUM mmol/L 3 9 3 1*   CHLORIDE mmol/L 98* 92*   CO2 mmol/L 17* 20*   ANION GAP mmol/L 17* 20*   BUN mg/dL 102* 111*   CREATININE mg/dL 6 14* 7 06*   EGFR ml/min/1 73sq m 7 6   CALCIUM mg/dL 6 7* 7 3*     Results from last 7 days   Lab Units 08/28/21  1418   AST U/L 120*   ALT U/L 83*   ALK PHOS U/L 117*   TOTAL PROTEIN g/dL 7 2   ALBUMIN g/dL 2 7*   TOTAL BILIRUBIN mg/dL 0 31         Results from last 7 days   Lab Units 08/29/21  0554 08/28/21  1418   GLUCOSE RANDOM mg/dL 92 120     Results from last 7 days   Lab Units 08/28/21  1418   TROPONIN I ng/mL 0 17*     Results from last 7 days   Lab Units 08/28/21  1418   NT-PRO BNP pg/mL 7,919*       Results from last 7 days   Lab Units 08/28/21  1713   CLARITY UA  Cloudy   COLOR UA  Yellow   SPEC GRAV UA  1 020   PH UA  5 5   GLUCOSE UA mg/dl Negative   KETONES UA mg/dl Negative   BLOOD UA  Moderate*   PROTEIN UA mg/dl Trace*   NITRITE UA  Negative   BILIRUBIN UA  Negative   UROBILINOGEN UA E U /dl 0 2   LEUKOCYTES UA  Moderate*   WBC UA /hpf 10-20*   RBC UA /hpf 1-2*   BACTERIA UA /hpf Moderate*   EPITHELIAL CELLS WET PREP /hpf Occasional       CXR 08-28-21  [facility]    ED Treatment:   Medication Administration from 08/28/2021 1239 to 08/28/2021 1728       Date/Time Order Dose Route Action     08/28/2021 1628 potassium chloride (K-DUR,KLOR-CON) CR tablet 40 mEq 40 mEq Oral Given        Past Medical History:   Diagnosis Date    Allergies     CH[facility] (congestive heart failure) ([facility])     Coronary artery disease     Disease of thyroid gland     hypo    Renal disorder      Present on Admission:   [facility]INA [facility]acute [facility]) ([facility] [address] )   Chronic systolic (congestive) heart failure ([facility])   CAD (coronary artery disease)   Postoperative hypothyroidism   NSVT (nonsustained ventricular tachycardia) ([facility])      Admitting Diagnosis: SOB (shortness of breath) [R06 02]  Elevated troponin [R77 8]  Acute renal failure (ARF) ([facility]) [N17 9]  Generalized weakness [R53 1]  Age/Sex: 49 y o  female  Admission Orders:  Scheduled Medications:  aspirin, 81 mg, Oral, Daily  atorvastatin, 80 mg, Oral, After Dinner  ferrous gluconate, 324 mg, Oral, BID AC  levothyroxine, 100 mcg, Oral, Early Morning  melatonin, 6 mg, Oral, HS  ticagrelor, 90 mg, Oral, BID      Continuous IV Infusions:  sodium chloride, 75 mL/hr, Intravenous, Continuous      PRN Meds:  acetaminophen, 650 mg, Oral, Q4H PRN  pneumococcal 23-valent polysaccharide vaccine, 0 5 mL, Subcutaneous, Prior to discharge        None    Network Utilization Review Department  ATTENTION: Please call with any questions or concerns to [phone] and carefully listen to the prompts so that you are directed to the right person  All voicemails are confidential   [employer] all requests for admission clinical reviews, approved or denied determinations and any other requests to dedicated fax number below belonging to the campus where the patient is receiving treatment   List of dedicated fax numbers for the Facilities:  [address] DENIALS (Administrative/Medical Necessity) [phone]   [address] (Maternity/NICU/Pediatrics) [phone]   [address] [place] [address] Dr Sanaz Arroyo [address] [id number] [address] [address] [address] T[address] [place] [address] [phone]

## 2021-08-29 NOTE — PROGRESS NOTES
[facility]  Progress Note - Trang Jin 1965, 49 y o  female MRN: [id number]  Unit/Bed#: E4 -01 Encounter: [id number]  Primary Care Provider: Bryan Mijares PA-C   Date and time admitted to hospital: 8/28/2021  1:03 PM    * MARINA (acute kidney injury) [facility])  Assessment & Plan  · MARINA secondary to diuresis, hypotension, and ACE-inhibitor use  · Continue holding metoprolol and lisinopril  Continue holding diuretics  · Continue IV fluids but decrease rate to 75 mL/h    Results from last 7 days   Lab Units 08/29/21  0554 08/28/21  1418   BUN mg/dL 102* 111*   CREATININE mg/dL 6 14* 7 06*   EGFR ml/min/1 73sq m 7 6       NSVT (nonsustained ventricular tachycardia) ([facility] [address] )  Assessment & Plan  · Will consult cardiology for evaluation  Monitor electrolytes  Chronic systolic (congestive) heart failure [facility])  Assessment & Plan  Wt Readings from Last 3 Encounters:   08/28/21 58 8 kg (129 lb 10 1 oz)   08/12/21 59 kg (130 lb)   07/12/21 70 2 kg (154 lb 12 2 oz)     Results from last 7 days   Lab Units 08/28/21  1418   NT-PRO BNP pg/mL 7,919*     · Currently compensated  Given profound renal dysfunction holding furosemide and lisinopril  CAD (coronary artery disease)  Assessment & Plan  · CAD status post recent stent July 2021  · Continue DA PT atorvastatin  · Holding metoprolol due to hypotension    Postoperative hypothyroidism  Assessment & Plan  · Continue levothyroxine  · Recent TSH very suppressed  Will recheck  VTE Pharmacologic Prophylaxis: VTE Score: 2 Low Risk (Score 0-2) - Encourage Ambulation  Patient Centered Rounds: I have performed bedside rounds with nursing staff today  Discussions with Specialists or Other Care Team Provider:     Education and Discussions with Family / Patient: discussed with mother on telephone    Time Spent for Care: 25 mins    More than 50% of total time spent on counseling and coordination of care as described above     Current Length of Stay: 1 day(s)  Current Patient Status: Inpatient   Certification Statement: The patient will continue to require additional inpatient hospital stay due to renal failure  Discharge Plan / Estimated Discharge Date: Anticipate discharge in 48-72 hrs to home  Code Status: Level 1 - Full Code      Subjective:   Patient seen and examined  No new complaints, still hypotensive  Objective:   Vitals: Blood pressure 95/50, pulse 86, temperature 98 5 °F (36 9 °C), temperature source Tympanic, resp  rate 18, height 5' 5" (1 651 m), weight 58 8 kg (129 lb 10 1 oz), SpO2 98 %, not currently breastfeeding  Physical Exam  Vitals reviewed  Constitutional:       General: She is not in acute distress  Appearance: Normal appearance  Cardiovascular:      Rate and Rhythm: Regular rhythm  Heart sounds: Normal heart sounds  Pulmonary:      Breath sounds: Decreased breath sounds present  No wheezing  Abdominal:      Palpations: Abdomen is soft  Tenderness: There is no abdominal tenderness  There is no guarding  Musculoskeletal:         General: No swelling  Skin:     General: Skin is warm  Neurological:      Mental Status: She is alert and oriented to person, place, and time  Mental status is at baseline     Psychiatric:         Mood and Affect: Mood normal        Additional Data:   Labs:  Results from last 7 days   Lab Units 08/29/21  0554 08/28/21  1418   WBC Thousand/uL 9 97 10 76*   HEMOGLOBIN g/dL 8 9* 10 3*   HEMATOCRIT % 27 1* 32 0*   MCV fL 82 82   PLATELETS Thousands/uL 119* 121*     Results from last 7 days   Lab Units 08/29/21  0554 08/28/21  1418   SODIUM mmol/L 132* 132*   POTASSIUM mmol/L 3 9 3 1*   CHLORIDE mmol/L 98* 92*   CO2 mmol/L 17* 20*   ANION GAP mmol/L 17* 20*   BUN mg/dL 102* 111*   CREATININE mg/dL 6 14* 7 06*   CALCIUM mg/dL 6 7* 7 3*   ALBUMIN g/dL  --  2 7*   TOTAL BILIRUBIN mg/dL  --  0 31   ALK PHOS U/L  --  117*   ALT U/L  --  83*   AST U/L  -- 120*   EGFR ml/min/1 73sq m 7 6   GLUCOSE RANDOM mg/dL 92 120         Results from last 7 days   Lab Units 08/28/21  1418   TROPONIN I ng/mL 0 17*     Results from last 7 days   Lab Units 08/28/21  1418   NT-PRO BNP pg/mL 7,919*                      * I Have Reviewed All Lab Data Listed Above  Cultures:                   Lines/Drains:  Invasive Devices     Peripheral Intravenous Line            Peripheral IV 08/28/21 Left Forearm <1 day              Telemetry:   Telemetry Orders (From admission, onward)             48 Hour Telemetry Monitoring  (ED Bridging Orders Panel)  Continuous x 48 hours     Question:  Reason for 48 Hour Telemetry  Answer:  Arrhythmias Requiring Medical Therapy (eg  SVT, Vtach/fib, Bradycardia, Uncontrolled A-fib)                  Imaging:  Imaging Reports Reviewed Today Include:   XR chest 1 view portable    Result Date: 8/28/2021  Impression: No acute cardiopulmonary disease  Workstation performed: [id number]     Scheduled Meds:  Current Facility-Administered Medications   Medication Dose Route Frequency Provider Last Rate    acetaminophen  650 mg Oral Q4H PRN Melvin Grey MD      aspirin  81 mg Oral Daily Melvin Grey MD      atorvastatin  80 mg Oral After Jung Wilson MD      ferrous gluconate  324 mg Oral BID AC Melvin Grey MD      levothyroxine  100 mcg Oral Early Morning Melvin Grey MD      melatonin  6 mg Oral HS DELORES Purdy      pneumococcal 23-valent polysaccharide vaccine  0 5 mL Subcutaneous Prior to discharge Melvin Grey MD      sodium chloride  100 mL/hr Intravenous Continuous Melvin Grey  mL/hr (08/29/21 0336)    ticagrelor  90 mg Oral BID Melvin Grey MD         [facility] [facility] Internal Medicine  Hospitalist    ** Please Note: This note has been constructed using a voice recognition system   **

## 2021-08-29 NOTE — ASSESSMENT & PLAN NOTE
Wt Readings from Last 3 Encounters:   08/28/21 58 8 kg (129 lb 10 1 oz)   08/12/21 59 kg (130 lb)   07/12/21 70 2 kg (154 lb 12 2 oz)     Results from last 7 days   Lab Units 08/28/21  1418   NT-PRO BNP pg/mL 7,919*     · Currently compensated  Given profound renal dysfunction holding furosemide and lisinopril

## 2021-08-29 NOTE — ASSESSMENT & PLAN NOTE
· MARINA secondary to diuresis, hypotension, and ACE-inhibitor use  · Continue holding metoprolol and lisinopril    Continue holding diuretics  · Continue IV fluids but decrease rate to 75 mL/h    Results from last 7 days   Lab Units 08/29/21  0554 08/28/21  1418   BUN mg/dL 102* 111*   CREATININE mg/dL 6 14* 7 06*   EGFR ml/min/1 73sq m 7 6

## 2021-08-29 NOTE — ASSESSMENT & PLAN NOTE
· CAD status post recent stent July 2021  · Continue DA PT atorvastatin    · Holding metoprolol due to hypotension

## 2021-08-29 NOTE — NURSING NOTE
Pt had multiple runs of V tach every time she ambulated to the bathroom, asymptomatic, continue to monitor overnight

## 2021-08-30 ENCOUNTER — APPOINTMENT (OUTPATIENT)
Dept: CARDIAC REHAB | Facility: CLINIC | Age: 56
End: 2021-08-30
Attending: STUDENT IN AN ORGANIZED HEALTH CARE EDUCATION/TRAINING PROGRAM
Payer: COMMERCIAL

## 2021-08-30 ENCOUNTER — APPOINTMENT (INPATIENT)
Dept: NON INVASIVE DIAGNOSTICS | Facility: HOSPITAL | Age: 56
DRG: 683 | End: 2021-08-30
Payer: COMMERCIAL

## 2021-08-30 PROBLEM — E87.20 ACIDOSIS: Status: ACTIVE | Noted: 2021-08-30

## 2021-08-30 PROBLEM — E87.2 ACIDOSIS: Status: ACTIVE | Noted: 2021-08-30

## 2021-08-30 LAB
ALBUMIN SERPL BCP-MCNC: 2 G/DL (ref 3.5–5)
ALP SERPL-CCNC: 95 U/L (ref 46–116)
ALT SERPL W P-5'-P-CCNC: 60 U/L (ref 12–78)
ANION GAP SERPL CALCULATED.3IONS-SCNC: 17 MMOL/L (ref 4–13)
AST SERPL W P-5'-P-CCNC: 84 U/L (ref 5–45)
ATRIAL RATE: 103 BPM
ATRIAL RATE: 109 BPM
BILIRUB SERPL-MCNC: 0.47 MG/DL (ref 0.2–1)
BUN SERPL-MCNC: 85 MG/DL (ref 5–25)
CALCIUM ALBUM COR SERPL-MCNC: 8.1 MG/DL (ref 8.3–10.1)
CALCIUM SERPL-MCNC: 6.5 MG/DL (ref 8.3–10.1)
CHLORIDE SERPL-SCNC: 101 MMOL/L (ref 100–108)
CO2 SERPL-SCNC: 15 MMOL/L (ref 21–32)
CREAT SERPL-MCNC: 4.6 MG/DL (ref 0.6–1.3)
ERYTHROCYTE [DISTWIDTH] IN BLOOD BY AUTOMATED COUNT: 17.7 % (ref 11.6–15.1)
GFR SERPL CREATININE-BSD FRML MDRD: 10 ML/MIN/1.73SQ M
GLUCOSE SERPL-MCNC: 92 MG/DL (ref 65–140)
HCT VFR BLD AUTO: 27.5 % (ref 34.8–46.1)
HGB BLD-MCNC: 9 G/DL (ref 11.5–15.4)
MCH RBC QN AUTO: 27.3 PG (ref 26.8–34.3)
MCHC RBC AUTO-ENTMCNC: 32.7 G/DL (ref 31.4–37.4)
MCV RBC AUTO: 83 FL (ref 82–98)
OSMOLALITY UR: 329 MMOL/KG
P AXIS: 28 DEGREES
P AXIS: 48 DEGREES
PLATELET # BLD AUTO: 146 THOUSANDS/UL (ref 149–390)
PMV BLD AUTO: 12.1 FL (ref 8.9–12.7)
POTASSIUM SERPL-SCNC: 3.5 MMOL/L (ref 3.5–5.3)
PR INTERVAL: 132 MS
PR INTERVAL: 134 MS
PROT SERPL-MCNC: 5.8 G/DL (ref 6.4–8.2)
QRS AXIS: -20 DEGREES
QRS AXIS: -7 DEGREES
QRSD INTERVAL: 170 MS
QRSD INTERVAL: 98 MS
QT INTERVAL: 308 MS
QT INTERVAL: 422 MS
QTC INTERVAL: 403 MS
QTC INTERVAL: 568 MS
RBC # BLD AUTO: 3.3 MILLION/UL (ref 3.81–5.12)
SODIUM 24H UR-SCNC: 24 MOL/L
SODIUM SERPL-SCNC: 133 MMOL/L (ref 136–145)
T WAVE AXIS: 139 DEGREES
T WAVE AXIS: 88 DEGREES
T4 FREE SERPL-MCNC: 0.46 NG/DL (ref 0.76–1.46)
TSH SERPL DL<=0.05 MIU/L-ACNC: 2.21 UIU/ML (ref 0.36–3.74)
VENTRICULAR RATE: 103 BPM
VENTRICULAR RATE: 109 BPM
WBC # BLD AUTO: 10.52 THOUSAND/UL (ref 4.31–10.16)

## 2021-08-30 PROCEDURE — 99222 1ST HOSP IP/OBS MODERATE 55: CPT | Performed by: INTERNAL MEDICINE

## 2021-08-30 PROCEDURE — 93325 DOPPLER ECHO COLOR FLOW MAPG: CPT | Performed by: INTERNAL MEDICINE

## 2021-08-30 PROCEDURE — 99255 IP/OBS CONSLTJ NEW/EST HI 80: CPT | Performed by: INTERNAL MEDICINE

## 2021-08-30 PROCEDURE — 83935 ASSAY OF URINE OSMOLALITY: CPT | Performed by: NURSE PRACTITIONER

## 2021-08-30 PROCEDURE — 93010 ELECTROCARDIOGRAM REPORT: CPT | Performed by: INTERNAL MEDICINE

## 2021-08-30 PROCEDURE — 80053 COMPREHEN METABOLIC PANEL: CPT | Performed by: INTERNAL MEDICINE

## 2021-08-30 PROCEDURE — 84300 ASSAY OF URINE SODIUM: CPT | Performed by: NURSE PRACTITIONER

## 2021-08-30 PROCEDURE — 93321 DOPPLER ECHO F-UP/LMTD STD: CPT | Performed by: INTERNAL MEDICINE

## 2021-08-30 PROCEDURE — 84443 ASSAY THYROID STIM HORMONE: CPT | Performed by: INTERNAL MEDICINE

## 2021-08-30 PROCEDURE — 93308 TTE F-UP OR LMTD: CPT | Performed by: INTERNAL MEDICINE

## 2021-08-30 PROCEDURE — 99233 SBSQ HOSP IP/OBS HIGH 50: CPT | Performed by: INTERNAL MEDICINE

## 2021-08-30 PROCEDURE — 84439 ASSAY OF FREE THYROXINE: CPT | Performed by: INTERNAL MEDICINE

## 2021-08-30 PROCEDURE — 93308 TTE F-UP OR LMTD: CPT

## 2021-08-30 PROCEDURE — 85027 COMPLETE CBC AUTOMATED: CPT | Performed by: INTERNAL MEDICINE

## 2021-08-30 RX ORDER — ALBUMIN (HUMAN) 12.5 G/50ML
12.5 SOLUTION INTRAVENOUS ONCE
Status: DISCONTINUED | OUTPATIENT
Start: 2021-08-30 | End: 2021-08-30

## 2021-08-30 RX ORDER — HEPARIN SODIUM 5000 [USP'U]/ML
5000 INJECTION, SOLUTION INTRAVENOUS; SUBCUTANEOUS EVERY 8 HOURS SCHEDULED
Status: DISCONTINUED | OUTPATIENT
Start: 2021-08-30 | End: 2021-09-03

## 2021-08-30 RX ORDER — CALCIUM GLUCONATE 20 MG/ML
1 INJECTION, SOLUTION INTRAVENOUS ONCE
Status: COMPLETED | OUTPATIENT
Start: 2021-08-30 | End: 2021-08-30

## 2021-08-30 RX ADMIN — TICAGRELOR 90 MG: 90 TABLET ORAL at 09:14

## 2021-08-30 RX ADMIN — MELATONIN 6 MG: at 21:48

## 2021-08-30 RX ADMIN — CALCIUM GLUCONATE 1 G: 20 INJECTION, SOLUTION INTRAVENOUS at 09:14

## 2021-08-30 RX ADMIN — ATORVASTATIN CALCIUM 80 MG: 80 TABLET, FILM COATED ORAL at 17:15

## 2021-08-30 RX ADMIN — ASPIRIN 81 MG: 81 TABLET, CHEWABLE ORAL at 09:14

## 2021-08-30 RX ADMIN — HEPARIN SODIUM 5000 UNITS: 5000 INJECTION INTRAVENOUS; SUBCUTANEOUS at 21:57

## 2021-08-30 RX ADMIN — SODIUM BICARBONATE 100 ML/HR: 84 INJECTION, SOLUTION INTRAVENOUS at 10:44

## 2021-08-30 RX ADMIN — SODIUM BICARBONATE 100 ML/HR: 84 INJECTION, SOLUTION INTRAVENOUS at 22:19

## 2021-08-30 RX ADMIN — TICAGRELOR 90 MG: 90 TABLET ORAL at 17:15

## 2021-08-30 RX ADMIN — SODIUM CHLORIDE 75 ML/HR: 0.9 INJECTION, SOLUTION INTRAVENOUS at 05:40

## 2021-08-30 RX ADMIN — LEVOTHYROXINE SODIUM 100 MCG: 100 TABLET ORAL at 05:42

## 2021-08-30 RX ADMIN — FERROUS GLUCONATE 324 MG: 324 TABLET ORAL at 17:15

## 2021-08-30 RX ADMIN — FERROUS GLUCONATE 324 MG: 324 TABLET ORAL at 05:42

## 2021-08-30 NOTE — CONSULTS
Consultation - Nephrology   Shankar Jones 49 y o  female MRN: [id number]  Unit/Bed#: E4 -01 Encounter: [id number]    ASSESSMENT/PLAN:  MARINA (POA):  Suspected prerenal etiology secondary to hemodynamics with hypotension, diuretics, and Ace inhibition  -originally presented with creatinine of 7 06   -baseline creatinine 1 1-1 5 since June 2020    -currently on IV fluids  Change to bicarbonate this morning  -UA showed moderate blood, moderate leukocytes, trace protein, 1-2 RBCs, 10-20 WBCs, moderate bacteria  -renal ultrasound showed normal kidneys and bladder   -recommend holding diuretics and Ace inhibitor   -checking bladder scan    -recommend avoiding nephrotoxins, hypotension, IV contrast   -I/O  Hypotension:  Systolic blood pressure in the 80's to low 100's  Patient reports blood pressure in the 90 to low 100s at baseline  Reports occasional lightheadedness  Currently asymptomatic   -recommend avoiding further hypotension   -home medications:  Lisinopril 5 mg daily, metoprolol XL 25 mg b i d  -maintain map greater than 65 mmHg    -will give albumin as needed to assist with hypotension   -consider addition of midodrine  Mild hyponatremia:  Suspect hypovolemic   -will place on fluid restriction   -check serum Osm, urine osm, urine sodium, am cortisol level, TSH 2 2   -continue to monitor and trend  Anion gap acidosis:  -will change IV fluids to bicarbonate  Mild hypocalcemia:  With noted hypoalbuminemia   -corrected calcium level 8 1   -will provide replacement today   -placed on nutritional supplements  Nonsustained SVT:  -cardiology team following   -checking TSH   -currently not on beta-blocker or calcium channel blocker  Chronic systolic CHF:  With EF of 38%  -BNP elevated    -chest x-ray negative for acute cardiopulmonary disease   -diuretics currently on hold   -home diuretic:  Furosemide 40 mg daily   -check daily weights, implement fluid restriction, strict I/O     Anemia:  -continue to monitor and transfuse as needed for hemoglobin less than 7 0   -continue home iron supplementation, ferrous gluconate 324 mg 2 times per day  Other:  CAD status post stent in 2021, 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,      HISTORY OF PRESENT ILLNESS:  Requesting Physician: Laron Barney DO  Reason for Consult: MARINA (POA)    Maxwell Cason is a 49y o  year old female who was admitted to [facility] after presenting with complaints of feeling weak and fatigued  A renal consultation is requested today for assistance in the management of MARINA (POA)  Per record review, the patient was admitted in July secondary to MI and underwent a cardiac catheterization with stent placement  She has newly diagnosed ischemic cardiomyopathy with EF of 38%  The patient denies any previous renal history  She reports that she had recent adjustments in her metoprolol and atorvastatin as an outpatient  Also her Lasix was increased to 40 mg daily from 20 mg 3 times per week  She reports having hypotension at baseline with systolic blood pressures in the 90s to low 100s  She checks her blood pressure daily at home  She states that she occasionally feels lightheaded  She denies any chest discomfort or shortness of breath  She denies nausea, vomiting, diarrhea, or issues with urination  She denies any NSAID use at home  PAST MEDICAL HISTORY:  Past Medical History:   Diagnosis Date    Allergies     CHF (congestive heart failure) ([address] )     Coronary artery disease     Disease of thyroid gland     hypo    Renal disorder        PAST SURGICAL HISTORY:  Past Surgical History:   Procedure Laterality Date    CARDIAC CATHETERIZATION       SECTION      x2    FRACTURE SURGERY      AL OPEN TX RADIAL & ULNAR SHAFT FX FIX RADIUS AND ULNA Left 6/3/2020    Procedure: Open reduction internal fixation left distal radius fracture;  Surgeon:  Kenny Adkins MD;  Location: [facility];  Service: Orthopedics    TOTAL THYROIDECTOMY      TUBAL LIGATION         ALLERGIES:  Allergies   Allergen Reactions    Penicillins Rash       SOCIAL HISTORY:  Social History     Substance and Sexual Activity   Alcohol Use Yes    Comment: only on special ocassions  Social History     Substance and Sexual Activity   Drug Use Never     Social History     Tobacco Use   Smoking Status Never Smoker   Smokeless Tobacco Never Used       FAMILY HISTORY:  Family History   Problem Relation Age of Onset    No Known Problems Mother     No Known Problems Father     Cancer Family        MEDICATIONS:    Current Facility-Administered Medications:     acetaminophen (TYLENOL) tablet 650 mg, 650 mg, Oral, Q4H PRN, Miranda Mejia MD    aspirin chewable tablet 81 mg, 81 mg, Oral, Daily, Miranda Mejia MD, 81 mg at 08/29/21 0955    atorvastatin (LIPITOR) tablet 80 mg, 80 mg, Oral, After Render MD Rosa Isela, 80 mg at 08/29/21 1733    ferrous gluconate (FERGON) tablet 324 mg, 324 mg, Oral, BID AC, Miranda Mejia MD, 324 mg at 08/30/21 0542    levothyroxine tablet 100 mcg, 100 mcg, Oral, Early Morning, Miranda Mejia MD, 100 mcg at 08/30/21 0542    melatonin tablet 6 mg, 6 mg, Oral, HS, KAYLA BentonNP, 6 mg at 08/29/21 2307    pneumococcal 23-valent polysaccharide vaccine (PNEUMOVAX-23) injection 0 5 mL, 0 5 mL, Subcutaneous, Prior to discharge, Miranda Mejia MD    sodium bicarbonate 150 mEq in dextrose 5 % 1,000 mL infusion, 100 mL/hr, Intravenous, Continuous, Chaz Hollis DO    ticagrelor (BRILINTA) tablet 90 mg, 90 mg, Oral, BID, Miranda Mejia MD, 90 mg at 08/29/21 1733    REVIEW OF SYSTEMS:  A complete review of systems was performed and found to be negative unless otherwise noted in the history of present illness  General: No fevers, chills  Cardiovascular:  - chest pain, - leg edema  Respiratory: No cough, sputum production,  - shortness of breath    Gastrointestinal:  - nausea/vomiting,  - diarrhea,  - abdominal pain   Genitourinary: No hematuria  No foamy urine    No dysuria    PHYSICAL EXAM:  Current Weight: Weight - Scale: 58 8 kg (129 lb 10 1 oz)  First Weight: Weight - Scale: 58 8 kg (129 lb 10 1 oz)  Vitals:    08/29/21 1615 08/29/21 1625 08/29/21 1910 08/29/21 2241   BP: 96/68  111/59 (!) 86/54   BP Location: Right arm  Right arm Left arm   Pulse: 95  92 97   Resp: 18   18   Temp: 97 5 °F (36 4 °C)   98 °F (36 7 °C)   TempSrc: Temporal   Temporal   SpO2:  97%  96%   Weight:       Height:           Intake/Output Summary (Last 24 hours) at 8/30/2021 0826  Last data filed at 8/30/2021 0701  Gross per 24 hour   Intake 1581 25 ml   Output --   Net 1581 25 ml     General: NAD  Skin: warm, dry, intact, no rash  HEENT: Moist mucous membranes, sclera anicteric, normocephalic, atraumatic  Neck: No apparent JVD appreciated  Chest:lung sounds clear B/L, on RA   CVS:Regular rate and rhythm, no murmer   Abdomen: Soft, round, non-tender, +BS  Extremities: No B/L LE edema present  Neuro: alert and oriented  Psych: appropriate mood and affect     Invasive Devices:      Lab Results:   Results from last 7 days   Lab Units 08/30/21  0550 08/29/21  0554 08/28/21  1418   WBC Thousand/uL 10 52* 9 97 10 76*   HEMOGLOBIN g/dL 9 0* 8 9* 10 3*   HEMATOCRIT % 27 5* 27 1* 32 0*   PLATELETS Thousands/uL 146* 119* 121*   SODIUM mmol/L 133* 132* 132*   POTASSIUM mmol/L 3 5 3 9 3 1*   CHLORIDE mmol/L 101 98* 92*   CO2 mmol/L 15* 17* 20*   BUN mg/dL 85* 102* 111*   CREATININE mg/dL 4 60* 6 14* 7 06*   CALCIUM mg/dL 6 5* 6 7* 7 3*   ALK PHOS U/L 95  --  117*   ALT U/L 60  --  83*   AST U/L 84*  --  120*

## 2021-08-30 NOTE — PLAN OF CARE
Problem: PAIN - ADULT  Goal: Verbalizes/displays adequate comfort level or baseline comfort level  Description: Interventions:  - Encourage patient to monitor pain and request assistance  - Assess pain using appropriate pain scale  - Administer analgesics based on type and severity of pain and evaluate response  - Implement non-pharmacological measures as appropriate and evaluate response  - Consider cultural and social influences on pain and pain management  - Notify physician/advanced practitioner if interventions unsuccessful or patient reports new pain  Outcome: Progressing     Problem: SAFETY ADULT  Goal: Patient will remain free of falls  Description: INTERVENTIONS:  - Educate patient/family on patient safety including physical limitations  - Instruct patient to call for assistance with activity   - Consult OT/PT to assist with strengthening/mobility   - Keep Call bell within reach  - Keep bed low and locked with side rails adjusted as appropriate  - Keep care items and personal belongings within reach  - Initiate and maintain comfort rounds  - Make Fall Risk Sign visible to staff  - Offer Toileting every  Hours, in advance of need  - Initiate/Maintain alarm  - Obtain necessary fall risk management equipment:   - Apply yellow socks and bracelet for high fall risk patients  - Consider moving patient to room near nurses station  Outcome: Progressing  Goal: Maintain or return to baseline ADL function  Description: INTERVENTIONS:  -  Assess patient's ability to carry out ADLs; assess patient's baseline for ADL function and identify physical deficits which impact ability to perform ADLs (bathing, care of mouth/teeth, toileting, grooming, dressing, etc )  - Assess/evaluate cause of self-care deficits   - Assess range of motion  - Assess patient's mobility; develop plan if impaired  - Assess patient's need for assistive devices and provide as appropriate  - Encourage maximum independence but intervene and supervise when necessary  - Involve family in performance of ADLs  - Assess for home care needs following discharge   - Consider OT consult to assist with ADL evaluation and planning for discharge  - Provide patient education as appropriate  Outcome: Progressing  Goal: Maintains/Returns to pre admission functional level  Description: INTERVENTIONS:  - Perform BMAT or MOVE assessment daily    - Set and communicate daily mobility goal to care team and patient/family/caregiver  - Collaborate with rehabilitation services on mobility goals if consulted  - Perform Range of Motion  times a day  - Reposition patient every  hours  - Dangle patient  times a day  - Stand patient  times a day  - Ambulate patient  times a day  - Out of bed to chair  times a day   - Out of bed for meals times a day  - Out of bed for toileting  - Record patient progress and toleration of activity level   Outcome: Progressing     Problem: DISCHARGE PLANNING  Goal: Discharge to home or other facility with appropriate resources  Description: INTERVENTIONS:  - Identify barriers to discharge w/patient and caregiver  - Arrange for needed discharge resources and transportation as appropriate  - Identify discharge learning needs (meds, wound care, etc )  - Arrange for interpretive services to assist at discharge as needed  - Refer to Case Management Department for coordinating discharge planning if the patient needs post-hospital services based on physician/advanced practitioner order or complex needs related to functional status, cognitive ability, or social support system  Outcome: Progressing     Problem: Knowledge Deficit  Goal: Patient/family/caregiver demonstrates understanding of disease process, treatment plan, medications, and discharge instructions  Description: Complete learning assessment and assess knowledge base    Interventions:  - Provide teaching at level of understanding  - Provide teaching via preferred learning methods  Outcome: Progressing Problem: Potential for Falls  Goal: Patient will remain free of falls  Description: INTERVENTIONS:  - Educate patient/family on patient safety including physical limitations  - Instruct patient to call for assistance with activity   - Consult OT/PT to assist with strengthening/mobility   - Keep Call bell within reach  - Keep bed low and locked with side rails adjusted as appropriate  - Keep care items and personal belongings within reach  - Initiate and maintain comfort rounds  - Make Fall Risk Sign visible to staff  - Offer Toileting every  Hours, in advance of need  - Initiate/Maintain alarm  - Obtain necessary fall risk management equipment:   - Apply yellow socks and bracelet for high fall risk patients  - Consider moving patient to room near nurses station  Outcome: Progressing

## 2021-08-30 NOTE — ASSESSMENT & PLAN NOTE
Wt Readings from Last 3 Encounters:   08/28/21 58 8 kg (129 lb 10 1 oz)   08/12/21 59 kg (130 lb)   07/12/21 70 2 kg (154 lb 12 2 oz)     Results from last 7 days   Lab Units 08/28/21  1418   NT-PRO BNP pg/mL 7,919*     · Currently compensated  Given profound renal dysfunction holding furosemide and lisinopril    · Eventual resumption of diuretic at discharge  · Will update echocardiogram, as likely recovered ejection fraction may not require aggressive diuretic therapy

## 2021-08-30 NOTE — ASSESSMENT & PLAN NOTE
· Patient felt of left bundle-branch block rather than nonsustained ventricular tachycardia  · Ventral resumption of patient on Corlanor versus metoprolol

## 2021-08-30 NOTE — ASSESSMENT & PLAN NOTE
· MARINA secondary to diuresis, hypotension, and ACE-inhibitor use  With significant uremia - consultation placed to Nephrology  Started on bicarbonate drip given metabolic acidosis  Continue for now  Results from last 7 days   Lab Units 08/30/21  0550 08/29/21  0554 08/28/21  1418   BUN mg/dL 85* 102* 111*   CREATININE mg/dL 4 60* 6 14* 7 06*   EGFR ml/min/1 73sq m 10 7 6

## 2021-08-30 NOTE — ASSESSMENT & PLAN NOTE
· Continue levothyroxine  · Recent TSH very suppressed     · Free T4 pending - likely will need reduction levothyroxine to 75 mcg if free T4 is elevated  ·

## 2021-08-30 NOTE — PROGRESS NOTES
[facility]  Progress Note - Norman Anderson 1965, 49 y o  female MRN: [id number]  Unit/Bed#: E4 -01 Encounter: [id number]  Primary Care Provider: Lupillo Mirza PA-C   Date and time admitted to hospital: 8/28/2021  1:03 PM    Acidosis  Assessment & Plan  Metabolic acidosis in the setting of renal failure  Bicarbonate added to IV fluid  Repeat labs in a m  NSVT (nonsustained ventricular tachycardia) ([facility])  Assessment & Plan  · Patient felt of left bundle-branch block rather than nonsustained ventricular tachycardia  · Ventral resumption of patient on Corlanor versus metoprolol    Hyponatremia  Assessment & Plan  Recent Labs     08/28/21  1418 08/29/21  0554 08/30/21  0550   SODIUM 132* 132* 133*   Secondary to renal failure  Monitor with bicarbonate infusion      Chronic systolic (congestive) heart failure (HCC)  Assessment & Plan  Wt Readings from Last 3 Encounters:   08/28/21 58 8 kg (129 lb 10 1 oz)   08/12/21 59 kg (130 lb)   07/12/21 70 2 kg (154 lb 12 2 oz)     Results from last 7 days   Lab Units 08/28/21  1418   NT-PRO BNP pg/mL 7,919*     · Currently compensated  Given profound renal dysfunction holding furosemide and lisinopril  · Eventual resumption of diuretic at discharge  · Will update echocardiogram, as likely recovered ejection fraction may not require aggressive diuretic therapy    CAD (coronary artery disease)  Assessment & Plan  · CAD status post recent stent July 2021  · Continue DA PT atorvastatin  · Holding metoprolol due to hypotension    Anemia  Assessment & Plan  Monitor blood counts and transfuse for hemoglobin less than 7    Postoperative hypothyroidism  Assessment & Plan  · Continue levothyroxine  · Recent TSH very suppressed     · Free T4 pending - likely will need reduction levothyroxine to 75 mcg if free T4 is elevated  ·     * MARINA (acute kidney injury) ([facility] [address] )  Assessment & Plan  · MARINA secondary to diuresis, hypotension, and ACE-inhibitor use  With significant uremia - consultation placed to Nephrology  Started on bicarbonate drip given metabolic acidosis  Continue for now  Results from last 7 days   Lab Units 08/30/21  0550 08/29/21  0554 08/28/21  1418   BUN mg/dL 85* 102* 111*   CREATININE mg/dL 4 60* 6 14* 7 06*   EGFR ml/min/1 73sq m 10 7 6         [facility] Progress Note  Patient: Aida Zelaya 49 y o  female   MRN: [id number]  PCP: Yvonne Calderon PA-C  Unit/Bed#: E4 -01 Encounter: [id number]  Date Of Visit: 08/30/21    Assessment:    Principal Problem:    MARINA (acute kidney injury) ([facility] [address] )  Active Problems:    Postoperative hypothyroidism    Anemia    CAD (coronary artery disease)    Chronic systolic (congestive) heart failure (HCC)    Hyponatremia    NSVT (nonsustained ventricular tachycardia) (HCC)    Acidosis      Plan:    · Start sodium bicarbonate  · Nephrology consultation placed for comanagement  · Repeat BMP in a m   · Monitor on telemetry, repeat electrolytes       VTE Pharmacologic Prophylaxis:   Pharmacologic: Heparin  Mechanical VTE Prophylaxis in Place: Yes    Patient Centered Rounds: I have performed bedside rounds with nursing staff today  Discussions with Specialists or Other Care Team Provider:  Case management    Education and Discussions with Family / Patient:  Patient mother    Time Spent for Care: 30 minutes  More than 50% of total time spent on counseling and coordination of care as described above  Current Length of Stay: 2 day(s)    Current Patient Status: Inpatient   Certification Statement: The patient will continue to require additional inpatient hospital stay due to Treatment of renal failure    Discharge Plan / Estimated Discharge Date:  48 hours    Code Status: Level 1 - Full Code      Subjective:   Seen and examined, no acute complaints  Producing urine  She denies any chest pain, no nausea vomiting      A complete and comprehensive 14 point organ system review has been performed and all other systems are negative other than stated above  Objective:     Vitals:   Temp (24hrs), Av 9 °F (36 6 °C), Min:97 8 °F (36 6 °C), Max:98 °F (36 7 °C)    Temp:  [97 8 °F (36 6 °C)-98 °F (36 7 °C)] 97 8 °F (36 6 °C)  HR:  [81-97] 81  Resp:  [18] 18  BP: ()/(54-59) 107/59  SpO2:  [96 %-98 %] 98 %  Body mass index is 21 57 kg/m²  Input and Output Summary (last 24 hours):        Intake/Output Summary (Last 24 hours) at 2021 1835  Last data filed at 2021 0701  Gross per 24 hour   Intake 1581 25 ml   Output --   Net 1581 25 ml       Physical Exam:     General: well appearing, no acute distress  HEENT: atraumatic, PERRLA, moist mucosa, normal pharynx, normal tonsils and adenoids, normal tongue, no fluid in sinuses  Neck: Trachea midline, no carotid bruit, no masses  Respiratory: normal chest wall expansion, CTA B, no r/r/w, no rubs  Cardiovascular: RRR, no m/r/g, Normal S1 and S2  Abdomen: Soft, non-tender, non-distended, normal bowel sounds in all quadrants, no hepatosplenomegaly, no tympany  Rectal: deferred  Musculoskeletal: normal ROM in upper and lower extremities  Integumentary: warm, dry, and pink, with no rash, purpura, or petechia  Heme/Lymph: no lymphadenopathy, no bruises  Neurological: Cranial Nerves II-XII grossly intact, no tics, normal sensation to pressure and light touch  Psychiatric: cooperative with normal mood, affect, and cognition      Additional Data:     Labs:    Results from last 7 days   Lab Units 21  0550 21  0554   WBC Thousand/uL 10 52* 9 97   HEMOGLOBIN g/dL 9 0* 8 9*   HEMATOCRIT % 27 5* 27 1*   PLATELETS Thousands/uL 146* 119*   NEUTROS PCT %  --  83*   LYMPHS PCT %  --  5*   MONOS PCT %  --  10   EOS PCT %  --  1     Results from last 7 days   Lab Units 21  0550   POTASSIUM mmol/L 3 5   CHLORIDE mmol/L 101   CO2 mmol/L 15*   BUN mg/dL 85*   CREATININE mg/dL 4 60*   CALCIUM mg/dL 6 5*   ALK PHOS U/L 95   ALT U/L 60   AST U/L 84*           * I Have Reviewed All Lab Data Listed Above  * Additional Pertinent Lab Tests Reviewed: [address] Admission Reviewed    Imaging:    Imaging Reports Reviewed Today Include:  No new imaging  Imaging Personally Reviewed by Myself Includes:  No new imaging    Recent Cultures (last 7 days):           Last 24 Hours Medication List:   Current Facility-Administered Medications   Medication Dose Route Frequency Provider Last Rate    acetaminophen  650 mg Oral Q4H PRN Olya rCuz MD      aspirin  81 mg Oral Daily Olya Cruz MD      atorvastatin  80 mg Oral After Karen Putnam MD      ferrous gluconate  324 mg Oral BID AC Olya Cruz MD      levothyroxine  100 mcg Oral Early Morning Olya Cruz MD      melatonin  6 mg Oral HS DELORES Forman      pneumococcal 23-valent polysaccharide vaccine  0 5 mL Subcutaneous Prior to discharge Olya Cruz MD      sodium bicarbonate infusion  100 mL/hr Intravenous Continuous Williamjeb Villavicencio  mL/hr (08/30/21 1044)    ticagrelor  90 mg Oral BID Olya Cruz MD          Today, Patient Was Seen By: Srinath Rice DO    ** Please Note: This note was completed in part utilizing [employer] Direct Software  Grammatical errors, random word insertions, spelling mistakes, and incomplete sentences may be an occasional consequence of this system secondary to software limitations, ambient noise, and hardware issues  If you have any questions or concerns about the content, text, or information contained within the body of this dictation, please contact the provider for clarification   **

## 2021-08-30 NOTE — ASSESSMENT & PLAN NOTE
Recent Labs     08/28/21  1418 08/29/21  0554 08/30/21  0550   SODIUM 132* 132* 133*   Secondary to renal failure  Monitor with bicarbonate infusion

## 2021-08-30 NOTE — PLAN OF CARE
Problem: PAIN - ADULT  Goal: Verbalizes/displays adequate comfort level or baseline comfort level  Description: Interventions:  - Encourage patient to monitor pain and request assistance  - Assess pain using appropriate pain scale  - Administer analgesics based on type and severity of pain and evaluate response  - Implement non-pharmacological measures as appropriate and evaluate response  - Consider cultural and social influences on pain and pain management  - Notify physician/advanced practitioner if interventions unsuccessful or patient reports new pain  Outcome: Progressing     Problem: SAFETY ADULT  Goal: Patient will remain free of falls  Description: INTERVENTIONS:  - Educate patient/family on patient safety including physical limitations  - Instruct patient to call for assistance with activity   - Consult OT/PT to assist with strengthening/mobility   - Keep Call bell within reach  - Keep bed low and locked with side rails adjusted as appropriate  - Keep care items and personal belongings within reach  - Initiate and maintain comfort rounds  - Make Fall Risk Sign visible to staff  - Offer Toileting every 2 Hours, in advance of need  - Obtain necessary fall risk management equipment:   - Apply yellow socks and bracelet for high fall risk patients  - Consider moving patient to room near nurses station  Outcome: Progressing  Goal: Maintain or return to baseline ADL function  Description: INTERVENTIONS:  -  Assess patient's ability to carry out ADLs; assess patient's baseline for ADL function and identify physical deficits which impact ability to perform ADLs (bathing, care of mouth/teeth, toileting, grooming, dressing, etc )  - Assess/evaluate cause of self-care deficits   - Assess range of motion  - Assess patient's mobility; develop plan if impaired  - Assess patient's need for assistive devices and provide as appropriate  - Encourage maximum independence but intervene and supervise when necessary  - Involve family in performance of ADLs  - Assess for home care needs following discharge   - Consider OT consult to assist with ADL evaluation and planning for discharge  - Provide patient education as appropriate  Outcome: Progressing  Goal: Maintains/Returns to pre admission functional level  Description: INTERVENTIONS:  - Perform BMAT or MOVE assessment daily    - Set and communicate daily mobility goal to care team and patient/family/caregiver  - Collaborate with rehabilitation services on mobility goals if consulted  - Perform Range of Motion 3 times a day  - Stand patient 3 times a day  - Ambulate patient 3 times a day  - Out of bed to chair 3 times a day   - Out of bed for meals 3 times a day  - Out of bed for toileting  - Record patient progress and toleration of activity level   Outcome: Progressing     Problem: DISCHARGE PLANNING  Goal: Discharge to home or other facility with appropriate resources  Description: INTERVENTIONS:  - Identify barriers to discharge w/patient and caregiver  - Arrange for needed discharge resources and transportation as appropriate  - Identify discharge learning needs (meds, wound care, etc )  - Arrange for interpretive services to assist at discharge as needed  - Refer to Case Management Department for coordinating discharge planning if the patient needs post-hospital services based on physician/advanced practitioner order or complex needs related to functional status, cognitive ability, or social support system  Outcome: Progressing     Problem: Knowledge Deficit  Goal: Patient/family/caregiver demonstrates understanding of disease process, treatment plan, medications, and discharge instructions  Description: Complete learning assessment and assess knowledge base    Interventions:  - Provide teaching at level of understanding  - Provide teaching via preferred learning methods  Outcome: Progressing     Problem: Potential for Falls  Goal: Patient will remain free of falls  Description: INTERVENTIONS:  - Educate patient/family on patient safety including physical limitations  - Instruct patient to call for assistance with activity   - Consult OT/PT to assist with strengthening/mobility   - Keep Call bell within reach  - Keep bed low and locked with side rails adjusted as appropriate  - Keep care items and personal belongings within reach  - Initiate and maintain comfort rounds  - Make Fall Risk Sign visible to staff  - Offer Toileting every 2 Hours, in advance of need  - Obtain necessary fall risk management equipment:   - Apply yellow socks and bracelet for high fall risk patients  - Consider moving patient to room near nurses station  Outcome: Progressing

## 2021-08-30 NOTE — CONSULTS
Consult - Cardiology   Yohana Knee 49 y o  female MRN: [id number]  Unit/Bed#: E4 -01 Encounter: [id number]        Reason For Consult:  Nonsustained ventricular tachycardia                 Assessment:  Intermittent Left bundle branch block (rate related)  MARINA   Creatinine 7 06 upon arrival   Baseline creatinine approximately 1 3-1 5  Hypokalemia   Potassium 3 1 on arrival  CAD   SEJAL-mLAD 6/17/2020 ( OM1 50% - iFR negative)   [facility] 7/15/2021: OM1 90% -->SEJAL (mRCA 40%, pLAD 50%,  mLAD -0% at site of prior stent)   DAPT (Brilinta + aspirin)  Valvular heart disease:  Echo 7/12/2021- moderate MR, mild TR and AI  Dyslipidemia   Lipitor 80 mg   Hx  ischemic and non ischemic cardiomyopathy    LVEF 10% per echo 2006 (reportedly nonischemic)   echo 2008:  EF normalized w/o RWMA    Echo 6/12/2020:  EF 29% (likely ischemic with PCI during that hospitalization)   Echo 8/25/2020:  EF 43%, no RWMAs   Echo 7/12/2021:  EF 38%   O/P Rx:  Toprol XL 25 mg b i d , Zestril 5 mg daily  Chronic systolic CHF   O/p diuretic: Lasix 40 mg daily  Hx Goiter -S/p Thyroidectomy with subsequent hypothyroidism    Discussion / Plan:  #  asked to evaluate patient for ventricular tachycardia - current telemetry shows recurrent episodes of intermittent left bundle branch block which for the most part look to be rate related with prior history of the same    · Although patient does not appear to be having ventricular tachycardia an echocardiogram was completed earlier today ~~> will review for reassessment of LVEF hopeful for signs of improvement in the aftermath of PCI and medical therapy  · As this patient does have known CAD, and cardiomyopathy, it would be desirable for her to be on a beta-blocker ~~> in light of recent complaints of fatigue would suggest patient be trialed back on Toprol 12 5 mg b i d  once BP and renal function further improve    Than if tolerated future resumption of a low-dose of lisinopril should also be considered  History Of Present Illness:  Eagle Acharya is a 61-year-old medical problems highlighted as above  Of note she had a prior diagnosis of nonischemic cardiomyopathy with LVEF of approximately 10% in 2006 later normalized per echo in 2008  In June of 2020 she was hospitalized with congestive heart failure with echocardiogram again showing regression her EF to approximately 25%  Catheterization that hospitalization showed flow limiting disease of the LAD for which she underwent PCI  She was not long thereafter hospitalized with complaints of pleuritic chest discomfort for Kevin diagnosis of myopericarditis for which she was placed on colchicine with improvement  A few days later the patient was hospitalized with symptoms of lightheadedness and LOC with signs of orthostatic hypotension and MARINA  In July of 2021 patient was hospitalized with chest pain and dyspnea at diagnosis of non MI troponin elevation with stress test showing fixed perfusion defect  This was followed by catheterization showing severe stenosis of OM 1 which is treated with PCI/SEJAL  Echo that hospitalization reported LVEF as approximately 38%  This reportedly offered resolution of her chest discomfort  She had subsequently enter cardiac rehabilitation with reports of elevated endurance  She was discharged on an increased dose of furosemide-40 mg daily and Coreg was discontinued and replaced with metoprolol succinate 12 5 mg b i d  This patient was recently seen in our office by Dr Garcia Patient on 8/12/2021 which time she offered no reports of orthopnea, lower extremity edema, dizziness, lightheadedness, palpitation, or chest discomfort  She was was making continued improvements in cardiac rehab  At that visit she examined to be euvolemic, with recorded weight of 59 kg/130 lb and blood pressure 110/60  Toprol was increased to 25 mg b i d  And a 2 month follow-up was planned      On 8/28/2021 the patient came to the hospital with complaints of increased generalized weakness and fatigue without any chest discomfort  On arrival she clinically appeared dry with low normal BP and signs of hypokalemia (3 1) and MARINA with BUN and creatinine respectively 111 and 7 06 on arrival   Since arrival the patient's pre-hospital dose of metoprolol, lisinopril, and Lasix have been held  With this and IVF her creatinine is improving  Her systolic blood pressure has for the most part been trending in the mid to upper 90s with some occasional episodes in the 110s which is more like her her store baseline  Presently because of reports of ventricular tachycardia we were asked to evaluate the patient  When seen the patient denies to me any chest discomfort tachycardia, or other perceived cardiac ectopy  Prior to her admission she had had some mild lightheadedness as discussed but she has had nothing to suggest presyncope or syncope including while hospitalized  Personal review of her telemetry shows intermittent left bundle branch block which at times lasts for at least several minutes  Past Medical History:        Past Medical History:   Diagnosis Date    Allergies     CHF (congestive heart failure) ([address] )     Coronary artery disease     Disease of thyroid gland     hypo    Renal disorder       Past Surgical History:   Procedure Laterality Date    CARDIAC CATHETERIZATION       SECTION      x2    FRACTURE SURGERY      IL OPEN TX RADIAL & ULNAR SHAFT FX FIX RADIUS AND ULNA Left 6/3/2020    Procedure: Open reduction internal fixation left distal radius fracture;  Surgeon: Dago Bryant MD;  Location:  MAIN OR;  Service: Orthopedics    TOTAL THYROIDECTOMY      TUBAL LIGATION          Allergy:        Allergies   Allergen Reactions    Penicillins Rash       Medications:       Prior to Admission medications    Medication Sig Start Date End Date Taking?  Authorizing Provider   acetaminophen (TYLENOL) 325 mg tablet Take 2 tablets (650 mg total) by mouth every 4 (four) hours as needed for mild pain, headaches or fever 6/19/20   Kaitlin Gomez,    aspirin 81 mg chewable tablet Chew 1 tablet (81 mg total) daily 6/19/20   Kaitlin Gomez, DO   atorvastatin (LIPITOR) 80 mg tablet Take 1 tablet (80 mg total) by mouth daily after dinner 8/12/21 11/10/21  Joce Ma, DO   ferrous gluconate (FERGON) 324 mg tablet TAKE 1 TABLET (324 MG TOTAL) BY MOUTH 2 (TWO) TIMES A DAY BEFORE MEALS 9/5/20   Tommie Waddell PA-C   furosemide (LASIX) 40 mg tablet Take 1 tablet (40 mg total) by mouth daily 8/12/21 11/10/21  Joce Ma, DO   levothyroxine 100 mcg tablet Take 1 tablet (100 mcg total) by mouth daily in the early morning 8/20/21 9/19/21  DELORES Schaffer   lisinopril (ZESTRIL) 5 mg tablet Take 1 tablet (5 mg total) by mouth daily 8/12/21 11/10/21  Joce Ma, DO   metoprolol succinate (TOPROL-XL) 50 mg 24 hr tablet 1/2 TABLET BY MOUTH TWICE A DAY 8/14/21   Joce Ma, DO   nitroglycerin (NITROSTAT) 0 4 mg SL tablet Place 1 tablet (0 4 mg total) under the tongue every 5 (five) minutes as needed for chest pain 6/19/20   Kaitlin Gomez,    ticagrelor (BRILINTA) 90 MG Take 1 tablet (90 mg total) by mouth 2 (two) times a day 8/12/21 11/10/21  Joce Ma,        Family History:     Family History   Problem Relation Age of Onset    No Known Problems Mother     No Known Problems Father     Cancer Family         Social History:       Social History     Socioeconomic History    Marital status:      Spouse name: None    Number of children: None    Years of education: None    Highest education level: None   Occupational History    None   Tobacco Use    Smoking status: Never Smoker    Smokeless tobacco: Never Used   Vaping Use    Vaping Use: Never used   Substance and Sexual Activity    Alcohol use: Yes     Comment: only on special ocassions       Drug use: Never    Sexual activity: None   Other Topics Concern    None   Social History Narrative    None     Social Determinants of Health     Financial Resource Strain:     Difficulty of Paying Living Expenses:    Food Insecurity:     Worried About Running Out of Food in the Last Year:     [address] in the Last Year:    Transportation Needs:     Lack of Transportation (Medical):  Lack of Transportation (Non-Medical):    Physical Activity:     Days of Exercise per Week:     Minutes of Exercise per Session:    Stress:     Feeling of Stress :    Social Connections:     Frequency of Communication with Friends and Family:     Frequency of Social Gatherings with Friends and Family:     Attends [employer] Services:     Active Member of Clubs or Organizations:     Attends Club or Organization Meetings:     Marital Status:    Intimate Partner Violence:     Fear of Current or Ex-Partner:     Emotionally Abused:     Physically Abused:     Sexually Abused:        ROS:  Symptoms per HPI  The remainder of the review of systems is negative    Exam:  General:  Alert, normally conversant, comfortable appearing  Head: Normocephalic, atraumatic  Eyes:  EOMI  Pupils - equal, round, reactive to accomodation  No icterus  Normal Conjunctiva  Oropharynx: Moist without lesion  Neck:  No gross bruit, JVD, or lymphadenopathy  Heart:  Regular with controlled rate  Soft apical murmur  Lungs:  Clear without rales/rhonchi/wheeze  Abdomen:  Soft and nontender with normal bowel sounds  No organomegaly or mass  Lower Limbs:  No edema  Pulses[de-identified]  RLE - DP:  1-2+                 LLE - DP:  1-2+  Musculoskeletal: Independent movement of limbs observed, Formal ROM and strength eval not performed  Neurologic:    Oriented to: person, place, situation  Cranial Nerves: grossly intact - vision, smell, taste, and hearing were not tested       Motor function: grossly normal, symmetric   Sensation: Was not tested    Vitals:    08/29/21 1625 08/29/21 1910 08/29/21 2241 08/30/21 0700   BP:  111/59 (!) 86/54 131/55   BP Location:  Right arm Left arm Left arm   Pulse:  92 97 96   Resp:   18 18   Temp:   98 °F (36 7 °C) 98 °F (36 7 °C)   TempSrc:   Temporal Tympanic   SpO2: 97%  96% 97%   Weight:       Height:               DATA:      Telemetry:   Normal sinus rhythm with intermittent left bundle branch block             -----------------------------------------------------------------------------------------------------------------------------------------------  Echocardiogram:         7/12/2021  IMPRESSIONS:  Technical quality: Good  Cardiac rhythm: Sinus with bundle-branch block     1  Dilated left ventricular cavity, moderately reduced left ventricular systolic function with global hypokinesis with regional variability  Ejection fraction is estimated as around 38 percent  Indeterminate diastolic dysfunction grade  2  Normal right ventricular size and systolic function  3  Mild left atrial cavity enlargement  4  Aortic valve sclerosis, mild aortic valve regurgitation  5  Mitral valve leaflet sclerosis, moderate mitral valve regurgitation  6  Mild tricuspid valve regurgitation  7  No obvious pulmonary hypertension but estimated right ventricular systolic pressure is close to upper limit of normal   8  Trace to small circumferential pericardial effusion      Compared to report of previous echocardiogram from August 25, 2020 left ventricular ejection fraction may be slightly decreased  There is progression of mitral valve regurgitation and trace to small pericardial effusion is new      SUMMARY     LEFT VENTRICLE:  Mildly dilated left ventricular cavity, LVIDd 5 7 centimeters, normal wall thickness, moderately reduced left ventricular systolic function  Global left ventricular hypokinesis with some regional wall motion variability  There is marked  hypokinesis of the inferior and inferolateral walls  Ejection fraction is estimated as around 38 percent   Diastolic dysfunction is likely present but was not adequately assessed      RIGHT VENTRICLE:  Mildly dilated right ventricular cavity, normal right ventricular systolic function  Estimated right ventricular systolic pressure is close to upper limit of normal, 35 mmHg      LEFT ATRIUM:  Mild left atrial cavity enlargement  Intact interatrial septum      RIGHT ATRIUM:  Normal right atrial cavity size      MITRAL VALVE:  Mild mitral valve leaflet thickening and sclerosis, moderate mitral valve regurgitation      AORTIC VALVE:  Tricuspid aortic valve with mild sclerosis, adequate cuspal separation  Mild aortic valve regurgitation      TRICUSPID VALVE:  Mild tricuspid valve regurgitation      PULMONIC VALVE:  No pulmonic valve regurgitation      AORTA:  Aortic root and proximal ascending aorta are normal in size on 2D imaging      IVC, HEPATIC VEINS:  Inferior vena cava is normal in size and demonstrates appropriate respiratory phasic changes in diameter      PERICARDIUM:  Trace to small circumferential pericardial effusion  There is no echocardiographic evidence of cardiac tamponade     -----------------------------------------------------------------------------------------------------------------------------------------------  Ischemic Testing:  Catheterization 7/15/2020  CORONARY VESSELS:     --  The coronary circulation is right dominant  --  Left main: The vessel was large sized  Angiography showed minor luminal irregularities  --  LAD: The vessel was large sized  Angiography showed the vessel to wrap around the cardiac apex  There was one major diagonal branch  --  Proximal LAD: There was a tubular 50 % stenosis  --  Mid LAD: There was a 0 % stenosis at the site of a prior stent  --  1st diagonal: The vessel was medium sized  --  Circumflex: The vessel was medium sized  There were two major obtuse marginals  --  1st obtuse marginal: There was a 90 % stenosis  This is a likely culprit for the patient's clinical presentation   An intervention was performed  --  RCA: The vessel was medium sized and moderately tortuous  --  Mid RCA: There was a 40 % stenosis      IMPRESSIONS:  There is significant double vessel coronary artery disease      RECOMMENDATIONS  Patient management should include increasing lipid lowering therapy  Patient management to include dual antiplatelet therapy         Corteny Bauman MD  Signed 07/15/2021 11:20:14    -----------------------------------------------------------------------------------------------------------------------------------------------  Weights: Wt Readings from Last 20 Encounters:   08/28/21 58 8 kg (129 lb 10 1 oz)   08/12/21 59 kg (130 lb)   07/12/21 70 2 kg (154 lb 12 2 oz)   05/19/21 64 4 kg (142 lb)   05/07/21 64 4 kg (142 lb)   05/05/21 64 1 kg (141 lb 5 oz)   04/28/21 63 2 kg (139 lb 5 3 oz)   09/09/20 61 7 kg (136 lb)   08/13/20 60 8 kg (134 lb)   07/21/20 61 7 kg (136 lb)   07/10/20 61 9 kg (136 lb 6 4 oz)   06/27/20 63 2 kg (139 lb 5 3 oz)   06/20/20 63 3 kg (139 lb 8 8 oz)   06/19/20 63 7 kg (140 lb 6 9 oz)   05/28/20 67 1 kg (148 lb)   05/27/20 67 7 kg (149 lb 4 oz)   05/25/20 68 7 kg (151 lb 7 3 oz)   , Body mass index is 21 57 kg/m²           Lab Studies:    Results from last 7 days   Lab Units 08/28/21  1418   TROPONIN I ng/mL 0 17*            Results from last 7 days   Lab Units 08/30/21  0550 08/29/21  0554 08/28/21  1418   WBC Thousand/uL 10 52* 9 97 10 76*   HEMOGLOBIN g/dL 9 0* 8 9* 10 3*   HEMATOCRIT % 27 5* 27 1* 32 0*   PLATELETS Thousands/uL 146* 119* 121*   ,   Results from last 7 days   Lab Units 08/30/21  0550 08/29/21  0554 08/28/21  1418   POTASSIUM mmol/L 3 5 3 9 3 1*   CHLORIDE mmol/L 101 98* 92*   CO2 mmol/L 15* 17* 20*   BUN mg/dL 85* 102* 111*   CREATININE mg/dL 4 60* 6 14* 7 06*   CALCIUM mg/dL 6 5* 6 7* 7 3*   ALK PHOS U/L 95  --  117*   ALT U/L 60  --  83*   AST U/L 84*  --  120*

## 2021-08-30 NOTE — ASSESSMENT & PLAN NOTE
Metabolic acidosis in the setting of renal failure  Bicarbonate added to IV fluid  Repeat labs in a m

## 2021-08-31 ENCOUNTER — APPOINTMENT (INPATIENT)
Dept: RADIOLOGY | Facility: HOSPITAL | Age: 56
DRG: 683 | End: 2021-08-31
Payer: COMMERCIAL

## 2021-08-31 PROBLEM — E87.2 ACIDOSIS: Status: RESOLVED | Noted: 2021-08-30 | Resolved: 2021-08-31

## 2021-08-31 PROBLEM — E87.20 ACIDOSIS: Status: RESOLVED | Noted: 2021-08-30 | Resolved: 2021-08-31

## 2021-08-31 LAB
ANION GAP SERPL CALCULATED.3IONS-SCNC: 10 MMOL/L (ref 4–13)
ATRIAL RATE: 91 BPM
BASOPHILS # BLD AUTO: 0.03 THOUSANDS/ΜL (ref 0–0.1)
BASOPHILS NFR BLD AUTO: 0 % (ref 0–1)
BUN SERPL-MCNC: 72 MG/DL (ref 5–25)
CALCIUM SERPL-MCNC: 6.1 MG/DL (ref 8.3–10.1)
CHLORIDE SERPL-SCNC: 99 MMOL/L (ref 100–108)
CO2 SERPL-SCNC: 25 MMOL/L (ref 21–32)
CORTIS AM PEAK SERPL-MCNC: 23.4 UG/DL (ref 4.2–22.4)
CREAT SERPL-MCNC: 3.52 MG/DL (ref 0.6–1.3)
EOSINOPHIL # BLD AUTO: 0.08 THOUSAND/ΜL (ref 0–0.61)
EOSINOPHIL NFR BLD AUTO: 1 % (ref 0–6)
ERYTHROCYTE [DISTWIDTH] IN BLOOD BY AUTOMATED COUNT: 17.8 % (ref 11.6–15.1)
GFR SERPL CREATININE-BSD FRML MDRD: 14 ML/MIN/1.73SQ M
GLUCOSE SERPL-MCNC: 123 MG/DL (ref 65–140)
HCT VFR BLD AUTO: 25.9 % (ref 34.8–46.1)
HGB BLD-MCNC: 8.4 G/DL (ref 11.5–15.4)
IMM GRANULOCYTES # BLD AUTO: 0.06 THOUSAND/UL (ref 0–0.2)
IMM GRANULOCYTES NFR BLD AUTO: 1 % (ref 0–2)
LYMPHOCYTES # BLD AUTO: 0.52 THOUSANDS/ΜL (ref 0.6–4.47)
LYMPHOCYTES NFR BLD AUTO: 5 % (ref 14–44)
MCH RBC QN AUTO: 26.5 PG (ref 26.8–34.3)
MCHC RBC AUTO-ENTMCNC: 32.4 G/DL (ref 31.4–37.4)
MCV RBC AUTO: 82 FL (ref 82–98)
MONOCYTES # BLD AUTO: 0.62 THOUSAND/ΜL (ref 0.17–1.22)
MONOCYTES NFR BLD AUTO: 6 % (ref 4–12)
NEUTROPHILS # BLD AUTO: 8.43 THOUSANDS/ΜL (ref 1.85–7.62)
NEUTS SEG NFR BLD AUTO: 87 % (ref 43–75)
NRBC BLD AUTO-RTO: 0 /100 WBCS
OSMOLALITY UR/SERPL-RTO: 299 MMOL/KG (ref 282–298)
P AXIS: 10 DEGREES
PLATELET # BLD AUTO: 161 THOUSANDS/UL (ref 149–390)
PMV BLD AUTO: 11.8 FL (ref 8.9–12.7)
POTASSIUM SERPL-SCNC: 3 MMOL/L (ref 3.5–5.3)
PR INTERVAL: 110 MS
QRS AXIS: 18 DEGREES
QRSD INTERVAL: 90 MS
QT INTERVAL: 360 MS
QTC INTERVAL: 442 MS
RBC # BLD AUTO: 3.17 MILLION/UL (ref 3.81–5.12)
SODIUM SERPL-SCNC: 134 MMOL/L (ref 136–145)
T WAVE AXIS: -49 DEGREES
VENTRICULAR RATE: 91 BPM
WBC # BLD AUTO: 9.74 THOUSAND/UL (ref 4.31–10.16)

## 2021-08-31 PROCEDURE — 82533 TOTAL CORTISOL: CPT | Performed by: NURSE PRACTITIONER

## 2021-08-31 PROCEDURE — 80048 BASIC METABOLIC PNL TOTAL CA: CPT | Performed by: INTERNAL MEDICINE

## 2021-08-31 PROCEDURE — 99232 SBSQ HOSP IP/OBS MODERATE 35: CPT | Performed by: INTERNAL MEDICINE

## 2021-08-31 PROCEDURE — 85025 COMPLETE CBC W/AUTO DIFF WBC: CPT | Performed by: INTERNAL MEDICINE

## 2021-08-31 PROCEDURE — 83930 ASSAY OF BLOOD OSMOLALITY: CPT | Performed by: INTERNAL MEDICINE

## 2021-08-31 PROCEDURE — 93010 ELECTROCARDIOGRAM REPORT: CPT | Performed by: INTERNAL MEDICINE

## 2021-08-31 PROCEDURE — 93005 ELECTROCARDIOGRAM TRACING: CPT

## 2021-08-31 PROCEDURE — 71046 X-RAY EXAM CHEST 2 VIEWS: CPT

## 2021-08-31 RX ORDER — POTASSIUM CHLORIDE 20 MEQ/1
40 TABLET, EXTENDED RELEASE ORAL ONCE
Status: COMPLETED | OUTPATIENT
Start: 2021-08-31 | End: 2021-08-31

## 2021-08-31 RX ORDER — BENZONATATE 100 MG/1
100 CAPSULE ORAL 3 TIMES DAILY PRN
Status: DISCONTINUED | OUTPATIENT
Start: 2021-08-31 | End: 2021-09-04 | Stop reason: HOSPADM

## 2021-08-31 RX ORDER — SODIUM CHLORIDE AND POTASSIUM CHLORIDE .9; .15 G/100ML; G/100ML
100 SOLUTION INTRAVENOUS CONTINUOUS
Status: DISCONTINUED | OUTPATIENT
Start: 2021-08-31 | End: 2021-09-01

## 2021-08-31 RX ORDER — METOPROLOL SUCCINATE 25 MG/1
25 TABLET, EXTENDED RELEASE ORAL DAILY
Status: DISCONTINUED | OUTPATIENT
Start: 2021-09-01 | End: 2021-09-02

## 2021-08-31 RX ORDER — GUAIFENESIN 600 MG
600 TABLET, EXTENDED RELEASE 12 HR ORAL EVERY 12 HOURS SCHEDULED
Status: DISCONTINUED | OUTPATIENT
Start: 2021-08-31 | End: 2021-09-04 | Stop reason: HOSPADM

## 2021-08-31 RX ADMIN — HEPARIN SODIUM 5000 UNITS: 5000 INJECTION INTRAVENOUS; SUBCUTANEOUS at 14:19

## 2021-08-31 RX ADMIN — ASPIRIN 81 MG: 81 TABLET, CHEWABLE ORAL at 10:11

## 2021-08-31 RX ADMIN — TICAGRELOR 90 MG: 90 TABLET ORAL at 17:34

## 2021-08-31 RX ADMIN — HEPARIN SODIUM 5000 UNITS: 5000 INJECTION INTRAVENOUS; SUBCUTANEOUS at 22:21

## 2021-08-31 RX ADMIN — BENZONATATE 100 MG: 100 CAPSULE ORAL at 22:21

## 2021-08-31 RX ADMIN — FERROUS GLUCONATE 324 MG: 324 TABLET ORAL at 17:33

## 2021-08-31 RX ADMIN — FERROUS GLUCONATE 324 MG: 324 TABLET ORAL at 06:19

## 2021-08-31 RX ADMIN — GUAIFENESIN 600 MG: 600 TABLET ORAL at 14:18

## 2021-08-31 RX ADMIN — POTASSIUM CHLORIDE 40 MEQ: 1500 TABLET, EXTENDED RELEASE ORAL at 10:11

## 2021-08-31 RX ADMIN — SODIUM CHLORIDE AND POTASSIUM CHLORIDE 100 ML/HR: .9; .15 SOLUTION INTRAVENOUS at 14:12

## 2021-08-31 RX ADMIN — HEPARIN SODIUM 5000 UNITS: 5000 INJECTION INTRAVENOUS; SUBCUTANEOUS at 06:20

## 2021-08-31 RX ADMIN — TICAGRELOR 90 MG: 90 TABLET ORAL at 10:11

## 2021-08-31 RX ADMIN — LEVOTHYROXINE SODIUM 100 MCG: 100 TABLET ORAL at 06:20

## 2021-08-31 RX ADMIN — DOXYLAMINE SUCCINATE 25 MG: 25 TABLET ORAL at 23:35

## 2021-08-31 RX ADMIN — ATORVASTATIN CALCIUM 80 MG: 80 TABLET, FILM COATED ORAL at 17:33

## 2021-08-31 RX ADMIN — BENZONATATE 100 MG: 100 CAPSULE ORAL at 14:18

## 2021-08-31 NOTE — ASSESSMENT & PLAN NOTE
· Patient felt of left bundle-branch block rather than nonsustained ventricular tachycardia  · Will need to resume beta-blocker at discharge, likely at a lower dose

## 2021-08-31 NOTE — PROGRESS NOTES
Progress Note - Nephrology   Von Snow 49 y o  female MRN: [id number]  Unit/Bed#: E4 -01 Encounter: [id number]      Assessment / Plan:  1  MARINA, POA, likely prerenal in the setting of hypotension, diuretic use with recent diuretic increased as well as Ace inhibition  -baseline serum creatinine 1 1-1 5 since 2020 per [facility] record review, admit serum creatinine 7 06, significantly improved status post IV fluids, now 3 52     -continue to monitor on normal saline IV fluid   -granular casts noted on urinalysis on admission, suspect ATN component  -maintain MAP > 65, SBP > 130 for renal perfusion  Did have episode of systolic in the 47U  -f/u am BMP  2  Hyponatremia-serum sodium improving with IV fluids, continue to monitor BMP  3  Hypokalemia-likely due to bicarbonate drip infusion, monitor on normal saline with potassium chloride, also status post oral repletion today  4  Elevated anion gap metabolic acidosis in the setting of renal failure-bicarbonate has normalize on bicarbonate drip, monitor on normal saline  5  Hypocalcemia-likely worsened by bicarbonate drip, monitor bicarb off this   6  Anemia-hemoglobin 8 4, monitor CBC, has been down trending since admission, question dilutional versus other cause, follow-up iron panel, on oral iron  7  Hx CHF - EF 99%, grade 1 diastolic dysfunction-monitor volume status closely on normal saline IV fluids, have discussed the case with Dr Concepcion Patrick of Cardiology        Subjective:   She denies chest pain but does have a little bit shortness of breath and cough  No other complaints  Objective:     Vitals: Blood pressure 126/72, pulse 82, temperature 99 3 °F (37 4 °C), temperature source Temporal, resp  rate 18, height 5' 5" (1 651 m), weight 58 6 kg (129 lb 3 oz), SpO2 98 %, not currently breastfeeding  ,Body mass index is 21 5 kg/m²  Temp (24hrs), Av 5 °F (36 9 °C), Min:98 °F (36 7 °C), Max:99 3 °F (37 4 °C)      Weight (last 2 days) Date/Time   Weight    08/31/21 0600   58 6 (129 19)              No intake or output data in the 24 hours ending 08/31/21 1905  No intake/output data recorded  Physical Exam:   Physical Exam  Vitals and nursing note reviewed  Constitutional:       General: She is not in acute distress  Appearance: She is well-developed  She is not diaphoretic  HENT:      Head: Normocephalic and atraumatic  Mouth/Throat:      Pharynx: No oropharyngeal exudate  Comments: Poor dentition  Eyes:      General: No scleral icterus  Right eye: No discharge  Left eye: No discharge  Neck:      Thyroid: No thyromegaly  Cardiovascular:      Rate and Rhythm: Normal rate and regular rhythm  Heart sounds: No murmur heard  Pulmonary:      Effort: Pulmonary effort is normal  No respiratory distress  Breath sounds: Normal breath sounds  No wheezing  Abdominal:      General: Bowel sounds are normal  There is no distension  Palpations: Abdomen is soft  Musculoskeletal:         General: No swelling  Cervical back: Normal range of motion and neck supple  Skin:     General: Skin is warm and dry  Coloration: Skin is pale  Findings: No rash  Neurological:      General: No focal deficit present  Mental Status: She is alert  Motor: No abnormal muscle tone        Comments: awake   Psychiatric:         Mood and Affect: Mood normal          Behavior: Behavior normal          Invasive Devices     Peripheral Intravenous Line            Peripheral IV 08/30/21 Right;Ventral (anterior) Forearm 1 day                Medications:    Scheduled Meds:  Current Facility-Administered Medications   Medication Dose Route Frequency Provider Last Rate    acetaminophen  650 mg Oral Q4H PRN Olga Ballesteros MD      aspirin  81 mg Oral Daily Olga Ballesteros MD      atorvastatin  80 mg Oral After Denis Hampton MD      benzonatate  100 mg Oral TID PRN Ainoumar Guerra DO      ferrous gluconate  324 mg Oral BID AC Mariusz Puentes MD      guaiFENesin  600 mg Oral Q12H [facility] Navin Cedillo DO      heparin (porcine)  5,000 Units Subcutaneous [facility] Navin Cedillo DO      levothyroxine  100 mcg Oral Early Morning Mariusz Puentes MD      melatonin  6 mg Oral HS DELORES Lara      [START ON 9/1/2021] metoprolol succinate  25 mg Oral Daily Mary Amaral DO      pneumococcal 23-valent polysaccharide vaccine  0 5 mL Subcutaneous Prior to discharge Mariusz Puentes MD      sodium chloride 0 9 % with KCl 20 mEq/L  100 mL/hr Intravenous Continuous Inessa Plasencia  mL/hr (08/31/21 1412)    ticagrelor  90 mg Oral BID Mariusz Puentes MD         PRN Meds:   acetaminophen    benzonatate    pneumococcal 23-valent polysaccharide vaccine    Continuous Infusions:sodium chloride 0 9 % with KCl 20 mEq/L, 100 mL/hr, Last Rate: 100 mL/hr (08/31/21 1412)            LAB RESULTS:      Results from last 7 days   Lab Units 08/31/21  0421 08/31/21  0419 08/30/21  0550 08/29/21  0554 08/28/21  1418   WBC Thousand/uL 9 74  --  10 52* 9 97 10 76*   HEMOGLOBIN g/dL 8 4*  --  9 0* 8 9* 10 3*   HEMATOCRIT % 25 9*  --  27 5* 27 1* 32 0*   PLATELETS Thousands/uL 161  --  146* 119* 121*   NEUTROS PCT % 87*  --   --  83* 86*   LYMPHS PCT % 5*  --   --  5* 3*   MONOS PCT % 6  --   --  10 9   EOS PCT % 1  --   --  1 0   POTASSIUM mmol/L  --  3 0* 3 5 3 9 3 1*   CHLORIDE mmol/L  --  99* 101 98* 92*   CO2 mmol/L  --  25 15* 17* 20*   BUN mg/dL  --  72* 85* 102* 111*   CREATININE mg/dL  --  3 52* 4 60* 6 14* 7 06*   CALCIUM mg/dL  --  6 1* 6 5* 6 7* 7 3*   ALK PHOS U/L  --   --  95  --  117*   ALT U/L  --   --  60  --  83*   AST U/L  --   --  84*  --  120*       CUTURES:  No results found for: Li Friend              Portions of the record may have been created with voice recognition software   Occasional wrong word or "sound a like" substitutions may have occurred due to the inherent limitations of voice recognition software  Read the chart carefully and recognize, using context, where substitutions have occurred  If you have any questions, please contact the dictating provider

## 2021-08-31 NOTE — NURSING NOTE
Patients telemetry alert V TACH multiple times through out this night  RN assessed patient and patient was always asymptomatic (AAOx3, no chest pain)  Messaged SLIM Delabre  EKG done shows patient in Sinus Tach with intraventricular conduction delays       Willie Kay RN, 08/31/21, 2:30 AM

## 2021-08-31 NOTE — ASSESSMENT & PLAN NOTE
Wt Readings from Last 3 Encounters:   08/31/21 58 6 kg (129 lb 3 oz)   08/12/21 59 kg (130 lb)   07/12/21 70 2 kg (154 lb 12 2 oz)     Results from last 7 days   Lab Units 08/28/21  1418   NT-PRO BNP pg/mL 7,919*     · Currently compensated  Given profound renal dysfunction holding furosemide and lisinopril    · Eventual resumption of diuretic at discharge  · Updated echocardiogram with ejection fraction of 50%, markedly improved from previous EF of 38 % from July

## 2021-08-31 NOTE — ASSESSMENT & PLAN NOTE
Recent Labs     08/29/21  0554 08/30/21  0550 08/31/21  0419   SODIUM 132* 133* 134*   Secondary to renal failure  Improving

## 2021-08-31 NOTE — ASSESSMENT & PLAN NOTE
· MARINA secondary to diuresis, hypotension, and ACE-inhibitor use  With significant uremia -   Chest x-ray performed today with no evidence of volume overload  Results from last 7 days   Lab Units 08/31/21  0419 08/30/21  0550 08/29/21  0554 08/28/21  1418   BUN mg/dL 72* 85* 102* 111*   CREATININE mg/dL 3 52* 4 60* 6 14* 7 06*   EGFR ml/min/1 73sq m 14 10 7 6

## 2021-08-31 NOTE — PROGRESS NOTES
Cardiology Progress Note   MD Max Fonseca MD Maryann Record, DO, [facility]  MD Natividad Duran DO, Genevieve Ferrera DO, [facility]  ----------------------------------------------------------------  [address] [address]    Deepak Pineda 49 y o  female MRN: [id number]  Unit/Bed#: E4 -01 Encounter: [id number]      ASSESSMENT:   · Acute kidney injury   · Chronic systolic heart failure  · CAD s/p cath w/ SEJAL-OM1, patent SJEAL-LAD x 3 (6/2020), with residual 50% pLAD, 40% mRCA, luminal and irregularities of LM, July 15, 2021  · LVEF 38% with inferior hypokinesis, diastolic dysfunction, mild LA dilatation, AV sclerosis, mild AR, moderate MR, mild TR, trace to small circumferential pericardial effusion, July 2021  · Abnormal pharmacologic nuclear stress test with fixed inferior and apical defect consistent with infarct, gated EF 28%, July 2021  · Left bundle branch block - rate related  · History of ischemic and non-ischemic cardiomyopathy  · Anemia  · Goiter s/p thyroidectomy with hypothyroidism  · History of syncope secondary orthostasis/volume depletion  · History of myopericarditis    PLAN:  Patient has had rate-related bundle branch block  No evidence of ventricular tachycardia  Would restart tomorrow at Toprol-XL 12 5 mg daily if BP can tolerate  Continue aspirin, high-intensity statin and Brilinta  Examines dry without signs of heart failure  Continue IV fluids as per Nephrology recommendations  Serial lung exams  Nephrology input appreciated      Signed: Magalys Winston DO, [facility], [facility]      History of Present Illness:  Patient seen and examined  Denies chest pain, pressure, tightness or squeezing  Denies lightheadedness, dizziness or palpitations  Denies lower extremity swelling, orthopnea or paroxysmal nocturnal dyspnea        Review of Systems:  Review of Systems   Constitutional: Negative for decreased appetite, fever, weight gain and weight loss  HENT: Negative for congestion and sore throat  Eyes: Negative for visual disturbance  Cardiovascular: Negative for chest pain, dyspnea on exertion, leg swelling, near-syncope and palpitations  Respiratory: Negative for cough and shortness of breath  Hematologic/Lymphatic: Negative for bleeding problem  Skin: Negative for rash  Musculoskeletal: Negative for myalgias and neck pain  Gastrointestinal: Negative for abdominal pain and nausea  Neurological: Negative for light-headedness and weakness  Psychiatric/Behavioral: Negative for depression  Allergies   Allergen Reactions    Penicillins Rash       No current facility-administered medications on file prior to encounter       Current Outpatient Medications on File Prior to Encounter   Medication Sig    acetaminophen (TYLENOL) 325 mg tablet Take 2 tablets (650 mg total) by mouth every 4 (four) hours as needed for mild pain, headaches or fever    aspirin 81 mg chewable tablet Chew 1 tablet (81 mg total) daily    atorvastatin (LIPITOR) 80 mg tablet Take 1 tablet (80 mg total) by mouth daily after dinner    ferrous gluconate (FERGON) 324 mg tablet TAKE 1 TABLET (324 MG TOTAL) BY MOUTH 2 (TWO) TIMES A DAY BEFORE MEALS    furosemide (LASIX) 40 mg tablet Take 1 tablet (40 mg total) by mouth daily    levothyroxine 100 mcg tablet Take 1 tablet (100 mcg total) by mouth daily in the early morning    lisinopril (ZESTRIL) 5 mg tablet Take 1 tablet (5 mg total) by mouth daily    metoprolol succinate (TOPROL-XL) 50 mg 24 hr tablet 1/2 TABLET BY MOUTH TWICE A DAY    nitroglycerin (NITROSTAT) 0 4 mg SL tablet Place 1 tablet (0 4 mg total) under the tongue every 5 (five) minutes as needed for chest pain    ticagrelor (BRILINTA) 90 MG Take 1 tablet (90 mg total) by mouth 2 (two) times a day        Current Facility-Administered Medications   Medication Dose Route Frequency Provider Last Rate    acetaminophen  650 mg Oral Q4H PRN Olga Ballesteros MD      aspirin  81 mg Oral Daily Olga Ballesteros MD      atorvastatin  80 mg Oral After Denis Hampton MD      benzonatate  100 mg Oral TID PRN Ian Javonor, DO      ferrous gluconate  324 mg Oral BID AC Olga Ballesteros MD      guaiFENesin  600 mg Oral Q12H [address] Ian Boor, DO      heparin (porcine)  5,000 Units Subcutaneous [facility] Ian Boor, DO      levothyroxine  100 mcg Oral Early Morning Olga Ballesteros MD      melatonin  6 mg Oral HS Shola Naas CRNP      pneumococcal 23-valent polysaccharide vaccine  0 5 mL Subcutaneous Prior to discharge Olga Ballesteros MD      sodium chloride 0 9 % with KCl 20 mEq/L  100 mL/hr Intravenous Continuous Inessa K Nicole,  mL/hr (08/31/21 1412)    ticagrelor  90 mg Oral BID Olga Ballesteros MD         sodium chloride 0 9 % with KCl 20 mEq/L, 100 mL/hr, Last Rate: 100 mL/hr (08/31/21 1412)        Vitals:    08/30/21 2241 08/31/21 0600 08/31/21 0752 08/31/21 1139   BP: 100/66  100/59 94/56   BP Location: Right arm  Right arm Right arm   Pulse: 102  72 76   Resp: 18  18 18   Temp: 98 °F (36 7 °C)  98 5 °F (36 9 °C) 98 1 °F (36 7 °C)   TempSrc: Temporal  Temporal Temporal   SpO2: 100%  99% 98%   Weight:  58 6 kg (129 lb 3 oz)     Height:           No intake or output data in the 24 hours ending 08/31/21 1421    Weight change:     PHYSICAL EXAMINATION:  Gen: Awake, Alert, NAD  Head/eyes: AT/NC, pupils equal and round, Anicteric  ENT: mmm  Neck: Supple, No elevated JVP, trachea midline  Resp: CTA bilaterally no w/r/r  CV: RRR +S1, S2, No m/r/g  Abd: Soft, NT/ND + BS  Ext: no LE edema bilaterally  Neuro:  Follows commands, moves all extermities  Psych: Appropriate affect, normal mood, pleasant attitude, non-combative  Skin: warm; no rash, erythema or venous stasis changes on exposed skin    Lab Results:  Results from last 7 days   Lab Units 08/31/21  0421   WBC Thousand/uL 9 74   HEMOGLOBIN g/dL 8 4*   HEMATOCRIT % 25 9*   PLATELETS Thousands/uL 161 Results from last 7 days   Lab Units 08/31/21  0419 08/30/21  0550   POTASSIUM mmol/L 3 0* 3 5   CHLORIDE mmol/L 99* 101   CO2 mmol/L 25 15*   BUN mg/dL 72* 85*   CREATININE mg/dL 3 52* 4 60*   CALCIUM mg/dL 6 1* 6 5*   ALK PHOS U/L  --  95   ALT U/L  --  60   AST U/L  --  84*     No results found for: TROPONINT      Results from last 7 days   Lab Units 08/28/21  1418   TROPONIN I ng/mL 0 17*           Tele: SR w/ rate related BBB    This note was completed in part utilizing M-Modal Fluency Direct Software  Grammatical errors, random word insertions, spelling mistakes, and incomplete sentences may be an occasional consequence of this system secondary to software limitations, ambient noise, and hardware issues  If you have any questions or concerns about the content, text, or information contained within the body of this dictation, please contact the provider for clarification

## 2021-08-31 NOTE — PROGRESS NOTES
[address] [facility]  Progress Note - Aida Zelaya 1965, 49 y o  female MRN: [id number]  Unit/Bed#: E4 -01 Encounter: [id number]  Primary Care Provider: Yvonne Calderon PA-C   Date and time admitted to hospital: 8/28/2021  1:03 PM    Acidosis  Assessment & Plan  Improving- status post bicarbonate infusion  IV fluids changed back to normal same    NSVT (nonsustained ventricular tachycardia) ([facility] )  Assessment & Plan  · Patient felt of left bundle-branch block rather than nonsustained ventricular tachycardia  · Will need to resume beta-blocker at discharge, likely at a lower dose    Hyponatremia  Assessment & Plan  Recent Labs     08/29/21  0554 08/30/21  0550 08/31/21  0419   SODIUM 132* 133* 134*   Secondary to renal failure  Improving      Chronic systolic (congestive) heart failure (HCC)  Assessment & Plan  Wt Readings from Last 3 Encounters:   08/31/21 58 6 kg (129 lb 3 oz)   08/12/21 59 kg (130 lb)   07/12/21 70 2 kg (154 lb 12 2 oz)     Results from last 7 days   Lab Units 08/28/21  1418   NT-PRO BNP pg/mL 7,919*     · Currently compensated  Given profound renal dysfunction holding furosemide and lisinopril  · Eventual resumption of diuretic at discharge  · Updated echocardiogram with ejection fraction of 50%, markedly improved from previous EF of 38 % from July    CAD (coronary artery disease)  Assessment & Plan  · CAD status post recent stent July 2021  · Continue DA PT atorvastatin  · Holding metoprolol due to hypotension    Anemia  Assessment & Plan  Monitor blood counts and transfuse for hemoglobin less than 7    Postoperative hypothyroidism  Assessment & Plan  · Continue levothyroxine        * MARINA (acute kidney injury) ([facility] )  Assessment & Plan  · MARINA secondary to diuresis, hypotension, and ACE-inhibitor use  With significant uremia -   Chest x-ray performed today with no evidence of volume overload  Results from last 7 days   Lab Units 08/31/21  0411 21  0550 21  0554 21  1418   BUN mg/dL 72* 85* 102* 111*   CREATININE mg/dL 3 52* 4 60* 6 14* 7 06*   EGFR ml/min/1 73sq m 14 10 7 6         [facility] Internal Medicine Progress Note  Patient: Yohana Knee 49 y o  female   MRN: [id number]  PCP: Lamar Crain PA-C  Unit/Bed#: E4 -01 Encounter: [id number]  Date Of Visit: 21    Assessment:    Principal Problem:    MARINA (acute kidney injury) ([address] )  Active Problems:    Postoperative hypothyroidism    Anemia    CAD (coronary artery disease)    Chronic systolic (congestive) heart failure (HCC)    Hyponatremia    NSVT (nonsustained ventricular tachycardia) ([facility])    Acidosis      Plan:    · Continue IV fluid  · Check labs in a m  · Discharge planning       VTE Pharmacologic Prophylaxis:   Pharmacologic: Heparin  Mechanical VTE Prophylaxis in Place: Yes    Patient Centered Rounds: I have performed bedside rounds with nursing staff today  Discussions with Specialists or Other Care Team Provider:      Education and Discussions with Family / Patient:  Patient mother    Time Spent for Care: 45 minutes  More than 50% of total time spent on counseling and coordination of care as described above  Current Length of Stay: 3 day(s)    Current Patient Status: Inpatient   Certification Statement: The patient will continue to require additional inpatient hospital stay due to Acute kidney injury    Discharge Plan / Estimated Discharge Date:  48 hours    Code Status: Level 1 - Full Code      Subjective:   Seen and examined, complained of cough today  No nausea vomiting or diarrhea  No abdominal    A complete and comprehensive 14 point organ system review has been performed and all other systems are negative other than stated above      Objective:     Vitals:   Temp (24hrs), Av 5 °F (36 9 °C), Min:98 °F (36 7 °C), Max:99 3 °F (37 4 °C)    Temp:  [98 °F (36 7 °C)-99 3 °F (37 4 °C)] 99 3 °F (37 4 °C)  HR:  [] 82  Resp: [18] 18  BP: ()/(50-72) 126/72  SpO2:  [98 %-100 %] 98 %  Body mass index is 21 5 kg/m²  Input and Output Summary (last 24 hours):     No intake or output data in the 24 hours ending 08/31/21 1848    Physical Exam:     General: well appearing, no acute distress  HEENT: atraumatic, PERRLA, moist mucosa, normal pharynx, normal tonsils and adenoids, normal tongue, no fluid in sinuses  Neck: Trachea midline, no carotid bruit, no masses  Respiratory: normal chest wall expansion, CTA B, no r/r/w, no rubs  Cardiovascular: RRR, no m/r/g, Normal S1 and S2  Abdomen: Soft, non-tender, non-distended, normal bowel sounds in all quadrants, no hepatosplenomegaly, no tympany  Rectal: deferred  Musculoskeletal: normal ROM in upper and lower extremities  Integumentary: warm, dry, and pink, with no rash, purpura, or petechia  Heme/Lymph: no lymphadenopathy, no bruises  Neurological: Cranial Nerves II-XII grossly intact, no tics, normal sensation to pressure and light touch  Psychiatric: cooperative with normal mood, affect, and cognition      Additional Data:     Labs:    Results from last 7 days   Lab Units 08/31/21  0421   WBC Thousand/uL 9 74   HEMOGLOBIN g/dL 8 4*   HEMATOCRIT % 25 9*   PLATELETS Thousands/uL 161   NEUTROS PCT % 87*   LYMPHS PCT % 5*   MONOS PCT % 6   EOS PCT % 1     Results from last 7 days   Lab Units 08/31/21  0419 08/30/21  0550   POTASSIUM mmol/L 3 0* 3 5   CHLORIDE mmol/L 99* 101   CO2 mmol/L 25 15*   BUN mg/dL 72* 85*   CREATININE mg/dL 3 52* 4 60*   CALCIUM mg/dL 6 1* 6 5*   ALK PHOS U/L  --  95   ALT U/L  --  60   AST U/L  --  84*           * I Have Reviewed All Lab Data Listed Above  * Additional Pertinent Lab Tests Reviewed:  [facility] Admission Reviewed    Imaging:    Imaging Reports Reviewed Today Include:  Chest x-ray  Imaging Personally Reviewed by Myself Includes:  Chest x-ray    Recent Cultures (last 7 days):           Last 24 Hours Medication List:   Current Facility-Administered Medications   Medication Dose Route Frequency Provider Last Rate    acetaminophen  650 mg Oral Q4H PRN Rush Leahy MD      aspirin  81 mg Oral Daily Rush Leahy MD      atorvastatin  80 mg Oral After Carlton Fox MD      benzonatate  100 mg Oral TID PRN Polo Fernandoin, DO      ferrous gluconate  324 mg Oral BID AC Rush Leahy MD      guaiFENesin  600 mg Oral Q12H [address] Rometta Dolin, DO      heparin (porcine)  5,000 Units Subcutaneous [facility] Polo Fernandoin, DO      levothyroxine  100 mcg Oral Early Morning Rush Leahy MD      melatonin  6 mg Oral HS DELORES Dang      [START ON 9/1/2021] metoprolol succinate  25 mg Oral Daily Felipa Barney DO      pneumococcal 23-valent polysaccharide vaccine  0 5 mL Subcutaneous Prior to discharge Rush Leahy MD      sodium chloride 0 9 % with KCl 20 mEq/L  100 mL/hr Intravenous Continuous Inessa K DO Nicole 100 mL/hr (08/31/21 1412)    ticagrelor  90 mg Oral BID Rush Leahy MD          Today, Patient Was Seen By: Tommie Alberto DO    ** Please Note: This note was completed in part utilizing M-Modal Fluency Direct Software  Grammatical errors, random word insertions, spelling mistakes, and incomplete sentences may be an occasional consequence of this system secondary to software limitations, ambient noise, and hardware issues  If you have any questions or concerns about the content, text, or information contained within the body of this dictation, please contact the provider for clarification   **

## 2021-09-01 PROBLEM — E87.1 HYPONATREMIA: Status: RESOLVED | Noted: 2021-07-11 | Resolved: 2021-09-01

## 2021-09-01 LAB
ANION GAP SERPL CALCULATED.3IONS-SCNC: 9 MMOL/L (ref 4–13)
BASOPHILS # BLD AUTO: 0.02 THOUSANDS/ΜL (ref 0–0.1)
BASOPHILS NFR BLD AUTO: 0 % (ref 0–1)
BUN SERPL-MCNC: 55 MG/DL (ref 5–25)
CALCIUM SERPL-MCNC: 5.9 MG/DL (ref 8.3–10.1)
CHLORIDE SERPL-SCNC: 104 MMOL/L (ref 100–108)
CO2 SERPL-SCNC: 26 MMOL/L (ref 21–32)
CREAT SERPL-MCNC: 2.71 MG/DL (ref 0.6–1.3)
EOSINOPHIL # BLD AUTO: 0.07 THOUSAND/ΜL (ref 0–0.61)
EOSINOPHIL NFR BLD AUTO: 1 % (ref 0–6)
ERYTHROCYTE [DISTWIDTH] IN BLOOD BY AUTOMATED COUNT: 17.6 % (ref 11.6–15.1)
FERRITIN SERPL-MCNC: 550 NG/ML (ref 8–388)
GFR SERPL CREATININE-BSD FRML MDRD: 19 ML/MIN/1.73SQ M
GLUCOSE SERPL-MCNC: 88 MG/DL (ref 65–140)
HCT VFR BLD AUTO: 27.1 % (ref 34.8–46.1)
HGB BLD-MCNC: 8.9 G/DL (ref 11.5–15.4)
IMM GRANULOCYTES # BLD AUTO: 0.1 THOUSAND/UL (ref 0–0.2)
IMM GRANULOCYTES NFR BLD AUTO: 1 % (ref 0–2)
IRON SATN MFR SERPL: 12 %
IRON SERPL-MCNC: 18 UG/DL (ref 50–170)
LYMPHOCYTES # BLD AUTO: 0.72 THOUSANDS/ΜL (ref 0.6–4.47)
LYMPHOCYTES NFR BLD AUTO: 7 % (ref 14–44)
MCH RBC QN AUTO: 26.8 PG (ref 26.8–34.3)
MCHC RBC AUTO-ENTMCNC: 32.8 G/DL (ref 31.4–37.4)
MCV RBC AUTO: 82 FL (ref 82–98)
MONOCYTES # BLD AUTO: 0.36 THOUSAND/ΜL (ref 0.17–1.22)
MONOCYTES NFR BLD AUTO: 3 % (ref 4–12)
NEUTROPHILS # BLD AUTO: 9.83 THOUSANDS/ΜL (ref 1.85–7.62)
NEUTS SEG NFR BLD AUTO: 88 % (ref 43–75)
NRBC BLD AUTO-RTO: 0 /100 WBCS
PLATELET # BLD AUTO: 194 THOUSANDS/UL (ref 149–390)
PMV BLD AUTO: 11.3 FL (ref 8.9–12.7)
POTASSIUM SERPL-SCNC: 4.1 MMOL/L (ref 3.5–5.3)
RBC # BLD AUTO: 3.32 MILLION/UL (ref 3.81–5.12)
SODIUM SERPL-SCNC: 139 MMOL/L (ref 136–145)
TIBC SERPL-MCNC: 146 UG/DL (ref 250–450)
WBC # BLD AUTO: 11.1 THOUSAND/UL (ref 4.31–10.16)

## 2021-09-01 PROCEDURE — 82728 ASSAY OF FERRITIN: CPT | Performed by: INTERNAL MEDICINE

## 2021-09-01 PROCEDURE — 83540 ASSAY OF IRON: CPT | Performed by: INTERNAL MEDICINE

## 2021-09-01 PROCEDURE — 83550 IRON BINDING TEST: CPT | Performed by: INTERNAL MEDICINE

## 2021-09-01 PROCEDURE — 99232 SBSQ HOSP IP/OBS MODERATE 35: CPT | Performed by: INTERNAL MEDICINE

## 2021-09-01 PROCEDURE — 85025 COMPLETE CBC W/AUTO DIFF WBC: CPT | Performed by: INTERNAL MEDICINE

## 2021-09-01 PROCEDURE — 80048 BASIC METABOLIC PNL TOTAL CA: CPT | Performed by: INTERNAL MEDICINE

## 2021-09-01 RX ORDER — SODIUM CHLORIDE, SODIUM GLUCONATE, SODIUM ACETATE, POTASSIUM CHLORIDE, MAGNESIUM CHLORIDE, SODIUM PHOSPHATE, DIBASIC, AND POTASSIUM PHOSPHATE .53; .5; .37; .037; .03; .012; .00082 G/100ML; G/100ML; G/100ML; G/100ML; G/100ML; G/100ML; G/100ML
100 INJECTION, SOLUTION INTRAVENOUS CONTINUOUS
Status: DISPENSED | OUTPATIENT
Start: 2021-09-01 | End: 2021-09-03

## 2021-09-01 RX ORDER — SODIUM CHLORIDE, SODIUM GLUCONATE, SODIUM ACETATE, POTASSIUM CHLORIDE, MAGNESIUM CHLORIDE, SODIUM PHOSPHATE, DIBASIC, AND POTASSIUM PHOSPHATE .53; .5; .37; .037; .03; .012; .00082 G/100ML; G/100ML; G/100ML; G/100ML; G/100ML; G/100ML; G/100ML
100 INJECTION, SOLUTION INTRAVENOUS ONCE
Status: DISCONTINUED | OUTPATIENT
Start: 2021-09-01 | End: 2021-09-01

## 2021-09-01 RX ADMIN — SODIUM CHLORIDE, SODIUM GLUCONATE, SODIUM ACETATE, POTASSIUM CHLORIDE, MAGNESIUM CHLORIDE, SODIUM PHOSPHATE, DIBASIC, AND POTASSIUM PHOSPHATE 100 ML/HR: .53; .5; .37; .037; .03; .012; .00082 INJECTION, SOLUTION INTRAVENOUS at 23:58

## 2021-09-01 RX ADMIN — FERROUS GLUCONATE 324 MG: 324 TABLET ORAL at 15:47

## 2021-09-01 RX ADMIN — ASPIRIN 81 MG: 81 TABLET, CHEWABLE ORAL at 09:09

## 2021-09-01 RX ADMIN — SODIUM CHLORIDE, SODIUM GLUCONATE, SODIUM ACETATE, POTASSIUM CHLORIDE, MAGNESIUM CHLORIDE, SODIUM PHOSPHATE, DIBASIC, AND POTASSIUM PHOSPHATE 100 ML/HR: .53; .5; .37; .037; .03; .012; .00082 INJECTION, SOLUTION INTRAVENOUS at 15:50

## 2021-09-01 RX ADMIN — HEPARIN SODIUM 5000 UNITS: 5000 INJECTION INTRAVENOUS; SUBCUTANEOUS at 05:36

## 2021-09-01 RX ADMIN — METOPROLOL SUCCINATE 25 MG: 25 TABLET, EXTENDED RELEASE ORAL at 09:09

## 2021-09-01 RX ADMIN — GUAIFENESIN 600 MG: 600 TABLET ORAL at 21:35

## 2021-09-01 RX ADMIN — ATORVASTATIN CALCIUM 80 MG: 80 TABLET, FILM COATED ORAL at 17:15

## 2021-09-01 RX ADMIN — TICAGRELOR 90 MG: 90 TABLET ORAL at 09:10

## 2021-09-01 RX ADMIN — HEPARIN SODIUM 5000 UNITS: 5000 INJECTION INTRAVENOUS; SUBCUTANEOUS at 21:35

## 2021-09-01 RX ADMIN — LEVOTHYROXINE SODIUM 100 MCG: 100 TABLET ORAL at 05:36

## 2021-09-01 RX ADMIN — TICAGRELOR 90 MG: 90 TABLET ORAL at 17:15

## 2021-09-01 RX ADMIN — SODIUM CHLORIDE AND POTASSIUM CHLORIDE 100 ML/HR: .9; .15 SOLUTION INTRAVENOUS at 10:59

## 2021-09-01 RX ADMIN — FERROUS GLUCONATE 324 MG: 324 TABLET ORAL at 05:42

## 2021-09-01 RX ADMIN — HEPARIN SODIUM 5000 UNITS: 5000 INJECTION INTRAVENOUS; SUBCUTANEOUS at 15:48

## 2021-09-01 RX ADMIN — GUAIFENESIN 600 MG: 600 TABLET ORAL at 09:09

## 2021-09-01 NOTE — PROGRESS NOTES
NEPHROLOGY PROGRESS NOTE   Aida Zelaya 49 y o  female MRN: [id number]  Unit/Bed#: E4 -01 Encounter: [id number]      ASSESSMENT/PLAN:  1  Acute kidney injury, POA:  Prerenal versus ATN setting of hypotension, diuretics with recent increased dose and ACE-inhibitor  · Baseline creatinine 1 1-1 5 since June 2020  · Creatinine 7 06 on admission and has been significantly improving with IV fluids down to 2 7 today  · UA:  Moderate blood, moderate leukocytes, trace protein, 1-2 RBCs, 10-20 wbc's, moderate bacteria, fine granular cast  · Avoid hypotension-- started on metoprolol 25mg daily today per cardiology with hold parameters   · Check a m  BMP  2  Hyponatremia:  Suspect prerenal   Resolved with IV fluid  3  Hypokalemia:  Improved with d/c bicarb drip and adding 20meq KCL to IVF   4  Anion gap metabolic acidosis:  Now resolved and status post bicarb  5  Hypocalcemia:  Trending down  Corrects to 7 5 for low albumin  Patient asymptomatic  6  Anemia:  Hemoglobin stable at 8 9  on oral iron   · Ferritin 550, iron saturation 12%  7  History of CHF with ejection fraction 50% and grade 1 diastolic dysfunction (based on 8/30/21 echo):  Okay to continue fluids per Cardiology    Plan Summary:    Since K has improved will d/c NS + 20meq KCL and start isolyte at 100cc/h    Check am BMP     SUBJECTIVE:  Feeling well today  Has good oral intake  No chest pain, shortness of breath, nausea, vomiting or diarrhea  Good urine output      OBJECTIVE:  Current Weight: Weight - Scale: 63 4 kg (139 lb 12 4 oz)  Vitals:    09/01/21 0747   BP: 114/71   Pulse: 83   Resp: 18   Temp: 99 4 °F (37 4 °C)   SpO2: 95%       Intake/Output Summary (Last 24 hours) at 9/1/2021 1257  Last data filed at 8/31/2021 2000  Gross per 24 hour   Intake 480 ml   Output --   Net 480 ml       General:  No acute distress  Skin:  No rash  Eyes:  Sclerae anicteric  ENT:  Moist mucous membranes  Neck:  Trachea midline with no JVD  Chest:  Clear to auscultation bilaterally  CVS:  Regular rate and rhythm  Abdomen:  Soft, nontender, nondistended  Extremities:  No edema  Neuro:  Awake and alert  Psych:  Appropriate affect      Medications:  Scheduled Meds:  Current Facility-Administered Medications   Medication Dose Route Frequency Provider Last Rate    acetaminophen  650 mg Oral Q4H PRN Honorio Cardenas MD      aspirin  81 mg Oral Daily Honorio Cardenas MD      atorvastatin  80 mg Oral After Yolanda Edouard MD      benzonatate  100 mg Oral TID PRN Walterine Caul, DO      doxylamine  25 mg Oral HS PRN Jaqueline Lua PA-C      ferrous gluconate  324 mg Oral BID AC Honorio Cardenas MD      guaiFENesin  600 mg Oral Q12H [address] Walterine Caul, DO      heparin (porcine)  5,000 Units Subcutaneous Q8H [address] Chaz Lehman,       levothyroxine  100 mcg Oral Early Morning Honorio Cardenas MD      melatonin  6 mg Oral HS DELORES Haney      metoprolol succinate  25 mg Oral Daily Shikha Nieto,       pneumococcal 23-valent polysaccharide vaccine  0 5 mL Subcutaneous Prior to discharge Honorio Cardenas MD      sodium chloride 0 9 % with KCl 20 mEq/L  100 mL/hr Intravenous Continuous Inessa SHY JessicaNicole,  mL/hr (09/01/21 1059)    ticagrelor  90 mg Oral BID Honorio Cardenas MD         PRN Meds:   acetaminophen    benzonatate    doxylamine    pneumococcal 23-valent polysaccharide vaccine    Continuous Infusions:sodium chloride 0 9 % with KCl 20 mEq/L, 100 mL/hr, Last Rate: 100 mL/hr (09/01/21 1059)        Laboratory Results:  Results from last 7 days   Lab Units 09/01/21  0457 08/31/21  0421 08/31/21  0419 08/30/21  0550 08/29/21  0554 08/28/21  1418   WBC Thousand/uL 11 10* 9 74  --  10 52* 9 97 10 76*   HEMOGLOBIN g/dL 8 9* 8 4*  --  9 0* 8 9* 10 3*   HEMATOCRIT % 27 1* 25 9*  --  27 5* 27 1* 32 0*   PLATELETS Thousands/uL 194 161  --  146* 119* 121*   SODIUM mmol/L 139  --  134* 133* 132* 132*   POTASSIUM mmol/L 4 1  --  3 0* 3 5 3 9 3 1*   CHLORIDE mmol/L 104  --  99* 101 98* 92*   CO2 mmol/L 26  --  25 15* 17* 20*   BUN mg/dL 55*  --  72* 85* 102* 111*   CREATININE mg/dL 2 71*  --  3 52* 4 60* 6 14* 7 06*   CALCIUM mg/dL 5 9*  --  6 1* 6 5* 6 7* 7 3*

## 2021-09-01 NOTE — PROGRESS NOTES
[address] [facility]  Progress Note - Emmie Diaz 1965, 49 y o  female MRN: [id number]  Unit/Bed#: E4 -01 Encounter: [id number]  Primary Care Provider: Fatemeh Gray PA-C   Date and time admitted to hospital: 8/28/2021  1:03 PM    NSVT (nonsustained ventricular tachycardia) ([facility] )  Assessment & Plan  · Patient felt of left bundle-branch block rather than nonsustained ventricular tachycardia  · Will need to resume beta-blocker at discharge, likely at a lower dose    Hypokalemia  Assessment & Plan  Resolved with repletion  Recent Labs     08/30/21  0550 08/31/21  0419 09/01/21  0457   K 3 5 3 0* 4 1         Hypocalcemia  Assessment & Plan  Repletion by the nephrology service  Recent Labs     08/30/21  0550 08/31/21  0419 09/01/21  0457   CALCIUM 6 5* 6 1* 5 9*         Chronic systolic (congestive) heart failure (HCC)  Assessment & Plan  Wt Readings from Last 3 Encounters:   09/01/21 63 4 kg (139 lb 12 4 oz)   08/12/21 59 kg (130 lb)   07/12/21 70 2 kg (154 lb 12 2 oz)     Results from last 7 days   Lab Units 08/28/21  1418   NT-PRO BNP pg/mL 7,919*     · Currently compensated  Given profound renal dysfunction holding furosemide and lisinopril  · Updated echocardiogram with ejection fraction of 50%, markedly improved from previous EF of 38 % from July    CAD (coronary artery disease)  Assessment & Plan  · CAD status post recent stent July 2021  · Continue DA PT atorvastatin  · Holding metoprolol due to hypotension    Anemia  Assessment & Plan  Monitor blood counts and transfuse for hemoglobin less than 7    Postoperative hypothyroidism  Assessment & Plan  · Continue levothyroxine        * MARINA (acute kidney injury) ([facility] )  Assessment & Plan  · MARINA secondary to diuresis, hypotension, and ACE-inhibitor use  With significant uremia -   Chest x-ray performed today with no evidence of volume overload  Results from last 7 days   Lab Units 09/01/21  0457 08/31/21  0419 08/30/21  0550 21  0554 21  1418   BUN mg/dL 55* 72* 85* 102* 111*   CREATININE mg/dL 2 71* 3 52* 4 60* 6 14* 7 06*   EGFR ml/min/1 73sq m 19 14 10 7 6         [facility] Internal Medicine Progress Note  Patient: Maxwell Cason 49 y o  female   MRN: [id number]  PCP: César Davis PA-C  Unit/Bed#: E4 -01 Encounter: [id number]  Date Of Visit: 21    Assessment:    Principal Problem:    MARINA (acute kidney injury) ([facility] )  Active Problems:    Postoperative hypothyroidism    Anemia    CAD (coronary artery disease)    Chronic systolic (congestive) heart failure (HCC)    Hypocalcemia    Hypokalemia    NSVT (nonsustained ventricular tachycardia) ([facility])      Plan:    · monitor labs  · Repeat electrolyte       VTE Pharmacologic Prophylaxis:   Pharmacologic: Heparin  Mechanical VTE Prophylaxis in Place: Yes    Patient Centered Rounds: I have performed bedside rounds with nursing staff today  Discussions with Specialists or Other Care Team Provider:  Nephrology    Education and Discussions with Family / Patient:  Mother at bedside    Time Spent for Care: 45 minutes  More than 50% of total time spent on counseling and coordination of care as described above  Current Length of Stay: 4 day(s)    Current Patient Status: Inpatient   Certification Statement: The patient will continue to require additional inpatient hospital stay due to Acute kidney    Discharge Plan / Estimated Discharge Date:  24-48 hours    Code Status: Level 1 - Full Code      Subjective:   Seen and examined, she feels much improved  No shortness of breath, no chest pain, no nausea vomiting    A complete and comprehensive 14 point organ system review has been performed and all other systems are negative other than stated above      Objective:     Vitals:   Temp (24hrs), Av 3 °F (37 4 °C), Min:99 °F (37 2 °C), Max:99 5 °F (37 5 °C)    Temp:  [99 °F (37 2 °C)-99 5 °F (37 5 °C)] 99 4 °F (37 4 °C)  HR:  [83-88] 83  Resp:  [17-18] 18  BP: (103-114)/(58-71) 114/71  SpO2:  [95 %-97 %] 95 %  Body mass index is 23 26 kg/m²  Input and Output Summary (last 24 hours): Intake/Output Summary (Last 24 hours) at 9/1/2021 1755  Last data filed at 8/31/2021 2000  Gross per 24 hour   Intake 480 ml   Output --   Net 480 ml       Physical Exam:     General: well appearing, no acute distress  HEENT: atraumatic, PERRLA, moist mucosa, normal pharynx, normal tonsils and adenoids, normal tongue, no fluid in sinuses  Neck: Trachea midline, no carotid bruit, no masses  Respiratory: normal chest wall expansion, CTA B, no r/r/w, no rubs  Cardiovascular: RRR, no m/r/g, Normal S1 and S2  Abdomen: Soft, non-tender, non-distended, normal bowel sounds in all quadrants, no hepatosplenomegaly, no tympany  Rectal: deferred  Musculoskeletal: normal ROM in upper and lower extremities  Integumentary: warm, dry, and pink, with no rash, purpura, or petechia  Heme/Lymph: no lymphadenopathy, no bruises  Neurological: Cranial Nerves II-XII grossly intact, no tics, normal sensation to pressure and light touch  Psychiatric: cooperative with normal mood, affect, and cognition      Additional Data:     Labs:    Results from last 7 days   Lab Units 09/01/21  0457   WBC Thousand/uL 11 10*   HEMOGLOBIN g/dL 8 9*   HEMATOCRIT % 27 1*   PLATELETS Thousands/uL 194   NEUTROS PCT % 88*   LYMPHS PCT % 7*   MONOS PCT % 3*   EOS PCT % 1     Results from last 7 days   Lab Units 09/01/21  0457 08/30/21  0550   POTASSIUM mmol/L 4 1 3 5   CHLORIDE mmol/L 104 101   CO2 mmol/L 26 15*   BUN mg/dL 55* 85*   CREATININE mg/dL 2 71* 4 60*   CALCIUM mg/dL 5 9* 6 5*   ALK PHOS U/L  --  95   ALT U/L  --  60   AST U/L  --  84*           * I Have Reviewed All Lab Data Listed Above  * Additional Pertinent Lab Tests Reviewed:  All Labs For Current Hospital Admission Reviewed    Imaging:    Imaging Reports Reviewed Today Include:  Chest x-ray  Imaging Personally Reviewed by Myself Includes:  Chest x-ray    Recent Cultures (last 7 days):           Last 24 Hours Medication List:   Current Facility-Administered Medications   Medication Dose Route Frequency Provider Last Rate    acetaminophen  650 mg Oral Q4H PRN Ingrid Vazquez MD      aspirin  81 mg Oral Daily Ingrid Vazquez MD      atorvastatin  80 mg Oral After Jess Love MD      benzonatate  100 mg Oral TID PRN Amber Lara, DO      doxylamine  25 mg Oral HS PRN Jaqueline Lua PA-C      ferrous gluconate  324 mg Oral BID AC Ingrid Vazquez MD      guaiFENesin  600 mg Oral Q12H [facility] Amber Lara, DO      heparin (porcine)  5,000 Units Subcutaneous [facility] Amber Lara DO      levothyroxine  100 mcg Oral Early Morning Ingrid Vazquez MD      melatonin  6 mg Oral HS DELORES Lara      metoprolol succinate  25 mg Oral Daily Ariela Phillips DO      multi-electrolyte  100 mL/hr Intravenous Continuous Iman Singh  mL/hr (09/01/21 1550)    pneumococcal 23-valent polysaccharide vaccine  0 5 mL Subcutaneous Prior to discharge Ingrid Vazquez MD      ticagrelor  90 mg Oral BID Ingrid Vazquez MD          Today, Patient Was Seen By: Eusebio Hernandez DO    ** Please Note: This note was completed in part utilizing M-Modal Fluency Direct Software  Grammatical errors, random word insertions, spelling mistakes, and incomplete sentences may be an occasional consequence of this system secondary to software limitations, ambient noise, and hardware issues  If you have any questions or concerns about the content, text, or information contained within the body of this dictation, please contact the provider for clarification   **

## 2021-09-01 NOTE — ASSESSMENT & PLAN NOTE
Wt Readings from Last 3 Encounters:   09/01/21 63 4 kg (139 lb 12 4 oz)   08/12/21 59 kg (130 lb)   07/12/21 70 2 kg (154 lb 12 2 oz)     Results from last 7 days   Lab Units 08/28/21  1418   NT-PRO BNP pg/mL 7,919*     · Currently compensated  Given profound renal dysfunction holding furosemide and lisinopril    · Updated echocardiogram with ejection fraction of 50%, markedly improved from previous EF of 38 % from July

## 2021-09-01 NOTE — ASSESSMENT & PLAN NOTE
· MARINA secondary to diuresis, hypotension, and ACE-inhibitor use  With significant uremia -   Chest x-ray performed today with no evidence of volume overload  Results from last 7 days   Lab Units 09/01/21  0457 08/31/21  0419 08/30/21  0550 08/29/21  0554 08/28/21  1418   BUN mg/dL 55* 72* 85* 102* 111*   CREATININE mg/dL 2 71* 3 52* 4 60* 6 14* 7 06*   EGFR ml/min/1 73sq m 19 14 10 7 6

## 2021-09-01 NOTE — CASE MANAGEMENT
LOS: 4; GMLOS: N/A  Bundle: pt is not a bundle; pt is not a readmission   Unplanned Readmission Score: N/A  30 Day Readmission: 24     CM met with patient at bedside  Explained the role of CM  Obtained the following information from patient  Pt's Emergency contact is pt's Mother: Sukhdev Cope  [phone] [facility])    Home: Pt lives in a townhouse  Lives With: Pt stated she lives alone with a pet cat   ADL's: Independent   DME: None   Ambulation: independent   Transportation: Self drive   Pharmacy: [address]  PCP: Tommie Felix PA-C    [facility] Hx: None   Rehab Hx: None   Mental Health Hx: No  Substance Abuse Hx: No   Employment:Pt is employed; pt works [email or web address]   POA/LW/AD: None   Transport at D/C: Parents     CM reviewed d/c planning process including the following: identifying help at home, patient preferences for d/c planning needs, [facility] to Siva Mcdermott, availability of treatment team to discuss questions or concerns patient and/or family may have regarding understanding medications and recognizing signs and symptoms once discharged       CM department will continue to follow through pt's D/C

## 2021-09-01 NOTE — ASSESSMENT & PLAN NOTE
Repletion by the nephrology service  Recent Labs     08/30/21  0550 08/31/21  0419 09/01/21  0457   CALCIUM 6 5* 6 1* 5 9*

## 2021-09-01 NOTE — PROGRESS NOTES
Progress Note - Cardiology   Bobby Mena 49 y o  female MRN: [id number]  Unit/Bed#: E4 -01 Encounter: [id number]    Assessment:  1  Acute kidney injury, improving, probable dehydration  2  Chronic systolic heart failure, compensated, LVEF by echo 28% by nuclear scan 38%  3  Coronary artery disease with SEJAL-OM1 and SEJAL-lad x3, nonobstructive disease  4  Rate dependent left bundle branch block pattern  5  History of both ischemic and nonischemic cardiomyopathies  6  Anemia  7  Hypothyroidism status post thyroidectomy  8  History of syncope secondary to orthostasis/volume depletion  9  History of myopericarditis     Plan:  1  Metoprolol succinate 25 mg p o  Daily in a m  Started today  Will advance dose in future  2  When stable, patient may benefit by Corlanor  3  Continue aspirin and Brilinta  4  Pre she ate Nephrology's help, would continue with IV fluid hydration      Interval history:  Patient feels good and has no complaints    Creatinine 3 52 -> 2 71    PROBLEM LIST:    Patient Active Problem List    Diagnosis Date Noted    NSVT [facility]onsustained ventricular tachycardia) ([facility]) 08/29/2021    Hyponatremia 07/11/2021    Hypokalemia 07/11/2021    Chest pain 07/11/2021    Multiple lacunar infarcts ([facility] ) 05/27/2021    Abnormal CT of brain 05/07/2021    Ischemic brain damage 05/07/2021    Hypoparathyroidism ([facility] ) 10/16/2020    Decreased GFR 07/21/2020    Syncope 06/27/2020    Elevated d-dimer 06/27/2020    Hypocalcemia 06/27/2020    Iron deficiency anemia 06/22/2020    Acute myopericarditis 06/19/2020    CAD (coronary artery disease) 06/19/2020    Chronic systolic (congestive) heart failure ([address] ) 06/19/2020    Cardiomyopathy ([facility] ) 06/16/2020    LBBB (left bundle branch block) 06/16/2020    Anemia 06/16/2020    Prediabetes 06/16/2020    s/p ORIF of left distal radius 78/49/6224    Acute systolic congestive heart failure ([address] ) 06/12/2020    MARINA (acute kidney injury) ([facility] ) 06/12/2020  Postoperative hypothyroidism        Vitals: /71 (BP Location: Left arm)   Pulse 83   Temp 99 4 °F (37 4 °C) (Temporal)   Resp 18   Ht 5' 5" (1 651 m)   Wt 63 4 kg (139 lb 12 4 oz)   SpO2 95%   BMI 23 26 kg/m²   PREVIOUS WEIGHTS:   Wt Readings from Last 5 Encounters:   09/01/21 63 4 kg (139 lb 12 4 oz)   08/12/21 59 kg (130 lb)   07/12/21 70 2 kg (154 lb 12 2 oz)   05/19/21 64 4 kg (142 lb)   05/07/21 64 4 kg (142 lb)        Labs/Data:        Results from last 7 days   Lab Units 09/01/21  0457 08/31/21  0421 08/30/21  0550   WBC Thousand/uL 11 10* 9 74 10 52*   HEMOGLOBIN g/dL 8 9* 8 4* 9 0*   HEMATOCRIT % 27 1* 25 9* 27 5*   MCV fL 82 82 83   MCH pg 26 8 26 5* 27 3   MCHC g/dL 32 8 32 4 32 7   PLATELETS Thousands/uL 194 161 146*     Results from last 7 days   Lab Units 09/01/21  0457 08/31/21  0419 08/30/21  0550   EGFR ml/min/1 73sq m 19 14 10   SODIUM mmol/L 139 134* 133*   POTASSIUM mmol/L 4 1 3 0* 3 5   CHLORIDE mmol/L 104 99* 101   CO2 mmol/L 26 25 15*   BUN mg/dL 55* 72* 85*   CREATININE mg/dL 2 71* 3 52* 4 60*     Results from last 7 days   Lab Units 09/01/21  0457 08/31/21  0419 08/30/21  0550 08/28/21  1418   CALCIUM mg/dL 5 9* 6 1* 6 5* 7 3*   AST U/L  --   --  84* 120*   ALT U/L  --   --  60 83*   ALK PHOS U/L  --   --  95 117*     Results from last 7 days   Lab Units 08/28/21  1418   TROPONIN I ng/mL 0 17*     Lab Results   Component Value Date    NTBNP 7,919 (H) 08/28/2021    NTBNP 16,077 (H) 07/11/2021    NTBNP 3,370 (H) 06/27/2020     Lab Results   Component Value Date    CHOLESTEROL 167 07/15/2021    TRIG 93 07/15/2021    HDL 61 07/15/2021    LDLCALC 87 07/15/2021    HGBA1C 6 0 (H) 06/13/2020     Lab Results   Component Value Date    TSH 0 01 (L) 09/25/2020     No results found for: DIGOXIN           Current Facility-Administered Medications:     acetaminophen (TYLENOL) tablet 650 mg, 650 mg, Oral, Q4H PRN, Sheila Cooper MD    aspirin chewable tablet 81 mg, 81 mg, Oral, Daily, Rani Cage Neel Cooper MD, 81 mg at 09/01/21 0909    atorvastatin (LIPITOR) tablet 80 mg, 80 mg, Oral, After Kadeem Ragsdale MD, 80 mg at 08/31/21 1733    benzonatate (TESSALON PERLES) capsule 100 mg, 100 mg, Oral, TID PRN, Jessica Acuna DO, 100 mg at 08/31/21 2221    doxylamine (UNISON) tablet 25 mg, 25 mg, Oral, HS PRN, Jaqueline Lua PA-C, 25 mg at 08/31/21 2335    ferrous gluconate (FERGON) tablet 324 mg, 324 mg, Oral, BID AC, Kaya Garcias MD, 324 mg at 09/01/21 0542    guaiFENesin (MUCINEX) 12 hr tablet 600 mg, 600 mg, Oral, Q12H [address], Chaz Hollis DO, 600 mg at 09/01/21 0909    heparin (porcine) subcutaneous injection 5,000 Units, 5,000 Units, Subcutaneous, Q8H [address], Chaz Hollis DO, 5,000 Units at 09/01/21 0536    levothyroxine tablet 100 mcg, 100 mcg, Oral, Early Morning, Kaya Garcias MD, 100 mcg at 09/01/21 0536    melatonin tablet 6 mg, 6 mg, Oral, HS, DELORES Benton, 6 mg at 08/30/21 2148    metoprolol succinate (TOPROL-XL) 24 hr tablet 25 mg, 25 mg, Oral, Daily, Abe Price DO, 25 mg at 09/01/21 0909    pneumococcal 23-valent polysaccharide vaccine (PNEUMOVAX-23) injection 0 5 mL, 0 5 mL, Subcutaneous, Prior to discharge, Kaya Garcias MD    sodium chloride 0 9 % with KCl 20 mEq/L infusion (premix), 100 mL/hr, Intravenous, Continuous, Inessa Rivera DO, Last Rate: 100 mL/hr at 08/31/21 1412, 100 mL/hr at 08/31/21 1412    ticagrelor (BRILINTA) tablet 90 mg, 90 mg, Oral, BID, Kaya Garcias MD, 90 mg at 09/01/21 6462  Allergies   Allergen Reactions    Penicillins Rash         Intake/Output Summary (Last 24 hours) at 9/1/2021 1025  Last data filed at 8/31/2021 2000  Gross per 24 hour   Intake 480 ml   Output --   Net 480 ml       Invasive Devices     Peripheral Intravenous Line            Peripheral IV 08/30/21 Right;Ventral (anterior) Forearm 1 day                Review of Systems   Respiratory: Negative for cough, choking, chest tightness, shortness of breath and wheezing  Cardiovascular: Negative for chest pain, palpitations and leg swelling  Musculoskeletal: Negative for gait problem  Skin: Negative for rash  Neurological: Negative for dizziness, tremors, syncope, weakness, light-headedness, numbness and headaches  Psychiatric/Behavioral: Negative for agitation and behavioral problems  The patient is hyperactive  Physical Exam  Constitutional:       General: She is not in acute distress  Appearance: She is well-developed  HENT:      Head: Normocephalic and atraumatic  Neck:      Thyroid: No thyromegaly  Vascular: No carotid bruit or JVD  Trachea: No tracheal deviation  Cardiovascular:      Rate and Rhythm: Normal rate and regular rhythm  Pulses: Normal pulses  Heart sounds: Normal heart sounds  No murmur heard  No friction rub  No gallop  Pulmonary:      Effort: Pulmonary effort is normal  No respiratory distress  Breath sounds: Normal breath sounds  No wheezing, rhonchi or rales  Chest:      Chest wall: No tenderness  Musculoskeletal:         General: Normal range of motion  Cervical back: Normal range of motion and neck supple  Right lower leg: No edema  Left lower leg: No edema  Skin:     General: Skin is warm and dry  Neurological:      General: No focal deficit present  Mental Status: She is alert and oriented to person, place, and time  Gait: Gait normal    Psychiatric:         Mood and Affect: Mood normal          Behavior: Behavior normal          Thought Content: Thought content normal          Judgment: Judgment normal            ----------------------------------------------------------------------------------------------  NUCLEAR STRESS TEST: No results found for this or any previous visit      Results for orders placed during the hospital encounter of 07/11/21    NM myocardial perfusion spect (rx stress and/or rest)    Narrative  [facility] [address] [address] [place], [place] [address]  [phone]    Rest/Stress Gated SPECT Myocardial Perfusion Imaging After Regadenoson    Patient: Hilda Alvarado  MR number: [id number]  Account number: [de-identified]  : 1965  Age: 49 years  Gender: Female  Status: Inpatient  Location: Stress lab  Height: 65 in  Weight: 154 lb  BP: 108/ 78 mmHg    Allergies: PENICILLINS    Diagnosis: R06 00 - Dyspnea, unspecified, R07 9 - Chest pain, unspecified    Primary Physician:  Wandy Cruz [facility]  Technician:  Ryland Damico  RN:  Trupti Castaneda RN BSN  Referring Physician:  Loki Mixon MD  Group:  [facility]  Report Prepared By[de-identified]  Trupti Castaneda RN BSN  Interpreting Physician:  Elen Christine DO    INDICATIONS: Evaluation of known coronary artery disease  HISTORY: The patient is a 49year old  female  Chest pain status: chest pain, associated with dyspnea  Other symptoms: dyspnea and decreased effort tolerance  Coronary artery disease risk factors: post-menopausal state  Cardiovascular history: coronary artery disease, congestive heart failure( Systolic/diastolic EF 04%), arrhythmia( ST/LBBB), and stroke  Prior cardiovascular procedures: angioplasty of left anterior descending (on )  Co-morbidity:  history of renal disease  Medications: a beta blocker, a diuretic, aspirin, and thyroid medications  PHYSICAL EXAM: Baseline physical exam screening: normal lung exam     REST ECG: Resting ECG: Sinus tachycardia left bundle-branch block    PROCEDURE: The study was performed in the the Stress lab  A regadenoson infusion pharmacologic stress test was performed  Gated SPECT myocardial perfusion imaging was performed after stress and at rest  Systolic blood pressure was 108  mmHg, at the start of the study  Diastolic blood pressure was 78 mmHg, at the start of the study  The heart rate was 107 bpm, at the start of the study  IV double checked    Regadenoson protocol:  HR bpm SBP mmHg DBP mmHg Symptoms  Baseline 107 108 78 none  Immediate 129 80 60 mild dyspnea, dizziness  3 min 118 110 74 dizziness  5 min 110 -- -- subsiding  7 min 108 112 70 none  Resting dose: 11 0 mCi technetium 99 tetrofosmin  Stress dose: 30 1 mCi technetium 99 tetrofosmin No medications or fluids given  STRESS SUMMARY: Duration of pharmacologic stress was 3 min  Maximal heart rate during stress was 129 bpm  The rate-pressure product for the peak heart rate and blood pressure was 87706  There was no chest pain during stress  The stress  test was terminated due to protocol completion  Unable to obtain 02 sat due to acrylic nails  Stress ECG: Sinus tachycardia LBBB  Arrhythmias: No significant arrhythmias were noted    ISOTOPE ADMINISTRATION:  The radiopharmaceutical was injected at the peak effect of pharmacologic stress  MYOCARDIAL PERFUSION IMAGING:  Resting images: There is a small sized, moderate intensity defect of the apex  There is a moderate sized, moderate intensity defect of the inferior wall from base to apex  Stress images: There is a small sized, moderate intensity defect of the apex  There is a moderate size, mild intensity defect of the inferior wall from base to apex  Inferior wall defect is fixed but improved in stress with hypokinesis  TID: 0 92    GATED SPECT:  Left ventricle is mild-to-moderately dilated with paradoxical septal wall motion, apical hypokinesis and inferior wall hypokinesis from base to mid  Gated LVEF 28%    SUMMARY:  -  Stress results: There was no chest pain during stress  IMPRESSIONS: 1  Abnormal pharmacologic nuclear stress test with fixed inferior and apical defects consistent with infarct  There is some improvement of inferior wall with stress  2  ECG portion of stress test is nondiagnostic for myocardial ischemia due to LBBB  3  No reported symptoms  4  No significant arrhythmias were noted  5   Left ventricle is mild-to-moderately dilated with inferior wall and apical hypokinesis; gated EF 28%  6  There is no study for comparison    Prepared and signed by    Murtaza Arreguin, DO  Signed 2021 17:19:02      --------------------------------------------------------------------------------  CATH:  Results for orders placed during the hospital encounter of 21    Cardiac catheterization    Narrative  [address] [place] [address], [address]  [phone]    Tanya Monsivais)    Invasive Cardiovascular Lab Complete Report    Patient: Em Camacho  MR number: [id number]  Account number: [de-identified]  Study date: 07/15/2021  Gender: Female  : 1965  Height: 65 in  Weight: 154 4 lb  BSA: 1 77 mï¾²    Allergies: PENICILLINS    Diagnostic Cardiologist:  María Prajapati MD  Interventional Cardiologist:  María Prajapati MD  Primary Physician:  Shiv Santiago PAC    SUMMARY    CORONARY CIRCULATION:  Proximal LAD: There was a tubular 50 % stenosis  1st obtuse marginal: There was a 90 % stenosis  This is a likely culprit for the patient's clinical presentation  An intervention was performed  1ST LESION INTERVENTIONS:  A successful balloon angioplasty with stent procedure was performed on the 90 % lesion in the 1st obtuse marginal  Following intervention there was an excellent angiographic appearance with a 0 % residual stenosis  A Xience Elle Rx 3 0 x 23mm drug-eluting stent was placed across the lesion and deployed at a maximum inflation pressure of 18 elsa  INDICATIONS:  --  Possible CAD: myocardial infarction without ST elevation (NSTEMI)  --  Congestive heart failure with ischemic cardiomyopathy  PROCEDURES PERFORMED    --  Left heart catheterization without ventriculogram   --  Left coronary angiography  --  Right coronary angiography  --  Inpatient  --  Mod Sedation Same Physician Initial 15min  --  Mod Sedation Same Physician Add 15min  --  Coronary Catheterization (w/ LHC)  --  Coronary Drug Eluting Stent w/PTCA    -- Intervention on OM1: balloon angioplasty, stent  PROCEDURE: The risks and alternatives of the procedures and conscious sedation were explained to the patient and informed consent was obtained  The patient was brought to the cath lab and placed on the table  The planned puncture sites  were prepped and draped in the usual sterile fashion  --  Right radial artery access  After performing an Otoniel's test to verify adequate ulnar artery supply to the hand, the radial site was prepped  The puncture site was infiltrated with local anesthetic  The vessel was accessed using the  modified Seldinger technique, a wire was advanced into the vessel, and a sheath was advanced over the wire into the vessel  --  Left heart catheterization without ventriculogram  A catheter was advanced over a guidewire into the ascending aorta  After recording ascending aortic pressure, the catheter was advanced across the aortic valve and left ventricular  pressure was recorded  The catheter was pulled back across the aortic valve and into the ascending aorta and pullback pressures were obtained  --  Left coronary artery angiography  A catheter was advanced over a guidewire into the aorta and positioned in the left coronary artery ostium under fluoroscopic guidance  Angiography was performed  --  Right coronary artery angiography  A catheter was advanced over a guidewire into the aorta and positioned in the right coronary artery ostium under fluoroscopic guidance  Angiography was performed  --  Inpatient  --  Mod Sedation Same Physician Initial 15min  --  Mod Sedation Same Physician Add 15min  --  Coronary Catheterization (w/ LH)  LESION INTERVENTION: A successful balloon angioplasty with stent procedure was performed on the 90 % lesion in the 1st obtuse marginal  Following intervention there was an excellent angiographic appearance with a 0 % residual stenosis    There was LALO 3 flow before the procedure and LALO 3 flow after the procedure  --  Vessel setup was performed  A Launcher 6Fr Ebu 3 5 guiding catheter was used to cannulate the vessel  --  Vessel setup was performed  A Runthrough NS 180cm wire was used to cross the lesion  --  Balloon angioplasty was performed, using a Euphora Rx 2 5 x 15mm balloon, with 2 inflations and a maximum inflation pressure of 10 elsa  --  A Xience Elle Rx 3 0 x 23mm drug-eluting stent was placed across the lesion and deployed at a maximum inflation pressure of 18 elsa  --  Balloon angioplasty was performed, using a NC Trek Rx 3 5 x 15mm balloon, with 2 inflations and a maximum inflation pressure of 20 elsa  INTERVENTIONS:  --  Coronary Drug Eluting Stent w/PTCA  PROCEDURE COMPLETION: The patient tolerated the procedure well and was discharged from the cath lab  TIMING: Test started at 10:41  Test concluded at 11:13  HEMOSTASIS: The sheath was removed  The site was compressed with a Hemoband  device  Hemostasis was obtained  MEDICATIONS GIVEN: Baby Aspirin, 81 mg, PO, at 10:29  Ticagrelor, 180 mg, PO, at 10:29  0 9 NSS, infusion rate of 100 ml/hr, IV, at 10:29  Fentanyl (1OOmcg/2 ml), 50 mcg, IV, at 10:38  Versed (2mg/2ml), 2  mg, IV, at 10:38  1% Lidocaine, 1 ml, subcutaneously, at 10:42  Versed (2mg/2ml), 1 mg, IV, at 10:43  Fentanyl (1OOmcg/2 ml), 50 mcg, IV, at 10:43  Nitroglycerin (200mcg/ml), 200 mcg, at 10:45  Verapamil (5mg/2ml), 2 5 mg, IV, at 10:45  Heparin 1000 units/ml, 4,000 units, IV, at 10:45  Heparin 1000 units/ml, 4,000 units, IV, at 10:50  Nitroglycerine (200mcg/ml), 100 mcg, at 10:57  Heparin 1000 units/ml, 2,000 units, IV, at 10:59  CONTRAST GIVEN: 90 ml Omnipaque (350mg I  /ml)  RADIATION EXPOSURE: Fluoroscopy time: 4 37 min  HEMODYNAMICS: Hemodynamic assessment demonstrated normal LVEDP  CORONARY VESSELS:   --  The coronary circulation is right dominant  --  Left main: The vessel was large sized   Angiography showed minor luminal irregularities  --  LAD: The vessel was large sized  Angiography showed the vessel to wrap around the cardiac apex  There was one major diagonal branch  --  Proximal LAD: There was a tubular 50 % stenosis  --  Mid LAD: There was a 0 % stenosis at the site of a prior stent  --  1st diagonal: The vessel was medium sized  --  Circumflex: The vessel was medium sized  There were two major obtuse marginals  --  1st obtuse marginal: There was a 90 % stenosis  This is a likely culprit for the patient's clinical presentation  An intervention was performed  --  RCA: The vessel was medium sized and moderately tortuous  --  Mid RCA: There was a 40 % stenosis  IMPRESSIONS:  There is significant double vessel coronary artery disease  RECOMMENDATIONS  Patient management should include increasing lipid lowering therapy  Patient management to include dual antiplatelet therapy  DISPOSITION:  The patient left the catheterization laboratory in stable condition  Prepared and signed by    Phoebe Abraham MD  Signed 07/15/2021 11:20:14    Study diagram    Angiographic findings  Native coronary lesions:  ï¾·Proximal LAD: Lesion 1: tubular, 50 % stenosis  ï¾·Mid LAD: Lesion 1: 0 % stenosis, site of prior stent  ï¾·OM1: Lesion 1: 90 % stenosis  ï¾·Mid RCA: Lesion 1: 40 % stenosis  Intervention results  Native coronary lesions:  ï¾·Successful balloon angioplasty and stent of the 90 % stenosis in OM1  Appearance excellent with 0 % residual stenosis  Stent: Shaheen Fragaer Rx 3 0 x 23mm drug-eluting      Hemodynamic tables    Pressures:  Baseline  Pressures:  - HR: 78  Pressures:  - Rhythm:  Pressures:  -- Aortic Pressure (S/D/M): 92/44/48  Pressures:  -- Left Ventricle (s/edp): 94/4/--    Outputs:  Baseline  Outputs:  -- CALCULATIONS: [age]  Outputs:  -- CALCULATIONS: Body Surface Area: 1 77  Outputs:  -- CALCULATIONS: Height in cm: 165 00  Outputs:  -- CALCULATIONS: Sex: Female  Outputs:  -- CALCULATIONS: Weight in k 20    --------------------------------------------------------------------------------  ECHO:   Results for orders placed during the hospital encounter of 21    Echo complete with contrast if indicated    Narrative  [address] [place] [address], [address]  [phone]    Transthoracic Echocardiogram  2D, M-mode, Doppler, and Color Doppler    Study date:  2021    Patient: Sharonda Rubi  MR number: [id number]  Account number: [de-identified]  : 1965  Age: 49 years  Gender: Female  Status: Inpatient  Location: Bedside  Height: 65 in  Weight: 154 lb  BP: 102/ 65 mmHg    Indications: Shortness of breath  Diagnoses: 786 05 - SHORTNESS OF BREATH    Sonographer:  CARLTON Lopez  Interpreting Physician:  Angela Maldonado MD  Primary Physician:  Jeff Murdock [facility]  Referring Physician:  DELORES Garnica  Group:  [facility]    IMPRESSIONS:  Technical quality: Good  Cardiac rhythm: Sinus with bundle-branch block    1  Dilated left ventricular cavity, moderately reduced left ventricular systolic function with global hypokinesis with regional variability  Ejection fraction is estimated as around 38 percent  Indeterminate diastolic dysfunction grade  2  Normal right ventricular size and systolic function  3  Mild left atrial cavity enlargement  4  Aortic valve sclerosis, mild aortic valve regurgitation  5  Mitral valve leaflet sclerosis, moderate mitral valve regurgitation  6  Mild tricuspid valve regurgitation  7  No obvious pulmonary hypertension but estimated right ventricular systolic pressure is close to upper limit of normal   8  Trace to small circumferential pericardial effusion  Compared to report of previous echocardiogram from 2020 left ventricular ejection fraction may be slightly decreased   There is progression of mitral valve regurgitation and trace to small pericardial effusion is new  SUMMARY    LEFT VENTRICLE:  Mildly dilated left ventricular cavity, LVIDd 5 7 centimeters, normal wall thickness, moderately reduced left ventricular systolic function  Global left ventricular hypokinesis with some regional wall motion variability  There is marked  hypokinesis of the inferior and inferolateral walls  Ejection fraction is estimated as around 38 percent  Diastolic dysfunction is likely present but was not adequately assessed  RIGHT VENTRICLE:  Mildly dilated right ventricular cavity, normal right ventricular systolic function  Estimated right ventricular systolic pressure is close to upper limit of normal, 35 mmHg  LEFT ATRIUM:  Mild left atrial cavity enlargement  Intact interatrial septum  RIGHT ATRIUM:  Normal right atrial cavity size  MITRAL VALVE:  Mild mitral valve leaflet thickening and sclerosis, moderate mitral valve regurgitation  AORTIC VALVE:  Tricuspid aortic valve with mild sclerosis, adequate cuspal separation  Mild aortic valve regurgitation  TRICUSPID VALVE:  Mild tricuspid valve regurgitation  PULMONIC VALVE:  No pulmonic valve regurgitation  AORTA:  Aortic root and proximal ascending aorta are normal in size on 2D imaging  IVC, HEPATIC VEINS:  Inferior vena cava is normal in size and demonstrates appropriate respiratory phasic changes in diameter  PERICARDIUM:  Trace to small circumferential pericardial effusion  There is no echocardiographic evidence of cardiac tamponade  SUMMARY MEASUREMENTS  2D measurements:  Unspecified Anatomy:   %FS was 10 8 %  Ao Diam was 3 2 cm  Ao asc was 3 2 cm   EDV(Teich) was 159 ml   EF Biplane was 42 3 %   EF(Teich) was 23 2 %  ESV(Teich) was 122 2 ml  IVSd was 0 9 cm  LA Diam was 3 3 cm  LAAs A2C was 28 cm2  LAAs A4C was 29 4 cm2  LAESV A-L A2C was 107 3 ml  LAESV A-L A4C was 110 6 ml  LAESV MOD A2C was 99 5 ml  LAESV MOD A4C was 102 3 ml  LAESV(A-L) was 112 3 ml    LAESV(MOD BP) was 103 8 ml  LALs A2C was 6 2 cm  LALs A4C was 6 6 cm  LVEDV MOD A2C was 129 1 ml  LVEDV MOD A4C was 220 9 ml  LVEDV MOD BP was 170 6 ml  LVEF MOD A2C was 45 4 %  LVEF MOD A4C was 41 %  LVESV MOD A2C was 70 5 ml  LVESV MOD A4C was 130 4 ml  LVESV MOD BP was 98 4 ml  LVIDd was 5 7 cm  LVIDs was 5 1 cm  LVLd A2C was 10 4 cm  LVLd A4C was 10 7 cm  LVLs A2C was 9 8 cm  LVLs A4C was 9 3 cm  LVOT Diam was 2 3 cm  LVPWd was 0 9 cm  RAAs A4C was 16 4 cm2  RAESV A-L was 46 8 ml  RAESV MOD was 45 3 ml  RALs was 4 8 cm  RVIDd was 4 3 cm  RWT was 0 3   SV MOD A2C was 58 6 ml   SV MOD A4C was 90 6 ml   SV(Teich) was 36 8 ml   CW measurements:  Unspecified Anatomy:   AR Dec [place] was 3 7 m/s2  AR Dec Time was 1139 3 ms  AR PHT was 330 4 ms  AR Vmax was 4 2 m/s  AR maxPG was 70 3 mmHg  AV Env  Ti was 319 7 ms   AV VTI was 24 2 cm  AV Vmax was 1 1 m/s  AV Vmean was 0 8 m/s  AV maxPG was 4 7 mmHg  AV meanPG was 2 7 mmHg  MR VTI was 201 3 cm  MR Vmax was 5 3 m/s  TR Vmax was 2 5 m/s   TR maxPG was 25 7 mmHg  MM measurements:  Unspecified Anatomy:   TAPSE was 2 5 cm  PW measurements:  Unspecified Anatomy:   DELIA (VTI) was 2 7 cm2  DELIA Vmax was 2 7 cm2  DVI was 0 7   LVOT Env  Ti was 314 ms  LVOT VTI was 16 6 cm  LVOT Vmax was 0 7 m/s  LVOT Vmean was 0 5 m/s  LVOT maxPG was 2 2 mmHg  LVOT meanPG was 1 3 mmHg  LVSV  Dopp was 66 5 ml   MV A Kyle was 0 8 m/s  MV E Kyle was 0 6 m/s  MV E/A Ratio was 0 7   RV S' was 0 1 m/s  HISTORY: PRIOR HISTORY: Congestive heart failure  Cardiomyopathy  Risk factors: hypertension and medication-treated hypercholesterolemia  PRIOR PROCEDURES: Prior stent  PROCEDURE: The procedure was performed at the bedside  This was a routine study  The transthoracic approach was used  The study included complete 2D imaging, M-mode, complete spectral Doppler, and color Doppler  The heart rate was 79 bpm,  at the start of the study  Image quality was adequate      LEFT VENTRICLE: Mildly dilated left ventricular cavity, LVIDd 5 7 centimeters, normal wall thickness, moderately reduced left ventricular systolic function  Global left ventricular hypokinesis with some regional wall motion variability  There is marked hypokinesis of the inferior and inferolateral walls  Ejection fraction is estimated as around 38 percent  Diastolic dysfunction is likely present but was not adequately assessed  RIGHT VENTRICLE: Mildly dilated right ventricular cavity, normal right ventricular systolic function  Estimated right ventricular systolic pressure is close to upper limit of normal, 35 mmHg  LEFT ATRIUM: Mild left atrial cavity enlargement  Intact interatrial septum  RIGHT ATRIUM: Normal right atrial cavity size  MITRAL VALVE: Mild mitral valve leaflet thickening and sclerosis, moderate mitral valve regurgitation  AORTIC VALVE: Tricuspid aortic valve with mild sclerosis, adequate cuspal separation  Mild aortic valve regurgitation  TRICUSPID VALVE: Mild tricuspid valve regurgitation  PULMONIC VALVE: No pulmonic valve regurgitation  PERICARDIUM: Trace to small circumferential pericardial effusion  There is no echocardiographic evidence of cardiac tamponade  AORTA: Aortic root and proximal ascending aorta are normal in size on 2D imaging  SYSTEMIC VEINS: IVC: Inferior vena cava is normal in size and demonstrates appropriate respiratory phasic changes in diameter      SYSTEM MEASUREMENT TABLES    2D  %FS: 10 8 %  Ao Diam: 3 2 cm  Ao asc: 3 2 cm  EDV(Teich): 159 ml  EF Biplane: 42 3 %  EF(Teich): 23 2 %  ESV(Teich): 122 2 ml  IVSd: 0 9 cm  LA Diam: 3 3 cm  LAAs A2C: 28 cm2  LAAs A4C: 29 4 cm2  LAESV A-L A2C: 107 3 ml  LAESV A-L A4C: 110 6 ml  LAESV MOD A2C: 99 5 ml  LAESV MOD A4C: 102 3 ml  LAESV(A-L): 112 3 ml  LAESV(MOD BP): 103 8 ml  LALs A2C: 6 2 cm  LALs A4C: 6 6 cm  LVEDV MOD A2C: 129 1 ml  LVEDV MOD A4C: 220 9 ml  LVEDV MOD BP: 170 6 ml  LVEF MOD A2C: 45 4 %  LVEF MOD A4C: 41 %  LVESV MOD A2C: 70 5 ml  LVESV MOD A4C: 130 4 ml  LVESV MOD BP: 98 4 ml  LVIDd: 5 7 cm  LVIDs: 5 1 cm  LVLd A2C: 10 4 cm  LVLd A4C: 10 7 cm  LVLs A2C: 9 8 cm  LVLs A4C: 9 3 cm  LVOT Diam: 2 3 cm  LVPWd: 0 9 cm  RAAs A4C: 16 4 cm2  RAESV A-L: 46 8 ml  RAESV MOD: 45 3 ml  RALs: 4 8 cm  RVIDd: 4 3 cm  RWT: 0 3  SV MOD A2C: 58 6 ml  SV MOD A4C: 90 6 ml  SV(Teich): 36 8 ml    CW  AR Dec [place]: 3 7 m/s2  AR Dec Time: 1139 3 ms  AR PHT: 330 4 ms  AR Vmax: 4 2 m/s  AR maxP 3 mmHg  AV Env  Ti: 319 7 ms  AV VTI: 24 2 cm  AV Vmax: 1 1 m/s  AV Vmean: 0 8 m/s  AV maxP 7 mmHg  AV meanP 7 mmHg  MR VTI: 201 3 cm  MR Vmax: 5 3 m/s  TR Vmax: 2 5 m/s  TR maxP 7 mmHg    MM  TAPSE: 2 5 cm    PW  DELIA (VTI): 2 7 cm2  DELIA Vmax: 2 7 cm2  DVI: 0 7  LVOT Env  Ti: 314 ms  LVOT VTI: 16 6 cm  LVOT Vmax: 0 7 m/s  LVOT Vmean: 0 5 m/s  LVOT maxP 2 mmHg  LVOT meanP 3 mmHg  LVSV Dopp: 66 5 ml  MV A Kyle: 0 8 m/s  MV E Kyle: 0 6 m/s  MV E/A Ratio: 0 7  RV S': 0 1 m/s    [employer] [facility]redited [facility]    Prepared and electronically signed by    Sunny Rudolph MD  Signed 2021 17:20:13      ======================================================     Imaging:   I have personally reviewed pertinent reports

## 2021-09-01 NOTE — ASSESSMENT & PLAN NOTE
Resolved with repletion  Recent Labs     08/30/21  0550 08/31/21  0419 09/01/21  0457   K 3 5 3 0* 4 1

## 2021-09-01 NOTE — PLAN OF CARE
Problem: PAIN - ADULT  Goal: Verbalizes/displays adequate comfort level or baseline comfort level  Description: Interventions:  - Encourage patient to monitor pain and request assistance  - Assess pain using appropriate pain scale  - Administer analgesics based on type and severity of pain and evaluate response  - Implement non-pharmacological measures as appropriate and evaluate response  - Consider cultural and social influences on pain and pain management  - Notify physician/advanced practitioner if interventions unsuccessful or patient reports new pain  Outcome: Progressing     Problem: SAFETY ADULT  Goal: Patient will remain free of falls  Description: INTERVENTIONS:  - Educate patient/family on patient safety including physical limitations  - Instruct patient to call for assistance with activity   - Consult OT/PT to assist with strengthening/mobility   - Keep Call bell within reach  - Keep bed low and locked with side rails adjusted as appropriate  - Keep care items and personal belongings within reach  - Initiate and maintain comfort rounds  - Make Fall Risk Sign visible to staff  - Offer Toileting every  Hours, in advance of need  - Initiate/Maintain alarm  - Obtain necessary fall risk management equipment:   - Apply yellow socks and bracelet for high fall risk patients  - Consider moving patient to room near nurses station  Outcome: Progressing     Problem: SAFETY ADULT  Goal: Maintain or return to baseline ADL function  Description: INTERVENTIONS:  -  Assess patient's ability to carry out ADLs; assess patient's baseline for ADL function and identify physical deficits which impact ability to perform ADLs (bathing, care of mouth/teeth, toileting, grooming, dressing, etc )  - Assess/evaluate cause of self-care deficits   - Assess range of motion  - Assess patient's mobility; develop plan if impaired  - Assess patient's need for assistive devices and provide as appropriate  - Encourage maximum independence but intervene and supervise when necessary  - Involve family in performance of ADLs  - Assess for home care needs following discharge   - Consider OT consult to assist with ADL evaluation and planning for discharge  - Provide patient education as appropriate  Outcome: Progressing     Problem: SAFETY ADULT  Goal: Maintains/Returns to pre admission functional level  Description: INTERVENTIONS:  - Perform BMAT or MOVE assessment daily    - Set and communicate daily mobility goal to care team and patient/family/caregiver  - Collaborate with rehabilitation services on mobility goals if consulted  - Perform Range of Motion  times a day  - Reposition patient every  hours  - Dangle patient  times a day  - Stand patient  times a day  - Ambulate patient  times a day  - Out of bed to chair  times a day   - Out of bed for meals  times a day  - Out of bed for toileting  - Record patient progress and toleration of activity level   Outcome: Progressing     Problem: DISCHARGE PLANNING  Goal: Discharge to home or other facility with appropriate resources  Description: INTERVENTIONS:  - Identify barriers to discharge w/patient and caregiver  - Arrange for needed discharge resources and transportation as appropriate  - Identify discharge learning needs (meds, wound care, etc )  - Arrange for interpretive services to assist at discharge as needed  - Refer to Case Management Department for coordinating discharge planning if the patient needs post-hospital services based on physician/advanced practitioner order or complex needs related to functional status, cognitive ability, or social support system  Outcome: Progressing     Problem: Knowledge Deficit  Goal: Patient/family/caregiver demonstrates understanding of disease process, treatment plan, medications, and discharge instructions  Description: Complete learning assessment and assess knowledge base    Interventions:  - Provide teaching at level of understanding  - Provide teaching via preferred learning methods  Outcome: Progressing     Problem: Potential for Falls  Goal: Patient will remain free of falls  Description: INTERVENTIONS:  - Educate patient/family on patient safety including physical limitations  - Instruct patient to call for assistance with activity   - Consult OT/PT to assist with strengthening/mobility   - Keep Call bell within reach  - Keep bed low and locked with side rails adjusted as appropriate  - Keep care items and personal belongings within reach  - Initiate and maintain comfort rounds  - Make Fall Risk Sign visible to staff  - Offer Toileting every  Hours, in advance of need  - Initiate/Maintain bed alarm  - Obtain necessary fall risk management equipment:   - Apply yellow socks and bracelet for high fall risk patients  - Consider moving patient to room near nurses station  Outcome: Progressing

## 2021-09-02 LAB
ANION GAP SERPL CALCULATED.3IONS-SCNC: 12 MMOL/L (ref 4–13)
BUN SERPL-MCNC: 41 MG/DL (ref 5–25)
CA-I BLD-SCNC: 0.77 MMOL/L (ref 1.12–1.32)
CALCIUM SERPL-MCNC: 5.6 MG/DL (ref 8.3–10.1)
CHLORIDE SERPL-SCNC: 106 MMOL/L (ref 100–108)
CO2 SERPL-SCNC: 24 MMOL/L (ref 21–32)
CREAT SERPL-MCNC: 2.51 MG/DL (ref 0.6–1.3)
GFR SERPL CREATININE-BSD FRML MDRD: 21 ML/MIN/1.73SQ M
GLUCOSE SERPL-MCNC: 87 MG/DL (ref 65–140)
POTASSIUM SERPL-SCNC: 4.2 MMOL/L (ref 3.5–5.3)
PTH-INTACT SERPL-MCNC: 9.6 PG/ML (ref 18.4–80.1)
SODIUM SERPL-SCNC: 142 MMOL/L (ref 136–145)

## 2021-09-02 PROCEDURE — 83970 ASSAY OF PARATHORMONE: CPT | Performed by: INTERNAL MEDICINE

## 2021-09-02 PROCEDURE — 99232 SBSQ HOSP IP/OBS MODERATE 35: CPT | Performed by: INTERNAL MEDICINE

## 2021-09-02 PROCEDURE — 82306 VITAMIN D 25 HYDROXY: CPT | Performed by: INTERNAL MEDICINE

## 2021-09-02 PROCEDURE — 82330 ASSAY OF CALCIUM: CPT | Performed by: INTERNAL MEDICINE

## 2021-09-02 PROCEDURE — 80048 BASIC METABOLIC PNL TOTAL CA: CPT | Performed by: PHYSICIAN ASSISTANT

## 2021-09-02 RX ORDER — CALCIUM CARBONATE 500(1250)
1 TABLET ORAL
Status: DISCONTINUED | OUTPATIENT
Start: 2021-09-02 | End: 2021-09-04 | Stop reason: HOSPADM

## 2021-09-02 RX ORDER — METOPROLOL SUCCINATE 25 MG/1
25 TABLET, EXTENDED RELEASE ORAL 2 TIMES DAILY
Status: DISCONTINUED | OUTPATIENT
Start: 2021-09-02 | End: 2021-09-04 | Stop reason: HOSPADM

## 2021-09-02 RX ORDER — CALCIUM GLUCONATE 20 MG/ML
1 INJECTION, SOLUTION INTRAVENOUS ONCE
Status: COMPLETED | OUTPATIENT
Start: 2021-09-02 | End: 2021-09-02

## 2021-09-02 RX ADMIN — HEPARIN SODIUM 5000 UNITS: 5000 INJECTION INTRAVENOUS; SUBCUTANEOUS at 13:00

## 2021-09-02 RX ADMIN — ATORVASTATIN CALCIUM 80 MG: 80 TABLET, FILM COATED ORAL at 17:10

## 2021-09-02 RX ADMIN — CALCIUM GLUCONATE 1 G: 20 INJECTION, SOLUTION INTRAVENOUS at 10:24

## 2021-09-02 RX ADMIN — CALCIUM 1 TABLET: 500 TABLET ORAL at 12:43

## 2021-09-02 RX ADMIN — LEVOTHYROXINE SODIUM 100 MCG: 100 TABLET ORAL at 06:33

## 2021-09-02 RX ADMIN — CALCIUM 1 TABLET: 500 TABLET ORAL at 17:10

## 2021-09-02 RX ADMIN — TICAGRELOR 90 MG: 90 TABLET ORAL at 17:13

## 2021-09-02 RX ADMIN — ASPIRIN 81 MG: 81 TABLET, CHEWABLE ORAL at 09:54

## 2021-09-02 RX ADMIN — DOXYLAMINE SUCCINATE 25 MG: 25 TABLET ORAL at 22:28

## 2021-09-02 RX ADMIN — SODIUM CHLORIDE, SODIUM GLUCONATE, SODIUM ACETATE, POTASSIUM CHLORIDE, MAGNESIUM CHLORIDE, SODIUM PHOSPHATE, DIBASIC, AND POTASSIUM PHOSPHATE 100 ML/HR: .53; .5; .37; .037; .03; .012; .00082 INJECTION, SOLUTION INTRAVENOUS at 21:18

## 2021-09-02 RX ADMIN — FERROUS GLUCONATE 324 MG: 324 TABLET ORAL at 17:11

## 2021-09-02 RX ADMIN — GUAIFENESIN 600 MG: 600 TABLET ORAL at 09:53

## 2021-09-02 RX ADMIN — SODIUM CHLORIDE, SODIUM GLUCONATE, SODIUM ACETATE, POTASSIUM CHLORIDE, MAGNESIUM CHLORIDE, SODIUM PHOSPHATE, DIBASIC, AND POTASSIUM PHOSPHATE 100 ML/HR: .53; .5; .37; .037; .03; .012; .00082 INJECTION, SOLUTION INTRAVENOUS at 10:32

## 2021-09-02 RX ADMIN — HEPARIN SODIUM 5000 UNITS: 5000 INJECTION INTRAVENOUS; SUBCUTANEOUS at 21:18

## 2021-09-02 RX ADMIN — HEPARIN SODIUM 5000 UNITS: 5000 INJECTION INTRAVENOUS; SUBCUTANEOUS at 06:33

## 2021-09-02 RX ADMIN — GUAIFENESIN 600 MG: 600 TABLET ORAL at 21:18

## 2021-09-02 RX ADMIN — METOPROLOL SUCCINATE 25 MG: 25 TABLET, EXTENDED RELEASE ORAL at 09:53

## 2021-09-02 RX ADMIN — METOPROLOL SUCCINATE 25 MG: 25 TABLET, EXTENDED RELEASE ORAL at 21:18

## 2021-09-02 RX ADMIN — CALCIUM 1 TABLET: 500 TABLET ORAL at 09:54

## 2021-09-02 RX ADMIN — FERROUS GLUCONATE 324 MG: 324 TABLET ORAL at 06:33

## 2021-09-02 RX ADMIN — TICAGRELOR 90 MG: 90 TABLET ORAL at 09:54

## 2021-09-02 NOTE — ASSESSMENT & PLAN NOTE
Resolved with repletion  Recent Labs     08/31/21  0419 09/01/21  0457 09/02/21  0453   K 3 0* 4 1 4 2

## 2021-09-02 NOTE — ASSESSMENT & PLAN NOTE
Wt Readings from Last 3 Encounters:   09/02/21 65 kg (143 lb 4 8 oz)   08/12/21 59 kg (130 lb)   07/12/21 70 2 kg (154 lb 12 2 oz)     Results from last 7 days   Lab Units 08/28/21  1418   NT-PRO BNP pg/mL 7,919*     · Currently compensated  Given profound renal dysfunction holding furosemide and lisinopril    · Updated echocardiogram with ejection fraction of 50%, markedly improved from previous EF of 38 % from July

## 2021-09-02 NOTE — PROGRESS NOTES
Progress Note - Cardiology   Emmie Diaz 49 y o  female MRN: [id number]  Unit/Bed#: E4 -01 Encounter: [id number]    Assessment:  1  Severe hypocalcemia, etiology? Poor calcium intake, hypoparathyroidism, vitamin-D deficiency  2  Acute kidney injury improving progressively, probably dehydration  3  Chronic systolic heart failure, compensated  4  Coronary artery disease with SEJAL-om 1 and SEJAL LAD x3, nonobstructive residual disease  5  History of both ischemic and nonischemic cardiomyopathies  6  Anemia  7  Hypothyroidism on thyroid replacement, status post thyroidectomy  8  Since history of syncope secondary to orthostasis/volume depletion  9  History of myopericarditis   10  Rate dependent left bundle branch block pattern with very wide QRS in when in left bundle pattern (170 milliseconds) best to keep heart rates under 100     Plan:  1  Tolerating metoprolol succinate 25 mg p o  Daily  Will increase to b i d   2  When stable, patient may benefit by Corlanor  3  Continue aspirin and Brilinta  4  Appreciate Nephrology's help  5  Check parathyroid hormone intact levels and vitamin-D profile      Interval history:  Renal function gradually improving  GFR up to 21  Serum ionized calcium and calcium levels very low  Last  calcium level 5 6  Patient had a thyroidectomy at age 17  Her parathyroid glands were left but possibly they were damaged resulting in hypo parathyroidism      PROBLEM LIST:    Patient Active Problem List    Diagnosis Date Noted    NSVT (nonsustained ventricular tachycardia) ([facility] ) 08/29/2021    Hypokalemia 07/11/2021    Chest pain 07/11/2021    Multiple lacunar infarcts ([address] ) 05/27/2021    Abnormal CT of brain 05/07/2021    Ischemic brain damage 05/07/2021    Hypoparathyroidism ([address] ) 10/16/2020    Decreased GFR 07/21/2020    Syncope 06/27/2020    Elevated d-dimer 06/27/2020    Hypocalcemia 06/27/2020    Iron deficiency anemia 06/22/2020    Acute myopericarditis 06/19/2020    CAD (coronary artery disease) 06/19/2020    Chronic systolic (congestive) heart failure ([facility] ) 06/19/2020    Cardiomyopathy ([place] [address] ) 06/16/2020    LBBB (left bundle branch block) 06/16/2020    Anemia 06/16/2020    Prediabetes 06/16/2020    s/p ORIF of left distal radius 74/93/5132    Acute systolic congestive heart failure ([place] [address] ) 06/12/2020    MARINA (acute kidney injury) ([place] [address] ) 06/12/2020    Postoperative hypothyroidism        Vitals: /70 (BP Location: Left arm)   Pulse 80   Temp 98 1 °F (36 7 °C) (Temporal)   Resp 18   Ht 5' 5" (1 651 m)   Wt 65 kg (143 lb 4 8 oz)   SpO2 96%   BMI 23 85 kg/m²   PREVIOUS WEIGHTS:   Wt Readings from Last 5 Encounters:   09/02/21 65 kg (143 lb 4 8 oz)   08/12/21 59 kg (130 lb)   07/12/21 70 2 kg (154 lb 12 2 oz)   05/19/21 64 4 kg (142 lb)   05/07/21 64 4 kg (142 lb)        Labs/Data:        Results from last 7 days   Lab Units 09/01/21  0457 08/31/21  0421 08/30/21  0550   WBC Thousand/uL 11 10* 9 74 10 52*   HEMOGLOBIN g/dL 8 9* 8 4* 9 0*   HEMATOCRIT % 27 1* 25 9* 27 5*   MCV fL 82 82 83   MCH pg 26 8 26 5* 27 3   MCHC g/dL 32 8 32 4 32 7   PLATELETS Thousands/uL 194 161 146*     Results from last 7 days   Lab Units 09/02/21  0453 09/01/21  0457 08/31/21  0419   EGFR ml/min/1 73sq m 21 19 14   SODIUM mmol/L 142 139 134*   POTASSIUM mmol/L 4 2 4 1 3 0*   CHLORIDE mmol/L 106 104 99*   CO2 mmol/L 24 26 25   BUN mg/dL 41* 55* 72*   CREATININE mg/dL 2 51* 2 71* 3 52*     Results from last 7 days   Lab Units 09/02/21  0453 09/01/21  0457 08/31/21  0419 08/30/21  0550 08/28/21  1418   CALCIUM mg/dL 5 6* 5 9* 6 1* 6 5* 7 3*   AST U/L  --   --   --  84* 120*   ALT U/L  --   --   --  60 83*   ALK PHOS U/L  --   --   --  95 117*     Results from last 7 days   Lab Units 08/28/21  1418   TROPONIN I ng/mL 0 17*     Lab Results   Component Value Date    NTBNP 7,919 (H) 08/28/2021    NTBNP 16,077 (H) 07/11/2021    NTBNP 3,370 (H) 06/27/2020     Lab Results   Component Value Date    CHOLESTEROL 167 07/15/2021    TRIG 93 07/15/2021    HDL 61 07/15/2021    LDLCALC 87 07/15/2021    HGBA1C 6 0 (H) 06/13/2020     Lab Results   Component Value Date    TSH 0 01 (L) 09/25/2020     No results found for: DIGOXIN           Current Facility-Administered Medications:     acetaminophen (TYLENOL) tablet 650 mg, 650 mg, Oral, Q4H PRN, Mary Jo Lester MD    aspirin chewable tablet 81 mg, 81 mg, Oral, Daily, Mary Jo Lester MD, 81 mg at 09/02/21 0954    atorvastatin (LIPITOR) tablet 80 mg, 80 mg, Oral, After Fitz Carrillo MD, 80 mg at 09/01/21 1715    benzonatate (TESSALON PERLES) capsule 100 mg, 100 mg, Oral, TID PRN, Paulina Kelly DO, 100 mg at 08/31/21 2221    calcium carbonate (OYSTER SHELL,OSCAL) 500 mg tablet 1 tablet, 1 tablet, Oral, TID With Meals, Paulina Kelly DO, 1 tablet at 09/02/21 0954    calcium gluconate 1 g in sodium chloride 0 9% 50 mL (premix), 1 g, Intravenous, Once, Chaz Hollis DO, Last Rate: 100 mL/hr at 09/02/21 1024, 1 g at 09/02/21 1024    doxylamine (UNISON) tablet 25 mg, 25 mg, Oral, HS PRN, Jaqueline Lua PA-C, 25 mg at 08/31/21 2335    ferrous gluconate (FERGON) tablet 324 mg, 324 mg, Oral, BID AC, Mary Jo Lester MD, 324 mg at 09/02/21 0862    guaiFENesin (MUCINEX) 12 hr tablet 600 mg, 600 mg, Oral, Q12H [facility], Chaz Hollis DO, 600 mg at 09/02/21 0953    heparin (porcine) subcutaneous injection 5,000 Units, 5,000 Units, Subcutaneous, Q8H [facility], Chaz Hollis DO, 5,000 Units at 09/02/21 2696    levothyroxine tablet 100 mcg, 100 mcg, Oral, Early Morning, Mary Jo Lester MD, 100 mcg at 09/02/21 7436    melatonin tablet 6 mg, 6 mg, Oral, HS, DELORES Benton, 6 mg at 08/30/21 2148    metoprolol succinate (TOPROL-XL) 24 hr tablet 25 mg, 25 mg, Oral, Daily, Karie Charles DO, 25 mg at 09/02/21 0953    multi-electrolyte (PLASMALYTE-A/ISOLYTE-S PH 7 4) IV solution, 100 mL/hr, Intravenous, Continuous, Inessa Rivera DO, Last Rate: 100 mL/hr at 09/02/21 1032, 100 mL/hr at 09/02/21 1032    pneumococcal 23-valent polysaccharide vaccine (PNEUMOVAX-23) injection 0 5 mL, 0 5 mL, Subcutaneous, Prior to discharge, Chrissie Nelson MD    [facility]) tablet 90 mg, 90 mg, Oral, BID, Chrissie Nelson MD, 90 mg at 09/02/21 8254  Allergies   Allergen Reactions    Penicillins Rash         Intake/Output Summary (Last 24 hours) at 9/2/2021 1048  Last data filed at 9/2/2021 1032  Gross per 24 hour   Intake 3046 67 ml   Output --   Net 3046 67 ml       Invasive Devices     Peripheral Intravenous Line            Peripheral IV 08/30/21 Right;Ventral (anterior) Forearm 2 days                Review of Systems   Respiratory: Negative for cough, choking, chest tightness, shortness of breath and wheezing  Cardiovascular: Negative for chest pain, palpitations and leg swelling  Musculoskeletal: Negative for gait problem  Skin: Negative for rash  Neurological: Negative for dizziness, tremors, syncope, weakness, light-headedness, numbness and headaches  Psychiatric/Behavioral: Negative for agitation and behavioral problems  The patient is not hyperactive  Physical Exam  Constitutional:       General: She is not in acute distress  Appearance: She is well-developed  HENT:      Head: Normocephalic and atraumatic  Neck:      Thyroid: No thyromegaly  Vascular: No carotid bruit or JVD  Trachea: No tracheal deviation  Cardiovascular:      Rate and Rhythm: Normal rate and regular rhythm  Pulses: Normal pulses  Heart sounds: Normal heart sounds  No murmur heard  No friction rub  No gallop  Pulmonary:      Effort: Pulmonary effort is normal  No respiratory distress  Breath sounds: Normal breath sounds  No wheezing, rhonchi or rales  Chest:      Chest wall: No tenderness  Abdominal:      General: There is no distension  Tenderness: There is no abdominal tenderness  Musculoskeletal:         General: Normal range of motion        Cervical back: Normal range of motion and neck supple  Right lower leg: No edema  Left lower leg: No edema  Skin:     General: Skin is warm and dry  Neurological:      General: No focal deficit present  Mental Status: She is alert and oriented to person, place, and time  Gait: Gait normal    Psychiatric:         Mood and Affect: Mood normal          Behavior: Behavior normal          Thought Content: Thought content normal          Judgment: Judgment normal            ----------------------------------------------------------------------------------------------  NUCLEAR STRESS TEST: No results found for this or any previous visit  Results for orders placed during the hospital encounter of 21    NM myocardial perfusion spect (rx stress and/or rest)    [address]  [phone]    Rest/Stress Gated SPECT Myocardial Perfusion Imaging After Regadenoson    Patient: Olena Suggs  MR number: [id number]  Account number: [de-identified]  : 1965  Age: 49 years  Gender: Female  Status: Inpatient  Location: Stress lab  Height: 65 in  Weight: 154 lb  BP: 108/ 78 mmHg    Allergies: PENICILLINS    Diagnosis: R06 00 - Dyspnea, unspecified, R07 9 - Chest pain, unspecified    Primary Physician:  Rolando Swann [facility]  Technician:  Kimberly Redmond  RN:  Letty Donohue RN BSN  Referring Physician:  Elin Caputo MD  Group:  [facility]  Report Prepared By[de-identified]  Letty Donohue RN BSN  Interpreting Physician:  Norma Ordaz DO    INDICATIONS: Evaluation of known coronary artery disease  HISTORY: The patient is a 49year old  female  Chest pain status: chest pain, associated with dyspnea  Other symptoms: dyspnea and decreased effort tolerance  Coronary artery disease risk factors: post-menopausal state    Cardiovascular history: coronary artery disease, congestive heart failure( Systolic/diastolic EF 47%), arrhythmia( ST/LBBB), and stroke  Prior cardiovascular procedures: angioplasty of left anterior descending (on 6/20)  Co-morbidity:  history of renal disease  Medications: a beta blocker, a diuretic, aspirin, and thyroid medications  PHYSICAL EXAM: Baseline physical exam screening: normal lung exam     REST ECG: Resting ECG: Sinus tachycardia left bundle-branch block    PROCEDURE: The study was performed in the the Stress lab  A regadenoson infusion pharmacologic stress test was performed  Gated SPECT myocardial perfusion imaging was performed after stress and at rest  Systolic blood pressure was 108  mmHg, at the start of the study  Diastolic blood pressure was 78 mmHg, at the start of the study  The heart rate was 107 bpm, at the start of the study  IV double checked  Regadenoson protocol:  HR bpm SBP mmHg DBP mmHg Symptoms  Baseline 107 108 78 none  Immediate 129 80 60 mild dyspnea, dizziness  3 min 118 110 74 dizziness  5 min 110 -- -- subsiding  7 min 108 112 70 none  Resting dose: 11 0 mCi technetium 99 tetrofosmin  Stress dose: 30 1 mCi technetium 99 tetrofosmin No medications or fluids given  STRESS SUMMARY: Duration of pharmacologic stress was 3 min  Maximal heart rate during stress was 129 bpm  The rate-pressure product for the peak heart rate and blood pressure was 50906  There was no chest pain during stress  The stress  test was terminated due to protocol completion  Unable to obtain 02 sat due to acrylic nails  Stress ECG: Sinus tachycardia LBBB  Arrhythmias: No significant arrhythmias were noted    ISOTOPE ADMINISTRATION:  The radiopharmaceutical was injected at the peak effect of pharmacologic stress  MYOCARDIAL PERFUSION IMAGING:  Resting images: There is a small sized, moderate intensity defect of the apex  There is a moderate sized, moderate intensity defect of the inferior wall from base to apex  Stress images:  There is a small sized, moderate intensity defect of the apex  There is a moderate size, mild intensity defect of the inferior wall from base to apex  Inferior wall defect is fixed but improved in stress with hypokinesis  TID: 0 92    GATED SPECT:  Left ventricle is mild-to-moderately dilated with paradoxical septal wall motion, apical hypokinesis and inferior wall hypokinesis from base to mid  Gated LVEF 28%    SUMMARY:  -  Stress results: There was no chest pain during stress  IMPRESSIONS: 1  Abnormal pharmacologic nuclear stress test with fixed inferior and apical defects consistent with infarct  There is some improvement of inferior wall with stress  2  ECG portion of stress test is nondiagnostic for myocardial ischemia due to LBBB  3  No reported symptoms  4  No significant arrhythmias were noted  5  Left ventricle is mild-to-moderately dilated with inferior wall and apical hypokinesis; gated EF 28%  6  There is no study for comparison    Prepared and signed by    Denese Homans, DO  Signed 2021 17:19:02      --------------------------------------------------------------------------------  CATH:  Results for orders placed during the hospital encounter of 21    Cardiac catheterization    Narrative  [address] [place] [address], [address]  [phone]    [facility]    Invasive Cardiovascular Lab Complete Report    Patient: Earl Hughes  MR number: [id number]  Account number: [de-identified]  Study date: 07/15/2021  Gender: Female  : 1965  Height: 65 in  Weight: 154 4 lb  BSA: 1 77 mï¾²    Allergies: PENICILLINS    Diagnostic Cardiologist:  Halina Morris MD  Interventional Cardiologist:  Halina Morris MD  Primary Physician:  Earl Homans Brown PAC    SUMMARY    CORONARY CIRCULATION:  Proximal LAD: There was a tubular 50 % stenosis  1st obtuse marginal: There was a 90 % stenosis  This is a likely culprit for the patient's clinical presentation  An intervention was performed      1ST LESION INTERVENTIONS:  A successful balloon angioplasty with stent procedure was performed on the 90 % lesion in the 1st obtuse marginal  Following intervention there was an excellent angiographic appearance with a 0 % residual stenosis  A Xience Elle Rx 3 0 x 23mm drug-eluting stent was placed across the lesion and deployed at a maximum inflation pressure of 18 elsa  INDICATIONS:  --  Possible CAD: myocardial infarction without ST elevation (NSTEMI)  --  Congestive heart failure with ischemic cardiomyopathy  PROCEDURES PERFORMED    --  Left heart catheterization without ventriculogram   --  Left coronary angiography  --  Right coronary angiography  --  Inpatient  --  Mod Sedation Same Physician Initial 15min  --  Mod Sedation Same Physician Add 15min  --  Coronary Catheterization (w/ LHC)  --  Coronary Drug Eluting Stent w/PTCA  --  Intervention on OM1: balloon angioplasty, stent  PROCEDURE: The risks and alternatives of the procedures and conscious sedation were explained to the patient and informed consent was obtained  The patient was brought to the cath lab and placed on the table  The planned puncture sites  were prepped and draped in the usual sterile fashion  --  Right radial artery access  After performing an Otoniel's test to verify adequate ulnar artery supply to the hand, the radial site was prepped  The puncture site was infiltrated with local anesthetic  The vessel was accessed using the  modified Seldinger technique, a wire was advanced into the vessel, and a sheath was advanced over the wire into the vessel  --  Left heart catheterization without ventriculogram  A catheter was advanced over a guidewire into the ascending aorta  After recording ascending aortic pressure, the catheter was advanced across the aortic valve and left ventricular  pressure was recorded   The catheter was pulled back across the aortic valve and into the ascending aorta and pullback pressures were obtained  --  Left coronary artery angiography  A catheter was advanced over a guidewire into the aorta and positioned in the left coronary artery ostium under fluoroscopic guidance  Angiography was performed  --  Right coronary artery angiography  A catheter was advanced over a guidewire into the aorta and positioned in the right coronary artery ostium under fluoroscopic guidance  Angiography was performed  --  Inpatient  --  Mod Sedation Same Physician Initial 15min  --  Mod Sedation Same Physician Add 15min  --  Coronary Catheterization (w/ LHC)  LESION INTERVENTION: A successful balloon angioplasty with stent procedure was performed on the 90 % lesion in the 1st obtuse marginal  Following intervention there was an excellent angiographic appearance with a 0 % residual stenosis  There was LALO 3 flow before the procedure and LALO 3 flow after the procedure  --  Vessel setup was performed  A Launcher 6Fr Ebu 3 5 guiding catheter was used to cannulate the vessel  --  Vessel setup was performed  A Runthrough NS 180cm wire was used to cross the lesion  --  Balloon angioplasty was performed, using a Euphora Rx 2 5 x 15mm balloon, with 2 inflations and a maximum inflation pressure of 10 elsa  --  A Xience Elle Rx 3 0 x 23mm drug-eluting stent was placed across the lesion and deployed at a maximum inflation pressure of 18 elsa  --  Balloon angioplasty was performed, using a NC Trek Rx 3 5 x 15mm balloon, with 2 inflations and a maximum inflation pressure of 20 elsa  INTERVENTIONS:  --  Coronary Drug Eluting Stent w/PTCA  PROCEDURE COMPLETION: The patient tolerated the procedure well and was discharged from the cath lab  TIMING: Test started at 10:41  Test concluded at 11:13  HEMOSTASIS: The sheath was removed  The site was compressed with a Hemoband  device  Hemostasis was obtained  MEDICATIONS GIVEN: Baby Aspirin, 81 mg, PO, at 10:29   Ticagrelor, 180 mg, PO, at 10:29  0 9 NSS, infusion rate of 100 ml/hr, IV, at 10:29  Fentanyl (1OOmcg/2 ml), 50 mcg, IV, at 10:38  Versed (2mg/2ml), 2  mg, IV, at 10:38  1% Lidocaine, 1 ml, subcutaneously, at 10:42  Versed (2mg/2ml), 1 mg, IV, at 10:43  Fentanyl (1OOmcg/2 ml), 50 mcg, IV, at 10:43  Nitroglycerin (200mcg/ml), 200 mcg, at 10:45  Verapamil (5mg/2ml), 2 5 mg, IV, at 10:45  Heparin 1000 units/ml, 4,000 units, IV, at 10:45  Heparin 1000 units/ml, 4,000 units, IV, at 10:50  Nitroglycerine (200mcg/ml), 100 mcg, at 10:57  Heparin 1000 units/ml, 2,000 units, IV, at 10:59  CONTRAST GIVEN: 90 ml Omnipaque (350mg I  /ml)  RADIATION EXPOSURE: Fluoroscopy time: 4 37 min  HEMODYNAMICS: Hemodynamic assessment demonstrated normal LVEDP  CORONARY VESSELS:   --  The coronary circulation is right dominant  --  Left main: The vessel was large sized  Angiography showed minor luminal irregularities  --  LAD: The vessel was large sized  Angiography showed the vessel to wrap around the cardiac apex  There was one major diagonal branch  --  Proximal LAD: There was a tubular 50 % stenosis  --  Mid LAD: There was a 0 % stenosis at the site of a prior stent  --  1st diagonal: The vessel was medium sized  --  Circumflex: The vessel was medium sized  There were two major obtuse marginals  --  1st obtuse marginal: There was a 90 % stenosis  This is a likely culprit for the patient's clinical presentation  An intervention was performed  --  RCA: The vessel was medium sized and moderately tortuous  --  Mid RCA: There was a 40 % stenosis  IMPRESSIONS:  There is significant double vessel coronary artery disease  RECOMMENDATIONS  Patient management should include increasing lipid lowering therapy  Patient management to include dual antiplatelet therapy  DISPOSITION:  The patient left the catheterization laboratory in stable condition      Prepared and signed by    Nestor Caldwell MD  Signed 07/15/2021 11:20:14    Study diagram    Angiographic findings  Native coronary lesions:  ï¾·Proximal LAD: Lesion 1: tubular, 50 % stenosis  ï¾·Mid LAD: Lesion 1: 0 % stenosis, site of prior stent  ï¾·OM1: Lesion 1: 90 % stenosis  ï¾·Mid RCA: Lesion 1: 40 % stenosis  Intervention results  Native coronary lesions:  ï¾·Successful balloon angioplasty and stent of the 90 % stenosis in OM1  Appearance excellent with 0 % residual stenosis  Stent: Racquel Leather Rx 3 0 x 23mm drug-eluting  Hemodynamic tables    Pressures:  Baseline  Pressures:  - HR: 78  Pressures:  - Rhythm:  Pressures:  -- Aortic Pressure (S/D/M): 92/44/48  Pressures:  -- Left Ventricle (s/edp): 94/4/--    Outputs:  Baseline  Outputs:  -- CALCULATIONS: [age] years[age]  Outputs:  -- CALCULATIONS: Body Surface Area: 1 77  Outputs:  -- CALCULATIONS: Height in cm: 165 00  Outputs:  -- CALCULATIONS: Sex: Female  Outputs:  -- CALCULATIONS: Weight in k 20    --------------------------------------------------------------------------------  ECHO:   Results for orders placed during the hospital encounter of 21    Echo complete with contrast if indicated    Narrative  [address] [place] [address], [address]  [phone]    Transthoracic Echocardiogram  2D, M-mode, Doppler, and Color Doppler    Study date:  2021    Patient: Olena Suggs  MR number: [id number]  Account number: [de-identified]  : 1965  Age: 49 years  Gender: Female  Status: Inpatient  Location: Bedside  Height: 65 in  Weight: 154 lb  BP: 102/ 65 mmHg    Indications: Shortness of breath  Diagnoses: 786 05 - SHORTNESS OF BREATH    Sonographer:  CARLTON Fuentes  Interpreting Physician:  Elin Caputo MD  Primary Physician:  Rosalia Swann [facility]  Referring Physician:  DELORES Gray  Group:  [facility]    IMPRESSIONS:  Technical quality: Good  Cardiac rhythm: Sinus with bundle-branch block    1   Dilated left ventricular cavity, moderately reduced left ventricular systolic function with global hypokinesis with regional variability  Ejection fraction is estimated as around 38 percent  Indeterminate diastolic dysfunction grade  2  Normal right ventricular size and systolic function  3  Mild left atrial cavity enlargement  4  Aortic valve sclerosis, mild aortic valve regurgitation  5  Mitral valve leaflet sclerosis, moderate mitral valve regurgitation  6  Mild tricuspid valve regurgitation  7  No obvious pulmonary hypertension but estimated right ventricular systolic pressure is close to upper limit of normal   8  Trace to small circumferential pericardial effusion  Compared to report of previous echocardiogram from August 25, 2020 left ventricular ejection fraction may be slightly decreased  There is progression of mitral valve regurgitation and trace to small pericardial effusion is new  SUMMARY    LEFT VENTRICLE:  Mildly dilated left ventricular cavity, LVIDd 5 7 centimeters, normal wall thickness, moderately reduced left ventricular systolic function  Global left ventricular hypokinesis with some regional wall motion variability  There is marked  hypokinesis of the inferior and inferolateral walls  Ejection fraction is estimated as around 38 percent  Diastolic dysfunction is likely present but was not adequately assessed  RIGHT VENTRICLE:  Mildly dilated right ventricular cavity, normal right ventricular systolic function  Estimated right ventricular systolic pressure is close to upper limit of normal, 35 mmHg  LEFT ATRIUM:  Mild left atrial cavity enlargement  Intact interatrial septum  RIGHT ATRIUM:  Normal right atrial cavity size  MITRAL VALVE:  Mild mitral valve leaflet thickening and sclerosis, moderate mitral valve regurgitation  AORTIC VALVE:  Tricuspid aortic valve with mild sclerosis, adequate cuspal separation  Mild aortic valve regurgitation      TRICUSPID VALVE:  Mild tricuspid valve regurgitation  PULMONIC VALVE:  No pulmonic valve regurgitation  AORTA:  Aortic root and proximal ascending aorta are normal in size on 2D imaging  IVC, HEPATIC VEINS:  Inferior vena cava is normal in size and demonstrates appropriate respiratory phasic changes in diameter  PERICARDIUM:  Trace to small circumferential pericardial effusion  There is no echocardiographic evidence of cardiac tamponade  SUMMARY MEASUREMENTS  2D measurements:  Unspecified Anatomy:   %FS was 10 8 %  Ao Diam was 3 2 cm  Ao asc was 3 2 cm   EDV(Teich) was 159 ml   EF Biplane was 42 3 %   EF(Teich) was 23 2 %  ESV(Teich) was 122 2 ml  IVSd was 0 9 cm  LA Diam was 3 3 cm  LAAs A2C was 28 cm2  LAAs A4C was 29 4 cm2  LAESV A-L A2C was 107 3 ml  LAESV A-L A4C was 110 6 ml  LAESV MOD A2C was 99 5 ml  LAESV MOD A4C was 102 3 ml  LAESV(A-L) was 112 3 ml  LAESV(MOD BP) was 103 8 ml  LALs A2C was 6 2 cm  LALs A4C was 6 6 cm  LVEDV MOD A2C was 129 1 ml  LVEDV MOD A4C was 220 9 ml  LVEDV MOD BP was 170 6 ml  LVEF MOD A2C was 45 4 %  LVEF MOD A4C was 41 %  LVESV MOD A2C was 70 5 ml  LVESV MOD A4C was 130 4 ml  LVESV MOD BP was 98 4 ml  LVIDd was 5 7 cm  LVIDs was 5 1 cm  LVLd A2C was 10 4 cm  LVLd A4C was 10 7 cm  LVLs A2C was 9 8 cm  LVLs A4C was 9 3 cm  LVOT Diam was 2 3 cm  LVPWd was 0 9 cm  RAAs A4C was 16 4 cm2  RAESV A-L was 46 8 ml  RAESV MOD was 45 3 ml  RALs was 4 8 cm  RVIDd was 4 3 cm  RWT was 0 3   SV MOD A2C was 58 6 ml   SV MOD A4C was 90 6 ml   SV(Teich) was 36 8 ml   CW measurements:  Unspecified Anatomy:   AR Dec [place] was 3 7 m/s2  AR Dec Time was 1139 3 ms  AR PHT was 330 4 ms  AR Vmax was 4 2 m/s  AR maxPG was 70 3 mmHg  AV Env  Ti was 319 7 ms   AV VTI was 24 2 cm  AV Vmax was 1 1 m/s  AV Vmean was 0 8 m/s  AV maxPG was 4 7 mmHg  AV meanPG was 2 7 mmHg  MR VTI was 201 3 cm  MR Vmax was 5 3 m/s  TR Vmax was 2 5 m/s   TR maxPG was 25 7 mmHg    MM measurements:  Unspecified Anatomy:   TAPSE was 2 5 cm  PW measurements:  Unspecified Anatomy:   DELIA (VTI) was 2 7 cm2  DELIA Vmax was 2 7 cm2  DVI was 0 7   LVOT Env  Ti was 314 ms  LVOT VTI was 16 6 cm  LVOT Vmax was 0 7 m/s  LVOT Vmean was 0 5 m/s  LVOT maxPG was 2 2 mmHg  LVOT meanPG was 1 3 mmHg  LVSV  Dopp was 66 5 ml   MV A Kyle was 0 8 m/s  MV E Kyle was 0 6 m/s  MV E/A Ratio was 0 7   RV S' was 0 1 m/s  HISTORY: PRIOR HISTORY: Congestive heart failure  Cardiomyopathy  Risk factors: hypertension and medication-treated hypercholesterolemia  PRIOR PROCEDURES: Prior stent  PROCEDURE: The procedure was performed at the bedside  This was a routine study  The transthoracic approach was used  The study included complete 2D imaging, M-mode, complete spectral Doppler, and color Doppler  The heart rate was 79 bpm,  at the start of the study  Image quality was adequate  LEFT VENTRICLE: Mildly dilated left ventricular cavity, LVIDd 5 7 centimeters, normal wall thickness, moderately reduced left ventricular systolic function  Global left ventricular hypokinesis with some regional wall motion variability  There is marked hypokinesis of the inferior and inferolateral walls  Ejection fraction is estimated as around 38 percent  Diastolic dysfunction is likely present but was not adequately assessed  RIGHT VENTRICLE: Mildly dilated right ventricular cavity, normal right ventricular systolic function  Estimated right ventricular systolic pressure is close to upper limit of normal, 35 mmHg  LEFT ATRIUM: Mild left atrial cavity enlargement  Intact interatrial septum  RIGHT ATRIUM: Normal right atrial cavity size  MITRAL VALVE: Mild mitral valve leaflet thickening and sclerosis, moderate mitral valve regurgitation  AORTIC VALVE: Tricuspid aortic valve with mild sclerosis, adequate cuspal separation  Mild aortic valve regurgitation  TRICUSPID VALVE: Mild tricuspid valve regurgitation      PULMONIC VALVE: No pulmonic valve regurgitation  PERICARDIUM: Trace to small circumferential pericardial effusion  There is no echocardiographic evidence of cardiac tamponade  AORTA: Aortic root and proximal ascending aorta are normal in size on 2D imaging  SYSTEMIC VEINS: IVC: Inferior vena cava is normal in size and demonstrates appropriate respiratory phasic changes in diameter  SYSTEM MEASUREMENT TABLES    2D  %FS: 10 8 %  Ao Diam: 3 2 cm  Ao asc: 3 2 cm  EDV(Teich): 159 ml  EF Biplane: 42 3 %  EF(Teich): 23 2 %  ESV(Teich): 122 2 ml  IVSd: 0 9 cm  LA Diam: 3 3 cm  LAAs A2C: 28 cm2  LAAs A4C: 29 4 cm2  LAESV A-L A2C: 107 3 ml  LAESV A-L A4C: 110 6 ml  LAESV MOD A2C: 99 5 ml  LAESV MOD A4C: 102 3 ml  LAESV(A-L): 112 3 ml  LAESV(MOD BP): 103 8 ml  LALs A2C: 6 2 cm  LALs A4C: 6 6 cm  LVEDV MOD A2C: 129 1 ml  LVEDV MOD A4C: 220 9 ml  LVEDV MOD BP: 170 6 ml  LVEF MOD A2C: 45 4 %  LVEF MOD A4C: 41 %  LVESV MOD A2C: 70 5 ml  LVESV MOD A4C: 130 4 ml  LVESV MOD BP: 98 4 ml  LVIDd: 5 7 cm  LVIDs: 5 1 cm  LVLd A2C: 10 4 cm  LVLd A4C: 10 7 cm  LVLs A2C: 9 8 cm  LVLs A4C: 9 3 cm  LVOT Diam: 2 3 cm  LVPWd: 0 9 cm  RAAs A4C: 16 4 cm2  RAESV A-L: 46 8 ml  RAESV MOD: 45 3 ml  RALs: 4 8 cm  RVIDd: 4 3 cm  RWT: 0 3  SV MOD A2C: 58 6 ml  SV MOD A4C: 90 6 ml  SV(Teich): 36 8 ml    CW  AR Dec [place]: 3 7 m/s2  AR Dec Time: 1139 3 ms  AR PHT: 330 4 ms  AR Vmax: 4 2 m/s  AR maxP 3 mmHg  AV Env  Ti: 319 7 ms  AV VTI: 24 2 cm  AV Vmax: 1 1 m/s  AV Vmean: 0 8 m/s  AV maxP 7 mmHg  AV meanP 7 mmHg  MR VTI: 201 3 cm  MR Vmax: 5 3 m/s  TR Vmax: 2 5 m/s  TR maxP 7 mmHg    MM  TAPSE: 2 5 cm    PW  DELIA (VTI): 2 7 cm2  DELIA Vmax: 2 7 cm2  DVI: 0 7  LVOT Env  Ti: 314 ms  LVOT VTI: 16 6 cm  LVOT Vmax: 0 7 m/s  LVOT Vmean: 0 5 m/s  LVOT maxP 2 mmHg  LVOT meanP 3 mmHg  LVSV Dopp: 66 5 ml  MV A Kyle: 0 8 m/s  MV E Kyle: 0 6 m/s  MV E/A Ratio: 0 7  RV S': 0 1 m/s    [facility] Accredited Echocardiography [facility]    Prepared and electronically signed by    Shu Rod MD  Signed 12-Jul-2021 17:20:13      ======================================================     Imaging:   I have personally reviewed pertinent reports

## 2021-09-02 NOTE — PROGRESS NOTES
[address] [facility]  Progress Note - Bobby Mena 1965, 49 y o  female MRN: [id number]  Unit/Bed#: E4 -01 Encounter: [id number]  Primary Care Provider: Nasreen Mccoy PA-C   Date and time admitted to hospital: 8/28/2021  1:03 PM    NS[facility]onsust[facility] tachycardia) ([facility] )  Assessment & Plan  · Patient felt of left bundle-branch block rather than nonsustained ventricular tachycardia  · Will need to resume beta-blocker at discharge, likely at a lower dose    Hypokalemia  Assessment & Plan  Resolved with repletion  Recent Labs     08/31/21  0419 09/01/21  0457 09/02/21  0453   K 3 0* 4 1 4 2         Hypocalcemia  Assessment & Plan  Repletion by the nephrology service  Given IV calcium gluconate x1 today  Start oral calcium supplementation  Recent Labs     08/31/21  0419 09/01/21  0457 09/02/21  0453   CALCIUM 6 1* 5 9* 5 6*         Chronic systolic (congestive) heart failure (HCC)  Assessment & Plan  Wt Readings from Last 3 Encounters:   09/02/21 65 kg (143 lb 4 8 oz)   08/12/21 59 kg (130 lb)   07/12/21 70 2 kg (154 lb 12 2 oz)     Results from last 7 days   Lab Units 08/28/21  1418   NT-PRO BNP pg/mL 7,919*     · Currently compensated  Given profound renal dysfunction holding furosemide and lisinopril  · Updated echocardiogram with ejection fraction of 50%, markedly improved from previous EF of 38 % from July    CAD (coronary artery disease)  Assessment & Plan  · CAD status post recent stent July 2021  · Continue DA PT atorvastatin  · Holding metoprolol due to hypotension    Anemia  Assessment & Plan  Monitor blood counts and transfuse for hemoglobin less than 7    Postoperative hypothyroidism  Assessment & Plan  · Continue levothyroxine        * MARINA (acute kidney injury) ([facility] )  Assessment & Plan  · MARINA secondary to diuresis, hypotension, and ACE-inhibitor use  With significant uremia on admission which has improved  Chest x-ray performed today with no evidence of volume overload  Started on isotonic fluid at 100 mL  Check BMP in a m  Results from last 7 days   Lab Units 09/02/21  0453 09/01/21  0457 08/31/21  0419 08/30/21  0550 08/29/21  0554 08/28/21  1418   BUN mg/dL 41* 55* 72* 85* 102* 111*   CREATININE mg/dL 2 51* 2 71* 3 52* 4 60* 6 14* 7 06*   EGFR ml/min/1 73sq m 21 19 14 10 7 6         [facility] Internal Medicine Progress Note  Patient: Lea Milian 49 y o  female   MRN: [id number]  PCP: Denisse Vicente PA-C  Unit/Bed#: E4 -01 Encounter: [id number]  Date Of Visit: 09/02/21    Assessment:    Principal Problem:    AMRINA (acute kidney injury) ([facility] )  Active Problems:    Postoperative hypothyroidism    Anemia    CAD (coronary artery disease)    Chronic systolic (congestive) heart failure ([facility])    Hypocalcemia    Hypokalemia    NSVT (nonsustained ventricular tachycardia) ([facility])      Plan:    · Continue IV fluid  · Replete calcium  · Check BMP in a m  VTE Pharmacologic Prophylaxis:   Pharmacologic: Heparin  Mechanical VTE Prophylaxis in Place: Yes    Patient Centered Rounds: I have performed bedside rounds with nursing staff today  Discussions with Specialists or Other Care Team Provider:      Education and Discussions with Family / Patient:  Patient is updating family    Time Spent for Care: 45 minutes  More than 50% of total time spent on counseling and coordination of care as described above  Current Length of Stay: 5 day(s)    Current Patient Status: Inpatient   Certification Statement: The patient will continue to require additional inpatient hospital stay due to Treatment of acute kidney    Discharge Plan / Estimated Discharge Date:  Potentially tomorrow    Code Status: Level 1 - Full Code      Subjective:   Seen and examined, no chest pain shortness of breath difficulty breathing    No abdominal pain no nausea vomiting or diarrhea    A complete and comprehensive 14 point organ system review has been performed and all other systems are negative other than stated above  Objective:     Vitals:   Temp (24hrs), Av 3 °F (36 8 °C), Min:98 1 °F (36 7 °C), Max:98 5 °F (36 9 °C)    Temp:  [98 1 °F (36 7 °C)-98 5 °F (36 9 °C)] 98 4 °F (36 9 °C)  HR:  [80-90] 84  Resp:  [18] 18  BP: (117-147)/(64-82) 147/82  SpO2:  [95 %-97 %] 95 %  Body mass index is 23 85 kg/m²  Input and Output Summary (last 24 hours):        Intake/Output Summary (Last 24 hours) at 2021 1537  Last data filed at 2021 1100  Gross per 24 hour   Intake 3286 67 ml   Output --   Net 3286 67 ml       Physical Exam:     General: well appearing, no acute distress  HEENT: atraumatic, PERRLA, moist mucosa, normal pharynx, normal tonsils and adenoids, normal tongue, no fluid in sinuses  Neck: Trachea midline, no carotid bruit, no masses  Respiratory: normal chest wall expansion, CTA B, no r/r/w, no rubs  Cardiovascular: RRR, no m/r/g, Normal S1 and S2  Abdomen: Soft, non-tender, non-distended, normal bowel sounds in all quadrants, no hepatosplenomegaly, no tympany  Rectal: deferred  Musculoskeletal: normal ROM in upper and lower extremities  Integumentary: warm, dry, and pink, with no rash, purpura, or petechia  Heme/Lymph: no lymphadenopathy, no bruises  Neurological: Cranial Nerves II-XII grossly intact, no tics, normal sensation to pressure and light touch  Psychiatric: cooperative with normal mood, affect, and cognition      Additional Data:     Labs:    Results from last 7 days   Lab Units 21  0457   WBC Thousand/uL 11 10*   HEMOGLOBIN g/dL 8 9*   HEMATOCRIT % 27 1*   PLATELETS Thousands/uL 194   NEUTROS PCT % 88*   LYMPHS PCT % 7*   MONOS PCT % 3*   EOS PCT % 1     Results from last 7 days   Lab Units 21  0453 21  0550   POTASSIUM mmol/L 4 2 3 5   CHLORIDE mmol/L 106 101   CO2 mmol/L 24 15*   BUN mg/dL 41* 85*   CREATININE mg/dL 2 51* 4 60*   CALCIUM mg/dL 5 6* 6 5*   ALK PHOS U/L  --  95   ALT U/L  --  60   AST U/L  --  84*           * I Have Reviewed All Lab Data Listed Above  * Additional Pertinent Lab Tests Reviewed: Carlos Eduardo 68 Admission Reviewed    Imaging:    Imaging Reports Reviewed Today Include:  No new imaging  Imaging Personally Reviewed by Myself Includes:  No new imaging    Recent Cultures (last 7 days):           Last 24 Hours Medication List:   Current Facility-Administered Medications   Medication Dose Route Frequency Provider Last Rate    acetaminophen  650 mg Oral Q4H PRN Sunita Em MD      aspirin  81 mg Oral Daily Sunita Em MD      atorvastatin  80 mg Oral After Nely Byrd MD      benzonatate  100 mg Oral TID PRN Nancy Malin, DO      calcium carbonate  1 tablet Oral TID With Meals Chaz Cardenas, DO      doxylamine  25 mg Oral HS PRN Jaqueline Lua PA-C      ferrous gluconate  324 mg Oral BID AC Sunita Em MD      guaiFENesin  600 mg Oral Q12H [facility] Nancy Malin DO      heparin (porcine)  5,000 Units Subcutaneous Q8H [facility] Chaz Barrera,       levothyroxine  100 mcg Oral Early Morning Sunita Em MD      melatonin  6 mg Oral HS LearDELORES Townsend      metoprolol succinate  25 mg Oral BID Rell De Guzman MD      multi-electrolyte  100 mL/hr Intravenous Continuous Ana María Afshan  mL/hr (09/02/21 1032)    pneumococcal 23-valent polysaccharide vaccine  0 5 mL Subcutaneous Prior to discharge Sunita Em MD      ticagrelor  90 mg Oral BID Sunita Em MD          Today, Patient Was Seen By: Dontae Ortiz DO    ** Please Note: This note was completed in part utilizing [email or web address] Direct Software  Grammatical errors, random word insertions, spelling mistakes, and incomplete sentences may be an occasional consequence of this system secondary to software limitations, ambient noise, and hardware issues  If you have any questions or concerns about the content, text, or information contained within the body of this dictation, please contact the provider for clarification   **

## 2021-09-02 NOTE — PROGRESS NOTES
NEPHROLOGY PROGRESS NOTE   Bobby Mena 49 y o  female MRN: [id number]  Unit/Bed#: E4 -01 Encounter: [id number]      ASSESSMENT/PLAN:  1  Acute kidney injury, POA:  Prerenal versus ATN setting of hypotension, diuretics with recent increased dose and ACE-inhibitor  · Baseline creatinine 1 1-1 5 since June 2020  · Creatinine 7 06 on admission and has been significantly improving with IV fluids down to 2 5 today  · UA:  Moderate blood, moderate leukocytes, trace protein, 1-2 RBCs, 10-20 wbc's, moderate bacteria, fine granular cast  · Check a m  BMP  2  Hyponatremia:  Suspect prerenal   Resolved with IV fluid  3  Hypokalemia:  Resolved with replacement  4  Anion gap metabolic acidosis:  Now resolved and status post bicarb  5  Hypocalcemia:  Trending down  Corrects to 7 2 today and ionized calcium 0 77  She is asymptomatic  · Vitamin-D and PTH pending  · Status post calcium gluconate 1 g IV today and started on calcium carbonate 1 tablet t i d   6  Anemia:  Hemoglobin stable at 8 9  on oral iron   · Ferritin 550, iron saturation 12%  7  History of CHF with ejection fraction 50% and grade 1 diastolic dysfunction (based on 8/30/21 echo):  Okay to continue fluids per Cardiology    Plan Summary:    Continue isolyte at 100cc/h   Check am BMP     SUBJECTIVE:  Feeling well today    No chest pain, shortness of breath, nausea, vomiting or diarrhea    OBJECTIVE:  Current Weight: Weight - Scale: 65 kg (143 lb 4 8 oz)  Vitals:    09/02/21 0738   BP: 147/70   Pulse: 80   Resp: 18   Temp: 98 1 °F (36 7 °C)   SpO2: 96%       Intake/Output Summary (Last 24 hours) at 9/2/2021 1307  Last data filed at 9/2/2021 1032  Gross per 24 hour   Intake 3046 67 ml   Output --   Net 3046 67 ml       General:  No acute distress  Skin:  No rash  Eyes:  Sclerae anicteric  ENT:  Moist mucous membranes  Neck:  Trachea midline with no JVD  Chest:  Clear to auscultation bilaterally  CVS:  Regular rate and rhythm  Abdomen:  Soft, nontender, nondistended  Extremities:  No edema  Neuro:  Awake and alert  Psych:  Appropriate affect      Medications:  Scheduled Meds:  Current Facility-Administered Medications   Medication Dose Route Frequency Provider Last Rate    acetaminophen  650 mg Oral Q4H PRN Spencer Smart MD      aspirin  81 mg Oral Daily Spencer Smart MD      atorvastatin  80 mg Oral After Carmela Sinha MD      benzonatate  100 mg Oral TID PRN Crystal Blackmon DO      calcium carbonate  1 tablet Oral TID With Meals Chaz Hollis DO      doxylamine  25 mg Oral HS PRN Jaqueline Lua PA-C      ferrous gluconate  324 mg Oral BID AC Spencer Smart MD      guaiFENesin  600 mg Oral Q12H [facility] Crystal Blackmon DO      heparin (porcine)  5,000 Units Subcutaneous [facility] Crystal Blackmon DO      levothyroxine  100 mcg Oral Early Morning Spencer Smart MD      melatonin  6 mg Oral HS DELORES Del Cid      metoprolol succinate  25 mg Oral BID Rashida Osuna MD      multi-electrolyte  100 mL/hr Intravenous Continuous Inessa Osuna  mL/hr (09/02/21 1032)    pneumococcal 23-valent polysaccharide vaccine  0 5 mL Subcutaneous Prior to discharge Spencer Smart MD      ticagrelor  90 mg Oral BID Spencer Smart MD         PRN Meds:   acetaminophen    benzonatate    doxylamine    pneumococcal 23-valent polysaccharide vaccine    Continuous Infusions:multi-electrolyte, 100 mL/hr, Last Rate: 100 mL/hr (09/02/21 1032)        Laboratory Results:  Results from last 7 days   Lab Units 09/02/21  0453 09/01/21  0457 08/31/21  0421 08/31/21  0419 08/30/21  0550 08/29/21  0554 08/28/21  1418   WBC Thousand/uL  --  11 10* 9 74  --  10 52* 9 97 10 76*   HEMOGLOBIN g/dL  --  8 9* 8 4*  --  9 0* 8 9* 10 3*   HEMATOCRIT %  --  27 1* 25 9*  --  27 5* 27 1* 32 0*   PLATELETS Thousands/uL  --  194 161  --  146* 119* 121*   SODIUM mmol/L 142 139  --  134* 133* 132* 132*   POTASSIUM mmol/L 4 2 4 1  --  3 0* 3 5 3 9 3 1*   CHLORIDE mmol/L 106 104  --  99* 101 98* 92*   CO2 mmol/L 24 26  --  25 15* 17* 20*   BUN mg/dL 41* 55*  --  72* 85* 102* 111*   CREATININE mg/dL 2 51* 2 71*  --  3 52* 4 60* 6 14* 7 06*   CALCIUM mg/dL 5 6* 5 9*  --  6 1* 6 5* 6 7* 7 3*

## 2021-09-02 NOTE — ASSESSMENT & PLAN NOTE
Repletion by the nephrology service  Given IV calcium gluconate x1 today  Start oral calcium supplementation  Recent Labs     08/31/21  0419 09/01/21  0457 09/02/21  0453   CALCIUM 6 1* 5 9* 5 6*

## 2021-09-02 NOTE — PLAN OF CARE
Problem: PAIN - ADULT  Goal: Verbalizes/displays adequate comfort level or baseline comfort level  Description: Interventions:  - Encourage patient to monitor pain and request assistance  - Assess pain using appropriate pain scale  - Administer analgesics based on type and severity of pain and evaluate response  - Implement non-pharmacological measures as appropriate and evaluate response  - Consider cultural and social influences on pain and pain management  - Notify physician/advanced practitioner if interventions unsuccessful or patient reports new pain  Outcome: Progressing     Problem: SAFETY ADULT  Goal: Patient will remain free of falls  Description: INTERVENTIONS:  - Educate patient/family on patient safety including physical limitations  - Instruct patient to call for assistance with activity   - Consult OT/PT to assist with strengthening/mobility   - Keep Call bell within reach  - Keep bed low and locked with side rails adjusted as appropriate  - Keep care items and personal belongings within reach  - Initiate and maintain comfort rounds  - Make Fall Risk Sign visible to staff  - Apply yellow socks and bracelet for high fall risk patients  - Consider moving patient to room near nurses station  Outcome: Progressing  Goal: Maintain or return to baseline ADL function  Description: INTERVENTIONS:  -  Assess patient's ability to carry out ADLs; assess patient's baseline for ADL function and identify physical deficits which impact ability to perform ADLs (bathing, care of mouth/teeth, toileting, grooming, dressing, etc )  - Assess/evaluate cause of self-care deficits   - Assess range of motion  - Assess patient's mobility; develop plan if impaired  - Assess patient's need for assistive devices and provide as appropriate  - Encourage maximum independence but intervene and supervise when necessary  - Involve family in performance of ADLs  - Assess for home care needs following discharge   - Consider OT consult to assist with ADL evaluation and planning for discharge  - Provide patient education as appropriate  Outcome: Progressing  Goal: Maintains/Returns to pre admission functional level  Description: INTERVENTIONS:  - Perform BMAT or MOVE assessment daily    - Set and communicate daily mobility goal to care team and patient/family/caregiver  - Collaborate with rehabilitation services on mobility goals if consulted  - Out of bed to chair 3 times a day   - Out of bed for meals   - Out of bed for toileting  - Record patient progress and toleration of activity level   Outcome: Progressing     Problem: DISCHARGE PLANNING  Goal: Discharge to home or other facility with appropriate resources  Description: INTERVENTIONS:  - Identify barriers to discharge w/patient and caregiver  - Arrange for needed discharge resources and transportation as appropriate  - Identify discharge learning needs (meds, wound care, etc )  - Refer to Case Management Department for coordinating discharge planning if the patient needs post-hospital services based on physician/advanced practitioner order or complex needs related to functional status, cognitive ability, or social support system  Outcome: Progressing     Problem: Knowledge Deficit  Goal: Patient/family/caregiver demonstrates understanding of disease process, treatment plan, medications, and discharge instructions  Description: Complete learning assessment and assess knowledge base    Interventions:  - Provide teaching at level of understanding  - Provide teaching via preferred learning methods  Outcome: Progressing     Problem: Potential for Falls  Goal: Patient will remain free of falls  Description: INTERVENTIONS:  - Educate patient/family on patient safety including physical limitations  - Instruct patient to call for assistance with activity   - Consult OT/PT to assist with strengthening/mobility   - Keep Call bell within reach  - Keep bed low and locked with side rails adjusted as appropriate  - Keep care items and personal belongings within reach  - Initiate and maintain comfort rounds  - Make Fall Risk Sign visible to staff  - Apply yellow socks and bracelet for high fall risk patients  - Consider moving patient to room near nurses station  Outcome: Progressing     Problem: METABOLIC, FLUID AND ELECTROLYTES - ADULT  Goal: Electrolytes maintained within normal limits  Description: INTERVENTIONS:  - Monitor labs and assess patient for signs and symptoms of electrolyte imbalances  - Administer electrolyte replacement as ordered  - Monitor response to electrolyte replacements, including repeat lab results as appropriate  - Instruct patient on fluid and nutrition as appropriate  Outcome: Progressing

## 2021-09-02 NOTE — ASSESSMENT & PLAN NOTE
· MARINA secondary to diuresis, hypotension, and ACE-inhibitor use  With significant uremia on admission which has improved  Chest x-ray performed today with no evidence of volume overload  Started on isotonic fluid at 100 mL  Check BMP in a m    Results from last 7 days   Lab Units 09/02/21  0453 09/01/21  0457 08/31/21  0419 08/30/21  0550 08/29/21  0554 08/28/21  1418   BUN mg/dL 41* 55* 72* 85* 102* 111*   CREATININE mg/dL 2 51* 2 71* 3 52* 4 60* 6 14* 7 06*   EGFR ml/min/1 73sq m 21 19 14 10 7 6

## 2021-09-02 NOTE — UTILIZATION REVIEW
Continued Stay Review    Date: 9/2                          Current Patient Class: Inpatient Current Level of Care: Med surg    HPI:56 y o  female initially admitted on 8/28    Assessment/Plan:   Hypocalcemia - Given Iv calcium gluconate x 1 today  Start oral calcium supplementation  MARINA - Creat continues to trend down  Continue IVFs  Results BMP in am    Currently compensated  Given profound renal dysfunction holding furosemide and lisinopril      Vital Signs:   09/02/21 1520  98 4 °F (36 9 °C)  84  18  147/82  108  95 %  None (Room air)  Lying   09/02/21 0738  98 1 °F (36 7 °C)  80  18  147/70  101  96 %  None (Room air)       Pertinent Labs/Diagnostic Results:       Results from last 7 days   Lab Units 09/01/21  0457 08/31/21  0421 08/30/21  0550 08/29/21  0554 08/28/21  1418   WBC Thousand/uL 11 10* 9 74 10 52* 9 97 10 76*   HEMOGLOBIN g/dL 8 9* 8 4* 9 0* 8 9* 10 3*   HEMATOCRIT % 27 1* 25 9* 27 5* 27 1* 32 0*   PLATELETS Thousands/uL 194 161 146* 119* 121*   NEUTROS ABS Thousands/µL 9 83* 8 43*  --  8 24* 9 21*         Results from last 7 days   Lab Units 09/02/21  0453 09/01/21  0457 08/31/21  0419 08/30/21  0550 08/29/21  0554   SODIUM mmol/L 142 139 134* 133* 132*   POTASSIUM mmol/L 4 2 4 1 3 0* 3 5 3 9   CHLORIDE mmol/L 106 104 99* 101 98*   CO2 mmol/L 24 26 25 15* 17*   ANION GAP mmol/L 12 9 10 17* 17*   BUN mg/dL 41* 55* 72* 85* 102*   CREATININE mg/dL 2 51* 2 71* 3 52* 4 60* 6 14*   EGFR ml/min/1 73sq m 21 19 14 10 7   CALCIUM mg/dL 5 6* 5 9* 6 1* 6 5* 6 7*   CALCIUM, IONIZED mmol/L 0 77*  --   --   --   --      Results from last 7 days   Lab Units 08/30/21  0550 08/28/21  1418   AST U/L 84* 120*   ALT U/L 60 83*   ALK PHOS U/L 95 117*   TOTAL PROTEIN g/dL 5 8* 7 2   ALBUMIN g/dL 2 0* 2 7*   TOTAL BILIRUBIN mg/dL 0 47 0 31         Results from last 7 days   Lab Units 09/02/21  0453 09/01/21  0457 08/31/21  0419 08/30/21  0550 08/29/21  0554 08/28/21  1418   GLUCOSE RANDOM mg/dL 87 88 123 92 92 120 Results from last 7 days   Lab Units 08/31/21  0419   OSMOLALITY, SERUM mmol/*       Results from last 7 days   Lab Units 08/28/21  1418   TROPONIN I ng/mL 0 17*             Results from last 7 days   Lab Units 08/30/21  0550   TSH 3RD GENERATON uIU/mL 2 215       Results from last 7 days   Lab Units 08/28/21  1418   NT-PRO BNP pg/mL 7,919*     Results from last 7 days   Lab Units 09/01/21  0457   FERRITIN ng/mL 550*       Results from last 7 days   Lab Units 08/30/21  1045 08/28/21  1713   CLARITY UA   --  Cloudy   COLOR UA   --  Yellow   SPEC GRAV UA   --  1 020   PH UA   --  5 5   GLUCOSE UA mg/dl  --  Negative   KETONES UA mg/dl  --  Negative   BLOOD UA   --  Moderate*   PROTEIN UA mg/dl  --  Trace*   NITRITE UA   --  Negative   BILIRUBIN UA   --  Negative   UROBILINOGEN UA E U /dl  --  0 2   LEUKOCYTES UA   --  Moderate*   WBC UA /hpf  --  10-20*   RBC UA /hpf  --  1-2*   BACTERIA UA /hpf  --  Moderate*   EPITHELIAL CELLS WET PREP /hpf  --  Occasional   SODIUM UR  24  --      Medications:   Scheduled Medications:  aspirin, 81 mg, Oral, Daily  atorvastatin, 80 mg, Oral, After Dinner  calcium carbonate, 1 tablet, Oral, TID With Meals  ferrous gluconate, 324 mg, Oral, BID AC  guaiFENesin, 600 mg, Oral, Q12H FERNANDO  heparin (porcine), 5,000 Units, Subcutaneous, Q8H FERNANDO  levothyroxine, 100 mcg, Oral, Early Morning  melatonin, 6 mg, Oral, HS  metoprolol succinate, 25 mg, Oral, BID  ticagrelor, 90 mg, Oral, BID      Continuous IV Infusions:  multi-electrolyte, 100 mL/hr, Intravenous, Continuous      PRN Meds:  acetaminophen, 650 mg, Oral, Q4H PRN  benzonatate, 100 mg, Oral, TID PRN  doxylamine, 25 mg, Oral, HS PRN  pneumococcal 23-valent polysaccharide vaccine, 0 5 mL, Subcutaneous, Prior to discharge        Discharge Plan: TBD    Network Utilization Review Department  ATTENTION: Please call with any questions or concerns to [phone] and carefully listen to the prompts so that you are directed to the right person  All voicemails are confidential   Jesica Ahoskie all requests for admission clinical reviews, approved or denied determinations and any other requests to dedicated fax number below belonging to the campus where the patient is receiving treatment   List of dedicated fax numbers for the Facilities:  [address] DENIALS (Administrative/Medical Necessity) [phone]   [address] (Maternity/NICU/Pediatrics) [phone]   [address] [place] [address] [facility] [place] [id number] [place] [address] [address] [facility] [facility] [phone]

## 2021-09-03 LAB
ANION GAP SERPL CALCULATED.3IONS-SCNC: 13 MMOL/L (ref 4–13)
BUN SERPL-MCNC: 35 MG/DL (ref 5–25)
CALCIUM SERPL-MCNC: 6.1 MG/DL (ref 8.3–10.1)
CHLORIDE SERPL-SCNC: 106 MMOL/L (ref 100–108)
CO2 SERPL-SCNC: 24 MMOL/L (ref 21–32)
CREAT SERPL-MCNC: 2.37 MG/DL (ref 0.6–1.3)
GFR SERPL CREATININE-BSD FRML MDRD: 23 ML/MIN/1.73SQ M
GLUCOSE SERPL-MCNC: 92 MG/DL (ref 65–140)
POTASSIUM SERPL-SCNC: 3.7 MMOL/L (ref 3.5–5.3)
SODIUM SERPL-SCNC: 143 MMOL/L (ref 136–145)

## 2021-09-03 PROCEDURE — 99232 SBSQ HOSP IP/OBS MODERATE 35: CPT

## 2021-09-03 PROCEDURE — 99232 SBSQ HOSP IP/OBS MODERATE 35: CPT | Performed by: INTERNAL MEDICINE

## 2021-09-03 PROCEDURE — 99233 SBSQ HOSP IP/OBS HIGH 50: CPT | Performed by: INTERNAL MEDICINE

## 2021-09-03 PROCEDURE — 80048 BASIC METABOLIC PNL TOTAL CA: CPT | Performed by: INTERNAL MEDICINE

## 2021-09-03 RX ORDER — HEPARIN SODIUM 5000 [USP'U]/ML
5000 INJECTION, SOLUTION INTRAVENOUS; SUBCUTANEOUS EVERY 8 HOURS SCHEDULED
Status: DISCONTINUED | OUTPATIENT
Start: 2021-09-03 | End: 2021-09-04 | Stop reason: HOSPADM

## 2021-09-03 RX ORDER — CALCIUM GLUCONATE 20 MG/ML
1 INJECTION, SOLUTION INTRAVENOUS ONCE
Status: COMPLETED | OUTPATIENT
Start: 2021-09-03 | End: 2021-09-04

## 2021-09-03 RX ORDER — POTASSIUM CHLORIDE 750 MG/1
20 TABLET, EXTENDED RELEASE ORAL ONCE
Status: COMPLETED | OUTPATIENT
Start: 2021-09-03 | End: 2021-09-03

## 2021-09-03 RX ADMIN — METOPROLOL SUCCINATE 25 MG: 25 TABLET, EXTENDED RELEASE ORAL at 21:06

## 2021-09-03 RX ADMIN — LEVOTHYROXINE SODIUM 100 MCG: 100 TABLET ORAL at 05:46

## 2021-09-03 RX ADMIN — CALCIUM GLUCONATE 1 G: 20 INJECTION, SOLUTION INTRAVENOUS at 09:32

## 2021-09-03 RX ADMIN — CALCIUM 1 TABLET: 500 TABLET ORAL at 12:40

## 2021-09-03 RX ADMIN — HEPARIN SODIUM 5000 UNITS: 5000 INJECTION INTRAVENOUS; SUBCUTANEOUS at 05:46

## 2021-09-03 RX ADMIN — CALCIUM 1 TABLET: 500 TABLET ORAL at 17:11

## 2021-09-03 RX ADMIN — FERROUS GLUCONATE 324 MG: 324 TABLET ORAL at 17:10

## 2021-09-03 RX ADMIN — POTASSIUM CHLORIDE 20 MEQ: 750 TABLET, EXTENDED RELEASE ORAL at 12:40

## 2021-09-03 RX ADMIN — HEPARIN SODIUM 5000 UNITS: 5000 INJECTION INTRAVENOUS; SUBCUTANEOUS at 21:06

## 2021-09-03 RX ADMIN — TICAGRELOR 90 MG: 90 TABLET ORAL at 09:33

## 2021-09-03 RX ADMIN — FERROUS GLUCONATE 324 MG: 324 TABLET ORAL at 05:46

## 2021-09-03 RX ADMIN — GUAIFENESIN 600 MG: 600 TABLET ORAL at 09:33

## 2021-09-03 RX ADMIN — TICAGRELOR 90 MG: 90 TABLET ORAL at 17:10

## 2021-09-03 RX ADMIN — CALCIUM 1 TABLET: 500 TABLET ORAL at 09:32

## 2021-09-03 RX ADMIN — ATORVASTATIN CALCIUM 80 MG: 80 TABLET, FILM COATED ORAL at 17:10

## 2021-09-03 RX ADMIN — GUAIFENESIN 600 MG: 600 TABLET ORAL at 21:06

## 2021-09-03 RX ADMIN — ASPIRIN 81 MG: 81 TABLET, CHEWABLE ORAL at 09:33

## 2021-09-03 RX ADMIN — METOPROLOL SUCCINATE 25 MG: 25 TABLET, EXTENDED RELEASE ORAL at 09:33

## 2021-09-03 RX ADMIN — SODIUM CHLORIDE, SODIUM GLUCONATE, SODIUM ACETATE, POTASSIUM CHLORIDE, MAGNESIUM CHLORIDE, SODIUM PHOSPHATE, DIBASIC, AND POTASSIUM PHOSPHATE 100 ML/HR: .53; .5; .37; .037; .03; .012; .00082 INJECTION, SOLUTION INTRAVENOUS at 05:46

## 2021-09-03 NOTE — PROGRESS NOTES
Progress Note - Cardiology   Trang Jin 49 y o  female MRN: [id number]  Unit/Bed#: E4 -01 Encounter: [id number]      Assessment/Recommendations/Discussion:   1  Acute renal failure, improving  2  Chronic systolic heart failure, compensated  3  Coronary artery disease with drug-eluting stent to the 1st obtuse marginal and drug-eluting stent to the LAD x3, nonobstructive residual disease  4  History of both ischemic and nonischemic cardiomyopathies  5  Anemia  6  Hypothyroidism  7  History of syncope secondary to orthostasis/volume depletion  8  History of myopericarditis  9  Rate dependent bundle-branch block pattern with very wide QRS when in left bundle pattern 170 milliseconds  10  Severe hypocalcemia, unknown etiology    PLAN   At this time for cardiac standpoint she is stable   She appears euvolemic   Continue with metoprolol succinate 25 mg b i d , aspirin, Brilinta   If blood pressure tolerates, may consider up titrating metoprolol to 37 5 mg b i d   Renal function continues to improve    Subjective:   HPI  Doing well today, denies chest pain or shortness of breath  Review of Systems: As noted in HPI  Rest of ROS is negative  Vitals:   /71 (BP Location: Left arm)   Pulse 84   Temp 98 7 °F (37 1 °C) (Temporal)   Resp 18   Ht 5' 5" (1 651 m)   Wt 66 2 kg (145 lb 15 1 oz)   SpO2 100%   BMI 24 29 kg/m²   Vitals:    09/02/21 0600 09/03/21 0600   Weight: 65 kg (143 lb 4 8 oz) 66 2 kg (145 lb 15 1 oz)       Intake/Output Summary (Last 24 hours) at 9/3/2021 1040  Last data filed at 9/3/2021 0546  Gross per 24 hour   Intake 2305 ml   Output --   Net 2305 ml       Physical Exam   Constitutional: awake, alert and oriented, in no acute distress, no obvious deformities  Head: Normocephalic, without obvious abnormality, atraumatic  Eyes: conjunctivae clear and moist  Sclera anicteric  No xanthelasmas  Pupils equal bilaterally  Extraocular motions are full    Ear nose mouth and throat: ears are symmetrical bilaterally, hearing appears to be equal bilaterally, no nasal discharge or epistaxis, oropharynx is clear with moist mucous membranes  Neck:  Trachea is midline, neck is supple, no thyromegaly or significant lymphadenopathy, there is full range of motion  Lungs: clear to auscultation bilaterally, no wheezes, no rales, no rhonchi, no accessory muscle use, breathing is nonlabored  Heart: regular rate and rhythm, S1, S2 normal, no murmur, no click, no rub and no gallop, no lower extremity edema  Abdomen: soft, non-tender; bowel sounds normal; no masses,  no organomegaly  Psychiatric:  Patient is oriented to time, place, person, mood/affect is negative for depression, anxiety, agitation, appears to have appropriate insight  Skin: Skin is warm, dry, intact  No obvious rashes or lesions on exposed extremities  Nail beds are pink with no cyanosis or clubbing      Lab Results:  Results from last 7 days   Lab Units 09/01/21  0457   WBC Thousand/uL 11 10*   HEMOGLOBIN g/dL 8 9*   HEMATOCRIT % 27 1*   PLATELETS Thousands/uL 194     Results from last 7 days   Lab Units 09/03/21  0517 08/30/21  0550   POTASSIUM mmol/L 3 7 3 5   CHLORIDE mmol/L 106 101   CO2 mmol/L 24 15*   BUN mg/dL 35* 85*   CREATININE mg/dL 2 37* 4 60*   CALCIUM mg/dL 6 1* 6 5*   ALK PHOS U/L  --  95   ALT U/L  --  60   AST U/L  --  84*     Results from last 7 days   Lab Units 09/03/21  0517   POTASSIUM mmol/L 3 7   CHLORIDE mmol/L 106   CO2 mmol/L 24   BUN mg/dL 35*   CREATININE mg/dL 2 37*   CALCIUM mg/dL 6 1*           Medications:    Current Facility-Administered Medications:     acetaminophen (TYLENOL) tablet 650 mg, 650 mg, Oral, Q4H PRN, Ravi Bhatti MD    aspirin chewable tablet 81 mg, 81 mg, Oral, Daily, Ravi Bhatti MD, 81 mg at 09/03/21 0933    atorvastatin (LIPITOR) tablet 80 mg, 80 mg, Oral, After Floyce Curling, MD, 80 mg at 09/02/21 1710    benzonatate (TESSALON PERLES) capsule 100 mg, 100 mg, Oral, TID PRN, Shaunna Keto, DO, 100 mg at 08/31/21 2221    calcium carbonate (OYSTER SHELL,OSCAL) 500 mg tablet 1 tablet, 1 tablet, Oral, TID With Meals, Shaunna Keto, DO, 1 tablet at 09/03/21 0932    doxylamine (UNISON) tablet 25 mg, 25 mg, Oral, HS PRN, Jaqueline Lua PA-C, 25 mg at 09/02/21 2228    ferrous gluconate (FERGON) tablet 324 mg, 324 mg, Oral, BID AC, Mervat Mirza MD, 324 mg at 09/03/21 0546    guaiFENesin (MUCINEX) 12 hr tablet 600 mg, 600 mg, Oral, Q12H [address], Chaz Hollis DO, 600 mg at 09/03/21 0933    heparin (porcine) subcutaneous injection 5,000 Units, 5,000 Units, Subcutaneous, Q8H [address], Chaz Hollis DO, 5,000 Units at 09/03/21 0546    levothyroxine tablet 100 mcg, 100 mcg, Oral, Early Morning, Mervat Mirza MD, 100 mcg at 09/03/21 0546    melatonin tablet 6 mg, 6 mg, Oral, HS, DELORES Benton, 6 mg at 08/30/21 2148    metoprolol succinate (TOPROL-XL) 24 hr tablet 25 mg, 25 mg, Oral, BID, Gil Andrade MD, 25 mg at 09/03/21 0933    multi-electrolyte (PLASMALYTE-A/ISOLYTE-S PH 7 4) IV solution, 100 mL/hr, Intravenous, Continuous, Inessa Rivera, , Last Rate: 100 mL/hr at 09/03/21 0546, 100 mL/hr at 09/03/21 0546    pneumococcal 23-valent polysaccharide vaccine (PNEUMOVAX-23) injection 0 5 mL, 0 5 mL, Subcutaneous, Prior to discharge, eMrvat Mirza MD    [facility]) tablet 90 mg, 90 mg, Oral, BID, Mervat Mirza MD, 90 mg at 09/03/21 3180    This note was completed in part utilizing [employer] Flu[employer] Direct Software  Grammatical errors, random word insertions, spelling mistakes, and incomplete sentences may be an occasional consequence of this system secondary to software limitations, ambient noise, and hardware issues  If you have any questions or concerns about the content, text, or information contained within the body of this dictation, please contact the provider for clarification      Saúl Reaves DO, [facility]  9/3/2021 10:40 AM

## 2021-09-03 NOTE — ASSESSMENT & PLAN NOTE
Wt Readings from Last 3 Encounters:   09/03/21 66 2 kg (145 lb 15 1 oz)   08/12/21 59 kg (130 lb)   07/12/21 70 2 kg (154 lb 12 2 oz)     Results from last 7 days   Lab Units 08/28/21  1418   NT-PRO BNP pg/mL 7,919*     · Currently compensated  Given profound renal dysfunction holding furosemide and lisinopril    · Updated echocardiogram with ejection fraction of 50%, markedly improved from previous EF of 38 % from July

## 2021-09-03 NOTE — PROGRESS NOTES
Follow up Consultation    Nephrology   Kimberoliver Yoav 49 y o  female MRN: [id number]  Unit/Bed#: E4 -01 Encounter: [id number]      Physician Requesting Consult: Laron Barney DO        ASSESSMENT/PLAN:  72-year-old female with multiple comorbidities including CHF, CAD, hypothyroidism and CKD stage 3 admitted for weakness and fatigability  Nephrology following for acute kidney injury evaluation and management   Acute kidney injury (POA) on CKD stage IIIA/B:  - MARINA most likely secondary to prerenal azotemia plus ischemic injury from hypotension and failure to auto regulate presence of Ace inhibitors plus increased susceptibility acute kidney injury due to recent episode of acute kidney injury on 07/11/2021 non dialysis requiring   - After review of records [facility] as well as Care everywhere it appears that the patient has a baseline Creatinine of 1 1-1 5 mg/dL  - patient was admitted with a creatinine of 7 06 mg/dL on 08/20/2021   - patient's creatinine today is at 2 37 mg/dL, stable  - creatinine improving with IV fluids  Will DC IV fluids of Plasmalyte at 100 cc an hour at midnight tonight   - check BMP in a m   - renal ultrasound from 08/20/2029 showing bilateral kidneys right 9 7 cm 11 cm left kidney no hydronephrosis  - Await renal recovery  - Optimize hemodynamic status to avoid delay in renal recovery    - Place on a renal diet when allowed diet order    - Avoid nephrotoxins, adjust meds to appropriate GFR   - Strict I/O   - Daily weights  - Urinary retention protocol if patient does not have a Ibrahim  - Most likely has underlying CKD secondary to age-related nephron loss plus cardiorenal syndrome  - will need to set up patient for follow up with Nephrology as an outpatient post hospitalization   - for nephrology as an outpatient patient follows up with no nephrologist   - if renal parameters continued to be stable and continue to improve as well as electrolytes stable then okay for discharge from renal standpoint when next 24-48 hours and medically cleared   Blood pressure/hypertension:  - current medications:  Toprol-XL 25 mg p o  B i d   - recommendations:  No changes for now  - Optimize hemodynamics   - Maintain MAP > 65mmHg  - Avoid BP fluctuations   H/H/anemia:  - most recent hemoglobin at 8 9 grams/deciliter  - maintain hemoglobin greater than 8 grams/deciliter  - most recent iron studies showing iron 18% T sat 12% ferritin 550  - on p o  Ferrous sulfate     Acid-base electrolytes:  o Hyponatremia:    - Most recent sodium at 143 mEq  - Stable and resolved    o Hypokalemia:  Most recent potassium 3 7 mEq  Will give K-Dur 20 mg p o  X1 today  Check CMP in a m   -   o Hypocalcemia:   - Most recent serum calcium 6 1  - Corrected calcium around 7 7, stable and improving if continues to be low consider addition of calcitriol   - Check magnesium and phosphorus to see if they may need supplementation to help with calcium correction   - Most recent PTH suppressed at 9 6  - Check CMP with next lab draw  - On calcium carbonate 500 mg t i d   - Check vitamin-D level patient may benefit from being on ergocalciferol  - If calcium continues to be low despite appropriate vitamin-D levels then consider workup to rule out underlying immunological disorder affecting PTH  o  Acid-base:    - Most recent bicarb at 24     Volume status:  o  Clinically euvolemic  Will DC IV fluids at midnight today   Proteinuria:   o Most recent UA with trace protein moderate blood  Will repeat when stable     Other medical problems:  o Hypothyroidism:  Management per primary team   On levothyroxine  o CAD:  Management per primary team   On Brilinta      Thanks for the consult  Will continue to follow  Please call with questions/ concerns    Above-mentioned orders and Plan in terms of acute kidney injury was discussed with the team in [address] Misael Zarate MD, FASN, 9/3/2021, 12:19 PM              Objective :   Patient seen and examined in her room no overnight events hemodynamically stable remains afebrile happy to hear renal parameters continue to improve  Remains on IV fluids  Urine output not adequately documented  PHYSICAL EXAM  /71 (BP Location: Left arm)   Pulse 84   Temp 98 7 °F (37 1 °C) (Temporal)   Resp 18   Ht 5' 5" (1 651 m)   Wt 66 2 kg (145 lb 15 1 oz)   SpO2 100%   BMI 24 29 kg/m²   Temp (24hrs), Av 2 °F (36 8 °C), Min:97 4 °F (36 3 °C), Max:98 7 °F (37 1 °C)        Intake/Output Summary (Last 24 hours) at 9/3/2021 1219  Last data filed at 9/3/2021 0546  Gross per 24 hour   Intake 2065 ml   Output --   Net 2065 ml       I/O last 24 hours: In: 3259 [P O :700; I V :2845]  Out: -       Current Weight: Weight - Scale: 66 2 kg (145 lb 15 1 oz)  First Weight: Weight - Scale: 58 8 kg (129 lb 10 1 oz)  Physical Exam  Vitals and nursing note reviewed  Constitutional:       General: She is not in acute distress  Appearance: Normal appearance  She is normal weight  She is not ill-appearing, toxic-appearing or diaphoretic  HENT:      Head: Normocephalic and atraumatic  Mouth/Throat:      Mouth: Mucous membranes are moist       Pharynx: Oropharynx is clear  No oropharyngeal exudate  Eyes:      General: No scleral icterus  Conjunctiva/sclera: Conjunctivae normal    Cardiovascular:      Rate and Rhythm: Normal rate  Heart sounds: Normal heart sounds  No friction rub  Pulmonary:      Effort: Pulmonary effort is normal  No respiratory distress  Breath sounds: Normal breath sounds  No wheezing  Abdominal:      General: There is no distension  Palpations: Abdomen is soft  There is no mass  Tenderness: There is no abdominal tenderness  Musculoskeletal:         General: No swelling  Cervical back: Normal range of motion and neck supple  Skin:     General: Skin is warm  Coloration: Skin is not jaundiced     Neurological:      General: No focal deficit present  Mental Status: She is alert and oriented to person, place, and time  Psychiatric:         Mood and Affect: Mood normal          Behavior: Behavior normal              Review of Systems   Constitutional: Negative for chills, fatigue and fever  HENT: Negative for congestion  Respiratory: Negative for cough, shortness of breath and wheezing  Cardiovascular: Negative for leg swelling  Gastrointestinal: Negative for abdominal pain, constipation, diarrhea, nausea and vomiting  Genitourinary: Negative for dysuria  Musculoskeletal: Negative for back pain  Skin: Negative for rash  Neurological: Negative for dizziness and headaches  Psychiatric/Behavioral: Negative for agitation and confusion  All other systems reviewed and are negative        Scheduled Meds:  Current Facility-Administered Medications   Medication Dose Route Frequency Provider Last Rate    acetaminophen  650 mg Oral Q4H PRN Lonnie Moser MD      aspirin  81 mg Oral Daily Lonnie Moser MD      atorvastatin  80 mg Oral After Yanni Cano MD      benzonatate  100 mg Oral TID PRN Dora Radha, DO      calcium carbonate  1 tablet Oral TID With Meals Chaz Hollis DO      doxylamine  25 mg Oral HS PRN Jaqueline Lua PA-C      ferrous gluconate  324 mg Oral BID AC Lonnie Moser MD      guaiFENesin  600 mg Oral Q12H [address] Dora Radha, DO      heparin (porcine)  5,000 Units Subcutaneous Q8H [address] Chaz Sterling DO      levothyroxine  100 mcg Oral Early Morning Lonnie Moser MD      melatonin  6 mg Oral HS DELORES Ross      metoprolol succinate  25 mg Oral BID Aurelio Guidry MD      multi-electrolyte  100 mL/hr Intravenous Continuous Lauro Madsen  mL/hr (09/03/21 0546)    pneumococcal 23-valent polysaccharide vaccine  0 5 mL Subcutaneous Prior to discharge Lonnie Moser MD      ticagrelor  90 mg Oral BID Lonnie Moser MD         PRN Meds:   acetaminophen    benzonatate    doxylamine   pneumococcal 23-valent polysaccharide vaccine    Continuous Infusions:multi-electrolyte, 100 mL/hr, Last Rate: 100 mL/hr (09/03/21 0546)          Invasive Devices: Invasive Devices     Peripheral Intravenous Line            Peripheral IV 08/30/21 Right;Ventral (anterior) Forearm 4 days                  LABORATORY:    Results from last 7 days   Lab Units 09/03/21  0517 09/02/21  0453 09/01/21  0457 08/31/21  0421 08/31/21  0419 08/30/21  0550 08/29/21  0554 08/28/21  1418   WBC Thousand/uL  --   --  11 10* 9 74  --  10 52* 9 97 10 76*   HEMOGLOBIN g/dL  --   --  8 9* 8 4*  --  9 0* 8 9* 10 3*   HEMATOCRIT %  --   --  27 1* 25 9*  --  27 5* 27 1* 32 0*   PLATELETS Thousands/uL  --   --  194 161  --  146* 119* 121*   POTASSIUM mmol/L 3 7 4 2 4 1  --  3 0* 3 5 3 9 3 1*   CHLORIDE mmol/L 106 106 104  --  99* 101 98* 92*   CO2 mmol/L 24 24 26  --  25 15* 17* 20*   BUN mg/dL 35* 41* 55*  --  72* 85* 102* 111*   CREATININE mg/dL 2 37* 2 51* 2 71*  --  3 52* 4 60* 6 14* 7 06*   CALCIUM mg/dL 6 1* 5 6* 5 9*  --  6 1* 6 5* 6 7* 7 3*      rest all reviewed    RADIOLOGY:  XR chest pa & lateral   Final Result by Sara Damon MD (08/31 1646)      No active pulmonary disease  Workstation performed: [id number]         US kidney and bladder   Final Result by Luz Berger MD (08/29 1550)      Normal kidneys and bladder  Incidental cholelithiasis  Workstation performed: [id number]         XR chest 1 view portable   Final Result by Wilbur Martin MD (08/28 1639)      No acute cardiopulmonary disease  Workstation performed: [id number]           Rest all reviewed    Portions of the record may have been created with voice recognition software  Occasional wrong word or "sound a like" substitutions may have occurred due to the inherent limitations of voice recognition software  Read the chart carefully and recognize, using context, where substitutions have occurred  If you have any questions, please contact the dictating provider

## 2021-09-03 NOTE — ED PROVIDER NOTES
History  Chief Complaint   Patient presents with    Shortness of Breath     pt reports SOB that started overnight  reports cardiac stent placement 2 months ago  also reporting dizziness  C/o sob since last night  No cp, no cough, no fevers  She had a recent stent placed by Dr Alla Grullon on 7/15/21  The cardiac cath  At that time Showed:  Native coronary lesions:  ï¾·Proximal LAD: Lesion 1: tubular, 50 % stenosis  ï¾·Mid LAD: Lesion 1: 0 % stenosis, site of prior stent  ï¾·OM1: Lesion 1: 90 % stenosis  ï¾·Mid RCA: Lesion 1: 40 % stenosis  Intervention results  Native coronary lesions:  ï¾·Successful balloon angioplasty and stent of the 90 % stenosis in OM1  Appearance excellent with 0 % residual stenosis  Stent: Ismael Malone Rx 3 0 x 23mm drug-eluting  Prior to Admission Medications   Prescriptions Last Dose Informant Patient Reported?  Taking?   acetaminophen (TYLENOL) 325 mg tablet  Self No No   Sig: Take 2 tablets (650 mg total) by mouth every 4 (four) hours as needed for mild pain, headaches or fever   aspirin 81 mg chewable tablet  Self No No   Sig: Chew 1 tablet (81 mg total) daily   atorvastatin (LIPITOR) 80 mg tablet   No No   Sig: Take 1 tablet (80 mg total) by mouth daily after dinner   ferrous gluconate (FERGON) 324 mg tablet  Self No No   Sig: TAKE 1 TABLET (324 MG TOTAL) BY MOUTH 2 (TWO) TIMES A DAY BEFORE MEALS   furosemide (LASIX) 40 mg tablet   No No   Sig: Take 1 tablet (40 mg total) by mouth daily   levothyroxine 100 mcg tablet   No No   Sig: Take 1 tablet (100 mcg total) by mouth daily in the early morning   lisinopril (ZESTRIL) 5 mg tablet   No No   Sig: Take 1 tablet (5 mg total) by mouth daily   metoprolol succinate (TOPROL-XL) 50 mg 24 hr tablet   No No   Si/2 TABLET BY MOUTH TWICE A DAY   nitroglycerin (NITROSTAT) 0 4 mg SL tablet  Self No No   Sig: Place 1 tablet (0 4 mg total) under the tongue every 5 (five) minutes as needed for chest pain   ticagrelor (BRILINTA) 90 MG   No No   Sig: Take 1 tablet (90 mg total) by mouth 2 (two) times a day      Facility-Administered Medications: None       Past Medical History:   Diagnosis Date    Allergies     CHF (congestive heart failure) (HCC)     Coronary artery disease     Disease of thyroid gland     hypo    Renal disorder        Past Surgical History:   Procedure Laterality Date    CARDIAC CATHETERIZATION       SECTION      x2    FRACTURE SURGERY      DE OPEN TX RADIAL & ULNAR SHAFT FX FIX RADIUS AND ULNA Left 6/3/2020    Procedure: Open reduction internal fixation left distal radius fracture;  Surgeon: Solomon Shah MD;  Location: [facility] MAIN OR;  Service: Orthopedics    TOTAL THYROIDECTOMY      TUBAL LIGATION         Family History   Problem Relation Age of Onset    No Known Problems Mother     No Known Problems Father     Cancer Family      I have reviewed and agree with the history as documented  E-Cigarette/Vaping    E-Cigarette Use Never User      E-Cigarette/Vaping Substances    Nicotine No     THC No     CBD No     Flavoring No      Social History     Tobacco Use    Smoking status: Never Smoker    Smokeless tobacco: Never Used   Vaping Use    Vaping Use: Never used   Substance Use Topics    Alcohol use: Yes     Comment: only on special ocassions   Drug use: Never       Review of Systems   Constitutional: Negative for appetite change, fatigue and fever  HENT: Negative for rhinorrhea and sore throat  Respiratory: Positive for shortness of breath  Negative for cough and wheezing  Cardiovascular: Negative for chest pain and leg swelling  Gastrointestinal: Negative for abdominal pain, diarrhea and vomiting  Genitourinary: Negative for dysuria and flank pain  Musculoskeletal: Negative for back pain and neck pain  Skin: Negative for rash  Neurological: Negative for syncope and headaches     Psychiatric/Behavioral:        Mood normal       Physical Exam  Physical Exam  Vitals and nursing note reviewed  Constitutional:       Appearance: She is well-developed  HENT:      Head: Normocephalic and atraumatic  Cardiovascular:      Rate and Rhythm: Normal rate and regular rhythm  Pulmonary:      Effort: Pulmonary effort is normal       Breath sounds: Normal breath sounds  Abdominal:      Palpations: Abdomen is soft  Tenderness: There is no abdominal tenderness  Musculoskeletal:         General: Normal range of motion  Cervical back: Normal range of motion and neck supple  Skin:     General: Skin is warm and dry  Neurological:      Mental Status: She is alert and oriented to person, place, and time           Vital Signs  ED Triage Vitals   Temperature Pulse Respirations Blood Pressure SpO2   08/28/21 1251 08/28/21 1251 08/28/21 1251 08/28/21 1251 08/28/21 1251   99 °F (37 2 °C) (!) 109 19 90/56 99 %      Temp Source Heart Rate Source Patient Position - Orthostatic VS BP Location FiO2 (%)   08/28/21 1251 08/28/21 1251 08/28/21 1251 08/28/21 1251 --   Oral Monitor Sitting Right arm       Pain Score       08/28/21 1744       No Pain           Vitals:    09/01/21 2354 09/02/21 0738 09/02/21 1520 09/02/21 2116   BP: 117/64 147/70 147/82 138/79   Pulse: 90 80 84 86   Patient Position - Orthostatic VS: Lying Lying Lying Lying         Visual Acuity      ED Medications  Medications   acetaminophen (TYLENOL) tablet 650 mg (650 mg Oral Refused 8/31/21 2221)   aspirin chewable tablet 81 mg (81 mg Oral Given 9/2/21 0954)   atorvastatin (LIPITOR) tablet 80 mg (80 mg Oral Given 9/2/21 1710)   ferrous gluconate (FERGON) tablet 324 mg (324 mg Oral Given 9/2/21 1711)   levothyroxine tablet 100 mcg (100 mcg Oral Given 9/2/21 0633)   ticagrelor (BRILINTA) tablet 90 mg (90 mg Oral Given 9/2/21 1713)   pneumococcal 23-valent polysaccharide vaccine (PNEUMOVAX-23) injection 0 5 mL (has no administration in time range)   melatonin tablet 6 mg (6 mg Oral Refused 9/2/21 2121)   heparin (porcine) subcutaneous injection 5,000 Units (5,000 Units Subcutaneous Given 9/2/21 2118)   benzonatate (TESSALON PERLES) capsule 100 mg (100 mg Oral Given 8/31/21 2221)   guaiFENesin (MUCINEX) 12 hr tablet 600 mg (600 mg Oral Given 9/2/21 2118)   doxylamine (UNISON) tablet 25 mg (25 mg Oral Given 8/31/21 2335)   multi-electrolyte (PLASMALYTE-A/ISOLYTE-S PH 7 4) IV solution (100 mL/hr Intravenous New Bag 9/2/21 2118)   calcium carbonate (OYSTER SHELL,OSCAL) 500 mg tablet 1 tablet (1 tablet Oral Given 9/2/21 1710)   metoprolol succinate (TOPROL-XL) 24 hr tablet 25 mg (25 mg Oral Given 9/2/21 2118)   potassium chloride (K-DUR,KLOR-CON) CR tablet 40 mEq (40 mEq Oral Given 8/28/21 1628)   calcium gluconate 1 g in sodium chloride 0 9% 50 mL (premix) (0 g Intravenous Stopped 8/30/21 0945)   potassium chloride (K-DUR,KLOR-CON) CR tablet 40 mEq (40 mEq Oral Given 8/31/21 1011)   calcium gluconate 1 g in sodium chloride 0 9% 50 mL (premix) (0 g Intravenous Stopped 9/2/21 1200)       Diagnostic Studies  Results Reviewed     Procedure Component Value Units Date/Time    Urinalysis with microscopic [[id number]]  (Abnormal) Collected: 08/28/21 1713    Lab Status: Final result Specimen: Urine, Clean Catch Updated: 08/28/21 1736     Clarity, UA Cloudy     Color, UA Yellow     Specific Gravity, UA 1 020     pH, UA 5 5     Glucose, UA Negative mg/dl      Ketones, UA Negative mg/dl      Blood, UA Moderate     Protein, UA Trace mg/dl      Nitrite, UA Negative     Bilirubin, UA Negative     Urobilinogen, UA 0 2 E U /dl      Leukocytes, UA Moderate     WBC, UA 10-20 /hpf      RBC, UA 1-2 /hpf      Bacteria, UA Moderate /hpf      Fine granular casts 3-4 /lpf      Epithelial Cells Occasional /hpf     NT-BNP PRO [[id number]]  (Abnormal) Collected: 08/28/21 1418    Lab Status: Final result Specimen: Blood from Arm, Left Updated: 08/28/21 1448     NT-proBNP 7,919 pg/mL     Troponin I [[id number]]  (Abnormal) Collected: 08/28/21 1418    Lab Status: Final result Specimen: Blood from Arm, Left Updated: 08/28/21 1443     Troponin I 0 17 ng/mL     Comprehensive metabolic panel [[id number]]  (Abnormal) Collected: 08/28/21 1418    Lab Status: Final result Specimen: Blood from Arm, Left Updated: 08/28/21 1439     Sodium 132 mmol/L      Potassium 3 1 mmol/L      Chloride 92 mmol/L      CO2 20 mmol/L      ANION GAP 20 mmol/L       mg/dL      Creatinine 7 06 mg/dL      Glucose 120 mg/dL      Calcium 7 3 mg/dL      Corrected Calcium 8 3 mg/dL       U/L      ALT 83 U/L      Alkaline Phosphatase 117 U/L      Total Protein 7 2 g/dL      Albumin 2 7 g/dL      Total Bilirubin 0 31 mg/dL      eGFR 6 ml/min/1 73sq m     Narrative:      National Kidney Disease Foundation guidelines for Chronic Kidney Disease (CKD):     Stage 1 with normal or high GFR (GFR > 90 mL/min/1 73 square meters)    Stage 2 Mild CKD (GFR = 60-89 mL/min/1 73 square meters)    Stage 3A Moderate CKD (GFR = 45-59 mL/min/1 73 square meters)    Stage 3B Moderate CKD (GFR = 30-44 mL/min/1 73 square meters)    Stage 4 Severe CKD (GFR = 15-29 mL/min/1 73 square meters)    Stage 5 End Stage CKD (GFR <15 mL/min/1 73 square meters)  Note: GFR calculation is accurate only with a steady state creatinine    CBC and differential [[id number]]  (Abnormal) Collected: 08/28/21 1418    Lab Status: Final result Specimen: Blood from Arm, Left Updated: 08/28/21 1429     WBC 10 76 Thousand/uL      RBC 3 92 Million/uL      Hemoglobin 10 3 g/dL      Hematocrit 32 0 %      MCV 82 fL      MCH 26 3 pg      MCHC 32 2 g/dL      RDW 17 5 %      MPV 13 1 fL      Platelets 742 Thousands/uL      nRBC 0 /100 WBCs      Neutrophils Relative 86 %      Immat GRANS % 1 %      Lymphocytes Relative 3 %      Monocytes Relative 9 %      Eosinophils Relative 0 %      Basophils Relative 1 %      Neutrophils Absolute 9 21 Thousands/µL      Immature Grans Absolute 0 13 Thousand/uL      Lymphocytes Absolute 0 35 Thousands/µL      Monocytes Absolute 0 99 Thousand/µL      Eosinophils Absolute 0 03 Thousand/µL      Basophils Absolute 0 05 Thousands/µL                  XR chest pa & lateral   Final Result by Sae Robin MD (08/31 1646)      No active pulmonary disease  Workstation performed: [id number]         US kidney and bladder   Final Result by Shahana Simmons MD (08/29 1550)      Normal kidneys and bladder  Incidental cholelithiasis  Workstation performed: [id number]         XR chest 1 view portable   Final Result by Saleem Niño MD (08/28 1639)      No acute cardiopulmonary disease  Workstation performed: [id number]                    Procedures  Procedures         ED Course                                           MDM  Number of Diagnoses or Management Options     Amount and/or Complexity of Data Reviewed  Clinical lab tests: ordered and reviewed  Tests in the radiology section of CPT®: ordered and reviewed    Risk of Complications, Morbidity, and/or Mortality  Presenting problems: moderate  General comments: Pt   Admitted for further workup/mangagement        Disposition  Final diagnoses:   [facility]   Elevated troponin   Generalized weakness     Time reflects when diagnosis was documented in both MDM as applicable and the Disposition within this note     Time User Action Codes Description Comment    8/28/2021  3:27 PM Janelle Holt R Add [N17 9] Acute renal failure (ARF) ([facility] )     8/28/2021  3:33 PM Denise Gallagher Add [R77 8] Elevated troponin     8/28/2021  3:33 PM Janelle Holt Kahlil R Add [R53 1] Generalized weakness     8/28/2021  6:24 PM Rosa Beauchamp Add [N17 9] MARINA (acute kidney injury) ([facility] )     8/28/2021  6:24 PM Temo Alfonso [N17 9] MARINA (acute kidney injury) ([facility] )     8/29/2021  5:20 PM Antonio Terrell Add [I47 2] NSVT (nonsustained ventricular tachycardia) [facility])       ED Disposition     ED Disposition Condition Date/Time Comment    Admit Stable Sat Aug 28, 2021  3:27 PM Case was discussed with HUMBEROT and the patient's admission status was agreed to be inpt  tele      Follow-up Information    None         Current Discharge Medication List      CONTINUE these medications which have NOT CHANGED    Details   acetaminophen (TYLENOL) 325 mg tablet Take 2 tablets (650 mg total) by mouth every 4 (four) hours as needed for mild pain, headaches or fever  Qty: 30 tablet, Refills: 0    Associated Diagnoses: Acute congestive heart failure, unspecified heart failure type (HCC)      aspirin 81 mg chewable tablet Chew 1 tablet (81 mg total) daily  Qty: 30 tablet, Refills: 0    Associated Diagnoses: Ischemic cardiomyopathy      atorvastatin (LIPITOR) 80 mg tablet Take 1 tablet (80 mg total) by mouth daily after dinner  Qty: 90 tablet, Refills: 1    Associated Diagnoses: Chest pain      ferrous gluconate (FERGON) 324 mg tablet TAKE 1 TABLET (324 MG TOTAL) BY MOUTH 2 (TWO) TIMES A DAY BEFORE MEALS  Qty: 60 tablet, Refills: 0    Associated Diagnoses: Anemia      furosemide (LASIX) 40 mg tablet Take 1 tablet (40 mg total) by mouth daily  Qty: 90 tablet, Refills: 1    Associated Diagnoses: Acute systolic congestive heart failure (HCC)      levothyroxine 100 mcg tablet Take 1 tablet (100 mcg total) by mouth daily in the early morning  Qty: 30 tablet, Refills: 0    Associated Diagnoses: Postoperative hypothyroidism      lisinopril (ZESTRIL) 5 mg tablet Take 1 tablet (5 mg total) by mouth daily  Qty: 90 tablet, Refills: 1    Associated Diagnoses: Acute systolic congestive heart failure (HCC)      metoprolol succinate (TOPROL-XL) 50 mg 24 hr tablet 1/2 TABLET BY MOUTH TWICE A DAY  Qty: 90 tablet, Refills: 0    Associated Diagnoses: Acute systolic congestive heart failure (HCC)      nitroglycerin (NITROSTAT) 0 4 mg SL tablet Place 1 tablet (0 4 mg total) under the tongue every 5 (five) minutes as needed for chest pain  Qty: 30 tablet, Refills: 0    Associated Diagnoses: Ischemic cardiomyopathy      ticagrelor (BRILINTA) 90 MG Take 1 tablet (90 mg total) by mouth 2 (two) times a day  Qty: 180 tablet, Refills: 2    Associated Diagnoses: Chest pain           No discharge procedures on file      PDMP Review       Value Time User    PDMP Reviewed  Yes 6/27/2020  8:57 PM Hal Pollack PA-C          ED Provider  Electronically Signed by           Tejal Viera MD  09/02/21 0367

## 2021-09-03 NOTE — ASSESSMENT & PLAN NOTE
Resolved with repletion  Recent Labs     09/01/21  0457 09/02/21  0453 09/03/21  0517   K 4 1 4 2 3 7

## 2021-09-03 NOTE — ASSESSMENT & PLAN NOTE
· MARNIA secondary to diuresis, hypotension, and ACE-inhibitor use  With significant uremia on admission which has improved  Continue intravenous fluid per the recommendations of the Nephrology Service  Renal function continues to improve slowly  Check BMP in a m    Results from last 7 days   Lab Units 09/03/21  0517 09/02/21  0453 09/01/21  0457 08/31/21  0419 08/30/21  0550 08/29/21  0554 08/28/21  1418   BUN mg/dL 35* 41* 55* 72* 85* 102* 111*   CREATININE mg/dL 2 37* 2 51* 2 71* 3 52* 4 60* 6 14* 7 06*   EGFR ml/min/1 73sq m 23 21 19 14 10 7 6

## 2021-09-03 NOTE — PROGRESS NOTES
[address] [facility]  Progress Note - Von Snow 1965, 49 y o  female MRN: [id number]  Unit/Bed#: E4 -01 Encounter: [id number]  Primary Care Provider: Mike Quintanilla PA-C   Date and time admitted to hospital: 8/28/2021  1:03 PM    NSVT (nonsustained ventricular tachycardia) ([facility] )  Assessment & Plan  · Patient felt of left bundle-branch block rather than nonsustained ventricular tachycardia  · Will need to resume beta-blocker at discharge, likely at a lower dose    Hypokalemia  Assessment & Plan  Resolved with repletion  Recent Labs     09/01/21  0457 09/02/21  0453 09/03/21  0517   K 4 1 4 2 3 7         Hypocalcemia  Assessment & Plan  Repletion by the nephrology service  Given IV calcium gluconate x1 today  Start oral calcium supplementation  Recent Labs     09/01/21  0457 09/02/21  0453 09/03/21  0517   CALCIUM 5 9* 5 6* 6 1*         Chronic systolic (congestive) heart failure (HCC)  Assessment & Plan  Wt Readings from Last 3 Encounters:   09/03/21 66 2 kg (145 lb 15 1 oz)   08/12/21 59 kg (130 lb)   07/12/21 70 2 kg (154 lb 12 2 oz)     Results from last 7 days   Lab Units 08/28/21  1418   NT-PRO BNP pg/mL 7,919*     · Currently compensated  Given profound renal dysfunction holding furosemide and lisinopril  · Updated echocardiogram with ejection fraction of 50%, markedly improved from previous EF of 38 % from July    CAD (coronary artery disease)  Assessment & Plan  · CAD status post recent stent July 2021  · Continue DAPT,  atorvastatin  · Holding metoprolol due to hypotension    Anemia  Assessment & Plan  Monitor blood counts and transfuse for hemoglobin less than 7    Postoperative hypothyroidism  Assessment & Plan  · Continue levothyroxine        * MARINA (acute kidney injury) ([facility] )  Assessment & Plan  · MARINA secondary to diuresis, hypotension, and ACE-inhibitor use  With significant uremia on admission which has improved  Continue intravenous fluid per the recommendations of the Nephrology Service  Renal function continues to improve slowly  Check BMP in a m  Results from last 7 days   Lab Units 09/03/21  0517 09/02/21  0453 09/01/21  0457 08/31/21  0419 08/30/21  0550 08/29/21  0554 08/28/21  1418   BUN mg/dL 35* 41* 55* 72* 85* 102* 111*   CREATININE mg/dL 2 37* 2 51* 2 71* 3 52* 4 60* 6 14* 7 06*   EGFR ml/min/1 73sq m 23 21 19 14 10 7 6         [facility] Internal Medicine Progress Note  Patient: Emmie Diaz 49 y o  female   MRN: [id number]  PCP: Fatemeh Gray PA-C  Unit/Bed#: E4 -01 Encounter: [id number]  Date Of Visit: 09/03/21    Assessment:    Principal Problem:    MARINA (acute kidney injury) ([facility] )  Active Problems:    Postoperative hypothyroidism    Anemia    CAD (coronary artery disease)    Chronic systolic (congestive) heart failure (HCC)    Hypocalcemia    Hypokalemia    NSVT (nonsustained ventricular tachycardia) ([facility])      Plan:    · Continue IV fluid  · Repeat labs in a m  · Discharge planning       VTE Pharmacologic Prophylaxis:   Pharmacologic: Heparin  Mechanical VTE Prophylaxis in Place: Yes    Patient Centered Rounds: I have performed bedside rounds with nursing staff today  Discussions with Specialists or Other Care Team Provider:  Discussed with case management/Nephrology    Education and Discussions with Family / Patient:  Patient declines family update    Time Spent for Care: 45 minutes  More than 50% of total time spent on counseling and coordination of care as described above      Current Length of Stay: 6 day(s)    Current Patient Status: Inpatient   Certification Statement: The patient will continue to require additional inpatient hospital stay due to Acute kidney injury    Discharge Plan / Estimated Discharge Date:  Tomorrow    Code Status: Level 1 - Full Code      Subjective:   Seen and examined, no acute complaints  No shortness of breath, feels well    A complete and comprehensive 14 point organ system review has been performed and all other systems are negative other than stated above  Objective:     Vitals:   Temp (24hrs), Av 4 °F (36 9 °C), Min:97 4 °F (36 3 °C), Max:99 °F (37 2 °C)    Temp:  [97 4 °F (36 3 °C)-99 °F (37 2 °C)] 99 °F (37 2 °C)  HR:  [82-86] 85  Resp:  [16-18] 18  BP: (133-153)/(71-79) 153/77  SpO2:  [95 %-100 %] 95 %  Body mass index is 24 29 kg/m²  Input and Output Summary (last 24 hours):        Intake/Output Summary (Last 24 hours) at 9/3/2021 1749  Last data filed at 9/3/2021 0546  Gross per 24 hour   Intake 2065 ml   Output --   Net 2065 ml       Physical Exam:     General: well appearing, no acute distress  HEENT: atraumatic, PERRLA, moist mucosa, normal pharynx, normal tonsils and adenoids, normal tongue, no fluid in sinuses  Neck: Trachea midline, no carotid bruit, no masses  Respiratory: normal chest wall expansion, CTA B, no r/r/w, no rubs  Cardiovascular: RRR, no m/r/g, Normal S1 and S2  Abdomen: Soft, non-tender, non-distended, normal bowel sounds in all quadrants, no hepatosplenomegaly, no tympany  Rectal: deferred  Musculoskeletal: normal ROM in upper and lower extremities  Integumentary: warm, dry, and pink, with no rash, purpura, or petechia  Heme/Lymph: no lymphadenopathy, no bruises  Neurological: Cranial Nerves II-XII grossly intact, no tics, normal sensation to pressure and light touch  Psychiatric: cooperative with normal mood, affect, and cognition      Additional Data:     Labs:    Results from last 7 days   Lab Units 21  0457   WBC Thousand/uL 11 10*   HEMOGLOBIN g/dL 8 9*   HEMATOCRIT % 27 1*   PLATELETS Thousands/uL 194   NEUTROS PCT % 88*   LYMPHS PCT % 7*   MONOS PCT % 3*   EOS PCT % 1     Results from last 7 days   Lab Units 21  0517 21  0550   POTASSIUM mmol/L 3 7 3 5   CHLORIDE mmol/L 106 101   CO2 mmol/L 24 15*   BUN mg/dL 35* 85*   CREATININE mg/dL 2 37* 4 60*   CALCIUM mg/dL 6 1* 6 5*   ALK PHOS U/L  --  95   ALT U/L  --  60   AST U/L  --  84* * I Have Reviewed All Lab Data Listed Above  * Additional Pertinent Lab Tests Reviewed: Carlos Eduardo 68 Admission Reviewed    Imaging:    Imaging Reports Reviewed Today Include:  No new imaging  Imaging Personally Reviewed by Myself Includes:  No new imaging    Recent Cultures (last 7 days):           Last 24 Hours Medication List:   Current Facility-Administered Medications   Medication Dose Route Frequency Provider Last Rate    acetaminophen  650 mg Oral Q4H PRN Lei Moore MD      aspirin  81 mg Oral Daily Lei Moore MD      atorvastatin  80 mg Oral After Artur Clock, MD      benzonatate  100 mg Oral TID PRN Edyta Doom, DO      calcium carbonate  1 tablet Oral TID With Meals Chaz Omie Nail, DO      doxylamine  25 mg Oral HS PRN Jaqueline Lua PA-C      ferrous gluconate  324 mg Oral BID AC Lei Moore MD      guaiFENesin  600 mg Oral Q12H [address] Edyta Doom, DO      levothyroxine  100 mcg Oral Early Morning Lei Moore MD      melatonin  6 mg Oral HS DELORES Riley      metoprolol succinate  25 mg Oral BID Antonio Willett MD      multi-electrolyte  100 mL/hr Intravenous Continuous Jillian Patel  mL/hr (09/03/21 0546)    pneumococcal 23-valent polysaccharide vaccine  0 5 mL Subcutaneous Prior to discharge Lei Moore MD      ticagrelor  90 mg Oral BID Lei Moroe MD          Today, Patient Was Seen By: Bill Salgado DO    ** Please Note: This note was completed in part utilizing [employer] Software  Grammatical errors, random word insertions, spelling mistakes, and incomplete sentences may be an occasional consequence of this system secondary to software limitations, ambient noise, and hardware issues  If you have any questions or concerns about the content, text, or information contained within the body of this dictation, please contact the provider for clarification   **

## 2021-09-03 NOTE — ASSESSMENT & PLAN NOTE
Repletion by the nephrology service  Given IV calcium gluconate x1 today  Start oral calcium supplementation  Recent Labs     09/01/21  0457 09/02/21  0453 09/03/21  0517   CALCIUM 5 9* 5 6* 6 1*

## 2021-09-03 NOTE — ASSESSMENT & PLAN NOTE
· CAD status post recent stent July 2021  · Continue DAPT,  atorvastatin    · Holding metoprolol due to hypotension

## 2021-09-04 VITALS
DIASTOLIC BLOOD PRESSURE: 71 MMHG | RESPIRATION RATE: 18 BRPM | BODY MASS INDEX: 24.79 KG/M2 | HEART RATE: 75 BPM | WEIGHT: 148.8 LBS | TEMPERATURE: 98.5 F | OXYGEN SATURATION: 93 % | HEIGHT: 65 IN | SYSTOLIC BLOOD PRESSURE: 150 MMHG

## 2021-09-04 LAB
25(OH)D3 SERPL-MCNC: 63.9 NG/ML (ref 30–100)
ALBUMIN SERPL BCP-MCNC: 1.8 G/DL (ref 3.5–5)
ALP SERPL-CCNC: 97 U/L (ref 46–116)
ALT SERPL W P-5'-P-CCNC: 31 U/L (ref 12–78)
ANION GAP SERPL CALCULATED.3IONS-SCNC: 15 MMOL/L (ref 4–13)
AST SERPL W P-5'-P-CCNC: 48 U/L (ref 5–45)
BILIRUB SERPL-MCNC: 0.47 MG/DL (ref 0.2–1)
BUN SERPL-MCNC: 29 MG/DL (ref 5–25)
CALCIUM ALBUM COR SERPL-MCNC: 8.1 MG/DL (ref 8.3–10.1)
CALCIUM SERPL-MCNC: 6.3 MG/DL (ref 8.3–10.1)
CHLORIDE SERPL-SCNC: 106 MMOL/L (ref 100–108)
CO2 SERPL-SCNC: 23 MMOL/L (ref 21–32)
CREAT SERPL-MCNC: 2.19 MG/DL (ref 0.6–1.3)
GFR SERPL CREATININE-BSD FRML MDRD: 25 ML/MIN/1.73SQ M
GLUCOSE SERPL-MCNC: 89 MG/DL (ref 65–140)
MAGNESIUM SERPL-MCNC: 2 MG/DL (ref 1.6–2.6)
PHOSPHATE SERPL-MCNC: 3.5 MG/DL (ref 2.7–4.5)
POTASSIUM SERPL-SCNC: 4.3 MMOL/L (ref 3.5–5.3)
PROT SERPL-MCNC: 5.9 G/DL (ref 6.4–8.2)
SODIUM SERPL-SCNC: 144 MMOL/L (ref 136–145)

## 2021-09-04 PROCEDURE — 90732 PPSV23 VACC 2 YRS+ SUBQ/IM: CPT | Performed by: INTERNAL MEDICINE

## 2021-09-04 PROCEDURE — 82306 VITAMIN D 25 HYDROXY: CPT | Performed by: INTERNAL MEDICINE

## 2021-09-04 PROCEDURE — 80053 COMPREHEN METABOLIC PANEL: CPT | Performed by: INTERNAL MEDICINE

## 2021-09-04 PROCEDURE — 90471 IMMUNIZATION ADMIN: CPT | Performed by: INTERNAL MEDICINE

## 2021-09-04 PROCEDURE — 99239 HOSP IP/OBS DSCHRG MGMT >30: CPT | Performed by: INTERNAL MEDICINE

## 2021-09-04 PROCEDURE — 84100 ASSAY OF PHOSPHORUS: CPT | Performed by: INTERNAL MEDICINE

## 2021-09-04 PROCEDURE — 83735 ASSAY OF MAGNESIUM: CPT | Performed by: INTERNAL MEDICINE

## 2021-09-04 PROCEDURE — 99232 SBSQ HOSP IP/OBS MODERATE 35: CPT | Performed by: INTERNAL MEDICINE

## 2021-09-04 RX ADMIN — ASPIRIN 81 MG: 81 TABLET, CHEWABLE ORAL at 08:51

## 2021-09-04 RX ADMIN — METOPROLOL SUCCINATE 25 MG: 25 TABLET, EXTENDED RELEASE ORAL at 08:51

## 2021-09-04 RX ADMIN — PNEUMOCOCCAL VACCINE POLYVALENT 0.5 ML
25; 25; 25; 25; 25; 25; 25; 25; 25; 25; 25; 25; 25; 25; 25; 25; 25; 25; 25; 25; 25; 25; 25 INJECTION, SOLUTION INTRAMUSCULAR; SUBCUTANEOUS at 11:02

## 2021-09-04 RX ADMIN — LEVOTHYROXINE SODIUM 100 MCG: 100 TABLET ORAL at 05:33

## 2021-09-04 RX ADMIN — FERROUS GLUCONATE 324 MG: 324 TABLET ORAL at 05:33

## 2021-09-04 RX ADMIN — CALCIUM 1 TABLET: 500 TABLET ORAL at 08:51

## 2021-09-04 RX ADMIN — GUAIFENESIN 600 MG: 600 TABLET ORAL at 08:51

## 2021-09-04 RX ADMIN — HEPARIN SODIUM 5000 UNITS: 5000 INJECTION INTRAVENOUS; SUBCUTANEOUS at 05:33

## 2021-09-04 RX ADMIN — TICAGRELOR 90 MG: 90 TABLET ORAL at 08:51

## 2021-09-04 NOTE — DISCHARGE INSTR - AVS FIRST PAGE
Dear Neel Adams,     It was our pleasure to care for you here at formerly [facility], [facility]  It is our hope that we were always able to exceed the expected standards for your care during your stay  You were hospitalized due to acute kidney injury  You were cared for on the for floor by Adilson Hanna DO with the [facility] who covers for your primary care physician (PCP), Santos Holman PA-C, while you were hospitalized  If you have any questions or concerns related to this hospitalization, you may contact us at [phone]  For follow up as well as any medication refills, we recommend that you follow up with your primary care physician  A registered nurse will reach out to you by phone within a few days after your discharge to answer any additional questions that you may have after going home  However, at this time we provide for you here, the most important instructions / recommendations at discharge:     · Notable Medication Adjustments -   · Hold diuretics  · Continue with metoprolol succinate 25 mg b i d , aspirin, Brilinta  · If blood pressure tolerates, may consider up titrating metoprolol to 37 5 mg b i d  as an outpatient   · Renal function continues to improve  · Testing Required after Discharge -   · Repeat BMP in one week  · Important follow up information -   · Continue to hydrate adequately  · Other Instructions -   · Will need outpatient follow-up with nephrology as likely underlying chronic kidney disease  · Please review this entire after visit summary as additional general instructions including medication list, appointments, activity, diet, any pertinent wound care, and other additional recommendations from your care team that may be provided for you        Sincerely,     Adilson Hanna DO and Nurse Jazzy Cage

## 2021-09-04 NOTE — ASSESSMENT & PLAN NOTE
· CAD status post recent stent July 2021  · Continue DAPT,  atorvastatin    · Metoprolol resumed at discharge  · Continue to hold Lasix and lisinopril

## 2021-09-04 NOTE — DISCHARGE INSTRUCTIONS
Acute Kidney Injury, Ambulatory Care   GENERAL INFORMATION:   Acute kidney injury  happens when your kidneys suddenly stop working correctly  Normally, the kidneys turn fluid, chemicals, and waste from your blood into urine  In acute kidney injury, your kidneys can no longer do this  In most cases, it is temporary, but it may become a chronic kidney condition  Common symptoms include the following:   · Decreased urination or dark-colored urine    · Swelling in your arms, legs, or feet     · Abdominal or low back pain    · Vomiting, diarrhea, or loss of appetite    · Fatigue     · Skin rash  Seek immediate care for the following symptoms:   · Heart beating faster than normal for you    · Sudden chest pain or trouble breathing    · Seizure  Treatment for acute kidney injury:  Treatment depends upon the cause of your acute kidney injury and how severe it is  Medicines may be given to increase blood flow to your kidneys and protect your kidneys  You may also need medicine to decrease inflammation in your kidneys  You may be given IV fluids to replenish fluids and help your heart pump blood  Dialysis may be needed to remove chemicals and waste from your blood when your kidneys cannot  Manage acute kidney injury:   · Manage other health conditions  Care for your diabetes, high blood pressure, or heart disease  These conditions increase your risk for acute kidney injury  · Talk to your healthcare provider before you take over-the-counter-medicine  NSAIDs, stomach medicine, or laxatives may harm your kidneys and increase your risk for acute kidney injury  Follow up with your healthcare provider as directed:  Write down your questions so you remember to ask them during your visits  CARE AGREEMENT:   You have the right to help plan your care  Learn about your health condition and how it may be treated  Discuss treatment options with your caregivers to decide what care you want to receive   You always have the right to refuse treatment  The above information is an  only  It is not intended as medical advice for individual conditions or treatments  Talk to your doctor, nurse or pharmacist before following any medical regimen to see if it is safe and effective for you  © 2014 [address] is for End User's use only and may not be sold, redistributed or otherwise used for commercial purposes  All illustrations and images included in CareNotes® are the copyrighted property of A D A M , Inc  or Fidel Andujar

## 2021-09-04 NOTE — DISCHARGE SUMMARY
[address] [facility]  Discharge- Rema Garcia 1965, 49 y o  female MRN: [id number]  Unit/Bed#: E4 -01 Encounter: [id number]  Primary Care Provider: Laura Nolan PA-C   Date and time admitted to hospital: 8/28/2021  1:03 PM    Admitting Provider:  Sunita Em MD  Discharge Provider:  Dontae Ortiz DO  Admission Date: 8/28/2021       Discharge Date: 09/04/21   LOS: 7  Primary Care Physician at Discharge: Hernesto Driscoll [place] [address] COURSE:  Rema Garcia is a 49 y o  female who presented with generalized weakness and easy fatigability  She had history of myocardial infarction underwent cardiac catheterization demonstrating 90% stenosis of 1st obtuse marginal branch to the circumflex artery  At that time she had an ejection fraction of 30% was appropriately discharged on diuretic therapy as well as beta-blocker therapy  She had been undergoing cardiac rehab and had been doing well  Did have a recent increase of her furosemide from 20 mg 3 times a week to 40 mg daily  The patient was found on admission to have acute kidney injury  She was evaluated in consultation by the cardiology and Nephrology Service  The patient's diuretic and angiotensin blockers were, it was felt that her symptoms were likely result of improved cardiac function as her echocardiogram demonstrated ejection fraction of greater than 50%  Her diuretics were subsequently held, and she remained euvolemic without any additional need  She was resumed back on her medications and her renal function was managed by the nephrology service  At the time of discharge patient was tolerating oral diet she was without acute complaint and medically cleared for discharge      All questions were answered to the patient's satisfaction and she was in agreement with discharge plan      DISCHARGE DIAGNOSES  NSVT (nonsustained ventricular tachycardia) ([facility] [address] )  Assessment & Plan  · Patient felt of left bundle-branch block rather than nonsustained ventricular tachycardia  · Will need to resume beta-blocker    Hypokalemia  Assessment & Plan  Resolved with repletion  Recent Labs     09/02/21  0453 09/03/21  0517 09/04/21  0553   K 4 2 3 7 4 3         Hypocalcemia  Assessment & Plan    Start oral calcium supplementation  Outpatient follow-up  Repeat BMP in 1 week  Recent Labs     09/02/21  0453 09/03/21  0517 09/04/21  0553   CALCIUM 5 6* 6 1* 6 3*         Chronic systolic (congestive) heart failure (HCC)  Assessment & Plan  Wt Readings from Last 3 Encounters:   09/04/21 67 5 kg (148 lb 12 8 oz)   08/12/21 59 kg (130 lb)   07/12/21 70 2 kg (154 lb 12 2 oz)     Results from last 7 days   Lab Units 08/28/21  1418   NT-PRO BNP pg/mL 7,919*     · Currently compensated  Given profound renal dysfunction holding furosemide and lisinopril  · Updated echocardiogram with ejection fraction of 50%, markedly improved from previous EF of 38 % from July    CAD (coronary artery disease)  Assessment & Plan  · CAD status post recent stent July 2021  · Continue DAPT,  atorvastatin  · Metoprolol resumed at discharge  · Continue to hold Lasix and lisinopril    Anemia  Assessment & Plan  Monitor blood counts and transfuse for hemoglobin less than 7    Postoperative hypothyroidism  Assessment & Plan  · Continue levothyroxine        * MARINA (acute kidney injury) ([facility] [address] )  Assessment & Plan  · MARINA secondary to diuresis, hypotension, and ACE-inhibitor use  With significant uremia on admission which has improved  Cleared for discharge home  Results from last 7 days   Lab Units 09/04/21  0553 09/03/21  0517 09/02/21  0453 09/01/21  0457 08/31/21  0419 08/30/21  0550 08/29/21  0554 08/28/21  1418   BUN mg/dL 29* 35* 41* 55* 72* 85* 102* 111*   CREATININE mg/dL 2 19* 2 37* 2 51* 2 71* 3 52* 4 60* 6 14* 7 06*   EGFR ml/min/1 73sq m 25 23 21 19 14 10 7 6         CONSULTING PROVIDERS   IP CONSULT TO CARDIOLOGY  IP CONSULT TO NEPHROLOGY    PROCEDURES PERFORMED  * No surgery found *    RADIOLOGY RESULTS  XR chest 1 view portable    Result Date: 2021  Narrative: CHEST INDICATION:   sob  COMPARISON:  2021 EXAM PERFORMED/VIEWS:  XR CHEST PORTABLE FINDINGS: Cardiomediastinal silhouette appears unremarkable  The lungs are clear  No pneumothorax or pleural effusion  Osseous structures appear within normal limits for patient age  A couple surgical clips are seen in the left side of the neck  Mildly prominent gaseous distention along the transverse colon  Impression: No acute cardiopulmonary disease  Workstation performed: [id number]     XR chest pa & lateral    Result Date: 2021  Narrative: CHEST INDICATION:   cough/shortness of breath  COMPARISON:  Chest x-ray 2021 EXAM PERFORMED/VIEWS:  XR CHEST PA & LATERAL FINDINGS:  A few surgical clips in the left lower neck  Cardiomediastinal silhouette appears unremarkable  The lungs are clear  No pneumothorax or pleural effusion  Osseous structures appear within normal limits for patient age  Curvature of the lumbar spine to the left  Impression: No active pulmonary disease  Workstation performed: [id number]     Echo limited with contrast if indicated    Result Date: 2021  Narrative: [address] [place] [address], [facility] [phone] Transthoracic Echocardiogram Limited 2D, M-mode, Doppler, and Color Doppler Study date:  30-Aug-2021 Patient: Earl Hughes MR number: [id number] Account number: [de-identified] : 1965 Age: 49 years Gender: Female Status: Inpatient Location: Bedside Height: 65 in Weight: 128 7 lb BP: 131/ 55 mmHg Diagnoses: I47 2 - Ventricular tachycardia Sonographer:  Maxime Rice [facility] Primary Physician:  Earl Homans [facility] Lab [facility] Referring Physician:  Tommie Alberto Do Group:  [facility] Interpreting Physician:  Susana Galdamez DO SUMMARY LEFT VENTRICLE: Systolic function was at the lower limits of normal  Ejection fraction was estimated to be 50 %  There were no regional wall motion abnormalities  Wall thickness was mildly increased  Doppler parameters were consistent with abnormal left ventricular relaxation (grade 1 diastolic dysfunction)  AORTIC VALVE: There was mild regurgitation  TRICUSPID VALVE: There was trace regurgitation  SUMMARY MEASUREMENTS 2D measurements: Unspecified Anatomy:   %FS was 37 7 %  AA PSSL Full was -15 6 %  AAS PSSL Full was -19 2 %  AI PSSL Full was -24 2 %  AL PSSL Full was -10 4 %  AP PSSL Full was -19 2 %  AS PSSL Full was -22 %  AVC was 410 4 ms  Ao Diam was 3 cm  BA PSSL Full was -15 4 %  BAS PSSL Full was -10 1 %  BI PSSL Full was -19 4 %  BL PSSL Full was -14 1 %  BP PSSL Full was -17 3 %  BS PSSL Full was -8 7 %  EDV(Teich) was 106 1 ml   EF(Teich) was 67 7 %  ESV(Teich) was 34 2 ml  G peak SL Full(A2C) was -16 9 %  G peak SL Full(A4C) was -12 7 %  G peak SL Full(APLAX) was -13 9 %  G peak SL Full(Avg) was -14 5 %  HR_2Ch_Q was 62 3 BPM   HR_4Ch_Q was 72 3 BPM   IVSd was 1 2 cm  LA Diam was 2 7 cm  LVCO_2Ch_Q was 4 2 L/min  LVCO_4Ch_Q was 4 7 L/min  LVCO_BiP_Q was 4 5 L/min  LVEF_2Ch_Q was 51 3 %  LVEF_4Ch_Q was 45 5 %  LVEF_BiP_Q was 49 5 %  LVIDd was 4 8 cm  LVIDs was 3 cm  LVLd_2Ch_Q was 8 7 cm  LVLd_4Ch_Q was 8 8 cm  LVLs_2Ch_Q was 7 3 cm  LVLs_4Ch_Q was 7 8 cm  LVPWd was 1 2 cm  LVSV_2Ch_Q was 67 9 ml  LVSV_4Ch_Q was 64 8 ml  LVSV_BiP_Q was 68 7 ml  LVVED_2Ch_Q was 132 4 ml  LVVED_4Ch_Q was 142 4 ml  LVVED_BiP_Q was 138 8 ml  LVVES_2Ch_Q was 64 5 ml  LVVES_4Ch_Q was 77 6 ml   LVVES_BiP_Q was 70 1 ml   MA PSSL Full was -15 8 %  MAS PSSL Full was -7 8 %  MI PSSL Full was -15 3 %  ML PSSL Full was -12 2 %  MP PSSL Full was -13 8 %  MS PSSL Full was -9 5 %  Peak SL Dispersion Full was 69 ms  RWT was 0 5    SV(Teich) was 71 9 ml  CW measurements: Unspecified Anatomy:   AV Env  Ti was 236 9 ms   AV VTI was 20 2 cm  AV Vmax was 1 3 m/s  AV Vmean was 0 9 m/s  AV maxPG was 6 6 mmHg  AV meanPG was 3 4 mmHg  TR Vmax was 2 2 m/s   TR maxPG was 20 1 mmHg  MM measurements: Unspecified Anatomy:   TAPSE was 2 3 cm  PW measurements: Unspecified Anatomy:   DVI was 0 8   E' Avg was 0 1 m/s  E' Lat was 0 1 m/s  E' Sept was 0 1 m/s  E/E' Avg was 6 6   E/E' Lat was 6 5   E/E' Sept was 6 7   LVOT Env  Ti was 299 7 ms  LVOT VTI was 16 2 cm  LVOT Vmax was 0 8 m/s  LVOT Vmean was 0 5 m/s  LVOT maxPG was 2 8 mmHg  LVOT meanPG was 1 4 mmHg  MV A Kyle was 0 7 m/s  MV Dec [place] was 2 3 m/s2  MV DecT was 262 5 ms   MV E Kyle was 0 6 m/s  MV E/A Ratio was 0 8   MV PHT was 76 1 ms  MVA By PHT was 2 9 cm2  PROCEDURE: The procedure was performed at the bedside  This was a routine study  The transthoracic approach was used  The study included limited 2D imaging, M-mode, limited spectral Doppler, and color Doppler  Image quality was adequate  LEFT VENTRICLE: Size was normal  Systolic function was at the lower limits of normal  Ejection fraction was estimated to be 50 %  There were no regional wall motion abnormalities  Wall thickness was mildly increased  DOPPLER: Doppler parameters were consistent with abnormal left ventricular relaxation (grade 1 diastolic dysfunction)  RIGHT VENTRICLE: The size was normal  Systolic function was normal  Wall thickness was normal  LEFT ATRIUM: Size was normal  RIGHT ATRIUM: Size was normal  MITRAL VALVE: Valve structure was normal  There was normal leaflet separation  DOPPLER: The transmitral velocity was within the normal range  There was no evidence for stenosis  There was no regurgitation  AORTIC VALVE: The valve was trileaflet  Leaflets exhibited normal thickness and normal cuspal separation  DOPPLER: Transaortic velocity was within the normal range  There was no evidence for stenosis  There was mild regurgitation   TRICUSPID VALVE: The valve structure was normal  There was normal leaflet separation  DOPPLER: There was no evidence for stenosis  There was trace regurgitation  PULMONIC VALVE: Not well visualized  DOPPLER: There was no regurgitation  PERICARDIUM: There was no pericardial effusion  The pericardium was normal in appearance  AORTA: The root exhibited normal size  SYSTEMIC VEINS: IVC: The inferior vena cava was normal in size and course  Respirophasic changes were normal  SYSTEM MEASUREMENT TABLES 2D %FS: 37 7 % AA PSSL Full: -15 6 % AAS PSSL Full: -19 2 % AI PSSL Full: -24 2 % AL PSSL Full: -10 4 % AP PSSL Full: -19 2 % AS PSSL Full: -22 % AVC: 410 4 ms Ao Diam: 3 cm BA PSSL Full: -15 4 % BAS PSSL Full: -10 1 % BI PSSL Full: -19 4 % BL PSSL Full: -14 1 % BP PSSL Full: -17 3 % BS PSSL Full: -8 7 % EDV(Teich): 106 1 ml EF(Teich): 67 7 % ESV(Teich): 34 2 ml G peak SL Full(A2C): -16 9 % G peak SL Full(A4C): -12 7 % G peak SL Full(APLAX): -13 9 % G peak SL Full(Avg): -14 5 % HR_2Ch_Q: 62 3 BPM HR_4Ch_Q: 72 3 BPM IVSd: 1 2 cm LA Diam: 2 7 cm LVCO_2Ch_Q: 4 2 L/min LVCO_4Ch_Q: 4 7 L/min LVCO_BiP_Q: 4 5 L/min LVEF_2Ch_Q: 51 3 % LVEF_4Ch_Q: 45 5 % LVEF_BiP_Q: 49 5 % LVIDd: 4 8 cm LVIDs: 3 cm LVLd_2Ch_Q: 8 7 cm LVLd_4Ch_Q: 8 8 cm LVLs_2Ch_Q: 7 3 cm LVLs_4Ch_Q: 7 8 cm LVPWd: 1 2 cm LVSV_2Ch_Q: 67 9 ml LVSV_4Ch_Q: 64 8 ml LVSV_BiP_Q: 68 7 ml LVVED_2Ch_Q: 132 4 ml LVVED_4Ch_Q: 142 4 ml LVVED_BiP_Q: 138 8 ml LVVES_2Ch_Q: 64 5 ml LVVES_4Ch_Q: 77 6 ml LVVES_BiP_Q: 70 1 ml MA PSSL Full: -15 8 % MAS PSSL Full: -7 8 % MI PSSL Full: -15 3 % ML PSSL Full: -12 2 % MP PSSL Full: -13 8 % MS PSSL Full: -9 5 % Peak SL Dispersion Full: 69 ms RWT: 0 5 SV(Teich): 71 9 ml CW AV Env  Ti: 236 9 ms AV VTI: 20 2 cm AV Vmax: 1 3 m/s AV Vmean: 0 9 m/s AV maxP 6 mmHg AV meanPG: 3 4 mmHg TR Vmax: 2 2 m/s TR maxP 1 mmHg MM TAPSE: 2 3 cm PW DVI: 0 8 E' Av 1 m/s E' Lat: 0 1 m/s E' Sept: 0 1 m/s E/E' Av 6 E/E' Lat: 6 5 E/E' Sept: 6 7 LVOT Env  Ti: 299 7 ms LVOT VTI: 16 2 cm LVOT Vmax: 0 8 m/s LVOT Vmean: 0 5 m/s LVOT maxP 8 mmHg LVOT meanP 4 mmHg MV A Kyle: 0 7 m/s MV Dec Beltrami: 2 3 m/s2 MV DecT: 262 5 ms MV E Kyle: 0 6 m/s MV E/A Ratio: 0 8 MV PHT: 76 1 ms MVA By PHT: 2 9 cm2 IntersSelect Specialty Hospital - Danvilleetal Commission Accredited Echocardiography Laboratory Prepared and electronically signed by Adelfo Daily DO Signed 30-Aug-2021 16:43:24     US kidney and bladder    Result Date: 2021  Narrative: RENAL ULTRASOUND INDICATION:   N17 9: Acute kidney failure, unspecified  COMPARISON: CT abdomen/pelvis 2006  TECHNIQUE:   Ultrasound of the retroperitoneum was performed with a curvilinear transducer utilizing volumetric sweeps and still imaging techniques  FINDINGS: KIDNEYS: Symmetric and normal size  Right kidney:  9 7 x 5 8 x 6 1 cm  Left kidney:  11 0 x 4 9 x 5 9 cm  Right kidney Normal echogenicity and contour  No suspicious masses detected  Subcentimeter cysts  No hydronephrosis  No shadowing calculi  No perinephric fluid collections  Left kidney Normal echogenicity and contour  No suspicious masses detected  Subcentimeter lower pole cyst  No hydronephrosis  No shadowing calculi  No perinephric fluid collections  URETERS: Nonvisualized  BLADDER: Normally distended  No focal thickening or mass lesions  Bilateral ureteral jets detected  Incidental cholelithiasis without evidence of acute cholecystitis  Impression: Normal kidneys and bladder  Incidental cholelithiasis   Workstation performed: [id number]       LABS  Results from last 7 days   Lab Units 21  0457 21  0421 21  0550 21  0554 21  1418   WBC Thousand/uL 11 10* 9 74 10 52* 9 97 10 76*   HEMOGLOBIN g/dL 8 9* 8 4* 9 0* 8 9* 10 3*   HEMATOCRIT % 27 1* 25 9* 27 5* 27 1* 32 0*   MCV fL 82 82 83 82 82   PLATELETS Thousands/uL 194 161 146* 119* 121*     Results from last 7 days   Lab Units 21  0553 21  0517 21  0453 21  0457 21  0419 21  0550 21  0554 21  1418   SODIUM mmol/L 144 143 142 139 134* 133* 132* 132*   POTASSIUM mmol/L 4 3 3 7 4 2 4 1 3 0* 3 5 3 9 3 1*   CHLORIDE mmol/L 106 106 106 104 99* 101 98* 92*   CO2 mmol/L 23 24 24 26 25 15* 17* 20*   BUN mg/dL 29* 35* 41* 55* 72* 85* 102* 111*   CREATININE mg/dL 2 19* 2 37* 2 51* 2 71* 3 52* 4 60* 6 14* 7 06*   CALCIUM mg/dL 6 3* 6 1* 5 6* 5 9* 6 1* 6 5* 6 7* 7 3*   ALBUMIN g/dL 1 8*  --   --   --   --  2 0*  --  2 7*   TOTAL BILIRUBIN mg/dL 0 47  --   --   --   --  0 47  --  0 31   ALK PHOS U/L 97  --   --   --   --  95  --  117*   ALT U/L 31  --   --   --   --  60  --  83*   AST U/L 48*  --   --   --   --  84*  --  120*   EGFR ml/min/1 73sq m 25 23 21 19 14 10 7 6   GLUCOSE RANDOM mg/dL 89 92 87 88 123 92 92 120     Results from last 7 days   Lab Units 08/28/21  1418   TROPONIN I ng/mL 0 17*     Results from last 7 days   Lab Units 08/28/21  1418   NT-PRO BNP pg/mL 7,919*                  Results from last 7 days   Lab Units 08/30/21  0550   TSH 3RD GENERATON uIU/mL 2 215   FREE T4 ng/dL 0 46*               Cultures:   Results from last 7 days   Lab Units 08/28/21  1713   COLOR UA  Yellow   CLARITY UA  Cloudy   SPEC GRAV UA  1 020   PH UA  5 5   LEUKOCYTES UA  Moderate*   NITRITE UA  Negative   GLUCOSE UA mg/dl Negative   KETONES UA mg/dl Negative   BILIRUBIN UA  Negative   BLOOD UA  Moderate*      Results from last 7 days   Lab Units 08/28/21  1713   RBC UA /hpf 1-2*   WBC UA /hpf 10-20*   EPITHELIAL CELLS WET PREP /hpf Occasional   BACTERIA UA /hpf Moderate*            PHYSICAL EXAM:  Vitals:   Blood Pressure: 150/71 (09/04/21 0805)  Pulse: 75 (09/04/21 0805)  Temperature: 98 5 °F (36 9 °C) (09/04/21 0805)  Temp Source: Temporal (09/04/21 0805)  Respirations: 18 (09/04/21 0805)  Height: 5' 5" (165 1 cm) (08/28/21 1741)  Weight - Scale: 67 5 kg (148 lb 12 8 oz) (09/04/21 0600)  SpO2: 93 % (09/04/21 0805)      General: well appearing, no acute distress  HEENT: atraumatic, PERRLA, moist mucosa, normal pharynx, normal tonsils and adenoids, normal tongue, no fluid in sinuses  Neck: Trachea midline, no carotid bruit, no masses  Respiratory: normal chest wall expansion, CTA B, no r/r/w, no rubs  Cardiovascular: RRR, no m/r/g, Normal S1 and S2  Abdomen: Soft, non-tender, non-distended, normal bowel sounds in all quadrants, no hepatosplenomegaly, no tympany  Rectal: deferred  Musculoskeletal: normal ROM in upper and lower extremities  Integumentary: warm, dry, and pink, with no rash, purpura, or petechia  Heme/Lymph: no lymphadenopathy, no bruises  Neurological: Cranial Nerves II-XII grossly intact, no tics, normal sensation to pressure and light touch  Psychiatric: cooperative with normal mood, affect, and cognition      Discharge Disposition: Home/Self Care      Test Results Pending at Discharge:   Pending Labs     Order Current Status    Vitamin D 25 hydroxy In process    Vitamin D Panel In process              Medications   · Summary of Medication Adjustments made as a result of this hospitalization:  Discontinue furosemide and lisinopril until seen by Cardiology  · Repeat BMP in 1 week  · Medication Dosing Tapers - Please refer to Discharge Medication List for details on any medication dosing tapers (if applicable to patient)  · Discharge Medication List: See after visit summary for reconciled discharge medications  Diet restrictions:         Diet Orders   (From admission, onward)             Start     Ordered    09/02/21 1515  Dietary nutrition supplements  Once     Question Answer Comment   Select Supplement: Ensure Enlive-Chocolate    Frequency Breakfast, Lunch        09/02/21 1514    08/30/21 0835  Diet Cardiovascular; Cardiac; Fluid Restriction 1500 ML  Diet effective now     Question Answer Comment   Diet Type Cardiovascular    Cardiac Cardiac    Other Restriction(s): Fluid Restriction 1500 ML    RD to adjust diet per protocol?  Yes        08/30/21 0835              Activity restrictions: No strenuous activity  Discharge Condition: good    Outpatient Follow-Up and Discharge Instructions  See after visit summary section titled Discharge Instructions for information provided to patient and family  Code Status: Level 1 - Full Code  Discharge Statement   I spent 35 minutes discharging the patient  This time was spent on the day of discharge  Greater than 50% of total time was spent with the patient and / or family counseling and / or coordination of care  ** Please Note: This note was completed in part utilizing [employer] Software  Grammatical errors, random word insertions, spelling mistakes, and incomplete sentences may be an occasional consequence of this system secondary to software limitations, ambient noise, and hardware issues  If you have any questions or concerns about the content, text, or information contained within the body of this dictation, please contact the provider for clarification  **

## 2021-09-04 NOTE — PLAN OF CARE
Problem: PAIN - ADULT  Goal: Verbalizes/displays adequate comfort level or baseline comfort level  Description: Interventions:  - Encourage patient to monitor pain and request assistance  - Assess pain using appropriate pain scale  - Administer analgesics based on type and severity of pain and evaluate response  - Implement non-pharmacological measures as appropriate and evaluate response  - Consider cultural and social influences on pain and pain management  - Notify physician/advanced practitioner if interventions unsuccessful or patient reports new pain  Outcome: Progressing     Problem: SAFETY ADULT  Goal: Patient will remain free of falls  Description: INTERVENTIONS:  - Educate patient/family on patient safety including physical limitations  - Instruct patient to call for assistance with activity   - Consult OT/PT to assist with strengthening/mobility   - Keep Call bell within reach  - Keep bed low and locked with side rails adjusted as appropriate  - Keep care items and personal belongings within reach  - Initiate and maintain comfort rounds  - Make Fall Risk Sign visible to staff  - Apply yellow socks and bracelet for high fall risk patients  - Consider moving patient to room near nurses station  Outcome: Progressing  Goal: Maintain or return to baseline ADL function  Description: INTERVENTIONS:  -  Assess patient's ability to carry out ADLs; assess patient's baseline for ADL function and identify physical deficits which impact ability to perform ADLs (bathing, care of mouth/teeth, toileting, grooming, dressing, etc )  - Assess/evaluate cause of self-care deficits   - Assess range of motion  - Assess patient's mobility; develop plan if impaired  - Assess patient's need for assistive devices and provide as appropriate  - Encourage maximum independence but intervene and supervise when necessary  - Involve family in performance of ADLs  - Assess for home care needs following discharge   - Consider OT consult to assist with ADL evaluation and planning for discharge  - Provide patient education as appropriate  Outcome: Progressing  Goal: Maintains/Returns to pre admission functional level  Description: INTERVENTIONS:  - Perform BMAT or MOVE assessment daily    - Set and communicate daily mobility goal to care team and patient/family/caregiver  - Collaborate with rehabilitation services on mobility goals if consulted  - Out of bed to chair 3 times a day   - Out of bed for meals   - Out of bed for toileting  - Record patient progress and toleration of activity level   Outcome: Progressing     Problem: DISCHARGE PLANNING  Goal: Discharge to home or other facility with appropriate resources  Description: INTERVENTIONS:  - Identify barriers to discharge w/patient and caregiver  - Arrange for needed discharge resources and transportation as appropriate  - Identify discharge learning needs (meds, wound care, etc )  - Refer to Case Management Department for coordinating discharge planning if the patient needs post-hospital services based on physician/advanced practitioner order or complex needs related to functional status, cognitive ability, or social support system  Outcome: Progressing     Problem: Knowledge Deficit  Goal: Patient/family/caregiver demonstrates understanding of disease process, treatment plan, medications, and discharge instructions  Description: Complete learning assessment and assess knowledge base    Interventions:  - Provide teaching at level of understanding  - Provide teaching via preferred learning methods  Outcome: Progressing     Problem: Potential for Falls  Goal: Patient will remain free of falls  Description: INTERVENTIONS:  - Educate patient/family on patient safety including physical limitations  - Instruct patient to call for assistance with activity   - Consult OT/PT to assist with strengthening/mobility   - Keep Call bell within reach  - Keep bed low and locked with side rails adjusted as appropriate  - Keep care items and personal belongings within reach  - Initiate and maintain comfort rounds  - Make Fall Risk Sign visible to staff  - Apply yellow socks and bracelet for high fall risk patients  - Consider moving patient to room near nurses station  Outcome: Progressing     Problem: METABOLIC, FLUID AND ELECTROLYTES - ADULT  Goal: Electrolytes maintained within normal limits  Description: INTERVENTIONS:  - Monitor labs and assess patient for signs and symptoms of electrolyte imbalances  - Administer electrolyte replacement as ordered  - Monitor response to electrolyte replacements, including repeat lab results as appropriate  - Instruct patient on fluid and nutrition as appropriate  Outcome: Progressing     Problem: Prexisting or High Potential for Compromised Skin Integrity  Goal: Skin integrity is maintained or improved  Description: INTERVENTIONS:  - Identify patients at risk for skin breakdown  - Assess and monitor skin integrity  - Assess and monitor nutrition and hydration status  - Monitor labs   - Assess for incontinence   - Turn and reposition patient  - Assist with mobility/ambulation  - Relieve pressure over bony prominences  - Avoid friction and shearing  - Provide appropriate hygiene as needed including keeping skin clean and dry  - Evaluate need for skin moisturizer/barrier cream  - Collaborate with interdisciplinary team   - Patient/family teaching  - Consider wound care consult   Outcome: Progressing

## 2021-09-04 NOTE — QUICK NOTE
Mrs Marni Ascencio is a 53-year-old family who we are following for acute kidney injury on CKD stage IIIA/3B as well as hypocalcemia  Her creatinine continues to improve but is still above baseline  She is currently off of IV fluids  Discussed with medicine team today and patient is cleared for discharge  Will send message office staff to arrange follow-up with repeat BMP next week  We discussed holding her diuretics at discharge  She will also continue on calcium carbonate supplementation  Labs reviewed  Magnesium and phosphorus level within normal range  Vitamin-D level 63 9  May consider addition of calcitriol as outpatient  Thank you

## 2021-09-04 NOTE — ASSESSMENT & PLAN NOTE
Start oral calcium supplementation  Outpatient follow-up  Repeat BMP in 1 week  Recent Labs     09/02/21  0453 09/03/21  0517 09/04/21  0553   CALCIUM 5 6* 6 1* 6 3*

## 2021-09-04 NOTE — ASSESSMENT & PLAN NOTE
· MARINA secondary to diuresis, hypotension, and ACE-inhibitor use  With significant uremia on admission which has improved  Cleared for discharge home  Results from last 7 days   Lab Units 09/04/21  0553 09/03/21  0517 09/02/21  0453 09/01/21  0457 08/31/21  0419 08/30/21  0550 08/29/21  0554 08/28/21  1418   BUN mg/dL 29* 35* 41* 55* 72* 85* 102* 111*   CREATININE mg/dL 2 19* 2 37* 2 51* 2 71* 3 52* 4 60* 6 14* 7 06*   EGFR ml/min/1 73sq m 25 23 21 19 14 10 7 6

## 2021-09-04 NOTE — ASSESSMENT & PLAN NOTE
Resolved with repletion  Recent Labs     09/02/21  0453 09/03/21  0517 09/04/21  0553   K 4 2 3 7 4 3

## 2021-09-04 NOTE — ASSESSMENT & PLAN NOTE
· Patient felt of left bundle-branch block rather than nonsustained ventricular tachycardia  · Will need to resume beta-blocker

## 2021-09-04 NOTE — PROGRESS NOTES
CARDIOLOGY INPATIENT FOLLOW-UP PROGRESS NOTE  *-*-*-*-*-*-*-*-*-*-*-*-*-*-*-*-*-*-*-*-*-*-*-*-*-*-*-*-*-*-*-*-*-*-*-*-*-*-*-*-*-*-*-*-*-*-*-*-*-*-*-*-*-*-  [id number] DATE: 09/04/21 11:54 AM   AUTHOR: Eartha Kocher, MD  PATIENT: Kristin Sosa   1965    [id number]   49 y o    female  INPATIENT HOSPITALIST PHYSICIAN: Spencer Smart MD  DATE OF ADMISSION: 8/28/2021  1:03 PM; LENGTH OF STAY: 7 days  *-*-*-*-*-*-*-*-*-*-*-*-*-*-*-*-*-*-*-*-*-*-*-*-*-*-*-*-*-*-*-*-*-*-*-*-*-*-*-*-*-*-*-*-*-*-*-*-*-*-*-*-*-*-    CARDIOLOGY ASSESSMENT  1  Acute renal failure, improving  2  Chronic systolic heart failure, compensated  3  Coronary artery disease with drug-eluting stent to the 1st obtuse marginal and drug-eluting stent to the LAD x3, nonobstructive residual disease  4  History of both ischemic and nonischemic cardiomyopathies  5  Anemia  6  Hypothyroidism  7  History of syncope secondary to orthostasis/volume depletion  8  History of myopericarditis  9  Rate dependent bundle-branch block pattern with very wide QRS when in left bundle pattern 170 milliseconds  10  Severe hypocalcemia, unknown etiology    Patient overall doing well with no chest pain and shortness of breath  Unfortunately could not assess telemetry as it was discontinued prior to my review   Patient Active Problem List   Diagnosis    Acute systolic congestive heart failure (HCC)    Postoperative hypothyroidism    [facility]) [facility]    Cardiomyopathy [facility] )    LBBB (left bundle branch block)    Anemia    Prediabetes    s/p ORIF of left distal radius    Acute myopericarditis    CAD (coronary artery disease)    Chronic systolic (congestive) heart failure (HCC)    Iron deficiency anemia    Syncope    Elevated d-dimer    Hypocalcemia    Decreased GFR    Hypoparathyroidism (HCC)    Abnormal CT of brain    Ischemic brain damage    Multiple lacunar infarcts (HCC)    Hypokalemia    Chest pain    [facility]        PLAN:  -- recommend increasing metoprolol succinate to 37 5 mg twice daily in the outpatient setting   -- continue other medications  -- patient will need close follow-up with Nephrology medicine and with Cardiology  Will arrange for cardiology office visit with Dr Nuris Silverman within next 2 weeks  Advising patient to follow with her primary physician within 1 week  -- will need outpatient Holter monitor to assess rate and frequency of rate-related bundle branch block  *-*-*-*-*-*-*-*-*-*-*-*-*-*-*-*-*-*-*-*-*-*-*-*-*-*-*-*-*-*-*-*-*-*-*-*-*-*-*-*-*-*-*-*-*-*-*-*-*-*-*-*-*-*-  INTERVAL CHANGES / HISTORY OF PRESENT ILLNESS:  No acute events overnight  Patient denies any chest pain or shortness of breath or dizziness  Previously reported fatigue is resolved  *-*-*-*-*-*-*-*-*-*-*-*-*-*-*-*-*-*-*-*-*-*-*-*-*-*-*-*-*-*-*-*-*-*-*-*-*-*-*-*-*-*-*-*-*-*-*-*-*-*-*-*-*-*  REVIEW OF SYMPTOMS:    Positive for:  Resolved chest pain and shortness of breath  Negative for: All remaining as reviewed below and in HPI   SYSTEM SYMPTOMS REVIEWED:  General--weight change, fever, night sweats  Respiratoryl-- Wheezing, shortness of breath, cough, URI symptoms, sputum, blood  Cardiovascular--chest pain, syncope, dyspnea on exertion, edema, decline in exercise tolerance, claudication   Gastrointestinal--persistent vomiting, diarrhea, abdominal distention, blood in stool   Muscular or skeletal--joint pain or swelling   Neurologic--headaches, syncope, abnormal movement  Hematologic--history of easy bruising and bleeding   Endocrine--thyroid enlargement, heat or cold intolerance, polyuria   Psychiatric--anxiety, depression      *-*-*-*-*-*-*-*-*-*-*-*-*-*-*-*-*-*-*-*-*-*-*-*-*-*-*-*-*-*-*-*-*-*-*-*-*-*-*-*-*-*-*-*-*-*-*-*-*-*-*-*-*-*-  VITAL SIGNS:  Vitals:    09/03/21 2105 09/03/21 2300 09/04/21 0600 09/04/21 0805   BP: 151/61 131/68  150/71   BP Location: Left arm Left arm  Left arm   Pulse: 97 91  75 Resp:  18  18   Temp:  (!) 96 5 °F (35 8 °C)  98 5 °F (36 9 °C)   TempSrc:  Temporal  Temporal   SpO2:  97%  93%   Weight:   67 5 kg (148 lb 12 8 oz)    Height:          Temp (24hrs), Av °F (36 7 °C), Min:96 5 °F (35 8 °C), Max:99 °F (37 2 °C)  Current: Temperature: 98 5 °F (36 9 °C)    Weight (last 2 days)     Date/Time   Weight    21 0600   67 5 (148 8)    21 0600   66 2 (145 94)    21 0600   65 (143 3)            Body mass index is 24 76 kg/m²  Wt Readings from Last 3 Encounters:   21 67 5 kg (148 lb 12 8 oz)   21 59 kg (130 lb)   21 70 2 kg (154 lb 12 2 oz)      Intake/Output Summary (Last 24 hours) at 2021 1154  Last data filed at 2021 0900  Gross per 24 hour   Intake 480 ml   Output --   Net 480 ml        *-*-*-*-*-*-*-*-*-*-*-*-*-*-*-*-*-*-*-*-*-*-*-*-*-*-*-*-*-*-*-*-*-*-*-*-*-*-*-*-*-*-*-*-*-*-*-*-*-*-*-*-*-*-  PHYSICAL EXAM:  General Appearance:    Alert, cooperative, no distress, appears stated age   Head, Eyes, ENT:    No obvious abnormality, moist mucous mebranes  Neck:   Supple, no carotid bruit or JVD   Back:     Symmetric, no curvature  Lungs:     Respirations unlabored  Clear to auscultation bilaterally,    Chest wall:    No tenderness or deformity   Heart:    Regular rate and rhythm, S1 and S2 normal, no murmur, rub  or gallop  Abdomen:     Soft, non-tender, No obvious masses, or organomegaly   Extremities:   Extremities warm, no cyanosis or edema    Skin:   Skin color, texture, turgor normal, no rashes or lesions     *-*-*-*-*-*-*-*-*-*-*-*-*-*-*-*-*-*-*-*-*-*-*-*-*-*-*-*-*-*-*-*-*-*-*-*-*-*-*-*-*-*-*-*-*-*-*-*-*-*-*-*-*-*-  TELEMETRY, LAST ECG:  Telemetry reviewed    Not on telemetry   Results for orders placed or performed during the hospital encounter of 21   ECG 12 lead   Result Value    Ventricular Rate 91    Atrial Rate 91    AL Interval 110    QRSD Interval 90    QT Interval 360    QTC Interval 442    P Axis 10    QRS Axis 18 T Wave Axis -49    Narrative    Sinus rhythm with short IN  T wave abnormality, consider inferior ischemia  T wave abnormality, consider anterolateral ischemia  Abnormal ECG  When compared with ECG of 28-AUG-2021 13:41,  T wave inversion more evident in Anterolateral leads  Confirmed by Zacarias White ([id number]) on 8/31/2021 5:26:27 AM      *-*-*-*-*-*-*-*-*-*-*-*-*-*-*-*-*-*-*-*-*-*-*-*-*-*-*-*-*-*-*-*-*-*-*-*-*-*-*-*-*-*-*-*-*-*-*-*-*-*-*-*-*-*-    CURRENT SCHEDULED MEDICATIONS:    Current Facility-Administered Medications:     acetaminophen (TYLENOL) tablet 650 mg, 650 mg, Oral, Q4H PRN, Aspen Meek MD    aspirin chewable tablet 81 mg, 81 mg, Oral, Daily, Aspen Meek MD, 81 mg at 09/04/21 0851    atorvastatin (LIPITOR) tablet 80 mg, 80 mg, Oral, After Tona Diane MD, 80 mg at 09/03/21 1710    benzonatate (TESSALON PERLES) capsule 100 mg, 100 mg, Oral, TID PRN, Joy Jett DO, 100 mg at 08/31/21 2221    calcium carbonate (OYSTER SHELL,OSCAL) 500 mg tablet 1 tablet, 1 tablet, Oral, TID With Meals, Joy Jett DO, 1 tablet at 09/04/21 0851    doxylamine (UNISON) tablet 25 mg, 25 mg, Oral, HS PRN, Jaqueline Lua PA-C, 25 mg at 09/02/21 2228    ferrous gluconate (FERGON) tablet 324 mg, 324 mg, Oral, BID AC, Aspen Meek MD, 324 mg at 09/04/21 0533    guaiFENesin (MUCINEX) 12 hr tablet 600 mg, 600 mg, Oral, Q12H [address], Chaz Hollis DO, 600 mg at 09/04/21 0851    heparin (porcine) subcutaneous injection 5,000 Units, 5,000 Units, Subcutaneous, Q8H [address], Chaz Hollis DO, 5,000 Units at 09/04/21 0533    levothyroxine tablet 100 mcg, 100 mcg, Oral, Early Morning, Aspen Meek MD, 100 mcg at 09/04/21 0533    melatonin tablet 6 mg, 6 mg, Oral, HS, DELORES Benton, 6 mg at 08/30/21 2148    metoprolol succinate (TOPROL-XL) 24 hr tablet 25 mg, 25 mg, Oral, BID, Maryellen Ospina MD, 25 mg at 09/04/21 0843    ticagrelor (BRILINTA) tablet 90 mg, 90 mg, Oral, BID, Aspen Meek MD, 90 mg at 09/04/21 3194 ALLERGIES:  Allergies   Allergen Reactions    Penicillins Rash        *-*-*-*-*-*-*-*-*-*-*-*-*-*-*-*-*-*-*-*-*-*-*-*-*-*-*-*-*-*-*-*-*-*-*-*-*-*-*-*-*-*-*-*-*-*-*-*-*-*-*-*-*-*  LABORATORY DATA:  I have personally reviewed pertinent labs  CMP:  Results from last 7 days   Lab Units 09/04/21  0553 09/03/21  0517 09/02/21  0453 08/30/21  0550 08/28/21  1418   SODIUM mmol/L 144 143 142 133* 132*   POTASSIUM mmol/L 4 3 3 7 4 2 3 5 3 1*   CHLORIDE mmol/L 106 106 106 101 92*   CO2 mmol/L 23 24 24 15* 20*   BUN mg/dL 29* 35* 41* 85* 111*   CREATININE mg/dL 2 19* 2 37* 2 51* 4 60* 7 06*   CALCIUM mg/dL 6 3* 6 1* 5 6* 6 5* 7 3*   ALK PHOS U/L 97  --   --  95 117*   ALT U/L 31  --   --  60 83*   AST U/L 48*  --   --  84* 120*        Results from last 7 days   Lab Units 09/04/21  0553   MAGNESIUM mg/dL 2 0     Results from last 7 days   Lab Units 09/04/21  0553   PHOSPHORUS mg/dL 3 5    Cardiac Profile:   Results from last 7 days   Lab Units 08/28/21  1418   TROPONIN I ng/mL 0 17*   NT-PRO BNP pg/mL 7,919*                CBC:   Results from last 7 days   Lab Units 09/01/21  0457 08/31/21  0421 08/30/21  0550   WBC Thousand/uL 11 10* 9 74 10 52*   HEMOGLOBIN g/dL 8 9* 8 4* 9 0*   HEMATOCRIT % 27 1* 25 9* 27 5*   PLATELETS Thousands/uL 194 161 146*     PT/INR: No results found for: PT, INR, Microbiology:          *-*-*-*-*-*-*-*-*-*-*-*-*-*-*-*-*-*-*-*-*-*-*-*-*-*-*-*-*-*-*-*-*-*-*-*-*-*-*-*-*-*-*-*-*-*-*-*-*-*-*-*-*-*-  IMAGING STUDIES REPORTS: Imaging studies results reviewed    No valid procedures specified  XR chest pa & lateral    Result Date: 8/31/2021  Impression No active pulmonary disease   Workstation performed: [id number]       *-*-*-*-*-*-*-*-*-*-*-*-*-*-*-*-*-*-*-*-*-*-*-*-*-*-*-*-*-*-*-*-*-*-*-*-*-*-*-*-*-*-*-*-*-*-*-*-*-*-*-*-*-*-  ECHOCARDIOGRAM AND OTHER CARDIAC TESTS RESULTS:  Results for orders placed during the hospital encounter of 07/11/21    Echo complete with contrast if indicated    Narrative  [address] [facility]  [facility]  [place], [address]  [phone]    Transthoracic Echocardiogram  2D, M-mode, Doppler, and Color Doppler    Study date:  2021    Patient: Jason Warner  MR number: [id number]  Account number: [de-identified]  : 1965  Age: 49 years  Gender: Female  Status: Inpatient  Location: Bedside  Height: 65 in  Weight: 154 lb  BP: 102/ 65 mmHg    Indications: Shortness of breath  Diagnoses: 786 05 - SHORTNESS OF BREATH    Sonographer:  CARLTON Helton  Interpreting Physician:  Loren Landers MD  Primary Physician:  Kevin Montalvo \[facility] [place] [facility]\""[facility]  Referring Physician:  DELORES Chris  Group:  [facility]    IMPRESSIONS:  Technical quality: Good  Cardiac rhythm: Sinus with bundle-branch block    1  Dilated left ventricular cavity, moderately reduced left ventricular systolic function with global hypokinesis with regional variability  Ejection fraction is estimated as around 38 percent  Indeterminate diastolic dysfunction grade  2  Normal right ventricular size and systolic function  3  Mild left atrial cavity enlargement  4  Aortic valve sclerosis, mild aortic valve regurgitation  5  Mitral valve leaflet sclerosis, moderate mitral valve regurgitation  6  Mild tricuspid valve regurgitation  7  No obvious pulmonary hypertension but estimated right ventricular systolic pressure is close to upper limit of normal   8  Trace to small circumferential pericardial effusion  Compared to report of previous echocardiogram from 2020 left ventricular ejection fraction may be slightly decreased  There is progression of mitral valve regurgitation and trace to small pericardial effusion is new  SUMMARY    LEFT VENTRICLE:  Mildly dilated left ventricular cavity, LVIDd 5 7 centimeters, normal wall thickness, moderately reduced left ventricular systolic function   Global left ventricular hypokinesis with some regional wall motion variability  There is marked  hypokinesis of the inferior and inferolateral walls  Ejection fraction is estimated as around 38 percent  Diastolic dysfunction is likely present but was not adequately assessed  RIGHT VENTRICLE:  Mildly dilated right ventricular cavity, normal right ventricular systolic function  Estimated right ventricular systolic pressure is close to upper limit of normal, 35 mmHg  LEFT ATRIUM:  Mild left atrial cavity enlargement  Intact interatrial septum  RIGHT ATRIUM:  Normal right atrial cavity size  MITRAL VALVE:  Mild mitral valve leaflet thickening and sclerosis, moderate mitral valve regurgitation  AORTIC VALVE:  Tricuspid aortic valve with mild sclerosis, adequate cuspal separation  Mild aortic valve regurgitation  TRICUSPID VALVE:  Mild tricuspid valve regurgitation  PULMONIC VALVE:  No pulmonic valve regurgitation  AORTA:  Aortic root and proximal ascending aorta are normal in size on 2D imaging  IVC, HEPATIC VEINS:  Inferior vena cava is normal in size and demonstrates appropriate respiratory phasic changes in diameter  PERICARDIUM:  Trace to small circumferential pericardial effusion  There is no echocardiographic evidence of cardiac tamponade  SUMMARY MEASUREMENTS  2D measurements:  Unspecified Anatomy:   %FS was 10 8 %  Ao Diam was 3 2 cm  Ao asc was 3 2 cm   EDV(Teich) was 159 ml   EF Biplane was 42 3 %   EF(Teich) was 23 2 %  ESV(Teich) was 122 2 ml  IVSd was 0 9 cm  LA Diam was 3 3 cm  LAAs A2C was 28 cm2  LAAs A4C was 29 4 cm2  LAESV A-L A2C was 107 3 ml  LAESV A-L A4C was 110 6 ml  LAESV MOD A2C was 99 5 ml  LAESV MOD A4C was 102 3 ml  LAESV(A-L) was 112 3 ml  LAESV(MOD BP) was 103 8 ml  LALs A2C was 6 2 cm  LALs A4C was 6 6 cm  LVEDV MOD A2C was 129 1 ml  LVEDV MOD A4C was 220 9 ml  LVEDV MOD BP was 170 6 ml  LVEF MOD A2C was 45 4 %  LVEF MOD A4C was 41 %  LVESV MOD A2C was 70 5 ml  LVESV MOD A4C was 130 4 ml    LVESV MOD BP was 98 4 ml  LVIDd was 5 7 cm  LVIDs was 5 1 cm  LVLd A2C was 10 4 cm  LVLd A4C was 10 7 cm  LVLs A2C was 9 8 cm  LVLs A4C was 9 3 cm  LVOT Diam was 2 3 cm  LVPWd was 0 9 cm  RAAs A4C was 16 4 cm2  RAESV A-L was 46 8 ml  RAESV MOD was 45 3 ml  RALs was 4 8 cm  RVIDd was 4 3 cm  RWT was 0 3   SV MOD A2C was 58 6 ml   SV MOD A4C was 90 6 ml   SV(Teich) was 36 8 ml   CW measurements:  Unspecified Anatomy:   AR Dec [place] was 3 7 m/s2  AR Dec Time was 1139 3 ms  AR PHT was 330 4 ms  AR Vmax was 4 2 m/s  AR maxPG was 70 3 mmHg  AV Env  Ti was 319 7 ms   AV VTI was 24 2 cm  AV Vmax was 1 1 m/s  AV Vmean was 0 8 m/s  AV maxPG was 4 7 mmHg  AV meanPG was 2 7 mmHg  MR VTI was 201 3 cm  MR Vmax was 5 3 m/s  TR Vmax was 2 5 m/s   TR maxPG was 25 7 mmHg  MM measurements:  Unspecified Anatomy:   TAPSE was 2 5 cm  PW measurements:  Unspecified Anatomy:   DELIA (VTI) was 2 7 cm2  DELIA Vmax was 2 7 cm2  DVI was 0 7   LVOT Env  Ti was 314 ms  LVOT VTI was 16 6 cm  LVOT Vmax was 0 7 m/s  LVOT Vmean was 0 5 m/s  LVOT maxPG was 2 2 mmHg  LVOT meanPG was 1 3 mmHg  LVSV  Dopp was 66 5 ml   MV A Kyle was 0 8 m/s  MV E Kyle was 0 6 m/s  MV E/A Ratio was 0 7   RV S' was 0 1 m/s  HISTORY: PRIOR HISTORY: Congestive heart failure  Cardiomyopathy  Risk factors: hypertension and medication-treated hypercholesterolemia  PRIOR PROCEDURES: Prior stent  PROCEDURE: The procedure was performed at the bedside  This was a routine study  The transthoracic approach was used  The study included complete 2D imaging, M-mode, complete spectral Doppler, and color Doppler  The heart rate was 79 bpm,  at the start of the study  Image quality was adequate  LEFT VENTRICLE: Mildly dilated left ventricular cavity, LVIDd 5 7 centimeters, normal wall thickness, moderately reduced left ventricular systolic function  Global left ventricular hypokinesis with some regional wall motion variability    There is marked hypokinesis of the inferior and inferolateral walls  Ejection fraction is estimated as around 38 percent  Diastolic dysfunction is likely present but was not adequately assessed  RIGHT VENTRICLE: Mildly dilated right ventricular cavity, normal right ventricular systolic function  Estimated right ventricular systolic pressure is close to upper limit of normal, 35 mmHg  LEFT ATRIUM: Mild left atrial cavity enlargement  Intact interatrial septum  RIGHT ATRIUM: Normal right atrial cavity size  MITRAL VALVE: Mild mitral valve leaflet thickening and sclerosis, moderate mitral valve regurgitation  AORTIC VALVE: Tricuspid aortic valve with mild sclerosis, adequate cuspal separation  Mild aortic valve regurgitation  TRICUSPID VALVE: Mild tricuspid valve regurgitation  PULMONIC VALVE: No pulmonic valve regurgitation  PERICARDIUM: Trace to small circumferential pericardial effusion  There is no echocardiographic evidence of cardiac tamponade  AORTA: Aortic root and proximal ascending aorta are normal in size on 2D imaging  SYSTEMIC VEINS: IVC: Inferior vena cava is normal in size and demonstrates appropriate respiratory phasic changes in diameter      SYSTEM MEASUREMENT TABLES    2D  %FS: 10 8 %  Ao Diam: 3 2 cm  Ao asc: 3 2 cm  EDV(Teich): 159 ml  EF Biplane: 42 3 %  EF(Teich): 23 2 %  ESV(Teich): 122 2 ml  IVSd: 0 9 cm  LA Diam: 3 3 cm  LAAs A2C: 28 cm2  LAAs A4C: 29 4 cm2  LAESV A-L A2C: 107 3 ml  LAESV A-L A4C: 110 6 ml  LAESV MOD A2C: 99 5 ml  LAESV MOD A4C: 102 3 ml  LAESV(A-L): 112 3 ml  LAESV(MOD BP): 103 8 ml  LALs A2C: 6 2 cm  LALs A4C: 6 6 cm  LVEDV MOD A2C: 129 1 ml  LVEDV MOD A4C: 220 9 ml  LVEDV MOD BP: 170 6 ml  LVEF MOD A2C: 45 4 %  LVEF MOD A4C: 41 %  LVESV MOD A2C: 70 5 ml  LVESV MOD A4C: 130 4 ml  LVESV MOD BP: 98 4 ml  LVIDd: 5 7 cm  LVIDs: 5 1 cm  LVLd A2C: 10 4 cm  LVLd A4C: 10 7 cm  LVLs A2C: 9 8 cm  LVLs A4C: 9 3 cm  LVOT Diam: 2 3 cm  LVPWd: 0 9 cm  RAAs A4C: 16 4 cm2  RAESV A-L: 46 8 ml  RAESV MOD: 45 3 ml  RALs: 4 8 cm  RVIDd: 4 3 cm  RWT: 0 3  SV MOD A2C: 58 6 ml  SV MOD A4C: 90 6 ml  SV(Teich): 36 8 ml    CW  AR Dec [place]: 3 7 m/s2  AR Dec Time: 1139 3 ms  AR PHT: 330 4 ms  AR Vmax: 4 2 m/s  AR maxP 3 mmHg  AV Env  Ti: 319 7 ms  AV VTI: 24 2 cm  AV Vmax: 1 1 m/s  AV Vmean: 0 8 m/s  AV maxP 7 mmHg  AV meanP 7 mmHg  MR VTI: 201 3 cm  MR Vmax: 5 3 m/s  TR Vmax: 2 5 m/s  TR maxP 7 mmHg    MM  TAPSE: 2 5 cm    PW  DELIA (VTI): 2 7 cm2  DELIA Vmax: 2 7 cm2  DVI: 0 7  LVOT Env  Ti: 314 ms  LVOT VTI: 16 6 cm  LVOT Vmax: 0 7 m/s  LVOT Vmean: 0 5 m/s  LVOT maxP 2 mmHg  LVOT meanP 3 mmHg  LVSV Dopp: 66 5 ml  MV A Kyle: 0 8 m/s  MV E Kyle: 0 6 m/s  MV E/A Ratio: 0 7  RV S': 0 1 m/s    [facility] Accredited Echocardiography Laboratory    Prepared and electronically signed by    Taylor Parra MD  Signed 2021 17:20:13    No results found for this or any previous visit  No results found for this or any previous visit  No results found for this or any previous visit         *-*-*-*-*-*-*-*-*-*-*-*-*-*-*-*-*-*-*-*-*-*-*-*-*-*-*-*-*-*-*-*-*-*-*-*-*-*-*-*-*-*-*-*-*-*-*-*-*-*-*-*-*-*-  SIGNATURES:   @WHU@   Taylor Parra MD

## 2021-09-04 NOTE — ASSESSMENT & PLAN NOTE
Wt Readings from Last 3 Encounters:   09/04/21 67 5 kg (148 lb 12 8 oz)   08/12/21 59 kg (130 lb)   07/12/21 70 2 kg (154 lb 12 2 oz)     Results from last 7 days   Lab Units 08/28/21  1418   NT-PRO BNP pg/mL 7,919*     · Currently compensated  Given profound renal dysfunction holding furosemide and lisinopril    · Updated echocardiogram with ejection fraction of 50%, markedly improved from previous EF of 38 % from July

## 2021-09-06 ENCOUNTER — TELEPHONE (OUTPATIENT)
Dept: FAMILY MEDICINE CLINIC | Facility: CLINIC | Age: 56
End: 2021-09-06

## 2021-09-06 NOTE — TELEPHONE ENCOUNTER
----- Message from Bryant Damico DO sent at 9/4/2021 10:32 AM EDT -----  Thank you for allowing us to participate in the care of your patient, Emmie Diaz, who was hospitalized from 8/28/2021 through 9/4/2021 with the admitting diagnosis of acute kidney injury  She remained in the hospital for approximately 1 week as her renal function was managed by the nephrology service     She was discontinued on her furosemide as well as lisinopril  Echocardiogram demonstrated improved ejection fraction  She will need repeat BMP in 1 week  She require outpatient follow-up with nephrology            If you have any additional questions or would like to discuss further, please feel free to contact me      Laura [place], [place] [facility], Hospitalist  [phone]

## 2021-09-07 ENCOUNTER — TRANSITIONAL CARE MANAGEMENT (OUTPATIENT)
Dept: FAMILY MEDICINE CLINIC | Facility: CLINIC | Age: 56
End: 2021-09-07

## 2021-09-07 ENCOUNTER — TELEPHONE (OUTPATIENT)
Dept: CARDIOLOGY CLINIC | Facility: CLINIC | Age: 56
End: 2021-09-07

## 2021-09-07 ENCOUNTER — TELEPHONE (OUTPATIENT)
Dept: NEPHROLOGY | Facility: CLINIC | Age: 56
End: 2021-09-07

## 2021-09-07 DIAGNOSIS — N17.9 AKI (ACUTE KIDNEY INJURY) (HCC): Primary | ICD-10-CM

## 2021-09-07 DIAGNOSIS — E83.51 HYPOCALCEMIA: ICD-10-CM

## 2021-09-07 NOTE — TELEPHONE ENCOUNTER
----- Message from [facility], [address] sent at 9/4/2021 11:16 AM EDT -----  Please call patient and arrange acute kidney injury and hypocalcemia follow-up with Dr Kristina Walden  She will need repeat BMP prior to her appointment  Thank you

## 2021-09-07 NOTE — TELEPHONE ENCOUNTER
9/7/21 LM for patient to call and schedule a hospital follow up appointment with Dr Shazia Griffith in [facility]

## 2021-09-07 NOTE — TELEPHONE ENCOUNTER
Call from patient, [phone]  Calling to report she was discharged Sunday, 9/5/21 from [facility] due to MARINA  On discharge instructions, patient is to call office if b/p is less then 485 systolic x 2 readings  On discharge lisinopril and lasix were stopped  She reports her b/p on Monday, 9/6 was 110/60, today b/p 110/60  She denies any lightheaded, dizziness, sob, chest pain, swelling of legs  She does reports feeling good , but her legs feel heavy  She plans to restart cardiac rehab on fri, 9/10  reviewed her medication on discharge list, she is taking all medications as ordered  She is requesting what parameters for b/p should patient report to office  Will review with dr Candi Garcia  Please advise

## 2021-09-08 ENCOUNTER — TELEPHONE (OUTPATIENT)
Dept: CARDIOLOGY CLINIC | Facility: CLINIC | Age: 56
End: 2021-09-08

## 2021-09-08 NOTE — TELEPHONE ENCOUNTER
Pt calling for STD forms, paperwork is in inbox, please let nursing staff know when paperwork is done  Pt calling for update

## 2021-09-09 LAB
25(OH)D2 SERPL-MCNC: <1 NG/ML
25(OH)D3 SERPL-MCNC: 43 NG/ML
25(OH)D3+25(OH)D2 SERPL-MCNC: 43 NG/ML

## 2021-09-09 NOTE — TELEPHONE ENCOUNTER
9/9/21 LM for patient to call and schedule a hospital follow up appointment with Dr Kristina Walden in [facility]

## 2021-09-13 ENCOUNTER — OFFICE VISIT (OUTPATIENT)
Dept: CARDIOLOGY CLINIC | Facility: CLINIC | Age: 56
End: 2021-09-13
Payer: COMMERCIAL

## 2021-09-13 VITALS
SYSTOLIC BLOOD PRESSURE: 140 MMHG | WEIGHT: 129.6 LBS | DIASTOLIC BLOOD PRESSURE: 70 MMHG | BODY MASS INDEX: 21.57 KG/M2 | HEART RATE: 84 BPM

## 2021-09-13 DIAGNOSIS — I44.7 LBBB (LEFT BUNDLE BRANCH BLOCK): ICD-10-CM

## 2021-09-13 DIAGNOSIS — I25.10 CORONARY ARTERY DISEASE INVOLVING NATIVE CORONARY ARTERY OF NATIVE HEART WITHOUT ANGINA PECTORIS: Primary | ICD-10-CM

## 2021-09-13 DIAGNOSIS — N17.9 AKI (ACUTE KIDNEY INJURY) (HCC): ICD-10-CM

## 2021-09-13 DIAGNOSIS — I50.22 CHRONIC SYSTOLIC CONGESTIVE HEART FAILURE (HCC): ICD-10-CM

## 2021-09-13 DIAGNOSIS — I25.5 ISCHEMIC CARDIOMYOPATHY: ICD-10-CM

## 2021-09-13 DIAGNOSIS — R94.31 PROLONGED Q-T INTERVAL ON ECG: ICD-10-CM

## 2021-09-13 DIAGNOSIS — I47.2 NSVT (NONSUSTAINED VENTRICULAR TACHYCARDIA) (HCC): ICD-10-CM

## 2021-09-13 PROCEDURE — 93000 ELECTROCARDIOGRAM COMPLETE: CPT | Performed by: INTERNAL MEDICINE

## 2021-09-13 PROCEDURE — 1036F TOBACCO NON-USER: CPT | Performed by: INTERNAL MEDICINE

## 2021-09-13 PROCEDURE — 99214 OFFICE O/P EST MOD 30 MIN: CPT | Performed by: INTERNAL MEDICINE

## 2021-09-13 PROCEDURE — 1111F DSCHRG MED/CURRENT MED MERGE: CPT | Performed by: INTERNAL MEDICINE

## 2021-09-13 NOTE — TELEPHONE ENCOUNTER
9/13/21 LM for patient to call and schedule a hospital follow up appointment with Dr Usman Owens in [facility], Letter sent

## 2021-09-13 NOTE — PROGRESS NOTES
Cardiology Office Note  MD Carlton Mac MD Guillermina Goes, DO, [address] [facility] MD Bridgette Ogden DO, Haider Dallas DO, [facility] [place]  ----------------------------------------------------------------  [address] [place], [place] [address]    Neel Adams 49 y o  female MRN: [id number]  Unit/Bed#:  Encounter: [id number]    History of Present Illness: It was a please to see Neel Adams in the office today for follow-up CV evaluation  She was recently hospitalized in June of 2020 for significant shortness of breath and bilateral airspace disease  The patient was found to be in acute heart failure  She underwent aggressive IV diuresis  Echocardiogram showed have severe left ventricular dysfunction  She has a known history of left ventricular dysfunction with recovered LV function before in the past   Her EF was now down to 25%  She underwent cardiac catheterization and was found to have LAD disease  She received 3 stents to the LAD  Subsequently, she was discharged home  She then came back with episodes of chest discomfort  The chest discomfort she was experiencing was pleuritic in nature  Her symptoms markedly improved on colchicine  She was treated as possible myopericarditis  Once her symptoms of chest discomfort were resolved, she was discharged home  Several days later, she had experienced some lightheadedness and dizziness with loss of consciousness  She came in with orthostatic hypotension  Creatinine was as high as 1 7  IV fluids were given and she had improvement in her creatinine  Lasix was decreased to 20 mg every other day and she was discharged home  Since discharge, she denies any further lightheadedness or dizziness or loss of consciousness  Denies lower extremity swelling, orthopnea or paroxysmal nocturnal dyspnea  She states she is back to her usual state of health    At discharge, she was discharged with a life vest   Repeat echocardiogram demonstrated improvement in left ventricular function and life vest was discontinued  She was then hospitalized in July 2021 due to some chest discomfort and shortness of breath  Cardiac enzymes were found to be elevated and she was started on heparin drip  Subsequently, heparin drip was discontinued as it was felt to be non MI related troponin elevation  Stress test was performed showing fixed defect  With the abnormal stress test, she was sent for cardiac catheterization  Cardiac catheterization demonstrated severe stenosis of the OM1 branch  Drug-eluting stent was placed to this branch  Chest discomfort symptoms resolved and she was subsequently discharged home  Following discharge, she was doing well at cardiovascular rehabilitation  She has been pushing up her physical activity level was improving cardiovascular endurance  Due to her recurrent heart failure she was increased on her furosemide medication from 20 mg t i d  To 40 mg daily at discharge  In late August 2021, she admitted to significant weakness and fatigue with worsening exercise tolerance  Blood work was performed showing are to have acute kidney injury with volume depletion  She received IV fluids with improvement of her acute kidney injury  Her most recent creatinine was 2 19  She denies any chest pain, pressure, tightness or squeezing  Denies lightheadedness, dizziness or palpitations  Denies lower extremity swelling, orthopnea or paroxysmal nocturnal dyspnea  She feels the best she has in some time  Review of Systems:  Review of Systems   Constitutional: Negative for decreased appetite, fever, weight gain and weight loss  HENT: Negative for congestion and sore throat  Eyes: Negative for visual disturbance  Cardiovascular: Negative for chest pain, dyspnea on exertion, leg swelling, near-syncope and palpitations  Respiratory: Negative for cough and shortness of breath  Hematologic/Lymphatic: Negative for bleeding problem  Skin: Negative for rash  Musculoskeletal: Negative for myalgias and neck pain  Gastrointestinal: Negative for abdominal pain and nausea  Neurological: Negative for light-headedness and weakness  Psychiatric/Behavioral: Negative for depression  Past Medical History:   Diagnosis Date    Allergies     CHF (congestive heart failure) ([facility] [address] [facility]     Coronary artery disease     Disease of thyroid gland     hypo    Renal disorder        Past Surgical History:   Procedure Laterality Date    CARDIAC CATHETERIZATION       SECTION      x2    FRACTURE SURGERY      WV OPEN TX RADIAL & ULNAR SHAFT FX FIX RADIUS AND ULNA Left 6/3/2020    Procedure: Open reduction internal fixation left distal radius fracture;  Surgeon: Shaquille Huertas MD;  Location:  MAIN OR;  Service: Orthopedics    TOTAL THYROIDECTOMY      TUBAL LIGATION         Social History     Socioeconomic History    Marital status:      Spouse name: Not on file    Number of children: Not on file    Years of education: Not on file    Highest education level: Not on file   Occupational History    Not on file   Tobacco Use    Smoking status: Never Smoker    Smokeless tobacco: Never Used   Vaping Use    Vaping Use: Never used   Substance and Sexual Activity    Alcohol use: Yes     Comment: only on special ocassions   Drug use: Never    Sexual activity: Not on file   Other Topics Concern    Not on file   Social History Narrative    Not on file     Social Determinants of Health     Financial Resource Strain:     Difficulty of Paying Living Expenses:    Food Insecurity:     Worried About Running Out of Food in the Last Year:     [address] in the Last Year:    Transportation Needs:     Lack of Transportation (Medical):      Lack of Transportation (Non-Medical):    Physical Activity:     Days of Exercise per Week:     Minutes of Exercise per Session:    Stress:  Feeling of Stress :    Social Connections:     Frequency of Communication with Friends and Family:     Frequency of Social Gatherings with Friends and Family:     Attends Sabianist Services:     Active Member of Clubs or Organizations:     Attends Club or Organization Meetings:     Marital Status:    Intimate Partner Violence:     Fear of Current or Ex-Partner:     Emotionally Abused:     Physically Abused:     Sexually Abused:        Family History   Problem Relation Age of Onset    No Known Problems Mother     No Known Problems Father     Cancer Family        Allergies   Allergen Reactions    Penicillins Rash         Current Outpatient Medications:     acetaminophen (TYLENOL) 325 mg tablet, Take 2 tablets (650 mg total) by mouth every 4 (four) hours as needed for mild pain, headaches or fever, Disp: 30 tablet, Rfl: 0    aspirin 81 mg chewable tablet, Chew 1 tablet (81 mg total) daily, Disp: 30 tablet, Rfl: 0    atorvastatin (LIPITOR) 80 mg tablet, Take 1 tablet (80 mg total) by mouth daily after dinner, Disp: 90 tablet, Rfl: 1    ferrous gluconate (FERGON) 324 mg tablet, TAKE 1 TABLET (324 MG TOTAL) BY MOUTH 2 (TWO) TIMES A DAY BEFORE MEALS, Disp: 60 tablet, Rfl: 0    levothyroxine 100 mcg tablet, Take 1 tablet (100 mcg total) by mouth daily in the early morning, Disp: 30 tablet, Rfl: 0    metoprolol succinate (TOPROL-XL) 50 mg 24 hr tablet, 1/2 TABLET BY MOUTH TWICE A DAY, Disp: 90 tablet, Rfl: 0    nitroglycerin (NITROSTAT) 0 4 mg SL tablet, Place 1 tablet (0 4 mg total) under the tongue every 5 (five) minutes as needed for chest pain, Disp: 30 tablet, Rfl: 0    ticagrelor (BRILINTA) 90 MG, Take 1 tablet (90 mg total) by mouth 2 (two) times a day, Disp: 180 tablet, Rfl: 2    Vitals:    09/13/21 1537   BP: 140/70   BP Location: Right arm   Patient Position: Sitting   Cuff Size: Adult   Pulse: 84   Weight: 58 8 kg (129 lb 9 6 oz)       PHYSICAL EXAMINATION:  Gen: Awake, Alert, NAD  Head/eyes: AT/NC, pupils equal and round, Anicteric  ENT: mmm  Neck: Supple, No elevated JVP, trachea midline  Resp: CTA bilaterally no w/r/r  CV: RRR +S1, S2, No m/r/g  Abd: Soft, NT/ND + BS  Ext: no LE edema bilaterally; bilateral ecchymoses of the upper extremities  Neuro: Follows commands, moves all extermities  Psych: Appropriate affect, happy mood, pleasant attitude, non-combative  Skin: warm; no rash, erythema or venous stasis changes on exposed skin    --------------------------------------------------------------------------------  TREADMILL STRESS  No results found for this or any previous visit    --------------------------------------------------------------------------------  NUCLEAR STRESS TEST: No results found for this or any previous visit  No results found for this or any previous visit     --------------------------------------------------------------------------------  CATH:    Results for orders placed during the hospital encounter of 20   Cardiac catheterization    Narrative [address]  [address]  [facility], [address] [facility]  [phone]    [facility]    Invasive Cardiovascular Lab Complete Report    Patient: Enid Evans  MR number: [id number]  Account number: [de-identified]  Study date: 2020  Gender: Female  : 1965  Height: 65 in  Weight: 141 9 lb  BSA: 1 71 mï¾²    Allergies: PENICILLINS    Diagnostic Cardiologist:  Robert Morrow MD  Interventional Cardiologist:  Robert Morrow MD  Primary Physician:  Carson Nieves MD    SUMMARY    CORONARY CIRCULATION:  Mid LAD: There was a diffuse 60 % stenosis  This is a likely culprit for the patient's clinical presentation  An intervention was performed  Positive iFR 0 85  1st obtuse marginal: There was a 50 % stenosis  It appears amenable to percutaneous intervention  Negative iFR    CARDIAC STRUCTURES:  Global left ventricular function was moderately depressed   EF calculated by contrast ventriculography was 35 %  1ST LESION INTERVENTIONS:  A successful drug-eluting stent with balloon angioplasty procedure was performed on the 60 % lesion in the mid LAD  Following intervention there was an excellent angiographic appearance with a 0 % residual stenosis  A Resolute Morro Rx 2 5 x 22mm drug-eluting stent was placed across the lesion and deployed at a maximum inflation pressure of 16 elsa  A Resolute [place] Rx 3 0 x 26mm drug-eluting stent was placed across the lesion and deployed at a maximum inflation pressure of 16 elsa  A Resolute [place] Rx 3 5 x 12mm drug-eluting stent was placed across the lesion and deployed at a maximum inflation pressure of 12 elsa  INDICATIONS:  --  Possible CAD: myocardial infarction without ST elevation (NSTEMI)  --  Congestive heart failure with cardiomyopathy  PROCEDURES PERFORMED    --  Left heart catheterization with ventriculography  --  Left coronary angiography  --  Right coronary angiography  --  Diagnostic myocardial fractional flow reserve  --  Diagnostic myocardial fractional flow reserve  --  Inpatient  --  Mod Sedation Same Physician Initial 15min  --  Myocardial Flow [place]  --  Coronary Catheterization (w/ LHC)  --  Myocardial Flow Reserve Additional Vessel  --  Mod Sedation Same Physician Add 15min  --  Mod Sedation Same Physician Add 15min  --  Mod Sedation Same Physician Add 15min  --  --  Coronary Drug Eluting Stent w/PTCA  --  Intervention on mid LAD: drug-eluting stent, balloon angioplasty  PROCEDURE: The risks and alternatives of the procedures and conscious sedation were explained to the patient and informed consent was obtained  The patient was brought to the cath lab and placed on the table  The planned puncture sites  were prepped and draped in the usual sterile fashion  --  Right radial artery access  After performing an Otoniel's test to verify adequate ulnar artery supply to the hand, the radial site was prepped   The puncture site was infiltrated with local anesthetic  The vessel was accessed using the  modified Seldinger technique, a wire was advanced into the vessel, and a sheath was advanced over the wire into the vessel  --  Left heart catheterization with ventriculography  A catheter was advanced over a guidewire into the ascending aorta  After recording ascending aortic pressure, the catheter was advanced across the aortic valve and left ventricular  pressure was recorded  Ventriculography was performed  The catheter was pulled back across the aortic valve and into the ascending aorta and pullback pressures were obtained  --  Left coronary artery angiography  A catheter was advanced over a guidewire into the aorta and positioned in the left coronary artery ostium under fluoroscopic guidance  Angiography was performed  --  Right coronary artery angiography  A catheter was advanced over a guidewire into the aorta and positioned in the right coronary artery ostium under fluoroscopic guidance  Angiography was performed  --  Myocardial fractional flow reserve  Flow reserve was measured using a 0 014" pressure-monitoring guide-wire  Steady baseline values were obtained  Mean arterial pressure and mean distal coronary pressures were then obtained at maximum  hyperemia  --  Myocardial fractional flow reserve  Flow reserve was measured using a 0 014" pressure-monitoring guide-wire  Steady baseline values were obtained  Mean arterial pressure and mean distal coronary pressures were then obtained at maximum  hyperemia  --  Inpatient  --  Mod Sedation Same Physician Initial 15min  --  Myocardial Flow [place]  --  Coronary Catheterization (w/ LHC)  --  Myocardial Flow Reserve Additional Vessel  --  Mod Sedation Same Physician Add 15min  --  Mod Sedation Same Physician Add 15min  --  Mod Sedation Same Physician Add 15min      --  Instant Flow Reserve was measured using 0 014 pressure-monitoring guide-wire  Steady baseline values were obtained by measuring the ratio of distal coronary pressure (Pd) to the aortic pressure (Pa) during a wave-free segment of  diastole  LAD and OM1    LESION INTERVENTION: A successful drug-eluting stent with balloon angioplasty procedure was performed on the 60 % lesion in the mid LAD  Following intervention there was an excellent angiographic appearance with a 0 % residual stenosis  There was LALO 3 flow before the procedure and LALO 3 flow after the procedure  There was no dissection  --  Vessel setup was performed  A Launcher 6Fr Ebu 3 5 guiding catheter was used to cannulate the vessel  --  Vessel setup was performed  A Runthrough NS 180cm wire was used to cross the lesion  --  A Resolute [place]ro Rx 2 5 x 22mm drug-eluting stent was placed across the lesion and deployed at a maximum inflation pressure of 16 elsa  --  A Resolute [place] Rx 3 0 x 26mm drug-eluting stent was placed across the lesion and deployed at a maximum inflation pressure of 16 elsa  --  Balloon angioplasty was performed, with 1 inflations and a maximum inflation pressure of 18 elsa  --  A Resolute [place] Rx 3 5 x 12mm drug-eluting stent was placed across the lesion and deployed at a maximum inflation pressure of 12 elsa  --  Balloon angioplasty was performed, with 1 inflations and a maximum inflation pressure of 16 elsa  --  Balloon angioplasty was performed, using a NC Trek Rx 3 5 x 12mm balloon, with 6 inflations and a maximum inflation pressure of 20 elsa  INTERVENTIONS:  --  Coronary Drug Eluting Stent w/PTCA  PROCEDURE COMPLETION: The patient tolerated the procedure well and was discharged from the cath lab  TIMING: Test started at 11:17  Test concluded at 12:17  HEMOSTASIS: The sheath was removed  The site was compressed with a Hemoband  device  Hemostasis was obtained  MEDICATIONS GIVEN: Versed (2mg/2ml), 1 mg, IV, at 11:23   Fentanyl (1OOmcg/2 ml), 50 mcg, IV, at 11:23  1% Lidocaine, 1 ml, subcutaneously, at 11:25  Heparin 1000 units/ml, 4,000 units, IV, at 11:27  Verapamil (5mg/2ml), 2 5 mg, IV, at 11:28  Nitroglycerin (200mcg/ml), 200 mcg, at 11:28  Heparin 1000 units/ml, 4,000 units, IV, at 11:31  Ticagrelor, 180 mg, PO, at 11:33  Versed (2mg/2ml), 1 mg, IV, at 11:41  Fentanyl (1OOmcg/2 ml), 50  mcg, IV, at 11:41  Nitroglycerine (200mcg/ml), 200 mcg, at 11:42  Heparin 1000 units/ml, 4,000 units, IV, at 11:46  Versed (2mg/2ml), 1 mg, IV, at 12:03  Fentanyl (1OOmcg/2 ml), 50 mcg, IV, at 12:04  CONTRAST GIVEN: 100 ml Omnipaque (350mg  I /ml)  36 ml Omnipaque (350mg I/ml)  10 ml Omnipaque (350mg I /ml)  RADIATION EXPOSURE: Fluoroscopy time: 8 18 min  HEMODYNAMICS: Hemodynamic assessment demonstrated normal LVEDP  VENTRICLES:   --  Global left ventricular function was moderately depressed  EF calculated by contrast ventriculography was 35 %  VALVES:  AORTIC VALVE:   --  There was no aortic stenosis  MITRAL VALVE:   --  The mitral valve exhibited no regurgitation  CORONARY VESSELS:   --  The coronary circulation is right dominant  --  Left main: The vessel was medium to large sized  Angiography showed minor luminal irregularities  --  LAD: The vessel was large sized and mildly calcified  Angiography showed the vessel to wrap around the cardiac apex and moderate atherosclerosis  There was one major diagonal branch  --  Proximal LAD: There was a 40 % stenosis  --  Mid LAD: There was a diffuse 60 % stenosis  This is a likely culprit for the patient's clinical presentation  An intervention was performed  Positive iFR 0 85  --  Circumflex: The vessel was medium sized  There were two major obtuse marginals  --  1st obtuse marginal: There was a 50 % stenosis  It appears amenable to percutaneous intervention  Negative iFR  --  RCA: The vessel was medium sized and moderately tortuous  Angiography showed mild atherosclerosis    --  Mid RCA: There was a discrete 40 % stenosis  IMPRESSIONS:  There is significant double vessel coronary artery disease  RECOMMENDATIONS  The patient should continue with the present medications  DISPOSITION:  The patient left the catheterization laboratory in stable condition  Prepared and signed by    Riley Moreno MD  Signed 2020 12:28:47    Study diagram    Angiographic findings  Native coronary lesions:  ï¾·Proximal LAD: Lesion 1: 40 % stenosis  ï¾·Mid LAD: Lesion 1: diffuse, 60 % stenosis  ï¾·OM1: Lesion 1: 50 % stenosis  ï¾·Mid RCA: Lesion 1: discrete, 40 % stenosis  Intervention results  Native coronary lesions:  ï¾·Successful drug-eluting stent and balloon angioplasty of the 60 % stenosis in mid LAD  Appearance excellent with 0 % residual stenosis  Stent: Resolute [place] Rx 2 5 x 22mm drug-eluting  Stent: Resolute [place] Rx 3 0 x 26mm drug-eluting  Stent: Resolute Morro Rx 3 5 x 12mm drug-eluting  Hemodynamic tables    Pressures:  Baseline  Pressures:  - HR: 86  Pressures:  - Rhythm:  Pressures:  -- Aortic Pressure (S/D/M): 119/78/70  Pressures:  -- Left Ventricle (s/edp): 128/20/--    Outputs:  Baseline  Outputs:  -- CALCULATIONS: [age]  Outputs:  -- CALCULATIONS: Body Surface Area: 1 71  Outputs:  -- CALCULATIONS: Height in cm: 165 00  Outputs:  -- CALCULATIONS: Sex: Female  Outputs:  -- CALCULATIONS: Weight in k 50       --------------------------------------------------------------------------------  ECHO:   Results for orders placed during the hospital encounter of 20   Echo complete with contrast if indicated    Narrative [address] [place] [address]  [place] [place]     [facility] [address] [facility]  [phone]    Transthoracic Echocardiogram  2D, M-mode, Doppler, and Color Doppler    Study date:  2020    Patient: Carri Velarde  MR number: [id number]  Account number: [de-identified]  : 1965  Age: 48 years  Gender: Female  Status: Inpatient  Location: Bedside  Height: 65 in  Weight: 145 6 lb  BP: 130/ 109 mmHg    Indications: Heart failure  New bundle branch block  Diagnoses: I50 9 - Heart failure, unspecified    Sonographer:  CARLTON Denton  Primary Physician:  Joanie Mortensen MD  Referring Physician:  Verna Ramesh DO  Group:  [facility]  Interpreting Physician:  Verna Ramesh DO    SUMMARY    SUMMARY:  This is a technically adequate study  1  Left ventricle is moderately dilated with severely reduced systolic function  Left ventricular ejection fraction is estimated at 29%  2  Mild concentric left ventricular hypertrophy  3  Grade 1 diastolic dysfunction is present  4  Severe global hypokinesis with wall motion consistent with conduction abnormality  5  Left atrium is moderately dilated and right atrium is mildly dilated  6  Aortic valve sclerotic with adequate separation  Mild aortic regurgitation  7  Mild mitral annular calcification with mild mitral regurgitation  8  Pulmonary artery systolic pressures cannot be estimated due to lack of tricuspid regurgitation jet  9  Small pericardial effusion without echocardiographic indications of tamponade physiology    SUMMARY MEASUREMENTS  2D measurements:  Unspecified Anatomy:   %FS was 10 7 %  EDV(Teich) was 171 6 ml   EF Biplane was 28 7 %  EF(Teich) was 23 %  ESV(Teich) was 132 1 ml  IVSd was 1 cm  LAAs A2C was 24 2 cm2  LAAs A4C was 21 cm2  LAESV A-L A2C was 90 3 ml  LAESV A-L  A4C was 63 ml  LAESV Index (A-L) was 45 4 ml/m2  LAESV MOD A2C was 87 5 ml  LAESV MOD A4C was 58 1 ml  LAESV(A-L) was 78 5 ml  LAESV(MOD BP) was 74 ml  LAESVInd MOD BP was 42 8 ml/m2  LALs A2C was 5 5 cm  LALs A4C was 6 cm  LVEDV  MOD A2C was 155 1 ml  LVEDV MOD A4C was 149 9 ml  LVEDV MOD BP was 153 3 ml  LVEDVInd MOD BP was 88 6 ml/m2  LVEF MOD A2C was 30 6 %  LVEF MOD A4C was 27 9 %  LVESV MOD A2C was 107 7 ml  LVESV MOD A4C was 108 ml  LVESV MOD BP was  109 3 ml    LVESVInd MOD BP was 63 2 ml/m2  LVIDd was 5 9 cm  LVIDs was 5 2 cm  LVLd A2C was 9 7 cm  LVLd A4C was 9 6 cm  LVLs A2C was 9 2 cm  LVLs A4C was 8 9 cm  LVPWd was 1 2 cm  Lorrie A4C was 16 4 cm2  RAEDV A-L was 47 5 ml  RAEDV MOD was 46 7 ml  RALd was 4 8 cm  RVIDd was 3 8 cm  RWT was 0 4   SV MOD A2C was 47 4 ml   SV MOD A4C was 41 8 ml   SV(Teich) was 39 5 ml   CW measurements:  Unspecified Anatomy:   AV Env  Ti was 234 4 ms   AV VTI was 27 4 cm  AV Vmax was 1 5 m/s  AV Vmean was 1 2 m/s  AV maxPG was 8 5 mmHg  AV meanPG was 5 9 mmHg  MM measurements:  Unspecified Anatomy:   TAPSE was 3 3 cm  PW measurements:  Unspecified Anatomy:   DVI was 0 6   LVOT Env  Ti was 238 5 ms  LVOT VTI was 15 5 cm  LVOT Vmax was 0 9 m/s  LVOT Vmean was 0 7 m/s  LVOT maxPG was 3 3 mmHg  LVOT meanPG was 1 9 mmHg  HISTORY: PRIOR HISTORY: hypothyroid  PROCEDURE: The procedure was performed at the bedside  This was a routine study  The transthoracic approach was used  The study included complete 2D imaging, M-mode, complete spectral Doppler, and color Doppler  The heart rate was 108 bpm,  at the start of the study  Image quality was adequate  LEFT VENTRICLE: Left ventricle is moderately dilated with severely reduced systolic function  Left ventricular ejection fraction is estimated at 29%  There is mild concentric left ventricular hypertrophy  Grade 1 diastolic dysfunction  There is severe global hypokinesis with wall motion consistent with conduction abnormality  RIGHT VENTRICLE: Right ventricle is normal size and function  LEFT ATRIUM: Left atrium is moderately dilated  ATRIAL SEPTUM: Interatrial septum is bowing toward the right atrium consistent with elevated left atrial pressures  RIGHT ATRIUM: Right atrium is mildly dilated  MITRAL VALVE: Mitral valve opens well  There is mild mitral annular calcification posteriorly  Mild mitral regurgitation      AORTIC VALVE: Aortic valve is minimally sclerotic with adequate separation  There is no evidence of aortic stenosis  Mild aortic regurgitation  LVOT diameter 2 cm, LVOT VTI 15 5, stroke volume 48 9 mL    TRICUSPID VALVE: Tricuspid valve opens well  There is no evidence of tricuspid regurgitation  Pulmonary artery systolic pressures cannot be estimated due to lack of tricuspid regurgitation jet  PULMONIC VALVE: Pulmonic valve not seen on the study  PERICARDIUM: There is a small pericardial effusion without echocardiographic indications of tamponade physiology  AORTA: The aortic root is normal in size at 3 3 cm  SYSTEMIC VEINS: IVC: The IVC is normal size with collapse  SYSTEM MEASUREMENT TABLES    2D  %FS: 10 7 %  EDV(Teich): 171 6 ml  EF Biplane: 28 7 %  EF(Teich): 23 %  ESV(Teich): 132 1 ml  IVSd: 1 cm  LAAs A2C: 24 2 cm2  LAAs A4C: 21 cm2  LAESV A-L A2C: 90 3 ml  LAESV A-L A4C: 63 ml  LAESV Index (A-L): 45 4 ml/m2  LAESV MOD A2C: 87 5 ml  LAESV MOD A4C: 58 1 ml  LAESV(A-L): 78 5 ml  LAESV(MOD BP): 74 ml  LAESVInd MOD BP: 42 8 ml/m2  LALs A2C: 5 5 cm  LALs A4C: 6 cm  LVEDV MOD A2C: 155 1 ml  LVEDV MOD A4C: 149 9 ml  LVEDV MOD BP: 153 3 ml  LVEDVInd MOD BP: 88 6 ml/m2  LVEF MOD A2C: 30 6 %  LVEF MOD A4C: 27 9 %  LVESV MOD A2C: 107 7 ml  LVESV MOD A4C: 108 ml  LVESV MOD BP: 109 3 ml  LVESVInd MOD BP: 63 2 ml/m2  LVIDd: 5 9 cm  LVIDs: 5 2 cm  LVLd A2C: 9 7 cm  LVLd A4C: 9 6 cm  LVLs A2C: 9 2 cm  LVLs A4C: 8 9 cm  LVPWd: 1 2 cm  Lorrie A4C: 16 4 cm2  RAEDV A-L: 47 5 ml  RAEDV MOD: 46 7 ml  RALd: 4 8 cm  RVIDd: 3 8 cm  RWT: 0 4  SV MOD A2C: 47 4 ml  SV MOD A4C: 41 8 ml  SV(Teich): 39 5 ml    CW  AV Env  Ti: 234 4 ms  AV VTI: 27 4 cm  AV Vmax: 1 5 m/s  AV Vmean: 1 2 m/s  AV maxP 5 mmHg  AV meanP 9 mmHg    MM  TAPSE: 3 3 cm    PW  DVI: 0 6  LVOT Env  Ti: 238 5 ms  LVOT VTI: 15 5 cm  LVOT Vmax: 0 9 m/s  LVOT Vmean: 0 7 m/s  LVOT maxPG: 3 3 mmHg  LVOT meanP 9 mmHg    [facility] Acc[facility]    Prepared and electronically signed by    Timothy Harding DO  Signed 13-Jun-2020 15:04:09       LVEF 38% with inferior hypokinesis, diastolic dysfunction, mild LA dilatation, AV sclerosis, mild AR, moderate MR, mild TR, trace to small circumferential pericardial effusion, July 2021  --------------------------------------------------------------------------------  HOLTER  No results found for this or any previous visit  No results found for this or any previous visit   --------------------------------------------------------------------------------  CAROTIDS  No results found for this or any previous visit    --------------------------------------------------------------------------------  ECGs:  Results for orders placed or performed in visit on 09/13/21   POCT ECG    Impression    Sinus rhythm 85 beats per minute left atrial abnormality voltage criteria for LVH with nonspecific ST T wave abnormalities, prolonged  ms        Lab Results   Component Value Date    WBC 11 10 (H) 09/01/2021    HGB 8 9 (L) 09/01/2021    HCT 27 1 (L) 09/01/2021    MCV 82 09/01/2021     09/01/2021      Lab Results   Component Value Date    SODIUM 144 09/04/2021    K 4 3 09/04/2021     09/04/2021    CO2 23 09/04/2021    BUN 29 (H) 09/04/2021    CREATININE 2 19 (H) 09/04/2021    GLUC 89 09/04/2021    CALCIUM 6 3 (L) 09/04/2021      Lab Results   Component Value Date    HGBA1C 6 0 (H) 06/13/2020      No results found for: CHOL  Lab Results   Component Value Date    HDL 61 07/15/2021    HDL 52 06/13/2020     Lab Results   Component Value Date    LDLCALC 87 07/15/2021    LDLCALC 97 06/13/2020     Lab Results   Component Value Date    TRIG 93 07/15/2021    TRIG 89 06/13/2020     No results found for: CHOLHDL   Lab Results   Component Value Date    INR 1 07 07/11/2021    INR 1 05 06/19/2020    PROTIME 13 7 07/11/2021    PROTIME 13 8 06/19/2020        1   Coronary artery disease involving native coronary artery of native heart without angina pectoris    2  Chronic systolic [facility] )    3  Ischemic cardiomyopathy    4  MARINA (acute kidney injury) ([facility] )    5  LBBB (left bundle branch block)    6  NSVT (nonsustained ventricular tachycardia) ([facility])  -     POCT ECG    7  Prolonged Q-T interval on ECG  -     POCT ECG; Future; Expected date: 09/20/2021  -     Basic metabolic panel  -     Magnesium        IMPRESSION:  · Acute kidney injury   · Chronic systolic heart failure (dry weight 132 lbs+/- 3 lbs)  · CAD s/p cath w/ SEJAL-OM1, patent SEJAL-LAD x 3 (6/2020), with residual 50% pLAD, 40% mRCA, luminal and irregularities of LM, July 15, 2021  · LVEF 50%, mild LVH, grade 1 diastolic dysfunction, mild AR, trace TR, August 2021  · Abnormal pharmacologic nuclear stress test with fixed inferior and apical defect consistent with infarct, gated EF 28%, July 2021  · Left bundle branch block - rate related  · History of ischemic and non-ischemic cardiomyopathy  · Anemia  · Goiter s/p thyroidectomy with hypothyroidism  · History of syncope secondary orthostasis/volume depletion  · History of myopericarditis    PLAN:  It was a pleasure to see Ghassan Brennan in the office today for follow-up CV evaluation  She is here today in follow-up after her recent hospitalization with acute kidney injury  Creatinine has improved down to 2  19  The patient has creatinine is that have been up and down between the high 1s and low 2s range in the past   She plans to follow-up with Nephrology in the near future  She has no symptoms concerning for angina and no signs or symptoms of heart failure  She examines to be euvolemic in the office today  Echocardiogram in July 2021 demonstrated normal left ventricular function with only mild valvular disease  She has been doing well since her stent placement in July 2021  ECG demonstrates no significant ST T-wave changes compared to prior  Blood pressure is mildly elevated in the office today    She has been taking her medications without any reported adverse effects  Patient was discharge off of diuretic medication  So far, weights have been stable  She feels the best she has in some time  Based on her clinical presentation, I have the following recommendations:    1  Recommend aggressive risk factor and lifestyle modifications  2  We encouraged cardiovascular rehabilitation as previously prescribed  3  Minimum 1 year dual anti-platelet therapy with aspirin and Brilinta  Risks and benefits of noncompliance discussed  At 1 year, would transition to either Brilinta 60 mg b i d  Or Xarelto 2 5 mg b i d   4  Will hold on diuretics for now  Patient understands to check her weights  Should she gain greater than 3 lb in a day or 5 lb overall, recommend she call our office or Nephrology for further titration of diuretic medication  5  Continue high-intensity statin  6  Increase metoprolol to 50 mg b i d  for both blood pressure control, QT interval and cardioprotective effect  Repeat ECG in week  Check repeat BMP and magnesium today  7  Encouraged follow-up with Nephrology as scheduled  8  Repeat echocardiogram in 3-5 years to reassess aortic regurgitation  9  Recommend heart healthy diet low in sodium  10  We will follow up with her in 3 months  As always, please do not hesitate to call with any questions  Portions of the record may have been created with voice recognition software  Occasional wrong word or "sound a like" substitutions may have occurred due to the inherent limitations of voice recognition software  Read the chart carefully and recognize, using context, where substitutions have occurred        Signed: Lillian Miramontes DO, Marsha Burke

## 2021-09-14 DIAGNOSIS — E89.0 POSTOPERATIVE HYPOTHYROIDISM: ICD-10-CM

## 2021-09-14 RX ORDER — LEVOTHYROXINE SODIUM 0.1 MG/1
100 TABLET ORAL
Qty: 90 TABLET | Refills: 1 | Status: SHIPPED | OUTPATIENT
Start: 2021-09-14 | End: 2021-10-14

## 2021-09-17 NOTE — PROGRESS NOTES
[facility]      Dear Dr Hesham Beckham    Thank you for referring your patient to our Cardiac  rehabilitation program  The patient has completed 13  visits  However, rehab is being discontinued at this time due to: Voluntary Dropout:  The patient has chosen to quit due to :  Returning to work  She was given information about joining [employer] and encouraged to continue exercise >150 min/week at moderate intensity  She was also encouraged to continue with her prescribed medications and a heart healthy diet  Please contact us at [phone] if you have any questions about this patents case  Thank you for your continued support of cardiac rehabilitation       Sincerely,    Stephanie Harding, MS, CEP

## 2021-09-27 ENCOUNTER — TELEPHONE (OUTPATIENT)
Dept: OTHER | Facility: OTHER | Age: 56
End: 2021-09-27

## 2021-11-23 DIAGNOSIS — I50.21 ACUTE SYSTOLIC CONGESTIVE HEART FAILURE (HCC): ICD-10-CM

## 2021-11-23 RX ORDER — METOPROLOL SUCCINATE 50 MG/1
TABLET, EXTENDED RELEASE ORAL
Qty: 90 TABLET | Refills: 0 | Status: SHIPPED | OUTPATIENT
Start: 2021-11-23

## 2021-12-30 ENCOUNTER — TELEPHONE (OUTPATIENT)
Dept: FAMILY MEDICINE CLINIC | Facility: CLINIC | Age: 56
End: 2021-12-30

## 2021-12-30 DIAGNOSIS — I25.5 ISCHEMIC CARDIOMYOPATHY: Primary | ICD-10-CM

## 2021-12-30 RX ORDER — FUROSEMIDE 40 MG/1
40 TABLET ORAL 2 TIMES DAILY
Qty: 30 TABLET | Refills: 2 | Status: SHIPPED | OUTPATIENT
Start: 2021-12-30 | End: 2022-03-18

## 2022-01-06 DIAGNOSIS — I25.5 ISCHEMIC CARDIOMYOPATHY: ICD-10-CM

## 2022-01-06 RX ORDER — FUROSEMIDE 40 MG/1
TABLET ORAL
Qty: 30 TABLET | Refills: 2 | OUTPATIENT
Start: 2022-01-06

## 2022-01-06 NOTE — TELEPHONE ENCOUNTER
I called and lmom for pt that she has been asked previously as well but a visit is needed before we will fill her medications  She may call her Cardiologist and ask if they can fill the Lasix as they have seen the patient in the last few months

## 2022-03-18 DIAGNOSIS — I25.5 ISCHEMIC CARDIOMYOPATHY: ICD-10-CM

## 2022-03-18 RX ORDER — FUROSEMIDE 40 MG/1
TABLET ORAL
Qty: 30 TABLET | Refills: 2 | Status: SHIPPED | OUTPATIENT
Start: 2022-03-18 | End: 2022-04-04

## 2022-03-18 NOTE — TELEPHONE ENCOUNTER
Requested Prescriptions     Pending Prescriptions Disp Refills    furosemide (LASIX) 40 mg tablet [Pharmacy Med Name: FUROSEMIDE 40 MG TABLET] 30 tablet 2     Sig: TAKE 1 TABLET BY MOUTH TWICE A DAY     LOV 5/7/21, F/U non scheduled, labs pending

## 2023-01-04 ENCOUNTER — TRANSITIONAL CARE MANAGEMENT (OUTPATIENT)
Dept: FAMILY MEDICINE CLINIC | Facility: CLINIC | Age: 58
End: 2023-01-04

## 2023-04-19 NOTE — PLAN OF CARE
Problem: Potential for Falls  Goal: Patient will remain free of falls  Description: INTERVENTIONS:  - Educate patient/family on patient safety including physical limitations  - Instruct patient to call for assistance with activity   - Consult OT/PT to assist with strengthening/mobility   - Keep Call bell within reach  - Keep bed low and locked with side rails adjusted as appropriate  - Keep care items and personal belongings within reach  - Initiate and maintain comfort rounds  - Make Fall Risk Sign visible to staff  - Offer Toileting every 2 Hours, in advance of need  - Initiate/Maintain bed alarm  - Obtain necessary fall risk management equipment:  - Apply yellow socks and bracelet for high fall risk patients  - Consider moving patient to room near nurses station  Outcome: Progressing     Problem: Nutrition/Hydration-ADULT  Goal: Nutrient/Hydration intake appropriate for improving, restoring or maintaining nutritional needs  Description: Monitor and assess patient's nutrition/hydration status for malnutrition  Collaborate with interdisciplinary team and initiate plan and interventions as ordered  Monitor patient's weight and dietary intake as ordered or per policy  Utilize nutrition screening tool and intervene as necessary  Determine patient's food preferences and provide high-protein, high-caloric foods as appropriate       INTERVENTIONS:  - Monitor oral intake, urinary output, labs, and treatment plans  - Assess nutrition and hydration status and recommend course of action  - Evaluate amount of meals eaten  - Assist patient with eating if necessary   - Allow adequate time for meals  - Recommend/ encourage appropriate diets, oral nutritional supplements, and vitamin/mineral supplements  - Order, calculate, and assess calorie counts as needed  - Recommend, monitor, and adjust tube feedings and TPN/PPN based on assessed needs  - Assess need for intravenous fluids  - Provide specific nutrition/hydration education as appropriate  - Include patient/family/caregiver in decisions related to nutrition  Outcome: Progressing     Problem: CARDIOVASCULAR - ADULT  Goal: Maintains optimal cardiac output and hemodynamic stability  Description: INTERVENTIONS:  - Monitor I/O, vital signs and rhythm  - Monitor for S/S and trends of decreased cardiac output  - Administer and titrate ordered vasoactive medications to optimize hemodynamic stability  - Assess quality of pulses, skin color and temperature  - Assess for signs of decreased coronary artery perfusion  - Instruct patient to report change in severity of symptoms  Outcome: Progressing  Goal: Absence of cardiac dysrhythmias or at baseline rhythm  Description: INTERVENTIONS:  - Continuous cardiac monitoring, vital signs, obtain 12 lead EKG if ordered  - Administer antiarrhythmic and heart rate control medications as ordered  - Monitor electrolytes and administer replacement therapy as ordered  Outcome: Progressing     Problem: HEMATOLOGIC - ADULT  Goal: Maintains hematologic stability  Description: INTERVENTIONS  - Assess for signs and symptoms of bleeding or hemorrhage  - Monitor labs  - Administer supportive blood products/factors as ordered and appropriate  Outcome: Progressing Instructions: This plan will send the code FBSE to the PM system.  DO NOT or CHANGE the price. Detail Level: Simple Price (Do Not Change): 0.00

## (undated) DEVICE — PADDING CAST 4 IN  COTTON STRL

## (undated) DEVICE — PENCIL ELECTROSURG E-Z CLEAN -0035H

## (undated) DEVICE — DRAPE SHEET THREE QUARTER

## (undated) DEVICE — SUT MONOCRYL 4-0 PS-2 27 IN Y426H

## (undated) DEVICE — OCCLUSIVE GAUZE STRIP,3% BISMUTH TRIBROMOPHENATE IN PETROLATUM BLEND: Brand: XEROFORM

## (undated) DEVICE — STERILE POLYISOPRENE POWDER-FREE SURGICAL GLOVES: Brand: PROTEXIS

## (undated) DEVICE — PAD CAST 4 IN COTTON NON STERILE

## (undated) DEVICE — 3M™ STERI-STRIP™ REINFORCED ADHESIVE SKIN CLOSURES, R1547, 1/2 IN X 4 IN (12 MM X 100 MM), 6 STRIPS/ENVELOPE: Brand: 3M™ STERI-STRIP™

## (undated) DEVICE — BETHLEHEM UNIVERSAL  MIONR EXT: Brand: CARDINAL HEALTH

## (undated) DEVICE — 1.6 K-WIRE, TROCAR, 150MM, 1/PKG
Type: IMPLANTABLE DEVICE | Site: WRIST | Status: NON-FUNCTIONAL
Brand: APTUS
Removed: 2020-06-03

## (undated) DEVICE — SPLINT PLASTER 4 IN X 15 IN

## (undated) DEVICE — STERILE POLYISOPRENE POWDER-FREE SURGICAL GLOVES WITH EMOLLIENT COATING: Brand: PROTEXIS

## (undated) DEVICE — Device

## (undated) DEVICE — 2.5 CORTICAL SCREW 24MM, HD7, 5/PKG
Type: IMPLANTABLE DEVICE | Site: WRIST | Status: NON-FUNCTIONAL
Brand: APTUS

## (undated) DEVICE — PAD GROUNDING ADULT

## (undated) DEVICE — CURITY NON-ADHERENT STRIPS: Brand: CURITY

## (undated) DEVICE — ACE WRAP 4 IN STERILE

## (undated) DEVICE — ACE WRAP 4 IN UNSTERILE

## (undated) DEVICE — INTENDED FOR TISSUE SEPARATION, AND OTHER PROCEDURES THAT REQUIRE A SHARP SURGICAL BLADE TO PUNCTURE OR CUT.: Brand: BARD-PARKER ® SAFETYLOCK CARBON RIB-BACK BLADES

## (undated) DEVICE — STANDARD SURGICAL GOWN, L: Brand: CONVERTORS

## (undated) DEVICE — DRAPE C-ARM X-RAY

## (undated) DEVICE — TWIST DRILL Ø2.0MMX40MM,L91MM,SCALED,AO
Type: IMPLANTABLE DEVICE | Site: WRIST | Status: NON-FUNCTIONAL
Brand: APTUS
Removed: 2020-06-03

## (undated) DEVICE — TUBING SUCTION 5MM X 12 FT

## (undated) DEVICE — CHLORAPREP HI-LITE 26ML ORANGE

## (undated) DEVICE — SINGLE PORT MANIFOLD: Brand: NEPTUNE 2

## (undated) DEVICE — CUFF TOURNIQUET DISP SZ18

## (undated) DEVICE — GAUZE SPONGES,16 PLY: Brand: CURITY

## (undated) DEVICE — SCD SEQUENTIAL COMPRESSION COMFORT SLEEVE MEDIUM KNEE LENGTH: Brand: KENDALL SCD

## (undated) DEVICE — BUCKET PLASTER DISPOSABLE